# Patient Record
Sex: FEMALE | Race: WHITE | Employment: OTHER | URBAN - NONMETROPOLITAN AREA
[De-identification: names, ages, dates, MRNs, and addresses within clinical notes are randomized per-mention and may not be internally consistent; named-entity substitution may affect disease eponyms.]

---

## 2017-01-10 DIAGNOSIS — I10 ESSENTIAL HYPERTENSION: ICD-10-CM

## 2017-01-10 DIAGNOSIS — Z13.820 OSTEOPOROSIS SCREENING: ICD-10-CM

## 2017-01-10 DIAGNOSIS — E78.49 OTHER HYPERLIPIDEMIA: ICD-10-CM

## 2017-01-10 LAB
ALBUMIN SERPL-MCNC: 4.1 G/DL (ref 3.5–5.2)
ALP BLD-CCNC: 77 U/L (ref 35–104)
ALT SERPL-CCNC: 13 U/L (ref 5–33)
ANION GAP SERPL CALCULATED.3IONS-SCNC: 15 MMOL/L (ref 7–19)
AST SERPL-CCNC: 16 U/L (ref 5–32)
BILIRUB SERPL-MCNC: 0.3 MG/DL (ref 0.2–1.2)
BUN BLDV-MCNC: 11 MG/DL (ref 8–23)
CALCIUM SERPL-MCNC: 9.2 MG/DL (ref 8.8–10.2)
CHLORIDE BLD-SCNC: 93 MMOL/L (ref 98–111)
CHOLESTEROL, TOTAL: 204 MG/DL (ref 160–199)
CO2: 24 MMOL/L (ref 22–29)
CREAT SERPL-MCNC: 0.7 MG/DL (ref 0.5–0.9)
GFR NON-AFRICAN AMERICAN: >60
GLOBULIN: 3.3 G/DL
GLUCOSE BLD-MCNC: 100 MG/DL (ref 74–109)
HCT VFR BLD CALC: 40.9 % (ref 37–47)
HDLC SERPL-MCNC: 48 MG/DL (ref 65–121)
HEMOGLOBIN: 13.6 G/DL (ref 12–16)
LDL CHOLESTEROL CALCULATED: 130 MG/DL
MCH RBC QN AUTO: 28.3 PG (ref 27–31)
MCHC RBC AUTO-ENTMCNC: 33.3 G/DL (ref 33–37)
MCV RBC AUTO: 85 FL (ref 81–99)
PDW BLD-RTO: 13.6 % (ref 11.5–14.5)
PLATELET # BLD: 411 K/UL (ref 130–400)
PMV BLD AUTO: 9.8 FL (ref 7.4–10.4)
POTASSIUM SERPL-SCNC: 4.3 MMOL/L (ref 3.5–5)
RBC # BLD: 4.81 M/UL (ref 4.2–5.4)
SODIUM BLD-SCNC: 132 MMOL/L (ref 136–145)
TOTAL PROTEIN: 7.4 G/DL (ref 6.6–8.7)
TRIGL SERPL-MCNC: 129 MG/DL (ref 150–199)
WBC # BLD: 6.7 K/UL (ref 4.8–10.8)

## 2017-01-12 ENCOUNTER — TELEPHONE (OUTPATIENT)
Dept: PRIMARY CARE CLINIC | Age: 77
End: 2017-01-12

## 2017-02-14 RX ORDER — DIPHENOXYLATE HYDROCHLORIDE AND ATROPINE SULFATE 2.5; .025 MG/1; MG/1
1 TABLET ORAL 4 TIMES DAILY
Qty: 120 TABLET | Refills: 0 | Status: SHIPPED | OUTPATIENT
Start: 2017-02-14 | End: 2017-07-17 | Stop reason: SDUPTHER

## 2017-03-15 DIAGNOSIS — F32.A ANXIETY AND DEPRESSION: ICD-10-CM

## 2017-03-15 DIAGNOSIS — F41.9 ANXIETY AND DEPRESSION: ICD-10-CM

## 2017-03-15 RX ORDER — MONTELUKAST SODIUM 10 MG/1
10 TABLET ORAL NIGHTLY
Qty: 90 TABLET | Refills: 3 | Status: SHIPPED | OUTPATIENT
Start: 2017-03-15 | End: 2018-05-02 | Stop reason: SDUPTHER

## 2017-03-15 RX ORDER — OMEPRAZOLE 20 MG/1
20 CAPSULE, DELAYED RELEASE ORAL DAILY
Qty: 90 CAPSULE | Refills: 3 | Status: SHIPPED | OUTPATIENT
Start: 2017-03-15 | End: 2017-05-28 | Stop reason: SDUPTHER

## 2017-03-15 RX ORDER — TOLTERODINE 4 MG/1
CAPSULE, EXTENDED RELEASE ORAL
Qty: 90 CAPSULE | Refills: 3 | Status: SHIPPED | OUTPATIENT
Start: 2017-03-15 | End: 2017-11-22 | Stop reason: ALTCHOICE

## 2017-03-15 RX ORDER — POTASSIUM CHLORIDE 750 MG/1
10 TABLET, EXTENDED RELEASE ORAL DAILY
Qty: 90 TABLET | Refills: 3 | Status: SHIPPED | OUTPATIENT
Start: 2017-03-15 | End: 2017-10-13 | Stop reason: SDUPTHER

## 2017-03-15 RX ORDER — ESTRADIOL 1 MG/1
1 TABLET ORAL DAILY
Qty: 90 TABLET | Refills: 3 | Status: SHIPPED | OUTPATIENT
Start: 2017-03-15 | End: 2017-05-28 | Stop reason: SDUPTHER

## 2017-03-15 RX ORDER — VENLAFAXINE HYDROCHLORIDE 150 MG/1
150 CAPSULE, EXTENDED RELEASE ORAL DAILY
Qty: 90 CAPSULE | Refills: 3 | Status: SHIPPED | OUTPATIENT
Start: 2017-03-15 | End: 2018-05-02 | Stop reason: SDUPTHER

## 2017-03-29 ENCOUNTER — OFFICE VISIT (OUTPATIENT)
Dept: PRIMARY CARE CLINIC | Age: 77
End: 2017-03-29
Payer: MEDICARE

## 2017-03-29 VITALS
OXYGEN SATURATION: 98 % | BODY MASS INDEX: 29.41 KG/M2 | HEART RATE: 81 BPM | WEIGHT: 149.8 LBS | SYSTOLIC BLOOD PRESSURE: 130 MMHG | HEIGHT: 60 IN | RESPIRATION RATE: 19 BRPM | DIASTOLIC BLOOD PRESSURE: 71 MMHG | TEMPERATURE: 97.4 F

## 2017-03-29 DIAGNOSIS — M54.40 CHRONIC MIDLINE LOW BACK PAIN WITH SCIATICA, SCIATICA LATERALITY UNSPECIFIED: ICD-10-CM

## 2017-03-29 DIAGNOSIS — G89.29 CHRONIC MIDLINE LOW BACK PAIN WITH SCIATICA, SCIATICA LATERALITY UNSPECIFIED: ICD-10-CM

## 2017-03-29 DIAGNOSIS — M25.531 RIGHT WRIST PAIN: Primary | ICD-10-CM

## 2017-03-29 PROCEDURE — G8420 CALC BMI NORM PARAMETERS: HCPCS | Performed by: NURSE PRACTITIONER

## 2017-03-29 PROCEDURE — G8482 FLU IMMUNIZE ORDER/ADMIN: HCPCS | Performed by: NURSE PRACTITIONER

## 2017-03-29 PROCEDURE — 4040F PNEUMOC VAC/ADMIN/RCVD: CPT | Performed by: NURSE PRACTITIONER

## 2017-03-29 PROCEDURE — 3017F COLORECTAL CA SCREEN DOC REV: CPT | Performed by: NURSE PRACTITIONER

## 2017-03-29 PROCEDURE — 4004F PT TOBACCO SCREEN RCVD TLK: CPT | Performed by: NURSE PRACTITIONER

## 2017-03-29 PROCEDURE — G8427 DOCREV CUR MEDS BY ELIG CLIN: HCPCS | Performed by: NURSE PRACTITIONER

## 2017-03-29 PROCEDURE — G8400 PT W/DXA NO RESULTS DOC: HCPCS | Performed by: NURSE PRACTITIONER

## 2017-03-29 PROCEDURE — 1123F ACP DISCUSS/DSCN MKR DOCD: CPT | Performed by: NURSE PRACTITIONER

## 2017-03-29 PROCEDURE — 1090F PRES/ABSN URINE INCON ASSESS: CPT | Performed by: NURSE PRACTITIONER

## 2017-03-29 PROCEDURE — 99213 OFFICE O/P EST LOW 20 MIN: CPT | Performed by: NURSE PRACTITIONER

## 2017-03-29 ASSESSMENT — ENCOUNTER SYMPTOMS
RESPIRATORY NEGATIVE: 1
EYES NEGATIVE: 1
GASTROINTESTINAL NEGATIVE: 1
ALLERGIC/IMMUNOLOGIC NEGATIVE: 1
BACK PAIN: 1

## 2017-04-10 ENCOUNTER — TELEPHONE (OUTPATIENT)
Dept: PRIMARY CARE CLINIC | Age: 77
End: 2017-04-10

## 2017-04-10 RX ORDER — METHYLPREDNISOLONE 4 MG/1
TABLET ORAL
Qty: 1 KIT | Refills: 0 | Status: SHIPPED | OUTPATIENT
Start: 2017-04-10 | End: 2019-12-27 | Stop reason: SDUPTHER

## 2017-04-11 ENCOUNTER — TELEPHONE (OUTPATIENT)
Dept: PRIMARY CARE CLINIC | Age: 77
End: 2017-04-11

## 2017-04-11 RX ORDER — AZITHROMYCIN 250 MG/1
TABLET, FILM COATED ORAL
Qty: 1 PACKET | Refills: 0 | Status: SHIPPED | OUTPATIENT
Start: 2017-04-11 | End: 2017-04-21

## 2017-04-17 DIAGNOSIS — H10.9 CONJUNCTIVITIS OF BOTH EYES, UNSPECIFIED CONJUNCTIVITIS TYPE: Primary | ICD-10-CM

## 2017-04-17 RX ORDER — TOBRAMYCIN 3 MG/ML
1-2 SOLUTION/ DROPS OPHTHALMIC EVERY 4 HOURS
Qty: 5 ML | Refills: 0 | Status: SHIPPED | OUTPATIENT
Start: 2017-04-17 | End: 2017-04-27

## 2017-05-11 ENCOUNTER — TELEPHONE (OUTPATIENT)
Dept: PRIMARY CARE CLINIC | Age: 77
End: 2017-05-11

## 2017-05-11 RX ORDER — FLUCONAZOLE 150 MG/1
150 TABLET ORAL DAILY
Qty: 3 TABLET | Refills: 0 | Status: SHIPPED | OUTPATIENT
Start: 2017-05-11 | End: 2017-05-14

## 2017-07-13 ENCOUNTER — APPOINTMENT (OUTPATIENT)
Dept: GENERAL RADIOLOGY | Age: 77
End: 2017-07-13
Payer: MEDICARE

## 2017-07-13 ENCOUNTER — OFFICE VISIT (OUTPATIENT)
Dept: PRIMARY CARE CLINIC | Age: 77
End: 2017-07-13

## 2017-07-13 ENCOUNTER — HOSPITAL ENCOUNTER (EMERGENCY)
Age: 77
Discharge: HOME | End: 2017-07-13
Payer: MEDICARE

## 2017-07-13 VITALS
SYSTOLIC BLOOD PRESSURE: 155 MMHG | RESPIRATION RATE: 18 BRPM | HEIGHT: 61 IN | TEMPERATURE: 97.7 F | BODY MASS INDEX: 27.38 KG/M2 | OXYGEN SATURATION: 92 % | WEIGHT: 145 LBS | DIASTOLIC BLOOD PRESSURE: 75 MMHG | HEART RATE: 81 BPM

## 2017-07-13 DIAGNOSIS — J40 BRONCHITIS: ICD-10-CM

## 2017-07-13 DIAGNOSIS — R09.1 PLEURISY: Primary | ICD-10-CM

## 2017-07-13 DIAGNOSIS — R07.89 OTHER CHEST PAIN: Primary | ICD-10-CM

## 2017-07-13 LAB
ALBUMIN SERPL-MCNC: 4.2 G/DL (ref 3.5–5.2)
ALP BLD-CCNC: 80 U/L (ref 35–104)
ALT SERPL-CCNC: 18 U/L (ref 5–33)
ANION GAP SERPL CALCULATED.3IONS-SCNC: 14 MMOL/L (ref 7–19)
AST SERPL-CCNC: 18 U/L (ref 5–32)
BASOPHILS ABSOLUTE: 0 K/UL (ref 0–0.2)
BASOPHILS RELATIVE PERCENT: 0.2 % (ref 0–1)
BILIRUB SERPL-MCNC: 0.3 MG/DL (ref 0.2–1.2)
BUN BLDV-MCNC: 17 MG/DL (ref 8–23)
CALCIUM SERPL-MCNC: 9.3 MG/DL (ref 8.8–10.2)
CHLORIDE BLD-SCNC: 91 MMOL/L (ref 98–111)
CO2: 25 MMOL/L (ref 22–29)
CREAT SERPL-MCNC: 0.6 MG/DL (ref 0.5–0.9)
EOSINOPHILS ABSOLUTE: 0 K/UL (ref 0–0.6)
EOSINOPHILS RELATIVE PERCENT: 0.1 % (ref 0–5)
GFR NON-AFRICAN AMERICAN: >60
GLUCOSE BLD-MCNC: 109 MG/DL (ref 74–109)
HCT VFR BLD CALC: 39.1 % (ref 37–47)
HEMOGLOBIN: 13.1 G/DL (ref 12–16)
INR BLD: 0.97 (ref 0.88–1.18)
LYMPHOCYTES ABSOLUTE: 1.9 K/UL (ref 1.1–4.5)
LYMPHOCYTES RELATIVE PERCENT: 19.1 % (ref 20–40)
MCH RBC QN AUTO: 28.5 PG (ref 27–31)
MCHC RBC AUTO-ENTMCNC: 33.5 G/DL (ref 33–37)
MCV RBC AUTO: 85.2 FL (ref 81–99)
MONOCYTES ABSOLUTE: 1 K/UL (ref 0–0.9)
MONOCYTES RELATIVE PERCENT: 10 % (ref 0–10)
NEUTROPHILS ABSOLUTE: 7 K/UL (ref 1.5–7.5)
NEUTROPHILS RELATIVE PERCENT: 70.4 % (ref 50–65)
PDW BLD-RTO: 13.2 % (ref 11.5–14.5)
PERFORMED ON: NORMAL
PERFORMED ON: NORMAL
PLATELET # BLD: 351 K/UL (ref 130–400)
PMV BLD AUTO: 9.2 FL (ref 9.4–12.3)
POC TROPONIN I: 0 NG/ML (ref 0–0.08)
POC TROPONIN I: 0 NG/ML (ref 0–0.08)
POTASSIUM SERPL-SCNC: 3.4 MMOL/L (ref 3.5–5)
PRO-BNP: 189 PG/ML (ref 0–1800)
PROTHROMBIN TIME: 12.8 SEC (ref 12–14.6)
RBC # BLD: 4.59 M/UL (ref 4.2–5.4)
SODIUM BLD-SCNC: 130 MMOL/L (ref 136–145)
TOTAL PROTEIN: 7.8 G/DL (ref 6.6–8.7)
TROPONIN: <0.01 NG/ML (ref 0–0.03)
WBC # BLD: 10 K/UL (ref 4.8–10.8)

## 2017-07-13 PROCEDURE — 6370000000 HC RX 637 (ALT 250 FOR IP): Performed by: PHYSICIAN ASSISTANT

## 2017-07-13 PROCEDURE — 2580000003 HC RX 258: Performed by: PHYSICIAN ASSISTANT

## 2017-07-13 PROCEDURE — 93005 ELECTROCARDIOGRAM TRACING: CPT

## 2017-07-13 PROCEDURE — 94640 AIRWAY INHALATION TREATMENT: CPT

## 2017-07-13 PROCEDURE — 71020 XR CHEST STANDARD TWO VW: CPT

## 2017-07-13 PROCEDURE — 3017F COLORECTAL CA SCREEN DOC REV: CPT | Performed by: NURSE PRACTITIONER

## 2017-07-13 PROCEDURE — 36415 COLL VENOUS BLD VENIPUNCTURE: CPT

## 2017-07-13 PROCEDURE — 87040 BLOOD CULTURE FOR BACTERIA: CPT

## 2017-07-13 PROCEDURE — 99285 EMERGENCY DEPT VISIT HI MDM: CPT

## 2017-07-13 PROCEDURE — 6360000002 HC RX W HCPCS: Performed by: PHYSICIAN ASSISTANT

## 2017-07-13 PROCEDURE — 99999 PR OFFICE/OUTPT VISIT,PROCEDURE ONLY: CPT | Performed by: NURSE PRACTITIONER

## 2017-07-13 PROCEDURE — 99283 EMERGENCY DEPT VISIT LOW MDM: CPT | Performed by: PHYSICIAN ASSISTANT

## 2017-07-13 PROCEDURE — 83880 ASSAY OF NATRIURETIC PEPTIDE: CPT

## 2017-07-13 PROCEDURE — 85610 PROTHROMBIN TIME: CPT

## 2017-07-13 PROCEDURE — G8428 CUR MEDS NOT DOCUMENT: HCPCS | Performed by: NURSE PRACTITIONER

## 2017-07-13 PROCEDURE — G8419 CALC BMI OUT NRM PARAM NOF/U: HCPCS | Performed by: NURSE PRACTITIONER

## 2017-07-13 PROCEDURE — 4040F PNEUMOC VAC/ADMIN/RCVD: CPT | Performed by: NURSE PRACTITIONER

## 2017-07-13 PROCEDURE — 80053 COMPREHEN METABOLIC PANEL: CPT

## 2017-07-13 PROCEDURE — 85025 COMPLETE CBC W/AUTO DIFF WBC: CPT

## 2017-07-13 PROCEDURE — 84484 ASSAY OF TROPONIN QUANT: CPT

## 2017-07-13 RX ORDER — GUAIFENESIN 600 MG/1
600 TABLET, EXTENDED RELEASE ORAL 2 TIMES DAILY
Qty: 20 TABLET | Refills: 0 | Status: SHIPPED | OUTPATIENT
Start: 2017-07-13 | End: 2017-07-23

## 2017-07-13 RX ORDER — METHYLPREDNISOLONE 4 MG/1
TABLET ORAL
Qty: 1 KIT | Refills: 0 | Status: SHIPPED | OUTPATIENT
Start: 2017-07-13 | End: 2017-10-07 | Stop reason: ALTCHOICE

## 2017-07-13 RX ORDER — BENZONATATE 100 MG/1
100 CAPSULE ORAL 3 TIMES DAILY PRN
Qty: 15 CAPSULE | Refills: 0 | Status: SHIPPED | OUTPATIENT
Start: 2017-07-13 | End: 2017-07-20

## 2017-07-13 RX ORDER — NITROGLYCERIN 0.4 MG/1
0.4 TABLET SUBLINGUAL ONCE
Status: COMPLETED | OUTPATIENT
Start: 2017-07-13 | End: 2017-07-13

## 2017-07-13 RX ORDER — ALBUTEROL SULFATE 2.5 MG/3ML
2.5 SOLUTION RESPIRATORY (INHALATION) EVERY 4 HOURS PRN
Status: DISCONTINUED | OUTPATIENT
Start: 2017-07-13 | End: 2017-07-13 | Stop reason: HOSPADM

## 2017-07-13 RX ORDER — ASPIRIN 325 MG
325 TABLET ORAL ONCE
Status: COMPLETED | OUTPATIENT
Start: 2017-07-13 | End: 2017-07-13

## 2017-07-13 RX ORDER — 0.9 % SODIUM CHLORIDE 0.9 %
1000 INTRAVENOUS SOLUTION INTRAVENOUS ONCE
Status: COMPLETED | OUTPATIENT
Start: 2017-07-13 | End: 2017-07-13

## 2017-07-13 RX ADMIN — NITROGLYCERIN 0.4 MG: 0.4 TABLET SUBLINGUAL at 17:51

## 2017-07-13 RX ADMIN — ALBUTEROL SULFATE 2.5 MG: 2.5 SOLUTION RESPIRATORY (INHALATION) at 16:43

## 2017-07-13 RX ADMIN — SODIUM CHLORIDE 1000 ML: 9 INJECTION, SOLUTION INTRAVENOUS at 16:38

## 2017-07-13 RX ADMIN — ASPIRIN 325 MG ORAL TABLET 325 MG: 325 PILL ORAL at 17:51

## 2017-07-13 ASSESSMENT — PAIN SCALES - GENERAL: PAINLEVEL_OUTOF10: 3

## 2017-07-13 ASSESSMENT — ENCOUNTER SYMPTOMS
CONSTIPATION: 0
VOMITING: 0
ABDOMINAL DISTENTION: 0
APNEA: 0
SHORTNESS OF BREATH: 0
RHINORRHEA: 0
COUGH: 1
ABDOMINAL PAIN: 0
NAUSEA: 0
WHEEZING: 0
SINUS PRESSURE: 0
CHEST TIGHTNESS: 0
SORE THROAT: 0
DIARRHEA: 0
EYE PAIN: 0

## 2017-07-14 ENCOUNTER — TELEPHONE (OUTPATIENT)
Dept: PRIMARY CARE CLINIC | Age: 77
End: 2017-07-14

## 2017-07-17 LAB
EKG P AXIS: 63 DEGREES
EKG P AXIS: 66 DEGREES
EKG P-R INTERVAL: 172 MS
EKG P-R INTERVAL: 208 MS
EKG Q-T INTERVAL: 378 MS
EKG Q-T INTERVAL: 444 MS
EKG QRS DURATION: 76 MS
EKG QRS DURATION: 80 MS
EKG QTC CALCULATION (BAZETT): 442 MS
EKG QTC CALCULATION (BAZETT): 466 MS
EKG T AXIS: 58 DEGREES
EKG T AXIS: 63 DEGREES

## 2017-07-17 RX ORDER — DIPHENOXYLATE HYDROCHLORIDE AND ATROPINE SULFATE 2.5; .025 MG/1; MG/1
1 TABLET ORAL 4 TIMES DAILY
Qty: 120 TABLET | Refills: 0 | OUTPATIENT
Start: 2017-07-17 | End: 2017-12-22 | Stop reason: SDUPTHER

## 2017-07-18 LAB
BLOOD CULTURE, ROUTINE: NORMAL
CULTURE, BLOOD 2: NORMAL

## 2017-08-02 RX ORDER — LOSARTAN POTASSIUM AND HYDROCHLOROTHIAZIDE 25; 100 MG/1; MG/1
TABLET ORAL
Qty: 90 TABLET | Refills: 3 | Status: SHIPPED | OUTPATIENT
Start: 2017-08-02 | End: 2017-10-11

## 2017-08-02 RX ORDER — TEMAZEPAM 30 MG/1
30 CAPSULE ORAL NIGHTLY PRN
Qty: 30 CAPSULE | Refills: 2 | OUTPATIENT
Start: 2017-08-02 | End: 2017-10-31

## 2017-08-17 ENCOUNTER — OFFICE VISIT (OUTPATIENT)
Dept: PRIMARY CARE CLINIC | Age: 77
End: 2017-08-17
Payer: MEDICARE

## 2017-08-17 VITALS — OXYGEN SATURATION: 99 %

## 2017-08-17 DIAGNOSIS — T63.461A WASP STING, ACCIDENTAL OR UNINTENTIONAL, INITIAL ENCOUNTER: Primary | ICD-10-CM

## 2017-08-17 PROCEDURE — 4040F PNEUMOC VAC/ADMIN/RCVD: CPT | Performed by: NURSE PRACTITIONER

## 2017-08-17 PROCEDURE — G8400 PT W/DXA NO RESULTS DOC: HCPCS | Performed by: NURSE PRACTITIONER

## 2017-08-17 PROCEDURE — 3017F COLORECTAL CA SCREEN DOC REV: CPT | Performed by: NURSE PRACTITIONER

## 2017-08-17 PROCEDURE — 1090F PRES/ABSN URINE INCON ASSESS: CPT | Performed by: NURSE PRACTITIONER

## 2017-08-17 PROCEDURE — 4004F PT TOBACCO SCREEN RCVD TLK: CPT | Performed by: NURSE PRACTITIONER

## 2017-08-17 PROCEDURE — 1123F ACP DISCUSS/DSCN MKR DOCD: CPT | Performed by: NURSE PRACTITIONER

## 2017-08-17 PROCEDURE — G8419 CALC BMI OUT NRM PARAM NOF/U: HCPCS | Performed by: NURSE PRACTITIONER

## 2017-08-17 PROCEDURE — 96372 THER/PROPH/DIAG INJ SC/IM: CPT | Performed by: NURSE PRACTITIONER

## 2017-08-17 PROCEDURE — G8427 DOCREV CUR MEDS BY ELIG CLIN: HCPCS | Performed by: NURSE PRACTITIONER

## 2017-08-17 PROCEDURE — 99212 OFFICE O/P EST SF 10 MIN: CPT | Performed by: NURSE PRACTITIONER

## 2017-08-17 RX ORDER — TRIAMCINOLONE ACETONIDE 40 MG/ML
40 INJECTION, SUSPENSION INTRA-ARTICULAR; INTRAMUSCULAR ONCE
Status: COMPLETED | OUTPATIENT
Start: 2017-08-17 | End: 2017-08-17

## 2017-08-17 RX ORDER — TRIAMCINOLONE ACETONIDE 40 MG/ML
40 INJECTION, SUSPENSION INTRA-ARTICULAR; INTRAMUSCULAR ONCE
Status: DISCONTINUED | OUTPATIENT
Start: 2017-08-17 | End: 2018-09-20 | Stop reason: HOSPADM

## 2017-08-17 RX ADMIN — TRIAMCINOLONE ACETONIDE 40 MG: 40 INJECTION, SUSPENSION INTRA-ARTICULAR; INTRAMUSCULAR at 16:26

## 2017-08-17 ASSESSMENT — ENCOUNTER SYMPTOMS
APNEA: 0
WHEEZING: 0
CHEST TIGHTNESS: 0
STRIDOR: 0
CHOKING: 0
SHORTNESS OF BREATH: 0
COLOR CHANGE: 1
COUGH: 0

## 2017-10-07 ENCOUNTER — OFFICE VISIT (OUTPATIENT)
Dept: URGENT CARE | Age: 77
End: 2017-10-07
Payer: MEDICARE

## 2017-10-07 VITALS
HEIGHT: 61 IN | WEIGHT: 140 LBS | SYSTOLIC BLOOD PRESSURE: 149 MMHG | HEART RATE: 85 BPM | OXYGEN SATURATION: 98 % | TEMPERATURE: 98.2 F | DIASTOLIC BLOOD PRESSURE: 88 MMHG | BODY MASS INDEX: 26.43 KG/M2 | RESPIRATION RATE: 20 BRPM

## 2017-10-07 DIAGNOSIS — R33.9 URINARY RETENTION: ICD-10-CM

## 2017-10-07 DIAGNOSIS — F41.8 DEPRESSION WITH ANXIETY: ICD-10-CM

## 2017-10-07 DIAGNOSIS — I10 ESSENTIAL (PRIMARY) HYPERTENSION: Primary | ICD-10-CM

## 2017-10-07 LAB
APPEARANCE FLUID: ABNORMAL
BILIRUBIN, POC: ABNORMAL
BLOOD URINE, POC: ABNORMAL
CLARITY, POC: ABNORMAL
COLOR, POC: ABNORMAL
GLUCOSE URINE, POC: ABNORMAL
KETONES, POC: ABNORMAL
LEUKOCYTE EST, POC: ABNORMAL
NITRITE, POC: ABNORMAL
PH, POC: 7.5
PROTEIN, POC: ABNORMAL
SPECIFIC GRAVITY, POC: 1.01
UROBILINOGEN, POC: 0.2

## 2017-10-07 PROCEDURE — 99213 OFFICE O/P EST LOW 20 MIN: CPT | Performed by: FAMILY MEDICINE

## 2017-10-07 PROCEDURE — 4040F PNEUMOC VAC/ADMIN/RCVD: CPT | Performed by: FAMILY MEDICINE

## 2017-10-07 PROCEDURE — G8484 FLU IMMUNIZE NO ADMIN: HCPCS | Performed by: FAMILY MEDICINE

## 2017-10-07 PROCEDURE — G8427 DOCREV CUR MEDS BY ELIG CLIN: HCPCS | Performed by: FAMILY MEDICINE

## 2017-10-07 PROCEDURE — G8419 CALC BMI OUT NRM PARAM NOF/U: HCPCS | Performed by: FAMILY MEDICINE

## 2017-10-07 PROCEDURE — 3017F COLORECTAL CA SCREEN DOC REV: CPT | Performed by: FAMILY MEDICINE

## 2017-10-07 PROCEDURE — 1090F PRES/ABSN URINE INCON ASSESS: CPT | Performed by: FAMILY MEDICINE

## 2017-10-07 PROCEDURE — 4004F PT TOBACCO SCREEN RCVD TLK: CPT | Performed by: FAMILY MEDICINE

## 2017-10-07 PROCEDURE — G8400 PT W/DXA NO RESULTS DOC: HCPCS | Performed by: FAMILY MEDICINE

## 2017-10-07 PROCEDURE — 1123F ACP DISCUSS/DSCN MKR DOCD: CPT | Performed by: FAMILY MEDICINE

## 2017-10-07 RX ORDER — BUSPIRONE HYDROCHLORIDE 10 MG/1
TABLET ORAL
Qty: 30 TABLET | Refills: 0 | Status: SHIPPED | OUTPATIENT
Start: 2017-10-07 | End: 2017-12-22

## 2017-10-07 RX ORDER — BUSPIRONE HYDROCHLORIDE 10 MG/1
TABLET ORAL
Qty: 30 TABLET | Refills: 0 | Status: SHIPPED | OUTPATIENT
Start: 2017-10-07 | End: 2017-10-07 | Stop reason: SDUPTHER

## 2017-10-07 RX ORDER — CLONIDINE HYDROCHLORIDE 0.2 MG/1
0.2 TABLET ORAL 2 TIMES DAILY
COMMUNITY
Start: 2017-10-04 | End: 2017-10-09

## 2017-10-07 RX ORDER — AMLODIPINE BESYLATE 2.5 MG/1
2.5 TABLET ORAL DAILY
Qty: 30 TABLET | Refills: 0 | Status: SHIPPED | OUTPATIENT
Start: 2017-10-07 | End: 2017-10-11 | Stop reason: SDUPTHER

## 2017-10-07 RX ORDER — CHOLECALCIFEROL (VITAMIN D3) 125 MCG
5 CAPSULE ORAL NIGHTLY PRN
COMMUNITY

## 2017-10-07 RX ORDER — KETOCONAZOLE 20 MG/ML
SHAMPOO TOPICAL DAILY PRN
COMMUNITY
Start: 2017-09-18 | End: 2018-05-02

## 2017-10-07 RX ORDER — AMLODIPINE BESYLATE 2.5 MG/1
2.5 TABLET ORAL DAILY
Qty: 30 TABLET | Refills: 0 | Status: SHIPPED | OUTPATIENT
Start: 2017-10-07 | End: 2017-10-07 | Stop reason: SDUPTHER

## 2017-10-07 ASSESSMENT — ENCOUNTER SYMPTOMS
CONSTIPATION: 0
SHORTNESS OF BREATH: 0
ABDOMINAL PAIN: 0
RHINORRHEA: 0
WHEEZING: 0
BACK PAIN: 1
CHEST TIGHTNESS: 0
SINUS PAIN: 0
DIARRHEA: 0
ABDOMINAL DISTENTION: 0
COUGH: 1

## 2017-10-07 NOTE — PROGRESS NOTES
Smokeless tobacco: Never Used    Alcohol use Yes      Comment: Rare       Current Outpatient Prescriptions   Medication Sig Dispense Refill    cloNIDine (CATAPRES) 0.2 MG tablet Take 0.2 mg by mouth 2 times daily       ketoconazole (NIZORAL) 2 % shampoo Apply topically daily as needed       melatonin 5 MG TABS tablet Take 5 mg by mouth daily      busPIRone (BUSPAR) 10 MG tablet 1 tablet twice a day as needed for anxiety 30 tablet 0    amLODIPine (NORVASC) 2.5 MG tablet Take 1 tablet by mouth daily 30 tablet 0    losartan-hydrochlorothiazide (HYZAAR) 100-25 MG per tablet TAKE 1 TABLET BY MOUTH ONCE DAILY. 90 tablet 3    temazepam (RESTORIL) 30 MG capsule Take 1 capsule by mouth nightly as needed for Sleep 30 capsule 2    diphenoxylate-atropine (LOMOTIL) 2.5-0.025 MG per tablet Take 1 tablet by mouth 4 times daily 120 tablet 0    albuterol (PROVENTIL) (5 MG/ML) 0.5% nebulizer solution Take 0.5 mLs by nebulization every 6 hours as needed for Wheezing 30 each 0    omeprazole (PRILOSEC) 20 MG delayed release capsule TAKE 1 CAPSULE BY MOUTH DAILY 90 capsule 3    estradiol (ESTRACE) 1 MG tablet TAKE 1 TABLET BY MOUTH ONCE DAILY. 90 tablet 3    montelukast (SINGULAIR) 10 MG tablet Take 1 tablet by mouth nightly 90 tablet 3    potassium chloride (KLOR-CON M) 10 MEQ extended release tablet Take 1 tablet by mouth daily 90 tablet 3    tolterodine (DETROL LA) 4 MG extended release capsule TAKE 1 CAPSULE BY MOUTH DAILY 90 capsule 3    venlafaxine (EFFEXOR XR) 150 MG extended release capsule Take 1 capsule by mouth daily 90 capsule 3    gemfibrozil (LOPID) 600 MG tablet TAKE 1 TABLET BY MOUTH TWICE DAILY.  180 tablet 3     Current Facility-Administered Medications   Medication Dose Route Frequency Provider Last Rate Last Dose    triamcinolone acetonide (KENALOG-40) injection 40 mg  40 mg Intramuscular Once DIANA Pryor         Allergies   Allergen Reactions    Aleve [Naproxen Sodium] Other (See Comments)  Fexofenadine-Pseudoephed Er Other (See Comments)       Health Maintenance   Topic Date Due    DTaP/Tdap/Td vaccine (1 - Tdap) 03/10/1959    DEXA (modify frequency per FRAX score)  03/10/2007    Flu vaccine (1) 09/01/2017    Zostavax vaccine  12/29/2017 (Originally 3/10/2002)    Pneumococcal low/med risk (1 of 2 - PCV13) 03/29/2018 (Originally 3/10/2007)    Lipid screen  01/10/2022    Colon cancer screen colonoscopy  08/29/2023       Subjective:      Review of Systems   Constitutional: Positive for fatigue. Negative for chills and fever. HENT: Negative for congestion, drooling, rhinorrhea and sinus pain. Respiratory: Positive for cough. Negative for chest tightness, shortness of breath and wheezing. Cardiovascular: Negative for chest pain, palpitations and leg swelling. Gastrointestinal: Negative for abdominal distention, abdominal pain, constipation and diarrhea. Endocrine: Negative for polydipsia and polyuria. Genitourinary: Positive for decreased urine volume and difficulty urinating. Negative for dysuria, enuresis, flank pain, frequency, hematuria, pelvic pain and urgency. Musculoskeletal: Positive for arthralgias and back pain. Neurological: Negative for dizziness, light-headedness and headaches. Psychiatric/Behavioral: Positive for decreased concentration and sleep disturbance. Negative for agitation, behavioral problems, confusion and suicidal ideas. The patient is nervous/anxious. See HPI for visit specific review of symptoms. All others negative      Objective:   BP (!) 149/88 (Site: Right Arm)   Pulse 85   Temp 98.2 °F (36.8 °C) (Temporal)   Resp 20   Ht 5' 1\" (1.549 m)   Wt 140 lb (63.5 kg)   SpO2 98%   BMI 26.45 kg/m²   Physical Exam   Constitutional: She appears well-developed. She does not appear ill. Eyes: Pupils are equal, round, and reactive to light. Neck: Normal range of motion. Neck supple. Cardiovascular: Normal rate and regular rhythm.   Exam

## 2017-10-09 ENCOUNTER — OFFICE VISIT (OUTPATIENT)
Dept: PRIMARY CARE CLINIC | Age: 77
End: 2017-10-09
Payer: MEDICARE

## 2017-10-09 ENCOUNTER — HOSPITAL ENCOUNTER (OUTPATIENT)
Dept: CT IMAGING | Age: 77
Discharge: HOME | End: 2017-10-09
Payer: MEDICARE

## 2017-10-09 VITALS
BODY MASS INDEX: 27.15 KG/M2 | RESPIRATION RATE: 18 BRPM | WEIGHT: 143.8 LBS | TEMPERATURE: 97 F | DIASTOLIC BLOOD PRESSURE: 80 MMHG | HEART RATE: 89 BPM | HEIGHT: 61 IN | SYSTOLIC BLOOD PRESSURE: 139 MMHG | OXYGEN SATURATION: 98 %

## 2017-10-09 DIAGNOSIS — R07.9 CHEST PAIN, UNSPECIFIED TYPE: ICD-10-CM

## 2017-10-09 DIAGNOSIS — I10 ESSENTIAL HYPERTENSION: ICD-10-CM

## 2017-10-09 DIAGNOSIS — I63.9 CEREBROVASCULAR ACCIDENT (CVA), UNSPECIFIED MECHANISM (HCC): ICD-10-CM

## 2017-10-09 DIAGNOSIS — R41.0 DELIRIUM: Primary | ICD-10-CM

## 2017-10-09 LAB
ALBUMIN SERPL-MCNC: 4.4 G/DL (ref 3.5–5.2)
ALP BLD-CCNC: 100 U/L (ref 35–104)
ALT SERPL-CCNC: 19 U/L (ref 5–33)
ANION GAP SERPL CALCULATED.3IONS-SCNC: 15 MMOL/L (ref 7–19)
AST SERPL-CCNC: 20 U/L (ref 5–32)
BASOPHILS ABSOLUTE: 0 K/UL (ref 0–0.2)
BASOPHILS RELATIVE PERCENT: 0.3 % (ref 0–1)
BILIRUB SERPL-MCNC: 0.4 MG/DL (ref 0.2–1.2)
BUN BLDV-MCNC: 23 MG/DL (ref 8–23)
CALCIUM SERPL-MCNC: 9.6 MG/DL (ref 8.8–10.2)
CHLORIDE BLD-SCNC: 83 MMOL/L (ref 98–111)
CO2: 28 MMOL/L (ref 22–29)
CREAT SERPL-MCNC: 0.8 MG/DL (ref 0.5–0.9)
EOSINOPHILS ABSOLUTE: 0 K/UL (ref 0–0.6)
EOSINOPHILS RELATIVE PERCENT: 0.1 % (ref 0–5)
GFR NON-AFRICAN AMERICAN: 48
GFR NON-AFRICAN AMERICAN: >60
GLUCOSE BLD-MCNC: 122 MG/DL (ref 74–109)
HCT VFR BLD CALC: 44.8 % (ref 37–47)
HEMOGLOBIN: 15 G/DL (ref 12–16)
LYMPHOCYTES ABSOLUTE: 1.8 K/UL (ref 1.1–4.5)
LYMPHOCYTES RELATIVE PERCENT: 16.6 % (ref 20–40)
MAGNESIUM: 1.9 MG/DL (ref 1.6–2.4)
MCH RBC QN AUTO: 28.1 PG (ref 27–31)
MCHC RBC AUTO-ENTMCNC: 33.5 G/DL (ref 33–37)
MCV RBC AUTO: 83.9 FL (ref 81–99)
MONOCYTES ABSOLUTE: 1 K/UL (ref 0–0.9)
MONOCYTES RELATIVE PERCENT: 8.6 % (ref 0–10)
NEUTROPHILS ABSOLUTE: 8.1 K/UL (ref 1.5–7.5)
NEUTROPHILS RELATIVE PERCENT: 74 % (ref 50–65)
PDW BLD-RTO: 13.6 % (ref 11.5–14.5)
PERFORMED ON: ABNORMAL
PLATELET # BLD: 458 K/UL (ref 130–400)
PMV BLD AUTO: 8.8 FL (ref 9.4–12.3)
POC CREATININE: 1.1 MG/DL (ref 0.3–1.3)
POC SAMPLE TYPE: ABNORMAL
POTASSIUM SERPL-SCNC: 3.6 MMOL/L (ref 3.5–5)
RBC # BLD: 5.34 M/UL (ref 4.2–5.4)
SODIUM BLD-SCNC: 126 MMOL/L (ref 136–145)
TOTAL PROTEIN: 8 G/DL (ref 6.6–8.7)
TROPONIN: <0.01 NG/ML (ref 0–0.03)
WBC # BLD: 11 K/UL (ref 4.8–10.8)

## 2017-10-09 PROCEDURE — 1090F PRES/ABSN URINE INCON ASSESS: CPT | Performed by: FAMILY MEDICINE

## 2017-10-09 PROCEDURE — G8484 FLU IMMUNIZE NO ADMIN: HCPCS | Performed by: FAMILY MEDICINE

## 2017-10-09 PROCEDURE — 4040F PNEUMOC VAC/ADMIN/RCVD: CPT | Performed by: FAMILY MEDICINE

## 2017-10-09 PROCEDURE — 99215 OFFICE O/P EST HI 40 MIN: CPT | Performed by: FAMILY MEDICINE

## 2017-10-09 PROCEDURE — 6360000004 HC RX CONTRAST MEDICATION: Performed by: FAMILY MEDICINE

## 2017-10-09 PROCEDURE — 93000 ELECTROCARDIOGRAM COMPLETE: CPT | Performed by: FAMILY MEDICINE

## 2017-10-09 PROCEDURE — G8598 ASA/ANTIPLAT THER USED: HCPCS | Performed by: FAMILY MEDICINE

## 2017-10-09 PROCEDURE — 70470 CT HEAD/BRAIN W/O & W/DYE: CPT

## 2017-10-09 PROCEDURE — 1123F ACP DISCUSS/DSCN MKR DOCD: CPT | Performed by: FAMILY MEDICINE

## 2017-10-09 PROCEDURE — G8400 PT W/DXA NO RESULTS DOC: HCPCS | Performed by: FAMILY MEDICINE

## 2017-10-09 PROCEDURE — G8427 DOCREV CUR MEDS BY ELIG CLIN: HCPCS | Performed by: FAMILY MEDICINE

## 2017-10-09 PROCEDURE — 4004F PT TOBACCO SCREEN RCVD TLK: CPT | Performed by: FAMILY MEDICINE

## 2017-10-09 PROCEDURE — 82565 ASSAY OF CREATININE: CPT

## 2017-10-09 PROCEDURE — 3017F COLORECTAL CA SCREEN DOC REV: CPT | Performed by: FAMILY MEDICINE

## 2017-10-09 PROCEDURE — G8419 CALC BMI OUT NRM PARAM NOF/U: HCPCS | Performed by: FAMILY MEDICINE

## 2017-10-09 RX ORDER — ATORVASTATIN CALCIUM 20 MG/1
20 TABLET, FILM COATED ORAL DAILY
Qty: 30 TABLET | Refills: 1 | Status: SHIPPED | OUTPATIENT
Start: 2017-10-09 | End: 2017-11-10 | Stop reason: SDUPTHER

## 2017-10-09 RX ORDER — ONDANSETRON 4 MG/1
4 TABLET, ORALLY DISINTEGRATING ORAL EVERY 8 HOURS PRN
Qty: 20 TABLET | Refills: 0 | Status: SHIPPED | OUTPATIENT
Start: 2017-10-09 | End: 2017-12-22

## 2017-10-09 RX ORDER — ASPIRIN 81 MG/1
81 TABLET ORAL DAILY
Qty: 90 TABLET | Refills: 5 | Status: SHIPPED | OUTPATIENT
Start: 2017-10-09 | End: 2018-11-28 | Stop reason: SDUPTHER

## 2017-10-09 RX ADMIN — IOVERSOL 60 ML: 741 INJECTION INTRA-ARTERIAL; INTRAVENOUS at 11:43

## 2017-10-09 NOTE — PATIENT INSTRUCTIONS
Stop gemfibrizol. Start 1/2 tab of atorvastatin daily for 2 weeks, then increase to full tab daily. Start taking daily 81 mg aspirin daily after you find out about your head CT    Follow up in 2 weeks. or sooner if needed.

## 2017-10-10 ENCOUNTER — TELEPHONE (OUTPATIENT)
Dept: PRIMARY CARE CLINIC | Age: 77
End: 2017-10-10

## 2017-10-10 ENCOUNTER — HOSPITAL ENCOUNTER (OUTPATIENT)
Dept: MRI IMAGING | Age: 77
Discharge: HOME | End: 2017-10-10
Payer: MEDICARE

## 2017-10-10 DIAGNOSIS — E87.1 LOW SODIUM LEVELS: ICD-10-CM

## 2017-10-10 DIAGNOSIS — I63.9 CEREBROVASCULAR ACCIDENT (CVA), UNSPECIFIED MECHANISM (HCC): ICD-10-CM

## 2017-10-10 DIAGNOSIS — R93.0 ABNORMAL CT SCAN OF HEAD: Primary | ICD-10-CM

## 2017-10-10 DIAGNOSIS — R93.0 ABNORMAL CT SCAN OF HEAD: ICD-10-CM

## 2017-10-10 PROBLEM — I63.50 CEREBROVASCULAR ACCIDENT (CVA) DUE TO OCCLUSION OF CEREBRAL ARTERY (HCC): Chronic | Status: ACTIVE | Noted: 2017-10-10

## 2017-10-10 LAB
ALBUMIN SERPL-MCNC: 4.1 G/DL (ref 3.5–5.2)
ALP BLD-CCNC: 93 U/L (ref 35–104)
ALT SERPL-CCNC: 19 U/L (ref 5–33)
ANION GAP SERPL CALCULATED.3IONS-SCNC: 18 MMOL/L (ref 7–19)
AST SERPL-CCNC: 21 U/L (ref 5–32)
BILIRUB SERPL-MCNC: 0.3 MG/DL (ref 0.2–1.2)
BUN BLDV-MCNC: 32 MG/DL (ref 8–23)
CALCIUM SERPL-MCNC: 9.2 MG/DL (ref 8.8–10.2)
CHLORIDE BLD-SCNC: 85 MMOL/L (ref 98–111)
CO2: 24 MMOL/L (ref 22–29)
CREAT SERPL-MCNC: 1.3 MG/DL (ref 0.5–0.9)
GFR NON-AFRICAN AMERICAN: 40
GLUCOSE BLD-MCNC: 92 MG/DL (ref 74–109)
POTASSIUM SERPL-SCNC: 3.1 MMOL/L (ref 3.5–5)
SODIUM BLD-SCNC: 127 MMOL/L (ref 136–145)
TOTAL PROTEIN: 7.3 G/DL (ref 6.6–8.7)

## 2017-10-10 PROCEDURE — 70551 MRI BRAIN STEM W/O DYE: CPT

## 2017-10-10 NOTE — TELEPHONE ENCOUNTER
I had spoke to Dr. Mallory Clay on the CT order, he read the results and ordered a MRI so I gave results of the CT - evidence of stroke, we will do further testing at the is point and gave results of labs - sodium level keeps dropping if the patient is not feeling any better go to the ER she really needs to be admitted to fix the issue. However the patient did state she was feeling better last night so we will repeat the CMP which I have ordered I did let her know if she started to feel bad to go on to the ER so they can get the sodium level taken care of . ..  The patient will do the the MRI today at 3:15

## 2017-10-11 ENCOUNTER — TELEPHONE (OUTPATIENT)
Dept: PRIMARY CARE CLINIC | Age: 77
End: 2017-10-11

## 2017-10-11 DIAGNOSIS — I63.89 OTHER CEREBRAL INFARCTION (HCC): ICD-10-CM

## 2017-10-11 DIAGNOSIS — E87.6 HYPOKALEMIA: ICD-10-CM

## 2017-10-11 DIAGNOSIS — I63.522: ICD-10-CM

## 2017-10-11 DIAGNOSIS — E87.1 HYPONATREMIA: ICD-10-CM

## 2017-10-11 DIAGNOSIS — I63.9 CEREBROVASCULAR ACCIDENT (CVA), UNSPECIFIED MECHANISM (HCC): ICD-10-CM

## 2017-10-11 DIAGNOSIS — I63.50 CEREBROVASCULAR ACCIDENT (CVA) DUE TO OCCLUSION OF CEREBRAL ARTERY (HCC): Primary | Chronic | ICD-10-CM

## 2017-10-11 RX ORDER — AMLODIPINE BESYLATE 5 MG/1
5 TABLET ORAL DAILY
Qty: 30 TABLET | Refills: 0 | Status: SHIPPED | OUTPATIENT
Start: 2017-10-11 | End: 2017-11-08 | Stop reason: SDUPTHER

## 2017-10-11 RX ORDER — LOSARTAN POTASSIUM 100 MG/1
100 TABLET ORAL DAILY
Qty: 30 TABLET | Refills: 3 | Status: SHIPPED | OUTPATIENT
Start: 2017-10-11 | End: 2018-02-22 | Stop reason: SDUPTHER

## 2017-10-11 NOTE — TELEPHONE ENCOUNTER
This NCMA called and spoke to the patient giving her the MRI results. This NCMA explained that as soon as each test and referral is scheduled she will be contacted with appointment date and times.

## 2017-10-11 NOTE — TELEPHONE ENCOUNTER
----- Message from Aly Holden MD sent at 10/11/2017  7:09 AM CDT -----  MRI is consistent with having had a stroke. I will refer her to see a neurologist.  I'll place an order for this. I am also going to order a carotid ultrasound as well as an echocardiogram.  She needs to start the statin medication that was prescribed as well as aspirin daily. I have placed the orders for these, can we please make them. Also her sodium and potassium is low. I need to change her blood pressure med again unfortunately. Let's have her stop the hydrochlorothiazide/losartan and take losartan in place of this. Hydrochlorothiazide component of the medicine is lowering her sodium and her potassium potentially. Also she needs to take 5 mg of amlodipine daily. She has 2.5 mg tabs at home, she can take 2 of these tabs until she runs out and then a new prescription is been placed. If she can make these changes today that will be great so that way we can see what her blood pressure looks like tomorrow.

## 2017-10-13 ENCOUNTER — HOSPITAL ENCOUNTER (OUTPATIENT)
Dept: NON INVASIVE DIAGNOSTICS | Age: 77
Discharge: HOME | End: 2017-10-13
Payer: MEDICARE

## 2017-10-13 ENCOUNTER — OFFICE VISIT (OUTPATIENT)
Dept: PRIMARY CARE CLINIC | Age: 77
End: 2017-10-13
Payer: MEDICARE

## 2017-10-13 ENCOUNTER — TELEPHONE (OUTPATIENT)
Dept: PRIMARY CARE CLINIC | Age: 77
End: 2017-10-13

## 2017-10-13 VITALS
DIASTOLIC BLOOD PRESSURE: 70 MMHG | SYSTOLIC BLOOD PRESSURE: 126 MMHG | BODY MASS INDEX: 27.56 KG/M2 | WEIGHT: 146 LBS | OXYGEN SATURATION: 99 % | HEART RATE: 83 BPM | TEMPERATURE: 97.4 F | HEIGHT: 61 IN

## 2017-10-13 DIAGNOSIS — I63.81 BASAL GANGLIA INFARCTION (HCC): Primary | Chronic | ICD-10-CM

## 2017-10-13 DIAGNOSIS — R27.8 DECREASED COORDINATION: ICD-10-CM

## 2017-10-13 DIAGNOSIS — E87.1 HYPONATREMIA: ICD-10-CM

## 2017-10-13 DIAGNOSIS — I63.50 CEREBROVASCULAR ACCIDENT (CVA) DUE TO OCCLUSION OF CEREBRAL ARTERY (HCC): Chronic | ICD-10-CM

## 2017-10-13 DIAGNOSIS — I63.89 OTHER CEREBRAL INFARCTION (HCC): ICD-10-CM

## 2017-10-13 DIAGNOSIS — I10 BENIGN ESSENTIAL HTN: Chronic | ICD-10-CM

## 2017-10-13 LAB
ANION GAP SERPL CALCULATED.3IONS-SCNC: 16 MMOL/L (ref 7–19)
BUN BLDV-MCNC: 17 MG/DL (ref 8–23)
CALCIUM SERPL-MCNC: 8.6 MG/DL (ref 8.8–10.2)
CHLORIDE BLD-SCNC: 91 MMOL/L (ref 98–111)
CO2: 26 MMOL/L (ref 22–29)
CREAT SERPL-MCNC: 0.8 MG/DL (ref 0.5–0.9)
GFR NON-AFRICAN AMERICAN: >60
GLUCOSE BLD-MCNC: 107 MG/DL (ref 74–109)
LV EF: 63 %
LVEF MODALITY: NORMAL
POTASSIUM SERPL-SCNC: 2.8 MMOL/L (ref 3.5–5)
SODIUM BLD-SCNC: 133 MMOL/L (ref 136–145)

## 2017-10-13 PROCEDURE — 1123F ACP DISCUSS/DSCN MKR DOCD: CPT | Performed by: FAMILY MEDICINE

## 2017-10-13 PROCEDURE — 1090F PRES/ABSN URINE INCON ASSESS: CPT | Performed by: FAMILY MEDICINE

## 2017-10-13 PROCEDURE — 99214 OFFICE O/P EST MOD 30 MIN: CPT | Performed by: FAMILY MEDICINE

## 2017-10-13 PROCEDURE — 3017F COLORECTAL CA SCREEN DOC REV: CPT | Performed by: FAMILY MEDICINE

## 2017-10-13 PROCEDURE — 4040F PNEUMOC VAC/ADMIN/RCVD: CPT | Performed by: FAMILY MEDICINE

## 2017-10-13 PROCEDURE — G8419 CALC BMI OUT NRM PARAM NOF/U: HCPCS | Performed by: FAMILY MEDICINE

## 2017-10-13 PROCEDURE — G8400 PT W/DXA NO RESULTS DOC: HCPCS | Performed by: FAMILY MEDICINE

## 2017-10-13 PROCEDURE — G8598 ASA/ANTIPLAT THER USED: HCPCS | Performed by: FAMILY MEDICINE

## 2017-10-13 PROCEDURE — 93306 TTE W/DOPPLER COMPLETE: CPT

## 2017-10-13 PROCEDURE — 4004F PT TOBACCO SCREEN RCVD TLK: CPT | Performed by: FAMILY MEDICINE

## 2017-10-13 PROCEDURE — 93880 EXTRACRANIAL BILAT STUDY: CPT

## 2017-10-13 PROCEDURE — G8427 DOCREV CUR MEDS BY ELIG CLIN: HCPCS | Performed by: FAMILY MEDICINE

## 2017-10-13 PROCEDURE — G8484 FLU IMMUNIZE NO ADMIN: HCPCS | Performed by: FAMILY MEDICINE

## 2017-10-13 RX ORDER — POTASSIUM CHLORIDE 750 MG/1
20 TABLET, EXTENDED RELEASE ORAL DAILY
Qty: 90 TABLET | Refills: 3 | Status: SHIPPED | OUTPATIENT
Start: 2017-10-13 | End: 2018-05-02 | Stop reason: SDUPTHER

## 2017-10-13 ASSESSMENT — ENCOUNTER SYMPTOMS
EYE ITCHING: 0
COLOR CHANGE: 0
SORE THROAT: 0
SHORTNESS OF BREATH: 0
ABDOMINAL PAIN: 0
COUGH: 0
RHINORRHEA: 0
NAUSEA: 0

## 2017-10-13 NOTE — PATIENT INSTRUCTIONS
Continue amlodipine 5 mg at night. Continue losartan in the morning. Stay on aspirin and atorvastatin. Get your labs checked in [address].

## 2017-10-14 PROBLEM — I63.81 BASAL GANGLIA INFARCTION (HCC): Chronic | Status: ACTIVE | Noted: 2017-10-14

## 2017-10-14 PROBLEM — I63.50 CEREBROVASCULAR ACCIDENT (CVA) DUE TO OCCLUSION OF CEREBRAL ARTERY (HCC): Chronic | Status: RESOLVED | Noted: 2017-10-10 | Resolved: 2017-10-14

## 2017-10-14 PROBLEM — I10 BENIGN ESSENTIAL HTN: Chronic | Status: ACTIVE | Noted: 2017-10-14

## 2017-10-14 NOTE — PROGRESS NOTES
Neurology     Number of Visits Requested:   1     Orders Placed This Encounter   Medications    potassium chloride (KLOR-CON M) 10 MEQ extended release tablet     Sig: Take 2 tablets by mouth daily     Dispense:  90 tablet     Refill:  3     Medications Discontinued During This Encounter   Medication Reason    potassium chloride (KLOR-CON M) 10 MEQ extended release tablet Reorder     Patient Instructions   Continue amlodipine 5 mg at night. Continue losartan in the morning. Stay on aspirin and atorvastatin. Get your labs checked in [address]. Patient given educational handouts and has had all questions answered. Patient voices understanding and agrees to plans along with risks and benefits of plan. Patient is instructed to continue prior meds, diet, and exercise plans unless instructed otherwise. Patient agrees to follow up as instructed and sooner if needed. Patient agrees to go to ER if condition becomes emergent. Notes may be completed with dictation device and spelling errors may occur. Return in about 4 weeks (around 11/10/2017) for f/u CVA.

## 2017-10-17 DIAGNOSIS — E87.1 HYPONATREMIA: ICD-10-CM

## 2017-10-17 LAB
ANION GAP SERPL CALCULATED.3IONS-SCNC: 21 MMOL/L (ref 7–19)
BUN BLDV-MCNC: 12 MG/DL (ref 8–23)
CALCIUM SERPL-MCNC: 9.3 MG/DL (ref 8.8–10.2)
CHLORIDE BLD-SCNC: 94 MMOL/L (ref 98–111)
CO2: 23 MMOL/L (ref 22–29)
CREAT SERPL-MCNC: 0.7 MG/DL (ref 0.5–0.9)
GFR NON-AFRICAN AMERICAN: >60
GLUCOSE BLD-MCNC: 101 MG/DL (ref 74–109)
POTASSIUM SERPL-SCNC: 3.3 MMOL/L (ref 3.5–5)
SODIUM BLD-SCNC: 138 MMOL/L (ref 136–145)

## 2017-10-20 ENCOUNTER — TELEPHONE (OUTPATIENT)
Dept: PRIMARY CARE CLINIC | Age: 77
End: 2017-10-20

## 2017-10-30 ENCOUNTER — TELEPHONE (OUTPATIENT)
Dept: PRIMARY CARE CLINIC | Age: 77
End: 2017-10-30

## 2017-10-30 NOTE — TELEPHONE ENCOUNTER
----- Message from Darrin Campbell MD sent at 10/23/2017 10:07 AM CDT -----    Called pt    Overall rather unremarkable echocardiogram.  It shows age expected changes. Heart function overall is very good for age.

## 2017-11-08 RX ORDER — AMLODIPINE BESYLATE 5 MG/1
TABLET ORAL
Qty: 30 TABLET | Refills: 0 | Status: SHIPPED | OUTPATIENT
Start: 2017-11-08 | End: 2017-11-10 | Stop reason: SDUPTHER

## 2017-11-10 ENCOUNTER — OFFICE VISIT (OUTPATIENT)
Dept: PRIMARY CARE CLINIC | Age: 77
End: 2017-11-10
Payer: MEDICARE

## 2017-11-10 VITALS
BODY MASS INDEX: 27.75 KG/M2 | SYSTOLIC BLOOD PRESSURE: 130 MMHG | HEART RATE: 63 BPM | HEIGHT: 61 IN | TEMPERATURE: 98 F | RESPIRATION RATE: 20 BRPM | OXYGEN SATURATION: 93 % | WEIGHT: 147 LBS | DIASTOLIC BLOOD PRESSURE: 72 MMHG

## 2017-11-10 DIAGNOSIS — I63.81 BASAL GANGLIA INFARCTION (HCC): Primary | Chronic | ICD-10-CM

## 2017-11-10 DIAGNOSIS — I10 BENIGN ESSENTIAL HTN: Chronic | ICD-10-CM

## 2017-11-10 DIAGNOSIS — J43.9 MILD EMPHYSEMA (HCC): ICD-10-CM

## 2017-11-10 DIAGNOSIS — F17.200 TOBACCO USE DISORDER: ICD-10-CM

## 2017-11-10 PROCEDURE — G8427 DOCREV CUR MEDS BY ELIG CLIN: HCPCS | Performed by: FAMILY MEDICINE

## 2017-11-10 PROCEDURE — G8484 FLU IMMUNIZE NO ADMIN: HCPCS | Performed by: FAMILY MEDICINE

## 2017-11-10 PROCEDURE — 1123F ACP DISCUSS/DSCN MKR DOCD: CPT | Performed by: FAMILY MEDICINE

## 2017-11-10 PROCEDURE — G8400 PT W/DXA NO RESULTS DOC: HCPCS | Performed by: FAMILY MEDICINE

## 2017-11-10 PROCEDURE — G8598 ASA/ANTIPLAT THER USED: HCPCS | Performed by: FAMILY MEDICINE

## 2017-11-10 PROCEDURE — G8926 SPIRO NO PERF OR DOC: HCPCS | Performed by: FAMILY MEDICINE

## 2017-11-10 PROCEDURE — 3023F SPIROM DOC REV: CPT | Performed by: FAMILY MEDICINE

## 2017-11-10 PROCEDURE — 4040F PNEUMOC VAC/ADMIN/RCVD: CPT | Performed by: FAMILY MEDICINE

## 2017-11-10 PROCEDURE — 99406 BEHAV CHNG SMOKING 3-10 MIN: CPT | Performed by: FAMILY MEDICINE

## 2017-11-10 PROCEDURE — 99214 OFFICE O/P EST MOD 30 MIN: CPT | Performed by: FAMILY MEDICINE

## 2017-11-10 PROCEDURE — G8419 CALC BMI OUT NRM PARAM NOF/U: HCPCS | Performed by: FAMILY MEDICINE

## 2017-11-10 PROCEDURE — 3017F COLORECTAL CA SCREEN DOC REV: CPT | Performed by: FAMILY MEDICINE

## 2017-11-10 PROCEDURE — 4004F PT TOBACCO SCREEN RCVD TLK: CPT | Performed by: FAMILY MEDICINE

## 2017-11-10 PROCEDURE — 1090F PRES/ABSN URINE INCON ASSESS: CPT | Performed by: FAMILY MEDICINE

## 2017-11-10 RX ORDER — ALBUTEROL SULFATE 90 UG/1
2 AEROSOL, METERED RESPIRATORY (INHALATION) EVERY 6 HOURS PRN
Qty: 1 INHALER | Refills: 3 | Status: SHIPPED | OUTPATIENT
Start: 2017-11-10 | End: 2018-03-09 | Stop reason: SDUPTHER

## 2017-11-10 RX ORDER — ATORVASTATIN CALCIUM 40 MG/1
40 TABLET, FILM COATED ORAL DAILY
Qty: 90 TABLET | Refills: 1 | Status: SHIPPED | OUTPATIENT
Start: 2017-11-10 | End: 2018-05-02 | Stop reason: SDUPTHER

## 2017-11-10 RX ORDER — AMLODIPINE BESYLATE 5 MG/1
5 TABLET ORAL DAILY
Qty: 90 TABLET | Refills: 3 | Status: SHIPPED | OUTPATIENT
Start: 2017-11-10 | End: 2018-11-07 | Stop reason: SDUPTHER

## 2017-11-10 ASSESSMENT — ENCOUNTER SYMPTOMS
COUGH: 0
EYE ITCHING: 0
SHORTNESS OF BREATH: 1
COLOR CHANGE: 0
SORE THROAT: 0
RHINORRHEA: 0
NAUSEA: 0
ABDOMINAL PAIN: 0

## 2017-11-10 NOTE — PATIENT INSTRUCTIONS
Use albuterol inhaler as needed. Keep track of how often you are having to use it. Use sample inhaler daily. Double up on atorvastatin 20 mg until you run out and then pick 40 mg. Follow up in 6 weeks or sooner if needed.

## 2017-11-10 NOTE — PROGRESS NOTES
Wheezing 1 Inhaler 3    amLODIPine (NORVASC) 5 MG tablet Take 1 tablet by mouth daily 90 tablet 3    potassium chloride (KLOR-CON M) 10 MEQ extended release tablet Take 2 tablets by mouth daily 90 tablet 3    losartan (COZAAR) 100 MG tablet Take 1 tablet by mouth daily 30 tablet 3    aspirin EC 81 MG EC tablet Take 1 tablet by mouth daily 90 tablet 5    ondansetron (ZOFRAN ODT) 4 MG disintegrating tablet Take 1 tablet by mouth every 8 hours as needed for Nausea or Vomiting 20 tablet 0    ketoconazole (NIZORAL) 2 % shampoo Apply topically daily as needed       melatonin 5 MG TABS tablet Take 5 mg by mouth daily      busPIRone (BUSPAR) 10 MG tablet 1 tablet twice a day as needed for anxiety 30 tablet 0    diphenoxylate-atropine (LOMOTIL) 2.5-0.025 MG per tablet Take 1 tablet by mouth 4 times daily 120 tablet 0    albuterol (PROVENTIL) (5 MG/ML) 0.5% nebulizer solution Take 0.5 mLs by nebulization every 6 hours as needed for Wheezing 30 each 0    omeprazole (PRILOSEC) 20 MG delayed release capsule TAKE 1 CAPSULE BY MOUTH DAILY 90 capsule 3    estradiol (ESTRACE) 1 MG tablet TAKE 1 TABLET BY MOUTH ONCE DAILY.  90 tablet 3    montelukast (SINGULAIR) 10 MG tablet Take 1 tablet by mouth nightly 90 tablet 3    tolterodine (DETROL LA) 4 MG extended release capsule TAKE 1 CAPSULE BY MOUTH DAILY 90 capsule 3    venlafaxine (EFFEXOR XR) 150 MG extended release capsule Take 1 capsule by mouth daily 90 capsule 3     Current Facility-Administered Medications   Medication Dose Route Frequency Provider Last Rate Last Dose    triamcinolone acetonide (KENALOG-40) injection 40 mg  40 mg Intramuscular Once Renetta Fatima, APRJAYLEN           Allergies   Allergen Reactions    Aleve [Naproxen Sodium] Other (See Comments)    Fexofenadine-Pseudoephed Er Other (See Comments)       Past Surgical History:   Procedure Laterality Date    APPENDECTOMY      CARDIAC CATHETERIZATION  5/3/2013   MDL    EF 60%    HEMORRHOID SURGERY     

## 2017-11-22 ENCOUNTER — OFFICE VISIT (OUTPATIENT)
Dept: NEUROLOGY | Age: 77
End: 2017-11-22
Payer: MEDICARE

## 2017-11-22 ENCOUNTER — HOSPITAL ENCOUNTER (OUTPATIENT)
Dept: NON INVASIVE DIAGNOSTICS | Age: 77
Discharge: HOME | End: 2017-11-22
Payer: MEDICARE

## 2017-11-22 VITALS
SYSTOLIC BLOOD PRESSURE: 146 MMHG | BODY MASS INDEX: 28.7 KG/M2 | WEIGHT: 146.2 LBS | HEART RATE: 81 BPM | DIASTOLIC BLOOD PRESSURE: 76 MMHG | HEIGHT: 60 IN

## 2017-11-22 DIAGNOSIS — I10 ESSENTIAL HYPERTENSION: ICD-10-CM

## 2017-11-22 DIAGNOSIS — I49.8 OTHER SPECIFIED CARDIAC ARRHYTHMIAS (CODE): ICD-10-CM

## 2017-11-22 DIAGNOSIS — R25.1 TREMOR: ICD-10-CM

## 2017-11-22 DIAGNOSIS — I63.81 BASAL GANGLIA INFARCTION (HCC): Primary | ICD-10-CM

## 2017-11-22 DIAGNOSIS — R26.9 GAIT ABNORMALITY: ICD-10-CM

## 2017-11-22 DIAGNOSIS — I63.81 BASAL GANGLIA INFARCTION (HCC): ICD-10-CM

## 2017-11-22 PROCEDURE — G8427 DOCREV CUR MEDS BY ELIG CLIN: HCPCS | Performed by: PSYCHIATRY & NEUROLOGY

## 2017-11-22 PROCEDURE — 93229 REMOTE 30 DAY ECG TECH SUPP: CPT

## 2017-11-22 PROCEDURE — 4004F PT TOBACCO SCREEN RCVD TLK: CPT | Performed by: PSYCHIATRY & NEUROLOGY

## 2017-11-22 PROCEDURE — G8400 PT W/DXA NO RESULTS DOC: HCPCS | Performed by: PSYCHIATRY & NEUROLOGY

## 2017-11-22 PROCEDURE — G8419 CALC BMI OUT NRM PARAM NOF/U: HCPCS | Performed by: PSYCHIATRY & NEUROLOGY

## 2017-11-22 PROCEDURE — 1123F ACP DISCUSS/DSCN MKR DOCD: CPT | Performed by: PSYCHIATRY & NEUROLOGY

## 2017-11-22 PROCEDURE — 3017F COLORECTAL CA SCREEN DOC REV: CPT | Performed by: PSYCHIATRY & NEUROLOGY

## 2017-11-22 PROCEDURE — G8598 ASA/ANTIPLAT THER USED: HCPCS | Performed by: PSYCHIATRY & NEUROLOGY

## 2017-11-22 PROCEDURE — 4040F PNEUMOC VAC/ADMIN/RCVD: CPT | Performed by: PSYCHIATRY & NEUROLOGY

## 2017-11-22 PROCEDURE — G8484 FLU IMMUNIZE NO ADMIN: HCPCS | Performed by: PSYCHIATRY & NEUROLOGY

## 2017-11-22 PROCEDURE — 1090F PRES/ABSN URINE INCON ASSESS: CPT | Performed by: PSYCHIATRY & NEUROLOGY

## 2017-11-22 PROCEDURE — 99204 OFFICE O/P NEW MOD 45 MIN: CPT | Performed by: PSYCHIATRY & NEUROLOGY

## 2017-11-22 RX ORDER — CLONIDINE HYDROCHLORIDE 0.2 MG/1
1 TABLET ORAL NIGHTLY
COMMUNITY
Start: 2017-11-14 | End: 2017-12-22

## 2017-11-22 NOTE — PROGRESS NOTES
Review of Systems    Constitutional  No fever or chills. No diaphoresis or significant fatigue. HENT   No tinnitus or significant hearing loss. Eyes  no sudden vision change or eye pain  Respiratory  yes significant shortness of breath or cough  Cardiovascular  no chest pain No palpitations or significant leg swelling  Gastrointestinal  no abdominal swelling or pain. Genitourinary  No difficulty urinating, dysuria  Musculoskeletal  yes back pain or myalgia. Skin  no color change or rash  Neurologic  No seizures. yes lateralizing weakness. Hematologic  yes easy bruising or excessive bleeding. Psychiatric  no severe anxiety or nervousness. All other review of systems are negative.

## 2017-11-22 NOTE — PROGRESS NOTES
Chief Complaint   Patient presents with    New Patient     ref Dr Nadeen Brown, Basal Ganglia Infarction. Traci Martínez is a 78y.o. year old female who is seen for evaluation of Episode of confusion and stroke on MRI. The patient indicates that in October 2017 she was at work when she could not remember the name. Her coworker indicated that she may have had some speech issues. She denies any clear weakness or numbness. She indicates that she has had chronic tremors worse the right hand and there is a family history of similar tremors. She denies diplopia, dysarthria or dysphagia. She denies any previous history of stroke. She does have some chronic imbalance.     Active Ambulatory Problems     Diagnosis Date Noted    Stress incontinence, female 05/09/2012    Nevus 05/09/2012    Hyponatremia 10/11/2017    Basal ganglia infarction ([address] 10/14/2017    Essential hypertension 10/14/2017    Tremor 11/22/2017    Gait abnormality 11/22/2017     Resolved Ambulatory Problems     Diagnosis Date Noted    Cerebrovascular accident (CVA) due to occlusion of cerebral artery ([address] 10/10/2017     Past Medical History:   Diagnosis Date    Acid reflux     Allergic rhinitis     Hyperlipidemia     Hypertension     Lung cancer ([address]     Osteoarthritis     Urinary incontinence        Past Surgical History:   Procedure Laterality Date    APPENDECTOMY      CARDIAC CATHETERIZATION  5/3/2013   MDL    EF 60%    HEMORRHOID SURGERY      HYSTERECTOMY      HYSTERECTOMY, TOTAL ABDOMINAL      LUNG CANCER SURGERY      TONSILLECTOMY         Family History   Problem Relation Age of Onset    High Blood Pressure Mother     High Blood Pressure Father     Diabetes Sister     High Blood Pressure Brother        Allergies   Allergen Reactions    Aleve [Naproxen Sodium] Other (See Comments)    Fexofenadine-Pseudoephed Er Other (See Comments)       Social History     Social History    Marital status:      Spouse name: N/A    tablet by mouth daily 90 tablet 5    ondansetron (ZOFRAN ODT) 4 MG disintegrating tablet Take 1 tablet by mouth every 8 hours as needed for Nausea or Vomiting 20 tablet 0    ketoconazole (NIZORAL) 2 % shampoo Apply topically daily as needed       melatonin 5 MG TABS tablet Take 5 mg by mouth daily      busPIRone (BUSPAR) 10 MG tablet 1 tablet twice a day as needed for anxiety 30 tablet 0    diphenoxylate-atropine (LOMOTIL) 2.5-0.025 MG per tablet Take 1 tablet by mouth 4 times daily (Patient taking differently: Take 1 tablet by mouth 4 times daily as needed  .) 120 tablet 0    albuterol (PROVENTIL) (5 MG/ML) 0.5% nebulizer solution Take 0.5 mLs by nebulization every 6 hours as needed for Wheezing 30 each 0    omeprazole (PRILOSEC) 20 MG delayed release capsule TAKE 1 CAPSULE BY MOUTH DAILY 90 capsule 3    estradiol (ESTRACE) 1 MG tablet TAKE 1 TABLET BY MOUTH ONCE DAILY. 90 tablet 3    montelukast (SINGULAIR) 10 MG tablet Take 1 tablet by mouth nightly 90 tablet 3    venlafaxine (EFFEXOR XR) 150 MG extended release capsule Take 1 capsule by mouth daily 90 capsule 3     Current Facility-Administered Medications   Medication Dose Route Frequency Provider Last Rate Last Dose    triamcinolone acetonide (KENALOG-40) injection 40 mg  40 mg Intramuscular Once DIANA Pryor           BP (!) 146/76   Pulse 81   Ht 5' (1.524 m)   Wt 146 lb 3.2 oz (66.3 kg)   BMI 28.55 kg/m²     Constitutional  well developed, well nourished.     Eyes  conjunctiva normal.  Pupils react to light  Ear, nose, throat -hearing intact to finger rub No scars, masses, or lesions over external nose or ears, no atrophy of tongue  Neck-symmetric, no masses noted, no jugular vein distension  Respiration- chest wall appears symmetric, good expansion,   normal effort without use of accessory muscles  Musculoskeletal  no significant wasting of muscles noted, no bony deformities  Extremities-no clubbing, cyanosis or edema  Skin  warm, dry, and intact. No rash, erythema, or pallor. Psychiatric  mood, affect, and behavior appear normal.      Neurological exam  Awake, alert, fluent oriented x 3 appropriate affect  Attention and concentration appear appropriate  Recent and remote memory appears unremarkable  Speech normal without dysarthria  No clear issues with language of fund of knowledge    Cranial Nerve Exam   CN II- Visual fields grossly unremarkable  CN III, IV,VI-EOMI, No nystagmus, conjugate eye movements, no ptosis  CN V-sensation intact to LT over face  CN VII-no facial assymetry  CN VIII-Hearing intact to finger rub  CN IX and X- Palate elevates in midline  CN XI-good shoulder shrug  CN XII-Tongue midline with no fasciculations or fibrillations    Motor Exam  V/V throughout upper and lower extremities bilaterally, no cogwheeling, normal tone    Sensory Exam  Sensation intact to light touch and temperature upper and lower extremities bilaterally    Reflexes   2+ biceps bilaterally  2+ brachioradialis  2+patella  2+ ankle jerks  No clonus ankles  No Johnson's sign bilateral hands    Tremors- postural tremor in the hands    Gait  Slightly slowed    Coordination  Finger to nose-unremarkable    No results found for: [id number]  Lab Results   Component Value Date    WBC 11.0 (H) 10/09/2017    HGB 15.0 10/09/2017    HCT 44.8 10/09/2017    MCV 83.9 10/09/2017     (H) 10/09/2017     Lab Results   Component Value Date     10/17/2017    K 3.3 (L) 10/17/2017    CL 94 (L) 10/17/2017    CO2 23 10/17/2017    BUN 12 10/17/2017    CREATININE 0.7 10/17/2017    GLUCOSE 101 10/17/2017    CALCIUM 9.3 10/17/2017    PROT 7.3 10/10/2017    LABALBU 4.1 10/10/2017    BILITOT 0.3 10/10/2017    ALKPHOS 93 10/10/2017    AST 21 10/10/2017    ALT 19 10/10/2017    LABGLOM >60 10/17/2017    GLOB 3.3 01/10/2017           Assessment    ICD-10-CM ICD-9-CM    1. Basal ganglia infarction (HCC) I63.9 434.91 Cardiac event monitor   2.  Tremor R25.1 781.0 Cardiac event monitor   3. Gait abnormality R26.9 781. 2 Cardiac event monitor   4. Essential hypertension I10 401.9 Cardiac event monitor   5. Other specified cardiac arrhythmias (CODE)  I49.8 427.89 Cardiac event monitor       Her neurological examination today was significant for some postural tremor in the hands consistent with an essential tremor. Based upon her history and examination it is unclear what the etiology of this episode of confusion was. Certainly a TIA is possible. Her MRI of the brain and evidence of an old right basal ganglia infarct with encephalomalacia. She had no evidence of acute ischemic change on MRI. Her carotid ultrasound was unremarkable. Her 2-D echo was unremarkable except for some mild left atrial enlargement Which can be seen in atrial fibrillation. At this time she will be scheduled for an extended cardiac Holter monitor x 14 days for completeness. She is on aspirin. We talked about blood pressure and lipids under targeted goals. The signs and symptoms of stroke were discussed with her. The patient indicated understanding of the management plan. She is to follow-up with me on a when necessary basis and call with any further problems. Plan    Orders Placed This Encounter   Procedures    Cardiac event monitor       No orders of the defined types were placed in this encounter. Return if symptoms worsen or fail to improve.

## 2017-12-08 ENCOUNTER — TELEPHONE (OUTPATIENT)
Dept: NEUROLOGY | Age: 77
End: 2017-12-08

## 2017-12-22 ENCOUNTER — OFFICE VISIT (OUTPATIENT)
Dept: PRIMARY CARE CLINIC | Age: 77
End: 2017-12-22
Payer: MEDICARE

## 2017-12-22 VITALS
OXYGEN SATURATION: 97 % | SYSTOLIC BLOOD PRESSURE: 130 MMHG | WEIGHT: 144.4 LBS | HEIGHT: 61 IN | HEART RATE: 93 BPM | DIASTOLIC BLOOD PRESSURE: 78 MMHG | BODY MASS INDEX: 27.26 KG/M2

## 2017-12-22 DIAGNOSIS — G45.8 OTHER SPECIFIED TRANSIENT CEREBRAL ISCHEMIAS: Primary | ICD-10-CM

## 2017-12-22 DIAGNOSIS — J43.8 OTHER EMPHYSEMA (HCC): ICD-10-CM

## 2017-12-22 DIAGNOSIS — R25.1 TREMOR: ICD-10-CM

## 2017-12-22 PROBLEM — G45.9 TIA (TRANSIENT ISCHEMIC ATTACK): Status: ACTIVE | Noted: 2017-12-22

## 2017-12-22 PROCEDURE — G8400 PT W/DXA NO RESULTS DOC: HCPCS | Performed by: FAMILY MEDICINE

## 2017-12-22 PROCEDURE — 1090F PRES/ABSN URINE INCON ASSESS: CPT | Performed by: FAMILY MEDICINE

## 2017-12-22 PROCEDURE — G8484 FLU IMMUNIZE NO ADMIN: HCPCS | Performed by: FAMILY MEDICINE

## 2017-12-22 PROCEDURE — G8419 CALC BMI OUT NRM PARAM NOF/U: HCPCS | Performed by: FAMILY MEDICINE

## 2017-12-22 PROCEDURE — 3017F COLORECTAL CA SCREEN DOC REV: CPT | Performed by: FAMILY MEDICINE

## 2017-12-22 PROCEDURE — G8926 SPIRO NO PERF OR DOC: HCPCS | Performed by: FAMILY MEDICINE

## 2017-12-22 PROCEDURE — G8427 DOCREV CUR MEDS BY ELIG CLIN: HCPCS | Performed by: FAMILY MEDICINE

## 2017-12-22 PROCEDURE — 99214 OFFICE O/P EST MOD 30 MIN: CPT | Performed by: FAMILY MEDICINE

## 2017-12-22 PROCEDURE — 4040F PNEUMOC VAC/ADMIN/RCVD: CPT | Performed by: FAMILY MEDICINE

## 2017-12-22 PROCEDURE — 3023F SPIROM DOC REV: CPT | Performed by: FAMILY MEDICINE

## 2017-12-22 PROCEDURE — G8598 ASA/ANTIPLAT THER USED: HCPCS | Performed by: FAMILY MEDICINE

## 2017-12-22 PROCEDURE — 4004F PT TOBACCO SCREEN RCVD TLK: CPT | Performed by: FAMILY MEDICINE

## 2017-12-22 PROCEDURE — 1123F ACP DISCUSS/DSCN MKR DOCD: CPT | Performed by: FAMILY MEDICINE

## 2017-12-22 RX ORDER — PROPRANOLOL HYDROCHLORIDE 40 MG/1
40 TABLET ORAL 2 TIMES DAILY
Qty: 60 TABLET | Refills: 2 | Status: SHIPPED | OUTPATIENT
Start: 2017-12-22 | End: 2018-02-02 | Stop reason: SDUPTHER

## 2017-12-22 RX ORDER — DIPHENOXYLATE HYDROCHLORIDE AND ATROPINE SULFATE 2.5; .025 MG/1; MG/1
1 TABLET ORAL 4 TIMES DAILY
Qty: 120 TABLET | Refills: 0 | Status: SHIPPED | OUTPATIENT
Start: 2017-12-22 | End: 2018-05-02 | Stop reason: SDUPTHER

## 2017-12-22 ASSESSMENT — ENCOUNTER SYMPTOMS
WHEEZING: 0
SHORTNESS OF BREATH: 0
NAUSEA: 0
DIARRHEA: 0
ABDOMINAL PAIN: 0
CHEST TIGHTNESS: 0
COUGH: 0
CONSTIPATION: 0
VOMITING: 0

## 2017-12-22 ASSESSMENT — PATIENT HEALTH QUESTIONNAIRE - PHQ9
SUM OF ALL RESPONSES TO PHQ9 QUESTIONS 1 & 2: 0
SUM OF ALL RESPONSES TO PHQ QUESTIONS 1-9: 0
2. FEELING DOWN, DEPRESSED OR HOPELESS: 0
1. LITTLE INTEREST OR PLEASURE IN DOING THINGS: 0

## 2017-12-22 NOTE — PROGRESS NOTES
Edgar Fox is a 78 y.o. female who presents today for   Chief Complaint   Patient presents with    Follow-up     pt states new meds have been helping. HPI  Patient is here for Follow-up for COPD. She notes that the Spiriva inhaler has been helping. Her co-pay would be $90 per month and she is debating on being able pain this. She does have Medicare with Medicare supplement currently. Complaining of tremor. Has dropped her for a couple times. She recently saw Dr. Colette Galarza and he believes that she had a prior major stroke. However TIA cannot be ruled out. No change in PMH, family, social, or surgical history unless mentioned above. Review of Systems   Constitutional: Negative for chills and fever. Respiratory: Negative for cough, chest tightness, shortness of breath and wheezing. Cardiovascular: Negative for chest pain, palpitations and leg swelling. Gastrointestinal: Negative for abdominal pain, constipation, diarrhea, nausea and vomiting. Genitourinary: Negative for difficulty urinating, dysuria and frequency. Neurological: Positive for tremors (worsening tremor in right hand, worsens whenever trying to do something such as eating). Past Medical History:   Diagnosis Date    Acid reflux     Allergic rhinitis     Hyperlipidemia     Hypertension     [facility]     Osteoarthritis     Other emphysema ([facility] 12/22/2017    Urinary incontinence     stress incontinence       Current Outpatient Prescriptions   Medication Sig Dispense Refill    diphenoxylate-atropine (LOMOTIL) 2.5-0.025 MG per tablet Take 1 tablet by mouth 4 times daily . 120 tablet 0    propranolol (INDERAL) 40 MG tablet Take 1 tablet by mouth 2 times daily Take 1 pill twice a day for 5 days, then start 1 pill three times a day thereafter.  60 tablet 2    ipratropium (ATROVENT HFA) 17 MCG/ACT inhaler Inhale 2 puffs into the lungs every 6 hours 1 Inhaler 3    atorvastatin (LIPITOR) 40 MG tablet Take 1 tablet Discontinued During This Encounter   Medication Reason    busPIRone (BUSPAR) 10 MG tablet Patient Choice    cloNIDine (CATAPRES) 0.2 MG tablet     ondansetron (ZOFRAN ODT) 4 MG disintegrating tablet     diphenoxylate-atropine (LOMOTIL) 2.5-0.025 MG per tablet Reorder     Patient Instructions   Start propranolol twice per day for tremor. Stay on current medications. Ask pharmacist about price of spiriva vs atrovent. Whichever is cheaper start daily. Please arrive 15 minutes early to next follow up appointment in 6 weeks or schedule an appointment sooner if needed. Patient given educational handouts and has had all questions answered. Patient voices understanding and agrees to plans along with risks and benefits of plan. Patient is instructed to continue prior meds, diet, and exercise plans unless instructed otherwise. Patient agrees to follow up as instructed and sooner if needed. Patient agrees to go to ER if condition becomes emergent. Notes may be completed with dictation device and spelling errors may occur. Return in about 6 weeks (around 2/2/2018) for f/u inhaler, propranolol for tremor.

## 2017-12-22 NOTE — PATIENT INSTRUCTIONS
Start propranolol twice per day for tremor. Stay on current medications. Ask pharmacist about price of spiriva vs atrovent. Whichever is cheaper start daily. Please arrive 15 minutes early to next follow up appointment in 6 weeks or schedule an appointment sooner if needed.

## 2018-01-29 ENCOUNTER — OFFICE VISIT (OUTPATIENT)
Dept: GASTROENTEROLOGY | Facility: CLINIC | Age: 78
End: 2018-01-29

## 2018-01-29 VITALS
BODY MASS INDEX: 27.94 KG/M2 | DIASTOLIC BLOOD PRESSURE: 64 MMHG | HEART RATE: 79 BPM | OXYGEN SATURATION: 98 % | WEIGHT: 148 LBS | HEIGHT: 61 IN | SYSTOLIC BLOOD PRESSURE: 128 MMHG | TEMPERATURE: 97.7 F

## 2018-01-29 DIAGNOSIS — R19.7 DIARRHEA, UNSPECIFIED TYPE: ICD-10-CM

## 2018-01-29 DIAGNOSIS — K63.5 POLYP OF COLON, UNSPECIFIED PART OF COLON, UNSPECIFIED TYPE: ICD-10-CM

## 2018-01-29 DIAGNOSIS — K92.1 BLOOD IN STOOL: Primary | ICD-10-CM

## 2018-01-29 PROCEDURE — 99214 OFFICE O/P EST MOD 30 MIN: CPT | Performed by: NURSE PRACTITIONER

## 2018-01-29 RX ORDER — SODIUM, POTASSIUM,MAG SULFATES 17.5-3.13G
2 SOLUTION, RECONSTITUTED, ORAL ORAL ONCE
Qty: 2 BOTTLE | Refills: 0 | Status: SHIPPED | OUTPATIENT
Start: 2018-01-29 | End: 2018-01-29

## 2018-01-29 RX ORDER — ATORVASTATIN CALCIUM 40 MG/1
TABLET, FILM COATED ORAL
COMMUNITY
Start: 2017-11-24

## 2018-01-29 RX ORDER — ASPIRIN 81 MG/1
81 TABLET ORAL
COMMUNITY
Start: 2017-10-09

## 2018-01-29 RX ORDER — KETOCONAZOLE 20 MG/ML
SHAMPOO TOPICAL
COMMUNITY
Start: 2018-01-17

## 2018-01-29 RX ORDER — ALBUTEROL SULFATE 90 UG/1
AEROSOL, METERED RESPIRATORY (INHALATION)
COMMUNITY
Start: 2017-11-10

## 2018-01-29 RX ORDER — TIOTROPIUM BROMIDE 18 UG/1
CAPSULE ORAL; RESPIRATORY (INHALATION)
Status: ON HOLD | COMMUNITY
Start: 2017-12-22 | End: 2018-06-04

## 2018-01-29 RX ORDER — DIPHENOXYLATE HYDROCHLORIDE AND ATROPINE SULFATE 2.5; .025 MG/1; MG/1
TABLET ORAL
COMMUNITY
Start: 2017-12-22

## 2018-01-29 RX ORDER — AMLODIPINE BESYLATE 5 MG/1
TABLET ORAL
COMMUNITY
Start: 2017-12-11

## 2018-01-29 RX ORDER — POTASSIUM CHLORIDE 750 MG/1
TABLET, EXTENDED RELEASE ORAL
COMMUNITY
Start: 2017-12-11

## 2018-01-29 RX ORDER — VENLAFAXINE HYDROCHLORIDE 150 MG/1
CAPSULE, EXTENDED RELEASE ORAL
COMMUNITY
Start: 2018-01-16

## 2018-01-29 RX ORDER — ESTRADIOL 1 MG/1
TABLET ORAL
COMMUNITY
Start: 2017-05-28

## 2018-01-29 RX ORDER — MONTELUKAST SODIUM 10 MG/1
TABLET ORAL
COMMUNITY
Start: 2017-12-11

## 2018-01-29 RX ORDER — OMEPRAZOLE 20 MG/1
CAPSULE, DELAYED RELEASE ORAL
COMMUNITY
Start: 2017-12-11

## 2018-01-29 RX ORDER — LOSARTAN POTASSIUM 100 MG/1
TABLET ORAL
COMMUNITY
Start: 2018-01-16

## 2018-01-29 NOTE — PROGRESS NOTES
Chief Complaint:   Chief Complaint   Patient presents with   • Black or Bloody Stool     Patient is here today with blood in her stool and bowel changes.         Patient ID: Nidia Dempsey is a 77 y.o. female     History of Present Illness: This is a very pleasant 77-year-old female who comes today with complaints of findings of bright red blood and clots in her stool for approximately 3 days.  The patient states that this occurred approximately 2 weeks ago.  She states that she really had no other symptom other than some diarrhea with this.  She states that she has had a long history of IBS and diarrhea and she normally takes Lomotil which works well.  She states that after the 3 days this stopped however she became concerned and felt that she needed further evaluation.  . She states she is lactose intolerence.  No family history of colon cancer or polyps. The patients last colonoscopy 8/29/13 with polyps.     She denies any nausea, vomiting, epigastric pain, dysphagia, pyrosis or hematemesis.  She denies any fever or chills.  She denies any black tarry stools.  She denies any unintentional weight loss or loss of appetite.      Past Medical History:   Diagnosis Date   • Colon polyp    • Depression    • Diverticulitis    • GERD (gastroesophageal reflux disease)    • HTN (hypertension)    • Lung cancer        Past Surgical History:   Procedure Laterality Date   • COLONOSCOPY  08/29/2013   • COLONOSCOPY  02/23/2009   • HEMORRHOIDECTOMY     • HYSTERECTOMY     • LOBECTOMY FOR SEIZURE FOCUS     • TONSILLECTOMY     • UPPER GASTROINTESTINAL ENDOSCOPY  08/29/2013         Current Outpatient Prescriptions:   •  amLODIPine (NORVASC) 5 MG tablet, , Disp: , Rfl:   •  aspirin 81 MG EC tablet, Take 81 mg by mouth., Disp: , Rfl:   •  atorvastatin (LIPITOR) 40 MG tablet, , Disp: , Rfl:   •  diphenoxylate-atropine (LOMOTIL) 2.5-0.025 MG per tablet, , Disp: , Rfl:   •  estradiol (ESTRACE) 1 MG tablet, TAKE 1 TABLET BY MOUTH ONCE DAILY.,  "Disp: , Rfl:   •  ketoconazole (NIZORAL) 2 % shampoo, , Disp: , Rfl:   •  losartan (COZAAR) 100 MG tablet, , Disp: , Rfl:   •  montelukast (SINGULAIR) 10 MG tablet, , Disp: , Rfl:   •  omeprazole (priLOSEC) 20 MG capsule, , Disp: , Rfl:   •  potassium chloride (K-DUR,KLOR-CON) 10 MEQ CR tablet, , Disp: , Rfl:   •  SPIRIVA HANDIHALER 18 MCG per inhalation capsule, , Disp: , Rfl:   •  venlafaxine XR (EFFEXOR-XR) 150 MG 24 hr capsule, , Disp: , Rfl:   •  VENTOLIN  (90 Base) MCG/ACT inhaler, , Disp: , Rfl:   •  sodium-potassium-magnesium sulfates (SUPREP BOWEL PREP KIT) 17.5-3.13-1.6 GM/180ML solution oral solution, Take 2 bottles by mouth 1 (One) Time for 1 dose. Split dose prep as directed by office instructions provided.  2 bottles = one kit., Disp: 2 bottle, Rfl: 0    Allergies   Allergen Reactions   • Aleve [Naproxen] Hives   • Lactose Intolerance (Gi) GI Intolerance       Social History     Social History   • Marital status: Unknown     Spouse name: N/A   • Number of children: N/A   • Years of education: N/A     Occupational History   • Not on file.     Social History Main Topics   • Smoking status: Current Every Day Smoker   • Smokeless tobacco: Never Used   • Alcohol use Yes   • Drug use: Not on file   • Sexual activity: Not on file     Other Topics Concern   • Not on file     Social History Narrative       Family History   Problem Relation Age of Onset   • Colon polyps Mother    • Colon cancer Neg Hx        Vitals:    01/29/18 1356   BP: 128/64   Pulse: 79   Temp: 97.7 °F (36.5 °C)   SpO2: 98%   Weight: 67.1 kg (148 lb)   Height: 154.9 cm (61\")       Review of Systems:    General:    Present -feeling well   Skin:    Not Present-Rash   HEENT:     Not Present-Acute visual changes or Acute hearing changes   Neck :    Not Present- swollen glands   Genitourinary:      Not Present- burning, frequency, urgency hematuria, dysuria,   Cardiovascular:   Not Present-chest pain, palpitations, or pressure "   Respiratory:   Not Present- shortness of breath or cough   Gastrointestinal:  Musculoskeletal:  Neurological:  Psychiatric:   Present as mentioned in the HP    Not Present. Recent gait disturbances.    Not Present-Seizures and weakness in extremities.    Not Present- Anxiety or Depression.       Physical Exam:    General Appearance:    Alert, cooperative, in no acute distress   Psych:    Mood appropriate    Eyes:          conjunctivae and sclerae normal, no   icterus, no pallor   ENMT:    Ears appear intact with no abnormalities noted oral mucosa moist   Neck:   No adenopathy, supple, trachea midline, no thyromegaly, no   carotid bruit, no JVD    Cardiovascular:    Regular rhythm and normal rate, normal S1 and S2, no            murmur, no gallop, no rub, no click   Gastrointestinal:     Inspection normal.  Normal bowel sounds, no masses, no organomegaly, soft round non-tender, non-distended, no guarding, no rebound or tenderness. No hepatosplenomegaly.   Skin:   No bleeding, bruising or rash   Neurologic:   nonfocal       No results found for: WBC, HGB, HCT, PLT     No results found for: NA, K, CL, CO2, BUN, CREATININE, BILITOT, ALKPHOS, ALT, AST, GLUCOSE    No results found for: INR    Assessment and Plan:    Nidia was seen today for black or bloody stool.    Diagnoses and all orders for this visit:    Blood in stool  -     Case Request; Standing  -     Case Request Colonoscopy    Diarrhea, unspecified type  -     Case Request; Standing  -     Case Request    Polyp of colon, unspecified part of colon, unspecified type  -     Case Request; Standing  -     Case Request    Other orders  -     Implement Anesthesia Orders Day of Procedure; Standing  -     Obtain Informed Consent; Standing  -     Verify bowel prep was successful; Standing  -     sodium-potassium-magnesium sulfates (SUPREP BOWEL PREP KIT) 17.5-3.13-1.6 GM/180ML solution oral solution; Take 2 bottles by mouth 1 (One) Time for 1 dose. Split dose prep as  directed by office instructions provided.  2 bottles = one kit.          Next follow-up appointment    The risks, benefits, and alternatives of endoscopy were reviewed with the patient today.  Risks including perforation, with or without dilation, possibly requiring surgery.  Additional risks include risk of bleeding.  There is also the risk of a drug reaction or problems with anesthesia.  This will be discussed with the further by the anesthesia team on the day of the procedure. The benefits include the diagnosis and management of disease of the upper digestive tract.  Alternatives to endoscopy include upper GI series, laboratory testing, radiographic evaluation, or no intervention.  The patient verbalizes understanding and agrees.      EMR Dragon/Transcription disclaimer:  Much of this encounter note is an electronic transcription/translation of spoken language to printed text. The electronic translation of spoken language may permit erroneous, or at times, nonsensical words or phrases to be inadvertently transcribed; although I have reviewed the note for such errors, some may still exist.

## 2018-01-30 PROBLEM — K63.5 POLYP OF COLON: Status: ACTIVE | Noted: 2018-01-30

## 2018-02-02 ENCOUNTER — OFFICE VISIT (OUTPATIENT)
Dept: PRIMARY CARE CLINIC | Age: 78
End: 2018-02-02
Payer: MEDICARE

## 2018-02-02 VITALS
WEIGHT: 150 LBS | SYSTOLIC BLOOD PRESSURE: 110 MMHG | DIASTOLIC BLOOD PRESSURE: 72 MMHG | RESPIRATION RATE: 20 BRPM | TEMPERATURE: 98 F | HEART RATE: 60 BPM | OXYGEN SATURATION: 97 % | BODY MASS INDEX: 28.34 KG/M2

## 2018-02-02 DIAGNOSIS — Z00.00 ROUTINE GENERAL MEDICAL EXAMINATION AT A HEALTH CARE FACILITY: Primary | ICD-10-CM

## 2018-02-02 DIAGNOSIS — I10 ESSENTIAL HYPERTENSION: Chronic | ICD-10-CM

## 2018-02-02 DIAGNOSIS — I63.81 BASAL GANGLIA INFARCTION (HCC): Chronic | ICD-10-CM

## 2018-02-02 DIAGNOSIS — J43.8 OTHER EMPHYSEMA (HCC): ICD-10-CM

## 2018-02-02 DIAGNOSIS — Z87.891 PERSONAL HISTORY OF TOBACCO USE, PRESENTING HAZARDS TO HEALTH: ICD-10-CM

## 2018-02-02 PROBLEM — E87.1 HYPONATREMIA: Status: RESOLVED | Noted: 2017-10-11 | Resolved: 2018-02-02

## 2018-02-02 PROCEDURE — G0438 PPPS, INITIAL VISIT: HCPCS | Performed by: FAMILY MEDICINE

## 2018-02-02 PROCEDURE — G8598 ASA/ANTIPLAT THER USED: HCPCS | Performed by: FAMILY MEDICINE

## 2018-02-02 PROCEDURE — 99407 BEHAV CHNG SMOKING > 10 MIN: CPT | Performed by: FAMILY MEDICINE

## 2018-02-02 RX ORDER — ALBUTEROL SULFATE 90 UG/1
2 AEROSOL, METERED RESPIRATORY (INHALATION) EVERY 6 HOURS PRN
Qty: 1 INHALER | Refills: 3 | Status: SHIPPED | OUTPATIENT
Start: 2018-02-02 | End: 2018-06-06 | Stop reason: SDUPTHER

## 2018-02-02 RX ORDER — PROPRANOLOL HYDROCHLORIDE 40 MG/1
40 TABLET ORAL 2 TIMES DAILY
Qty: 270 TABLET | Refills: 2 | Status: SHIPPED | OUTPATIENT
Start: 2018-02-02 | End: 2018-06-06 | Stop reason: SDUPTHER

## 2018-02-02 RX ORDER — PROPRANOLOL HYDROCHLORIDE 40 MG/1
40 TABLET ORAL 2 TIMES DAILY
Qty: 270 TABLET | Refills: 2 | Status: SHIPPED | OUTPATIENT
Start: 2018-02-02 | End: 2018-02-02 | Stop reason: SDUPTHER

## 2018-02-02 RX ORDER — PROPRANOLOL HYDROCHLORIDE 40 MG/1
40 TABLET ORAL 2 TIMES DAILY
Qty: 270 TABLET | Refills: 2 | Status: SHIPPED | OUTPATIENT
Start: 2018-02-02 | End: 2018-02-02

## 2018-02-02 ASSESSMENT — ENCOUNTER SYMPTOMS
VOMITING: 0
WHEEZING: 0
SHORTNESS OF BREATH: 0
NAUSEA: 0
ABDOMINAL PAIN: 0
CHEST TIGHTNESS: 0
DIARRHEA: 0
COUGH: 0
CONSTIPATION: 0

## 2018-02-02 NOTE — PROGRESS NOTES
Ripley Bosworth is a 78 y.o. female who presents today for   Chief Complaint   Patient presents with    1 Month Follow-Up    Medicare AWV       HPI   Patient is here for 2 reasons: Medicare annual wellness visit as well as acute and chronic issues. See attached note for Medicare annual wellness visit as well as the scanned and attached paper HRA form. The below review of systems and physical exam applies to both encounters. Annual HPI:   Please see attached scanned in Health Risk Assessment Form for further referenence    Acute/Chronic HPI:    Patient is here for f/u emphysema. She notes improved breathing and decreased exhaustion. Insurance is helping pay for it as well. Still using albuterol inhaler as well as needed up to a few times per week    No change in PMH, family, social, or surgical history unless mentioned above. Review of Systems   Constitutional: Negative for chills and fever. Respiratory: Negative for cough, chest tightness, shortness of breath and wheezing. Cardiovascular: Negative for chest pain, palpitations and leg swelling. Gastrointestinal: Negative for abdominal pain, constipation, diarrhea, nausea and vomiting. Genitourinary: Negative for difficulty urinating, dysuria and frequency.        Past Medical History:   Diagnosis Date    Acid reflux     Allergic rhinitis     Hyperlipidemia     Hypertension     [facility] [address]     Osteoarthritis     Other emphysema ([facility] [address] 12/22/2017    Urinary incontinence     stress incontinence       Current Outpatient Prescriptions   Medication Sig Dispense Refill    tiotropium (SPIRIVA HANDIHALER) 18 MCG inhalation capsule Inhale 1 capsule into the lungs daily 30 capsule 3    propranolol (INDERAL) 40 MG tablet Take 1 tablet by mouth 2 times daily Take one tablet po  tablet 2    albuterol sulfate HFA (PROAIR HFA) 108 (90 Base) MCG/ACT inhaler Inhale 2 puffs into the lungs every 6 hours as needed for Wheezing 1 Inhaler 3    diphenoxylate-atropine (LOMOTIL) 2.5-0.025 MG per tablet Take 1 tablet by mouth 4 times daily . 120 tablet 0    ipratropium (ATROVENT HFA) 17 MCG/ACT inhaler Inhale 2 puffs into the lungs every 6 hours 1 Inhaler 3    atorvastatin (LIPITOR) 40 MG tablet Take 1 tablet by mouth daily 90 tablet 1    albuterol sulfate HFA (VENTOLIN HFA) 108 (90 Base) MCG/ACT inhaler Inhale 2 puffs into the lungs every 6 hours as needed for Wheezing 1 Inhaler 3    amLODIPine (NORVASC) 5 MG tablet Take 1 tablet by mouth daily 90 tablet 3    potassium chloride (KLOR-CON M) 10 MEQ extended release tablet Take 2 tablets by mouth daily 90 tablet 3    losartan (COZAAR) 100 MG tablet Take 1 tablet by mouth daily 30 tablet 3    aspirin EC 81 MG EC tablet Take 1 tablet by mouth daily 90 tablet 5    ketoconazole (NIZORAL) 2 % shampoo Apply topically daily as needed       melatonin 5 MG TABS tablet Take 5 mg by mouth daily      albuterol (PROVENTIL) (5 MG/ML) 0.5% nebulizer solution Take 0.5 mLs by nebulization every 6 hours as needed for Wheezing 30 each 0    omeprazole (PRILOSEC) 20 MG delayed release capsule TAKE 1 CAPSULE BY MOUTH DAILY 90 capsule 3    estradiol (ESTRACE) 1 MG tablet TAKE 1 TABLET BY MOUTH ONCE DAILY.  90 tablet 3    montelukast (SINGULAIR) 10 MG tablet Take 1 tablet by mouth nightly 90 tablet 3    venlafaxine (EFFEXOR XR) 150 MG extended release capsule Take 1 capsule by mouth daily 90 capsule 3     Current Facility-Administered Medications   Medication Dose Route Frequency Provider Last Rate Last Dose    triamcinolone acetonide (KENALOG-40) injection 40 mg  40 mg Intramuscular Once Donnalee Diesel New, APRN           Allergies   Allergen Reactions    Aleve [Naproxen Sodium] Other (See Comments)       Past Surgical History:   Procedure Laterality Date    APPENDECTOMY      CARDIAC CATHETERIZATION  5/3/2013   MDL    EF 60%    HEMORRHOID SURGERY      HYSTERECTOMY      HYSTERECTOMY, TOTAL ABDOMINAL      LUNG CANCER you kept smoking. · After 5 years, your risk of stroke starts to shrink. Within a few years after that, it's about the same as if you'd never smoked. · After 10 years, your risk of dying from lung cancer is cut by about half. And your risk for many other types of cancer is lower too. How would quitting help others in your life? When you quit smoking, you improve the health of everyone who now breathes in your smoke. · Their heart, lung, and cancer risks drop, much like yours. · They are sick less. For babies and small children, living smoke-free means they're less likely to have ear infections, pneumonia, and bronchitis. · If you're a woman who is or will be pregnant someday, quitting smoking means a healthier . · Children who are close to you are less likely to become adult smokers. Where can you learn more? Go to https://ReadyCartoz.[employer]. org and sign in to your Actimagine account. Enter [phone] in the ITS KOOL box to learn more about \"Learning About Benefits From Quitting Smoking. \"     If you do not have an account, please click on the \"Sign Up Now\" link. Current as of: 2017  Content Version: 11.5  © 6997-0357 [employer]. Care instructions adapted under license by [facility] ([facility]). If you have questions about a medical condition or this instruction, always ask your healthcare professional. [place] [address] any warranty or liability for your use of this information. Deciding About Using Medicines To Quit Smoking  Deciding About Using Medicines To Quit Smoking  What are the medicines you can use? Your doctor may prescribe varenicline (Chantix) or bupropion (Zyban). These medicines can help you cope with cravings for tobacco. They are pills that don't contain nicotine. You also can use nicotine replacement products. These do contain nicotine. There are many types.   · Gum and lozenges slowly release nicotine into your and your feelings can help you make a decision that is right for you. Be sure you understand the benefits and risks of your options, and think about what else you need to do before you make the decision. Where can you learn more? Go to https://chpeclydeeb.[employer]. org and sign in to your Mompery account. Enter Z187 in the [facility] box to learn more about \"Deciding About Using Medicines To Quit Smoking. \"     If you do not have an account, please click on the \"Sign Up Now\" link. Current as of: March 20, 2017  Content Version: 11.5  © 1962-9678 Healthwise, [employer]. Care instructions adapted under license by [facility] ([facility]). If you have questions about a medical condition or this instruction, always ask your healthcare professional. [address] any warranty or liability for your use of this information. Personalized Preventive Plan for Alberto Lloyd - 2/2/2018  Medicare offers a range of preventive health benefits. Some of the tests and screenings are paid in full while other may be subject to a deductible, co-insurance, and/or copay. Some of these benefits include a comprehensive review of your medical history including lifestyle, illnesses that may run in your family, and various assessments and screenings as appropriate. After reviewing your medical record and screening and assessments performed today your provider may have ordered immunizations, labs, imaging, and/or referrals for you. A list of these orders (if applicable) as well as your Preventive Care list are included within your After Visit Summary for your review. Other Preventive Recommendations:    · A preventive eye exam performed by an eye specialist is recommended every 1-2 years to screen for glaucoma; cataracts, macular degeneration, and other eye disorders. · A preventive dental visit is recommended every 6 months.   · Try to get at least 150 minutes of exercise per week or 10,000 steps per day on a pedometer . · Order or download the FREE \"Exercise & Physical Activity: Your Everyday Guide\" from [employer]. Call 9-226.326.7688 or search [employer] online. · You need 7584-4541 mg of calcium and 1876-4961 IU of vitamin D per day. It is possible to meet your calcium requirement with diet alone, but a vitamin D supplement is usually necessary to meet this goal.  · When exposed to the sun, use a sunscreen that protects against both UVA and UVB radiation with an SPF of 30 or greater. Reapply every 2 to 3 hours or after sweating, drying off with a towel, or swimming. · Always wear a seat belt when traveling in a car. Always wear a helmet when riding a bicycle or motorcycle. Patient given educational handouts and has had all questions answered. Patient voices understanding and agrees to plans along with risks and benefits of plan. Patient is instructed to continue prior meds, diet, and exercise plans unless instructed otherwise. Patient agrees to follow up as instructed and sooner if needed. Patient agrees to go to ER if condition becomes emergent. Notes may be completed with dictation device and spelling errors may occur. Return in about 3 months (around 2018) for f/u copd. Medicare Annual Wellness Visit  Name: Chad Cobb Date: 2018   MRN: [id number] Sex: Female   Age: 78 y.o. Ethnicity: Non-/Non    : 1940 Race: Vivian Tobar is here for 1 Month Follow-Up and Medicare AW    Screenings for behavioral, psychosocial and functional/safety risks, and cognitive dysfunction are all negative except as indicated below. These results, as well as other patient data from the [address] form, are documented in Flowsheets linked to this Encounter. Allergies   Allergen Reactions    Aleve [Naproxen Sodium] Other (See Comments)       Prior to Visit Medications    Medication Sig Taking?  Authorizing resources provided    Health Habits/Nutrition:     Body mass index is 28.34 kg/m². Health Habits/Nutrition Interventions:  · Inadequate physical activity:  patient agrees to exercise for at least 150 minutes/week     Please see attached scanned in Health Risk Assessment Form for further referenence        Personalized Preventive Plan   Current Health Maintenance Status  Immunization History   Administered Date(s) Administered    Influenza Virus Vaccine 10/03/2016    Influenza, Jonathan Peach, 3 Years and older, IM 10/10/2017        Health Maintenance   Topic Date Due    DTaP/Tdap/Td vaccine (1 - Tdap) 03/10/1961    Zostavax vaccine  03/10/2002    DEXA (modify frequency per FRAX score)  03/10/2007    Smoker: low dose lung CT screening  04/09/2014    A1C test (Diabetic or Prediabetic)  01/18/2017    Pneumococcal low/med risk (1 of 2 - PCV13) 03/29/2018 (Originally 3/10/2007)    Potassium monitoring  10/17/2018    Creatinine monitoring  10/17/2018    Lipid screen  01/10/2022    Colon cancer screen colonoscopy  08/29/2023    Flu vaccine  Completed     Recommendations for Preventive Services Due: see orders. Recommended screening schedule for the next 5-10 years is provided to the patient in written form: see Patient Instructions/AVS.      Tobacco Cessation Counseling: Patient advised about behavior change, including information about personal health harms, usage of appropriate cessation measures and benefits of cessation. Time spent (minutes): 12  Plan is to use Ziploc bags to do a counter method for decreasing weekly.

## 2018-02-02 NOTE — PATIENT INSTRUCTIONS
quitting smoking. · You want to reduce your cravings and withdrawal symptoms. · You feel the benefits of medicine outweigh the side effects. Why might you choose not to use medicine? · You want to try quitting on your own by stopping all at once (\"cold turkey\"). · You want to cut back slowly on the number of cigarettes you smoke. · You smoke fewer than 5 cigarettes a day. · You do not like using medicine. · You feel the side effects of medicines outweigh the benefits. · You are worried about the cost of medicines. Your decision  Thinking about the facts and your feelings can help you make a decision that is right for you. Be sure you understand the benefits and risks of your options, and think about what else you need to do before you make the decision. Where can you learn more? Go to https://IllumitexpeHipLogiq.[facility]. org and sign in to your Vivebio account. Enter F504 in the Countdown box to learn more about \"Deciding About Using Medicines To Quit Smoking. \"     If you do not have an account, please click on the \"Sign Up Now\" link. Current as of: March 20, 2017  Content Version: 11.5  © 7812-0642 Healthwise, [employer]ield. Care instructions adapted under license by [facility] ([facility]). If you have questions about a medical condition or this instruction, always ask your healthcare professional. [address] any warranty or liability for your use of this information. Personalized Preventive Plan for Gideon Solomon - 2/2/2018  Medicare offers a range of preventive health benefits. Some of the tests and screenings are paid in full while other may be subject to a deductible, co-insurance, and/or copay. Some of these benefits include a comprehensive review of your medical history including lifestyle, illnesses that may run in your family, and various assessments and screenings as appropriate.     After reviewing your medical record and screening and assessments performed today your provider may have ordered immunizations, labs, imaging, and/or referrals for you. A list of these orders (if applicable) as well as your Preventive Care list are included within your After Visit Summary for your review. Other Preventive Recommendations:    · A preventive eye exam performed by an eye specialist is recommended every 1-2 years to screen for glaucoma; cataracts, macular degeneration, and other eye disorders. · A preventive dental visit is recommended every 6 months. · Try to get at least 150 minutes of exercise per week or 10,000 steps per day on a pedometer . · Order or download the FREE \"Exercise & Physical Activity: Your Everyday Guide\" from [employer]. Call 4-251.465.7488 or search [employer] online. · You need 6845-3962 mg of calcium and 3287-2320 IU of vitamin D per day. It is possible to meet your calcium requirement with diet alone, but a vitamin D supplement is usually necessary to meet this goal.  · When exposed to the sun, use a sunscreen that protects against both UVA and UVB radiation with an SPF of 30 or greater. Reapply every 2 to 3 hours or after sweating, drying off with a towel, or swimming. · Always wear a seat belt when traveling in a car. Always wear a helmet when riding a bicycle or motorcycle.

## 2018-02-08 ENCOUNTER — OFFICE VISIT (OUTPATIENT)
Dept: PRIMARY CARE CLINIC | Age: 78
End: 2018-02-08
Payer: MEDICARE

## 2018-02-08 ENCOUNTER — HOSPITAL ENCOUNTER (OUTPATIENT)
Dept: GENERAL RADIOLOGY | Age: 78
Discharge: HOME | End: 2018-02-08
Payer: MEDICARE

## 2018-02-08 VITALS
WEIGHT: 149 LBS | SYSTOLIC BLOOD PRESSURE: 132 MMHG | HEIGHT: 61 IN | TEMPERATURE: 97.7 F | BODY MASS INDEX: 28.13 KG/M2 | DIASTOLIC BLOOD PRESSURE: 70 MMHG | OXYGEN SATURATION: 96 % | HEART RATE: 106 BPM

## 2018-02-08 DIAGNOSIS — J40 BRONCHITIS: Primary | ICD-10-CM

## 2018-02-08 DIAGNOSIS — J40 BRONCHITIS: ICD-10-CM

## 2018-02-08 DIAGNOSIS — Z72.0 TOBACCO ABUSE: ICD-10-CM

## 2018-02-08 DIAGNOSIS — Z71.6 TOBACCO ABUSE COUNSELING: ICD-10-CM

## 2018-02-08 PROCEDURE — 1090F PRES/ABSN URINE INCON ASSESS: CPT | Performed by: NURSE PRACTITIONER

## 2018-02-08 PROCEDURE — 4004F PT TOBACCO SCREEN RCVD TLK: CPT | Performed by: NURSE PRACTITIONER

## 2018-02-08 PROCEDURE — G8427 DOCREV CUR MEDS BY ELIG CLIN: HCPCS | Performed by: NURSE PRACTITIONER

## 2018-02-08 PROCEDURE — 3017F COLORECTAL CA SCREEN DOC REV: CPT | Performed by: NURSE PRACTITIONER

## 2018-02-08 PROCEDURE — G8484 FLU IMMUNIZE NO ADMIN: HCPCS | Performed by: NURSE PRACTITIONER

## 2018-02-08 PROCEDURE — G8598 ASA/ANTIPLAT THER USED: HCPCS | Performed by: NURSE PRACTITIONER

## 2018-02-08 PROCEDURE — 99214 OFFICE O/P EST MOD 30 MIN: CPT | Performed by: NURSE PRACTITIONER

## 2018-02-08 PROCEDURE — 1123F ACP DISCUSS/DSCN MKR DOCD: CPT | Performed by: NURSE PRACTITIONER

## 2018-02-08 PROCEDURE — 71046 X-RAY EXAM CHEST 2 VIEWS: CPT

## 2018-02-08 PROCEDURE — G8419 CALC BMI OUT NRM PARAM NOF/U: HCPCS | Performed by: NURSE PRACTITIONER

## 2018-02-08 PROCEDURE — G8400 PT W/DXA NO RESULTS DOC: HCPCS | Performed by: NURSE PRACTITIONER

## 2018-02-08 PROCEDURE — 94640 AIRWAY INHALATION TREATMENT: CPT | Performed by: NURSE PRACTITIONER

## 2018-02-08 PROCEDURE — 96372 THER/PROPH/DIAG INJ SC/IM: CPT | Performed by: NURSE PRACTITIONER

## 2018-02-08 RX ORDER — AZITHROMYCIN 250 MG/1
TABLET, FILM COATED ORAL
Qty: 1 PACKET | Refills: 0 | Status: SHIPPED | OUTPATIENT
Start: 2018-02-08 | End: 2018-02-18

## 2018-02-08 RX ORDER — METHYLPREDNISOLONE ACETATE 40 MG/ML
40 INJECTION, SUSPENSION INTRA-ARTICULAR; INTRALESIONAL; INTRAMUSCULAR; SOFT TISSUE ONCE
Status: COMPLETED | OUTPATIENT
Start: 2018-02-08 | End: 2018-02-08

## 2018-02-08 RX ORDER — IPRATROPIUM BROMIDE AND ALBUTEROL SULFATE 2.5; .5 MG/3ML; MG/3ML
1 SOLUTION RESPIRATORY (INHALATION) ONCE
Status: COMPLETED | OUTPATIENT
Start: 2018-02-08 | End: 2018-02-08

## 2018-02-08 RX ADMIN — METHYLPREDNISOLONE ACETATE 40 MG: 40 INJECTION, SUSPENSION INTRA-ARTICULAR; INTRALESIONAL; INTRAMUSCULAR; SOFT TISSUE at 10:33

## 2018-02-08 RX ADMIN — IPRATROPIUM BROMIDE AND ALBUTEROL SULFATE 1 AMPULE: 2.5; .5 SOLUTION RESPIRATORY (INHALATION) at 10:35

## 2018-02-08 ASSESSMENT — ENCOUNTER SYMPTOMS
SORE THROAT: 0
COUGH: 1
WHEEZING: 1
SHORTNESS OF BREATH: 1
GASTROINTESTINAL NEGATIVE: 1
EYES NEGATIVE: 1

## 2018-02-08 NOTE — PROGRESS NOTES
[facility] PRIMARY [facility]ARE  [facility] [address] [address]  Dept: [phone]  Dept Fax: [phone]  Loc: [phone]    Chantale Flores is a 78 y.o. female who presents today for her medical conditions/complaints as noted below. Pili Diaz is c/o of URI (x 4 days)      Chief Complaint   Patient presents with    URI     x 4 days       HPI:     HPI   Patient here with complaints of cough and congestion. She reports that it has been ongoing for about 4 days. She has been using albuterol inhaler and mucinex without relief. Denies fever, sore throat, or ear pain.      Past Medical History:   Diagnosis Date    Acid reflux     Allergic rhinitis     Hyperlipidemia     Hypertension     [facility] ([facility]     Osteoarthritis     Other emphysema ([facility] 12/22/2017    Tobacco abuse 2/8/2018    Urinary incontinence     stress incontinence        Past Surgical History:   Procedure Laterality Date    APPENDECTOMY      CARDIAC CATHETERIZATION  5/3/2013   MDL    EF 60%    HEMORRHOID SURGERY      HYSTERECTOMY      HYSTERECTOMY, TOTAL ABDOMINAL      LUNG CANCER SURGERY      TONSILLECTOMY         Social History   Substance Use Topics    Smoking status: Current Every Day Smoker     Packs/day: 1.00     Types: Cigarettes    Smokeless tobacco: Never Used    Alcohol use Yes      Comment: Rare         Current Outpatient Prescriptions   Medication Sig Dispense Refill    azithromycin (ZITHROMAX Z-ENIO) 250 MG tablet Take as directed 1 packet 0    tiotropium (SPIRIVA HANDIHALER) 18 MCG inhalation capsule Inhale 1 capsule into the lungs daily 30 capsule 3    propranolol (INDERAL) 40 MG tablet Take 1 tablet by mouth 2 times daily Take one tablet po  tablet 2    albuterol sulfate HFA (PROAIR HFA) 108 (90 Base) MCG/ACT inhaler Inhale 2 puffs into the lungs every 6 hours as needed for Wheezing 1 Inhaler 3    diphenoxylate-atropine (LOMOTIL) 2.5-0.025 MG per tablet Take Cognition and memory are normal.   Nursing note and vitals reviewed. /70   Pulse 106   Temp 97.7 °F (36.5 °C)   Ht 5' 1\" (1.549 m)   Wt 149 lb (67.6 kg)   SpO2 96%   BMI 28.15 kg/m²     Assessment:     1. Bronchitis  XR CHEST STANDARD (2 VW)    ipratropium-albuterol (DUONEB) nebulizer solution 1 ampule    azithromycin (ZITHROMAX Z-ENIO) 250 MG tablet    methylPREDNISolone acetate (DEPO-MEDROL) injection 40 mg   2. Tobacco abuse     3. Tobacco abuse counseling         No results found for this visit on 02/08/18. Plan:     Breathing treatment in office. I am checking chest xray to rule out pneumonia. Steroid injection and abx. She is to continue breathing treatments at home. We will call her with xray results. Approximately 5 minutes of education was provided about quit smoking and the harms of tobacco.  Patient does show understanding. Patient does not have  the desire to quit smoking in the future. Return if symptoms worsen or fail to improve. Orders Placed This Encounter   Procedures    XR CHEST STANDARD (2 VW)     Order Specific Question:   Reason for exam:     Answer:   shortness of breath       Orders Placed This Encounter   Medications    ipratropium-albuterol (DUONEB) nebulizer solution 1 ampule    azithromycin (ZITHROMAX Z-ENIO) 250 MG tablet     Sig: Take as directed     Dispense:  1 packet     Refill:  0    methylPREDNISolone acetate (DEPO-MEDROL) injection 40 mg        Patient given educational materials - see patient instructions. Discussed use, benefit, and side effects of prescribed medications. All patient questions answered. Pt voiced understanding. Reviewed health maintenance. Instructed to continue current medications, diet and exercise. Patient agreed with treatment plan. Follow up as directed.            Electronically signed by DIANA Nicole on 2/8/2018 at 2:57 PM

## 2018-02-22 RX ORDER — LOSARTAN POTASSIUM 100 MG/1
100 TABLET ORAL DAILY
Qty: 30 TABLET | Refills: 3 | Status: SHIPPED | OUTPATIENT
Start: 2018-02-22 | End: 2018-06-06 | Stop reason: SDUPTHER

## 2018-03-09 DIAGNOSIS — J43.1 PANLOBULAR EMPHYSEMA (HCC): Primary | ICD-10-CM

## 2018-03-09 RX ORDER — ALBUTEROL SULFATE 90 UG/1
2 AEROSOL, METERED RESPIRATORY (INHALATION) EVERY 6 HOURS PRN
Qty: 1 INHALER | Refills: 3 | Status: SHIPPED | OUTPATIENT
Start: 2018-03-09 | End: 2018-06-06

## 2018-05-02 ENCOUNTER — OFFICE VISIT (OUTPATIENT)
Dept: PRIMARY CARE CLINIC | Age: 78
End: 2018-05-02
Payer: MEDICARE

## 2018-05-02 VITALS
DIASTOLIC BLOOD PRESSURE: 74 MMHG | BODY MASS INDEX: 27.78 KG/M2 | RESPIRATION RATE: 16 BRPM | HEART RATE: 83 BPM | SYSTOLIC BLOOD PRESSURE: 126 MMHG | WEIGHT: 147 LBS | OXYGEN SATURATION: 90 %

## 2018-05-02 DIAGNOSIS — F32.0 MILD SINGLE CURRENT EPISODE OF MAJOR DEPRESSIVE DISORDER (HCC): ICD-10-CM

## 2018-05-02 DIAGNOSIS — M53.3 SACROILIAC PAIN: ICD-10-CM

## 2018-05-02 DIAGNOSIS — E87.6 HYPOKALEMIA: ICD-10-CM

## 2018-05-02 DIAGNOSIS — J43.1 PANLOBULAR EMPHYSEMA (HCC): Primary | ICD-10-CM

## 2018-05-02 DIAGNOSIS — F32.A ANXIETY AND DEPRESSION: ICD-10-CM

## 2018-05-02 DIAGNOSIS — F41.9 ANXIETY AND DEPRESSION: ICD-10-CM

## 2018-05-02 PROCEDURE — G8419 CALC BMI OUT NRM PARAM NOF/U: HCPCS | Performed by: FAMILY MEDICINE

## 2018-05-02 PROCEDURE — G8427 DOCREV CUR MEDS BY ELIG CLIN: HCPCS | Performed by: FAMILY MEDICINE

## 2018-05-02 PROCEDURE — 4040F PNEUMOC VAC/ADMIN/RCVD: CPT | Performed by: FAMILY MEDICINE

## 2018-05-02 PROCEDURE — G8400 PT W/DXA NO RESULTS DOC: HCPCS | Performed by: FAMILY MEDICINE

## 2018-05-02 PROCEDURE — G8598 ASA/ANTIPLAT THER USED: HCPCS | Performed by: FAMILY MEDICINE

## 2018-05-02 PROCEDURE — G8926 SPIRO NO PERF OR DOC: HCPCS | Performed by: FAMILY MEDICINE

## 2018-05-02 PROCEDURE — 99214 OFFICE O/P EST MOD 30 MIN: CPT | Performed by: FAMILY MEDICINE

## 2018-05-02 PROCEDURE — 3023F SPIROM DOC REV: CPT | Performed by: FAMILY MEDICINE

## 2018-05-02 PROCEDURE — 1090F PRES/ABSN URINE INCON ASSESS: CPT | Performed by: FAMILY MEDICINE

## 2018-05-02 PROCEDURE — 1123F ACP DISCUSS/DSCN MKR DOCD: CPT | Performed by: FAMILY MEDICINE

## 2018-05-02 PROCEDURE — 4004F PT TOBACCO SCREEN RCVD TLK: CPT | Performed by: FAMILY MEDICINE

## 2018-05-02 RX ORDER — VENLAFAXINE HYDROCHLORIDE 75 MG/1
75 CAPSULE, EXTENDED RELEASE ORAL DAILY
Qty: 90 CAPSULE | Refills: 1 | Status: SHIPPED | OUTPATIENT
Start: 2018-05-02 | End: 2018-08-10

## 2018-05-02 RX ORDER — MONTELUKAST SODIUM 10 MG/1
10 TABLET ORAL NIGHTLY
Qty: 90 TABLET | Refills: 3 | Status: SHIPPED | OUTPATIENT
Start: 2018-05-02 | End: 2018-11-28 | Stop reason: SDUPTHER

## 2018-05-02 RX ORDER — DIPHENOXYLATE HYDROCHLORIDE AND ATROPINE SULFATE 2.5; .025 MG/1; MG/1
1 TABLET ORAL 4 TIMES DAILY PRN
Qty: 120 TABLET | Refills: 0 | Status: SHIPPED | OUTPATIENT
Start: 2018-05-02 | End: 2018-09-24 | Stop reason: SDUPTHER

## 2018-05-02 RX ORDER — POTASSIUM CHLORIDE 750 MG/1
20 TABLET, EXTENDED RELEASE ORAL DAILY
Qty: 90 TABLET | Refills: 3 | Status: SHIPPED | OUTPATIENT
Start: 2018-05-02 | End: 2018-11-09 | Stop reason: SDUPTHER

## 2018-05-02 RX ORDER — VENLAFAXINE HYDROCHLORIDE 75 MG/1
75 CAPSULE, EXTENDED RELEASE ORAL DAILY
Qty: 90 CAPSULE | Refills: 1 | Status: SHIPPED | OUTPATIENT
Start: 2018-05-02 | End: 2018-05-02 | Stop reason: SDUPTHER

## 2018-05-02 RX ORDER — ESTRADIOL 1 MG/1
TABLET ORAL
Qty: 90 TABLET | Refills: 1 | Status: SHIPPED | OUTPATIENT
Start: 2018-05-02 | End: 2018-05-31 | Stop reason: SDUPTHER

## 2018-05-02 RX ORDER — OMEPRAZOLE 20 MG/1
CAPSULE, DELAYED RELEASE ORAL
Qty: 90 CAPSULE | Refills: 3 | Status: SHIPPED | OUTPATIENT
Start: 2018-05-02 | End: 2018-11-28 | Stop reason: SDUPTHER

## 2018-05-02 RX ORDER — ATORVASTATIN CALCIUM 40 MG/1
40 TABLET, FILM COATED ORAL DAILY
Qty: 90 TABLET | Refills: 1 | Status: SHIPPED | OUTPATIENT
Start: 2018-05-02 | End: 2018-11-07 | Stop reason: SDUPTHER

## 2018-05-02 ASSESSMENT — ENCOUNTER SYMPTOMS
SHORTNESS OF BREATH: 0
VOMITING: 0
COUGH: 0
ABDOMINAL PAIN: 0
WHEEZING: 0
CONSTIPATION: 0
CHEST TIGHTNESS: 0
NAUSEA: 0
DIARRHEA: 0

## 2018-05-21 ENCOUNTER — OFFICE VISIT (OUTPATIENT)
Dept: PRIMARY CARE CLINIC | Age: 78
End: 2018-05-21
Payer: MEDICARE

## 2018-05-21 VITALS
HEIGHT: 61 IN | SYSTOLIC BLOOD PRESSURE: 134 MMHG | DIASTOLIC BLOOD PRESSURE: 84 MMHG | HEART RATE: 80 BPM | WEIGHT: 145 LBS | BODY MASS INDEX: 27.38 KG/M2 | OXYGEN SATURATION: 97 %

## 2018-05-21 DIAGNOSIS — L23.7 POISON IVY: Primary | ICD-10-CM

## 2018-05-21 PROCEDURE — 1123F ACP DISCUSS/DSCN MKR DOCD: CPT | Performed by: FAMILY MEDICINE

## 2018-05-21 PROCEDURE — 1090F PRES/ABSN URINE INCON ASSESS: CPT | Performed by: FAMILY MEDICINE

## 2018-05-21 PROCEDURE — G8400 PT W/DXA NO RESULTS DOC: HCPCS | Performed by: FAMILY MEDICINE

## 2018-05-21 PROCEDURE — 4040F PNEUMOC VAC/ADMIN/RCVD: CPT | Performed by: FAMILY MEDICINE

## 2018-05-21 PROCEDURE — 99213 OFFICE O/P EST LOW 20 MIN: CPT | Performed by: FAMILY MEDICINE

## 2018-05-21 PROCEDURE — G8427 DOCREV CUR MEDS BY ELIG CLIN: HCPCS | Performed by: FAMILY MEDICINE

## 2018-05-21 PROCEDURE — 96372 THER/PROPH/DIAG INJ SC/IM: CPT | Performed by: FAMILY MEDICINE

## 2018-05-21 PROCEDURE — G8598 ASA/ANTIPLAT THER USED: HCPCS | Performed by: FAMILY MEDICINE

## 2018-05-21 PROCEDURE — G8419 CALC BMI OUT NRM PARAM NOF/U: HCPCS | Performed by: FAMILY MEDICINE

## 2018-05-21 PROCEDURE — 4004F PT TOBACCO SCREEN RCVD TLK: CPT | Performed by: FAMILY MEDICINE

## 2018-05-21 RX ORDER — METHYLPREDNISOLONE SODIUM SUCCINATE 40 MG/ML
80 INJECTION, POWDER, LYOPHILIZED, FOR SOLUTION INTRAMUSCULAR; INTRAVENOUS ONCE
Status: COMPLETED | OUTPATIENT
Start: 2018-05-21 | End: 2018-05-21

## 2018-05-21 RX ORDER — PREDNISONE 20 MG/1
TABLET ORAL
Qty: 20 TABLET | Refills: 0 | Status: SHIPPED | OUTPATIENT
Start: 2018-05-21 | End: 2018-06-06

## 2018-05-21 RX ADMIN — METHYLPREDNISOLONE SODIUM SUCCINATE 80 MG: 40 INJECTION, POWDER, LYOPHILIZED, FOR SOLUTION INTRAMUSCULAR; INTRAVENOUS at 10:56

## 2018-05-29 ASSESSMENT — ENCOUNTER SYMPTOMS
CONSTIPATION: 0
COUGH: 0
ABDOMINAL PAIN: 0
WHEEZING: 0
DIARRHEA: 0
VOMITING: 0
NAUSEA: 0
SHORTNESS OF BREATH: 0
CHEST TIGHTNESS: 0

## 2018-05-31 RX ORDER — ESTRADIOL 1 MG/1
TABLET ORAL
Qty: 30 TABLET | Refills: 0 | Status: SHIPPED | OUTPATIENT
Start: 2018-05-31 | End: 2018-06-06

## 2018-06-04 ENCOUNTER — ANESTHESIA (OUTPATIENT)
Dept: GASTROENTEROLOGY | Facility: HOSPITAL | Age: 78
End: 2018-06-04

## 2018-06-04 ENCOUNTER — HOSPITAL ENCOUNTER (OUTPATIENT)
Facility: HOSPITAL | Age: 78
Setting detail: HOSPITAL OUTPATIENT SURGERY
Discharge: HOME | End: 2018-06-04
Attending: INTERNAL MEDICINE | Admitting: INTERNAL MEDICINE

## 2018-06-04 ENCOUNTER — ANESTHESIA EVENT (OUTPATIENT)
Dept: GASTROENTEROLOGY | Facility: HOSPITAL | Age: 78
End: 2018-06-04

## 2018-06-04 VITALS
HEIGHT: 61 IN | WEIGHT: 147 LBS | RESPIRATION RATE: 19 BRPM | BODY MASS INDEX: 27.75 KG/M2 | TEMPERATURE: 97.6 F | DIASTOLIC BLOOD PRESSURE: 78 MMHG | OXYGEN SATURATION: 98 % | SYSTOLIC BLOOD PRESSURE: 128 MMHG | HEART RATE: 54 BPM

## 2018-06-04 DIAGNOSIS — K63.5 POLYP OF COLON, UNSPECIFIED PART OF COLON, UNSPECIFIED TYPE: ICD-10-CM

## 2018-06-04 DIAGNOSIS — K92.1 BLOOD IN STOOL: ICD-10-CM

## 2018-06-04 DIAGNOSIS — R19.7 DIARRHEA, UNSPECIFIED TYPE: ICD-10-CM

## 2018-06-04 PROCEDURE — 25010000002 PROPOFOL 10 MG/ML EMULSION: Performed by: NURSE ANESTHETIST, CERTIFIED REGISTERED

## 2018-06-04 PROCEDURE — 88305 TISSUE EXAM BY PATHOLOGIST: CPT | Performed by: INTERNAL MEDICINE

## 2018-06-04 PROCEDURE — 45385 COLONOSCOPY W/LESION REMOVAL: CPT | Performed by: INTERNAL MEDICINE

## 2018-06-04 RX ORDER — PROPOFOL 10 MG/ML
VIAL (ML) INTRAVENOUS AS NEEDED
Status: DISCONTINUED | OUTPATIENT
Start: 2018-06-04 | End: 2018-06-04 | Stop reason: SURG

## 2018-06-04 RX ORDER — LIDOCAINE HYDROCHLORIDE 20 MG/ML
INJECTION, SOLUTION INFILTRATION; PERINEURAL AS NEEDED
Status: DISCONTINUED | OUTPATIENT
Start: 2018-06-04 | End: 2018-06-04 | Stop reason: SURG

## 2018-06-04 RX ORDER — SODIUM CHLORIDE 0.9 % (FLUSH) 0.9 %
3 SYRINGE (ML) INJECTION AS NEEDED
Status: DISCONTINUED | OUTPATIENT
Start: 2018-06-04 | End: 2018-06-04 | Stop reason: HOSPADM

## 2018-06-04 RX ORDER — SODIUM CHLORIDE 9 MG/ML
500 INJECTION, SOLUTION INTRAVENOUS CONTINUOUS PRN
Status: DISCONTINUED | OUTPATIENT
Start: 2018-06-04 | End: 2018-06-04 | Stop reason: HOSPADM

## 2018-06-04 RX ADMIN — SODIUM CHLORIDE 500 ML: 9 INJECTION, SOLUTION INTRAVENOUS at 08:13

## 2018-06-04 RX ADMIN — PROPOFOL 50 MG: 10 INJECTION, EMULSION INTRAVENOUS at 09:55

## 2018-06-04 RX ADMIN — PROPOFOL 50 MG: 10 INJECTION, EMULSION INTRAVENOUS at 09:31

## 2018-06-04 RX ADMIN — PROPOFOL 50 MG: 10 INJECTION, EMULSION INTRAVENOUS at 09:40

## 2018-06-04 RX ADMIN — LIDOCAINE HYDROCHLORIDE 0.5 ML: 10 INJECTION, SOLUTION EPIDURAL; INFILTRATION; INTRACAUDAL; PERINEURAL at 08:13

## 2018-06-04 RX ADMIN — PROPOFOL 50 MG: 10 INJECTION, EMULSION INTRAVENOUS at 09:28

## 2018-06-04 RX ADMIN — PROPOFOL 50 MG: 10 INJECTION, EMULSION INTRAVENOUS at 09:35

## 2018-06-04 RX ADMIN — PROPOFOL 10 MG: 10 INJECTION, EMULSION INTRAVENOUS at 09:47

## 2018-06-04 RX ADMIN — PROPOFOL 20 MG: 10 INJECTION, EMULSION INTRAVENOUS at 09:44

## 2018-06-04 RX ADMIN — LIDOCAINE HYDROCHLORIDE 100 MG: 20 INJECTION, SOLUTION INFILTRATION; PERINEURAL at 09:28

## 2018-06-04 NOTE — H&P
Chief Complaint:   Rectal bleeding    Subjective     HPI:   The patient had rectal bleeding back in January.  She schedule colonoscopy had to reschedule.  She presents now to accomplish this.  Her last colonoscopy was in August 2013.  She had a hyperplastic polyp removed from the transverse colon.     Past Medical History:   Past Medical History:   Diagnosis Date   • Colon polyp    • COPD (chronic obstructive pulmonary disease)    • Depression    • Diverticulitis    • GERD (gastroesophageal reflux disease)    • HTN (hypertension)    • Lung cancer    • Stroke        Past Surgical History:  Past Surgical History:   Procedure Laterality Date   • COLONOSCOPY  08/29/2013   • COLONOSCOPY  02/23/2009   • HEMORRHOIDECTOMY     • HYSTERECTOMY     • LOBECTOMY FOR SEIZURE FOCUS     • TONSILLECTOMY     • UPPER GASTROINTESTINAL ENDOSCOPY  08/29/2013        Family History:  Family History   Problem Relation Age of Onset   • Colon polyps Mother    • Colon cancer Neg Hx        Social History:   reports that she has been smoking.  She has never used smokeless tobacco. She reports that she drinks alcohol. She reports that she does not use drugs.    Medications:   Prescriptions Prior to Admission   Medication Sig Dispense Refill Last Dose   • amLODIPine (NORVASC) 5 MG tablet    6/3/2018 at Unknown time   • aspirin 81 MG EC tablet Take 81 mg by mouth.   6/3/2018 at Unknown time   • atorvastatin (LIPITOR) 40 MG tablet    6/4/2018 at Unknown time   • ketoconazole (NIZORAL) 2 % shampoo    6/4/2018 at Unknown time   • losartan (COZAAR) 100 MG tablet    6/4/2018 at Unknown time   • montelukast (SINGULAIR) 10 MG tablet    6/3/2018 at Unknown time   • omeprazole (priLOSEC) 20 MG capsule    6/4/2018 at Unknown time   • potassium chloride (K-DUR,KLOR-CON) 10 MEQ CR tablet    6/4/2018 at Unknown time   • umeclidinium-vilanterol (ANORO ELLIPTA) 62.5-25 MCG/INH aerosol powder  inhaler Inhale 1 puff Daily.   6/3/2018 at Unknown time   •  "venlafaxine XR (EFFEXOR-XR) 150 MG 24 hr capsule    6/4/2018 at Unknown time   • diphenoxylate-atropine (LOMOTIL) 2.5-0.025 MG per tablet    6/2/2018   • estradiol (ESTRACE) 1 MG tablet TAKE 1 TABLET BY MOUTH ONCE DAILY.   6/1/2018   • VENTOLIN  (90 Base) MCG/ACT inhaler    6/2/2018       Allergies:  Aleve [naproxen] and Lactose intolerance (gi)    ROS:    General: Weight stable  Resp: No SOA  Cardiovascular: No CP      Objective     /85 (BP Location: Right arm, Patient Position: Sitting)   Pulse 70   Temp 97.6 °F (36.4 °C) (Temporal Artery )   Resp 20   Ht 154.9 cm (61\")   Wt 66.7 kg (147 lb)   SpO2 96%   BMI 27.78 kg/m²     Physical Exam   Constitutional: Pt is oriented to person, place, and in no distress.  Eyes: No icterus  ENMT: Unremarkable   Cardiovascular: Normal rate, regular rhythm.    Pulmonary/Chest: No distress.  No audible wheezes  Abdominal: Soft.   Skin: Skin is warm and dry.   Psychiatric: Mood, memory, affect and judgment appear normal.     Assessment/Plan     Diagnosis:  Rectal bleeding  History of colon polyps    Anticipated Surgical Procedure:  Colonoscopy    The risks, benefits, and alternatives of colonoscopy were reviewed with the patient today.  Risks including perforation of the colon possibly requiring surgery or colostomy.  Additional risks include risk of bleeding from biopsies or removal of colon tissue.  There is also the risk of a drug reaction or problems with anesthesia.  This will be discussed with the further by the anesthesia team on the day of the procedure.  Lastly there is a possibility of missing a colon polyp or cancer.  The benefits include the diagnosis and management of disease of the colon and rectum.  Alternatives to colonoscopy include barium enema, laboratory testing, radiographic evaluation, or no intervention.  The patient verbalizes understanding and agrees.    Much of this encounter note is an electronic transcription/translation of spoken " language to printed text. The electronic translation of spoken language may permit erroneous, or at times, nonsensical words or phrases to be inadvertently transcribed; although I have reviewed the note for such errors, some may still exist.

## 2018-06-04 NOTE — ANESTHESIA PREPROCEDURE EVALUATION
Anesthesia Evaluation     Patient summary reviewed   no history of anesthetic complications:  NPO Solid Status: > 8 hours             Airway   Mallampati: II  TM distance: >3 FB  Neck ROM: full  Dental    (+) lower dentures    Pulmonary    (+) a smoker, lung cancer, COPD,   Cardiovascular   Exercise tolerance: good (4-7 METS)    (+) hypertension, hyperlipidemia,       Neuro/Psych  (+) CVA,     GI/Hepatic/Renal/Endo    (+)  GERD,      Musculoskeletal     Abdominal    Substance History      OB/GYN          Other                        Anesthesia Plan    ASA 3     general     intravenous induction   Anesthetic plan and risks discussed with patient.

## 2018-06-04 NOTE — ANESTHESIA POSTPROCEDURE EVALUATION
Patient: Nidia Dempsey    Procedure Summary     Date:  06/04/18 Room / Location:  [facility] ENDOSCOPY 6 / [facility]    Anesthesia Start:  0925 Anesthesia Stop:  0958    Procedure:  COLONOSCOPY WITH ANESTHESIA (N/A ) Diagnosis:       Blood in stool      Diarrhea, unspecified type      Polyp of colon, unspecified part of colon, unspecified type      (Blood in stool [K92.1])      (Diarrhea, unspecified type [R19.7])      (Polyp of colon, unspecified part of colon, unspecified type [K63.5])    Surgeon:  Padmini Anders MD Provider:  CHARLIE Driver CRNA    Anesthesia Type:  general ASA Status:  3          Anesthesia Type: general  Last vitals  BP   128/78 (06/04/18 1020)   Temp   97.6 °F (36.4 °C) (06/04/18 0752)   Pulse   54 (06/04/18 1020)   Resp   19 (06/04/18 1020)     SpO2   98 % (06/04/18 1017)     Post Anesthesia Care and Evaluation    Patient location during evaluation: PHASE II  Patient participation: complete - patient participated  Level of consciousness: awake and alert  Pain management: satisfactory to patient  Airway patency: patent  Anesthetic complications: No anesthetic complications    Cardiovascular status: acceptable  Respiratory status: acceptable  Hydration status: acceptable

## 2018-06-05 ENCOUNTER — TELEPHONE (OUTPATIENT)
Dept: GASTROENTEROLOGY | Facility: CLINIC | Age: 78
End: 2018-06-05

## 2018-06-05 LAB
CYTO UR: NORMAL
LAB AP CASE REPORT: NORMAL
LAB AP CLINICAL INFORMATION: NORMAL
Lab: NORMAL
PATH REPORT.FINAL DX SPEC: NORMAL
PATH REPORT.GROSS SPEC: NORMAL

## 2018-06-05 NOTE — TELEPHONE ENCOUNTER
----- Message from Padmini Anders MD sent at 6/5/2018  4:41 PM CDT -----  I am writing to let you know that the polyps removed from the colon did not have any cancer. They no longer pose a threat to you since there were completely removed.  However, in the future, it is possible that additional polyps might grow.  For this reason, we will repeat colonoscopy in 5 years as planned.    If you have any questions, please call our office.    Sincerely,        Padmini Anders MD

## 2018-06-06 ENCOUNTER — OFFICE VISIT (OUTPATIENT)
Dept: PRIMARY CARE CLINIC | Age: 78
End: 2018-06-06
Payer: MEDICARE

## 2018-06-06 VITALS
BODY MASS INDEX: 27.94 KG/M2 | DIASTOLIC BLOOD PRESSURE: 82 MMHG | SYSTOLIC BLOOD PRESSURE: 110 MMHG | HEIGHT: 61 IN | HEART RATE: 63 BPM | WEIGHT: 148 LBS | OXYGEN SATURATION: 96 %

## 2018-06-06 DIAGNOSIS — M53.3 SACROILIAC JOINT DYSFUNCTION OF BOTH SIDES: Primary | ICD-10-CM

## 2018-06-06 DIAGNOSIS — R35.0 URINARY FREQUENCY: ICD-10-CM

## 2018-06-06 DIAGNOSIS — J44.9 STAGE 3 SEVERE COPD BY GOLD CLASSIFICATION (HCC): ICD-10-CM

## 2018-06-06 PROBLEM — J43.1 PANLOBULAR EMPHYSEMA (HCC): Status: RESOLVED | Noted: 2018-03-09 | Resolved: 2018-06-06

## 2018-06-06 PROCEDURE — 1123F ACP DISCUSS/DSCN MKR DOCD: CPT | Performed by: FAMILY MEDICINE

## 2018-06-06 PROCEDURE — G8926 SPIRO NO PERF OR DOC: HCPCS | Performed by: FAMILY MEDICINE

## 2018-06-06 PROCEDURE — 4004F PT TOBACCO SCREEN RCVD TLK: CPT | Performed by: FAMILY MEDICINE

## 2018-06-06 PROCEDURE — 4040F PNEUMOC VAC/ADMIN/RCVD: CPT | Performed by: FAMILY MEDICINE

## 2018-06-06 PROCEDURE — 99214 OFFICE O/P EST MOD 30 MIN: CPT | Performed by: FAMILY MEDICINE

## 2018-06-06 PROCEDURE — 3023F SPIROM DOC REV: CPT | Performed by: FAMILY MEDICINE

## 2018-06-06 PROCEDURE — G8598 ASA/ANTIPLAT THER USED: HCPCS | Performed by: FAMILY MEDICINE

## 2018-06-06 PROCEDURE — 20610 DRAIN/INJ JOINT/BURSA W/O US: CPT | Performed by: FAMILY MEDICINE

## 2018-06-06 PROCEDURE — 1090F PRES/ABSN URINE INCON ASSESS: CPT | Performed by: FAMILY MEDICINE

## 2018-06-06 PROCEDURE — G8400 PT W/DXA NO RESULTS DOC: HCPCS | Performed by: FAMILY MEDICINE

## 2018-06-06 PROCEDURE — G8419 CALC BMI OUT NRM PARAM NOF/U: HCPCS | Performed by: FAMILY MEDICINE

## 2018-06-06 PROCEDURE — G8427 DOCREV CUR MEDS BY ELIG CLIN: HCPCS | Performed by: FAMILY MEDICINE

## 2018-06-06 PROCEDURE — 94060 EVALUATION OF WHEEZING: CPT | Performed by: FAMILY MEDICINE

## 2018-06-06 RX ORDER — PREDNISONE 20 MG/1
40 TABLET ORAL DAILY
Qty: 10 TABLET | Refills: 0 | Status: SHIPPED | OUTPATIENT
Start: 2018-06-06 | End: 2018-06-11

## 2018-06-06 RX ORDER — TRIAMCINOLONE ACETONIDE 40 MG/ML
40 INJECTION, SUSPENSION INTRA-ARTICULAR; INTRAMUSCULAR ONCE
Status: COMPLETED | OUTPATIENT
Start: 2018-06-06 | End: 2018-06-06

## 2018-06-06 RX ORDER — IPRATROPIUM BROMIDE AND ALBUTEROL SULFATE 2.5; .5 MG/3ML; MG/3ML
1 SOLUTION RESPIRATORY (INHALATION) EVERY 4 HOURS
Qty: 360 ML | Refills: 0 | Status: SHIPPED | OUTPATIENT
Start: 2018-06-06 | End: 2018-10-15 | Stop reason: SDUPTHER

## 2018-06-06 RX ORDER — ALBUTEROL SULFATE 90 UG/1
2 AEROSOL, METERED RESPIRATORY (INHALATION) EVERY 6 HOURS PRN
Qty: 1 INHALER | Refills: 3 | Status: SHIPPED | OUTPATIENT
Start: 2018-06-06

## 2018-06-06 RX ORDER — AZITHROMYCIN 250 MG/1
250 TABLET, FILM COATED ORAL DAILY
Qty: 6 TABLET | Refills: 0 | Status: SHIPPED | OUTPATIENT
Start: 2018-06-06 | End: 2018-06-11

## 2018-06-06 RX ORDER — LOSARTAN POTASSIUM 100 MG/1
100 TABLET ORAL DAILY
Qty: 30 TABLET | Refills: 3 | Status: SHIPPED | OUTPATIENT
Start: 2018-06-06 | End: 2018-11-28 | Stop reason: SDUPTHER

## 2018-06-06 RX ORDER — PROPRANOLOL HYDROCHLORIDE 40 MG/1
40 TABLET ORAL 2 TIMES DAILY
Qty: 270 TABLET | Refills: 2 | Status: SHIPPED | OUTPATIENT
Start: 2018-06-06 | End: 2018-11-28 | Stop reason: SDUPTHER

## 2018-06-06 RX ADMIN — TRIAMCINOLONE ACETONIDE 40 MG: 40 INJECTION, SUSPENSION INTRA-ARTICULAR; INTRAMUSCULAR at 13:55

## 2018-06-06 ASSESSMENT — ENCOUNTER SYMPTOMS
SHORTNESS OF BREATH: 1
NAUSEA: 0
DIARRHEA: 0
COUGH: 1
CONSTIPATION: 0
WHEEZING: 0
VOMITING: 0
ABDOMINAL PAIN: 0
CHEST TIGHTNESS: 0
BACK PAIN: 1

## 2018-06-21 ENCOUNTER — CARE COORDINATION (OUTPATIENT)
Dept: CARE COORDINATION | Age: 78
End: 2018-06-21

## 2018-08-01 RX ORDER — UMECLIDINIUM BROMIDE AND VILANTEROL TRIFENATATE 62.5; 25 UG/1; UG/1
POWDER RESPIRATORY (INHALATION)
Qty: 30 EACH | Refills: 5 | Status: SHIPPED | OUTPATIENT
Start: 2018-08-01 | End: 2018-11-14 | Stop reason: SDUPTHER

## 2018-08-10 ENCOUNTER — OFFICE VISIT (OUTPATIENT)
Dept: PRIMARY CARE CLINIC | Age: 78
End: 2018-08-10
Payer: MEDICARE

## 2018-08-10 VITALS
OXYGEN SATURATION: 97 % | DIASTOLIC BLOOD PRESSURE: 82 MMHG | BODY MASS INDEX: 28.13 KG/M2 | TEMPERATURE: 98 F | HEIGHT: 61 IN | WEIGHT: 149 LBS | SYSTOLIC BLOOD PRESSURE: 124 MMHG | HEART RATE: 60 BPM

## 2018-08-10 DIAGNOSIS — Z72.0 TOBACCO ABUSE: ICD-10-CM

## 2018-08-10 DIAGNOSIS — Z71.6 TOBACCO ABUSE COUNSELING: ICD-10-CM

## 2018-08-10 DIAGNOSIS — M25.552 PAIN OF BOTH HIP JOINTS: ICD-10-CM

## 2018-08-10 DIAGNOSIS — F41.9 ANXIETY AND DEPRESSION: ICD-10-CM

## 2018-08-10 DIAGNOSIS — J44.9 STAGE 3 SEVERE COPD BY GOLD CLASSIFICATION (HCC): Primary | Chronic | ICD-10-CM

## 2018-08-10 DIAGNOSIS — F32.A ANXIETY AND DEPRESSION: ICD-10-CM

## 2018-08-10 DIAGNOSIS — M25.551 PAIN OF BOTH HIP JOINTS: ICD-10-CM

## 2018-08-10 DIAGNOSIS — M19.90 ARTHRITIS: ICD-10-CM

## 2018-08-10 PROCEDURE — 99214 OFFICE O/P EST MOD 30 MIN: CPT | Performed by: NURSE PRACTITIONER

## 2018-08-10 PROCEDURE — G8400 PT W/DXA NO RESULTS DOC: HCPCS | Performed by: NURSE PRACTITIONER

## 2018-08-10 PROCEDURE — 1101F PT FALLS ASSESS-DOCD LE1/YR: CPT | Performed by: NURSE PRACTITIONER

## 2018-08-10 PROCEDURE — G8419 CALC BMI OUT NRM PARAM NOF/U: HCPCS | Performed by: NURSE PRACTITIONER

## 2018-08-10 PROCEDURE — 1123F ACP DISCUSS/DSCN MKR DOCD: CPT | Performed by: NURSE PRACTITIONER

## 2018-08-10 PROCEDURE — 3023F SPIROM DOC REV: CPT | Performed by: NURSE PRACTITIONER

## 2018-08-10 PROCEDURE — G8427 DOCREV CUR MEDS BY ELIG CLIN: HCPCS | Performed by: NURSE PRACTITIONER

## 2018-08-10 PROCEDURE — 1090F PRES/ABSN URINE INCON ASSESS: CPT | Performed by: NURSE PRACTITIONER

## 2018-08-10 PROCEDURE — 99406 BEHAV CHNG SMOKING 3-10 MIN: CPT | Performed by: NURSE PRACTITIONER

## 2018-08-10 PROCEDURE — G8926 SPIRO NO PERF OR DOC: HCPCS | Performed by: NURSE PRACTITIONER

## 2018-08-10 PROCEDURE — G8598 ASA/ANTIPLAT THER USED: HCPCS | Performed by: NURSE PRACTITIONER

## 2018-08-10 PROCEDURE — 4004F PT TOBACCO SCREEN RCVD TLK: CPT | Performed by: NURSE PRACTITIONER

## 2018-08-10 PROCEDURE — 4040F PNEUMOC VAC/ADMIN/RCVD: CPT | Performed by: NURSE PRACTITIONER

## 2018-08-10 RX ORDER — VENLAFAXINE HYDROCHLORIDE 150 MG/1
150 CAPSULE, EXTENDED RELEASE ORAL DAILY
Qty: 90 CAPSULE | Refills: 1 | Status: SHIPPED | OUTPATIENT
Start: 2018-08-10 | End: 2018-11-28 | Stop reason: SDUPTHER

## 2018-08-10 RX ORDER — MELOXICAM 7.5 MG/1
7.5 TABLET ORAL DAILY
Qty: 30 TABLET | Refills: 3 | Status: SHIPPED | OUTPATIENT
Start: 2018-08-10 | End: 2018-11-28 | Stop reason: SDUPTHER

## 2018-08-10 ASSESSMENT — ENCOUNTER SYMPTOMS
GASTROINTESTINAL NEGATIVE: 1
EYES NEGATIVE: 1
SHORTNESS OF BREATH: 1

## 2018-09-04 ENCOUNTER — OFFICE VISIT (OUTPATIENT)
Dept: PRIMARY CARE CLINIC | Age: 78
End: 2018-09-04
Payer: MEDICARE

## 2018-09-04 ENCOUNTER — HOSPITAL ENCOUNTER (OUTPATIENT)
Dept: VASCULAR LAB | Age: 78
Discharge: HOME | End: 2018-09-04
Payer: MEDICARE

## 2018-09-04 VITALS
HEIGHT: 61 IN | WEIGHT: 194.4 LBS | TEMPERATURE: 98 F | OXYGEN SATURATION: 98 % | DIASTOLIC BLOOD PRESSURE: 78 MMHG | HEART RATE: 78 BPM | BODY MASS INDEX: 36.7 KG/M2 | SYSTOLIC BLOOD PRESSURE: 124 MMHG

## 2018-09-04 DIAGNOSIS — M79.661 RIGHT CALF PAIN: ICD-10-CM

## 2018-09-04 DIAGNOSIS — M79.661 RIGHT CALF PAIN: Primary | ICD-10-CM

## 2018-09-04 PROCEDURE — 1123F ACP DISCUSS/DSCN MKR DOCD: CPT | Performed by: NURSE PRACTITIONER

## 2018-09-04 PROCEDURE — 99214 OFFICE O/P EST MOD 30 MIN: CPT | Performed by: NURSE PRACTITIONER

## 2018-09-04 PROCEDURE — 1090F PRES/ABSN URINE INCON ASSESS: CPT | Performed by: NURSE PRACTITIONER

## 2018-09-04 PROCEDURE — G8598 ASA/ANTIPLAT THER USED: HCPCS | Performed by: NURSE PRACTITIONER

## 2018-09-04 PROCEDURE — G8400 PT W/DXA NO RESULTS DOC: HCPCS | Performed by: NURSE PRACTITIONER

## 2018-09-04 PROCEDURE — 4040F PNEUMOC VAC/ADMIN/RCVD: CPT | Performed by: NURSE PRACTITIONER

## 2018-09-04 PROCEDURE — G8417 CALC BMI ABV UP PARAM F/U: HCPCS | Performed by: NURSE PRACTITIONER

## 2018-09-04 PROCEDURE — 93971 EXTREMITY STUDY: CPT

## 2018-09-04 PROCEDURE — 4004F PT TOBACCO SCREEN RCVD TLK: CPT | Performed by: NURSE PRACTITIONER

## 2018-09-04 PROCEDURE — G8427 DOCREV CUR MEDS BY ELIG CLIN: HCPCS | Performed by: NURSE PRACTITIONER

## 2018-09-04 PROCEDURE — 1101F PT FALLS ASSESS-DOCD LE1/YR: CPT | Performed by: NURSE PRACTITIONER

## 2018-09-04 ASSESSMENT — ENCOUNTER SYMPTOMS
EYES NEGATIVE: 1
RESPIRATORY NEGATIVE: 1
GASTROINTESTINAL NEGATIVE: 1

## 2018-09-04 NOTE — PROGRESS NOTES
[facility]  [address]  [address] [facility] [address]  Dept: [phone]  Dept Fax: [phone]  Loc: [phone]    Niharika Welch is a 70 y.o. female who presents today for her medical conditions/complaints as noted below. Niharika Welch is c/o of Leg Pain (right calf pain, x1 month+)      Chief Complaint   Patient presents with    Leg Pain     right calf pain, x1 month+       HPI:     HPI   Patient here for follow up on hip pain. Patient states that she is now having pain in her calf. She states the she is unable to stand or walk too far without having the calf pain. She states that her hips have not improved and overall discomfort either. Patient does not have a history of DVTs and she denies any recent travel or sitting in a seated position for a significant amount of time. Patient does state that the pain is localized to one area of the right calf.     Past Medical History:   Diagnosis Date    Acid reflux     Allergic rhinitis     Hyperlipidemia     Hypertension     Lung cancer ([address]     Mild single current episode of major depressive disorder ([address] 5/2/2018    Osteoarthritis     Other emphysema ([address] 12/22/2017    Stage 3 severe COPD by GOLD classification ([facility] 6/6/2018    FEV1 42%    Tobacco abuse 2/8/2018    Urinary incontinence     stress incontinence        Past Surgical History:   Procedure Laterality Date    APPENDECTOMY      CARDIAC CATHETERIZATION  5/3/2013   MDL    EF 60%    HEMORRHOID SURGERY      HYSTERECTOMY      HYSTERECTOMY, TOTAL ABDOMINAL      LUNG CANCER SURGERY      TONSILLECTOMY         Social History   Substance Use Topics    Smoking status: Current Every Day Smoker     Packs/day: 0.50     Years: 30.00     Types: Cigarettes     Start date: 6/6/1970    Smokeless tobacco: Never Used    Alcohol use Yes      Comment: Rare         Current Outpatient Prescriptions   Medication Sig Dispense Refill    venlafaxine

## 2018-09-05 ENCOUNTER — TELEPHONE (OUTPATIENT)
Dept: PRIMARY CARE CLINIC | Age: 78
End: 2018-09-05

## 2018-09-18 ENCOUNTER — HOSPITAL ENCOUNTER (INPATIENT)
Age: 78
LOS: 2 days | Discharge: HOME | DRG: 190 | End: 2018-09-20
Attending: EMERGENCY MEDICINE | Admitting: FAMILY MEDICINE
Payer: MEDICARE

## 2018-09-18 ENCOUNTER — APPOINTMENT (OUTPATIENT)
Dept: GENERAL RADIOLOGY | Age: 78
DRG: 190 | End: 2018-09-18
Payer: MEDICARE

## 2018-09-18 DIAGNOSIS — J44.1 COPD EXACERBATION (HCC): Primary | ICD-10-CM

## 2018-09-18 PROBLEM — R09.02 HYPOXIA: Status: ACTIVE | Noted: 2018-09-18

## 2018-09-18 PROBLEM — Z72.0 TOBACCO ABUSE: Status: ACTIVE | Noted: 2018-09-18

## 2018-09-18 LAB
ALBUMIN SERPL-MCNC: 4 G/DL (ref 3.5–5.2)
ALP BLD-CCNC: 93 U/L (ref 35–104)
ALT SERPL-CCNC: 15 U/L (ref 5–33)
ANION GAP SERPL CALCULATED.3IONS-SCNC: 18 MMOL/L (ref 7–19)
APTT: 28.8 SEC (ref 26–36.2)
AST SERPL-CCNC: 18 U/L (ref 5–32)
BACTERIA: ABNORMAL /HPF
BASE EXCESS ARTERIAL: -2 MMOL/L (ref -2–2)
BASE EXCESS ARTERIAL: -8.7 MMOL/L (ref -2–2)
BASOPHILS ABSOLUTE: 0 K/UL (ref 0–0.2)
BASOPHILS RELATIVE PERCENT: 0.2 % (ref 0–1)
BILIRUB SERPL-MCNC: 0.3 MG/DL (ref 0.2–1.2)
BILIRUBIN URINE: NEGATIVE
BLOOD, URINE: ABNORMAL
BUN BLDV-MCNC: 24 MG/DL (ref 8–23)
CALCIUM SERPL-MCNC: 9.3 MG/DL (ref 8.8–10.2)
CARBOXYHEMOGLOBIN ARTERIAL: 3.3 % (ref 0–5)
CARBOXYHEMOGLOBIN ARTERIAL: 4.9 % (ref 0–5)
CHLORIDE BLD-SCNC: 96 MMOL/L (ref 98–111)
CLARITY: CLEAR
CO2: 19 MMOL/L (ref 22–29)
COLOR: YELLOW
CREAT SERPL-MCNC: 1.1 MG/DL (ref 0.5–0.9)
EKG P AXIS: 71 DEGREES
EKG P-R INTERVAL: 204 MS
EKG Q-T INTERVAL: 410 MS
EKG QRS DURATION: 74 MS
EKG QTC CALCULATION (BAZETT): 443 MS
EKG T AXIS: 75 DEGREES
EOSINOPHILS ABSOLUTE: 0.1 K/UL (ref 0–0.6)
EOSINOPHILS RELATIVE PERCENT: 0.5 % (ref 0–5)
EPITHELIAL CELLS, UA: 2 /HPF (ref 0–5)
GFR NON-AFRICAN AMERICAN: 48
GLUCOSE BLD-MCNC: 230 MG/DL (ref 74–109)
GLUCOSE URINE: NEGATIVE MG/DL
HCO3 ARTERIAL: 18.8 MMOL/L (ref 22–26)
HCO3 ARTERIAL: 23.1 MMOL/L (ref 22–26)
HCT VFR BLD CALC: 41.5 % (ref 37–47)
HEMOGLOBIN, ART, EXTENDED: 11.5 G/DL (ref 12–16)
HEMOGLOBIN, ART, EXTENDED: 11.9 G/DL (ref 12–16)
HEMOGLOBIN: 12 G/DL (ref 12–16)
HYALINE CASTS: 0 /HPF (ref 0–8)
HYPOCHROMIA: ABNORMAL
INR BLD: 1.03 (ref 0.88–1.18)
KETONES, URINE: ABNORMAL MG/DL
LEUKOCYTE ESTERASE, URINE: ABNORMAL
LYMPHOCYTES ABSOLUTE: 3.3 K/UL (ref 1.1–4.5)
LYMPHOCYTES RELATIVE PERCENT: 30.6 % (ref 20–40)
MACROCYTES: ABNORMAL
MAGNESIUM: 1.9 MG/DL (ref 1.6–2.4)
MCH RBC QN AUTO: 27.7 PG (ref 27–31)
MCHC RBC AUTO-ENTMCNC: 28.9 G/DL (ref 33–37)
MCV RBC AUTO: 95.8 FL (ref 81–99)
METHEMOGLOBIN ARTERIAL: 0.5 %
METHEMOGLOBIN ARTERIAL: 0.7 %
MONOCYTES ABSOLUTE: 1 K/UL (ref 0–0.9)
MONOCYTES RELATIVE PERCENT: 9.4 % (ref 0–10)
NEUTROPHILS ABSOLUTE: 6.4 K/UL (ref 1.5–7.5)
NEUTROPHILS RELATIVE PERCENT: 58.7 % (ref 50–65)
NITRITE, URINE: NEGATIVE
O2 CONTENT ARTERIAL: 15.4 ML/DL
O2 CONTENT ARTERIAL: 16.1 ML/DL
O2 SAT, ARTERIAL: 93.1 %
O2 SAT, ARTERIAL: 94.1 %
O2 THERAPY: ABNORMAL
O2 THERAPY: ABNORMAL
OVALOCYTES: ABNORMAL
PCO2 ARTERIAL: 40 MMHG (ref 35–45)
PCO2 ARTERIAL: 46 MMHG (ref 35–45)
PDW BLD-RTO: 13.5 % (ref 11.5–14.5)
PH ARTERIAL: 7.22 (ref 7.35–7.45)
PH ARTERIAL: 7.37 (ref 7.35–7.45)
PH UA: 6.5
PLATELET # BLD: 306 K/UL (ref 130–400)
PLATELET SLIDE REVIEW: ADEQUATE
PMV BLD AUTO: 10 FL (ref 9.4–12.3)
PO2 ARTERIAL: 214 MMHG (ref 80–100)
PO2 ARTERIAL: 99 MMHG (ref 80–100)
POLYCHROMASIA: ABNORMAL
POTASSIUM SERPL-SCNC: 4.4 MMOL/L (ref 3.5–5)
POTASSIUM, WHOLE BLOOD: 3.9
POTASSIUM, WHOLE BLOOD: 4.3
PRO-BNP: 990 PG/ML (ref 0–1800)
PROTEIN UA: NEGATIVE MG/DL
PROTHROMBIN TIME: 13.4 SEC (ref 12–14.6)
RBC # BLD: 4.33 M/UL (ref 4.2–5.4)
RBC UA: 1 /HPF (ref 0–4)
SODIUM BLD-SCNC: 133 MMOL/L (ref 136–145)
SPECIFIC GRAVITY UA: 1.01
TOTAL PROTEIN: 7.2 G/DL (ref 6.6–8.7)
TROPONIN: 0.13 NG/ML (ref 0–0.03)
UROBILINOGEN, URINE: 0.2 E.U./DL
WBC # BLD: 10.9 K/UL (ref 4.8–10.8)
WBC UA: 7 /HPF (ref 0–5)

## 2018-09-18 PROCEDURE — 84484 ASSAY OF TROPONIN QUANT: CPT

## 2018-09-18 PROCEDURE — 71045 X-RAY EXAM CHEST 1 VIEW: CPT

## 2018-09-18 PROCEDURE — 6370000000 HC RX 637 (ALT 250 FOR IP): Performed by: HOSPITALIST

## 2018-09-18 PROCEDURE — 2500000003 HC RX 250 WO HCPCS: Performed by: HOSPITALIST

## 2018-09-18 PROCEDURE — 96374 THER/PROPH/DIAG INJ IV PUSH: CPT

## 2018-09-18 PROCEDURE — 81001 URINALYSIS AUTO W/SCOPE: CPT

## 2018-09-18 PROCEDURE — 99285 EMERGENCY DEPT VISIT HI MDM: CPT

## 2018-09-18 PROCEDURE — 85025 COMPLETE CBC W/AUTO DIFF WBC: CPT

## 2018-09-18 PROCEDURE — 99285 EMERGENCY DEPT VISIT HI MDM: CPT | Performed by: EMERGENCY MEDICINE

## 2018-09-18 PROCEDURE — 93005 ELECTROCARDIOGRAM TRACING: CPT

## 2018-09-18 PROCEDURE — 82803 BLOOD GASES ANY COMBINATION: CPT

## 2018-09-18 PROCEDURE — 6360000002 HC RX W HCPCS: Performed by: HOSPITALIST

## 2018-09-18 PROCEDURE — 83735 ASSAY OF MAGNESIUM: CPT

## 2018-09-18 PROCEDURE — 2580000003 HC RX 258: Performed by: HOSPITALIST

## 2018-09-18 PROCEDURE — 94640 AIRWAY INHALATION TREATMENT: CPT

## 2018-09-18 PROCEDURE — 85610 PROTHROMBIN TIME: CPT

## 2018-09-18 PROCEDURE — 85730 THROMBOPLASTIN TIME PARTIAL: CPT

## 2018-09-18 PROCEDURE — 2700000000 HC OXYGEN THERAPY PER DAY

## 2018-09-18 PROCEDURE — 94664 DEMO&/EVAL PT USE INHALER: CPT

## 2018-09-18 PROCEDURE — 99999 PR OFFICE/OUTPT VISIT,PROCEDURE ONLY: CPT | Performed by: INTERNAL MEDICINE

## 2018-09-18 PROCEDURE — 83880 ASSAY OF NATRIURETIC PEPTIDE: CPT

## 2018-09-18 PROCEDURE — 36415 COLL VENOUS BLD VENIPUNCTURE: CPT

## 2018-09-18 PROCEDURE — 84132 ASSAY OF SERUM POTASSIUM: CPT

## 2018-09-18 PROCEDURE — 80053 COMPREHEN METABOLIC PANEL: CPT

## 2018-09-18 PROCEDURE — APPSS60 APP SPLIT SHARED TIME 46-60 MINUTES: Performed by: PHYSICIAN ASSISTANT

## 2018-09-18 PROCEDURE — 36600 WITHDRAWAL OF ARTERIAL BLOOD: CPT

## 2018-09-18 PROCEDURE — 6360000002 HC RX W HCPCS: Performed by: EMERGENCY MEDICINE

## 2018-09-18 PROCEDURE — 1210000000 HC MED SURG R&B

## 2018-09-18 RX ORDER — SODIUM BICARBONATE 650 MG/1
650 TABLET ORAL 4 TIMES DAILY
Status: DISCONTINUED | OUTPATIENT
Start: 2018-09-18 | End: 2018-09-20 | Stop reason: HOSPADM

## 2018-09-18 RX ORDER — ASPIRIN 81 MG/1
81 TABLET ORAL DAILY
Status: DISCONTINUED | OUTPATIENT
Start: 2018-09-18 | End: 2018-09-20 | Stop reason: HOSPADM

## 2018-09-18 RX ORDER — ATORVASTATIN CALCIUM 40 MG/1
40 TABLET, FILM COATED ORAL DAILY
Status: DISCONTINUED | OUTPATIENT
Start: 2018-09-18 | End: 2018-09-20 | Stop reason: HOSPADM

## 2018-09-18 RX ORDER — IPRATROPIUM BROMIDE AND ALBUTEROL SULFATE 2.5; .5 MG/3ML; MG/3ML
1 SOLUTION RESPIRATORY (INHALATION)
Status: DISCONTINUED | OUTPATIENT
Start: 2018-09-18 | End: 2018-09-20 | Stop reason: HOSPADM

## 2018-09-18 RX ORDER — GUAIFENESIN 600 MG/1
600 TABLET, EXTENDED RELEASE ORAL 2 TIMES DAILY
Status: DISCONTINUED | OUTPATIENT
Start: 2018-09-18 | End: 2018-09-18

## 2018-09-18 RX ORDER — PROPRANOLOL HYDROCHLORIDE 40 MG/1
40 TABLET ORAL 2 TIMES DAILY
Status: DISCONTINUED | OUTPATIENT
Start: 2018-09-18 | End: 2018-09-20 | Stop reason: HOSPADM

## 2018-09-18 RX ORDER — POTASSIUM CHLORIDE 20 MEQ/1
20 TABLET, EXTENDED RELEASE ORAL DAILY
Status: DISCONTINUED | OUTPATIENT
Start: 2018-09-18 | End: 2018-09-20 | Stop reason: HOSPADM

## 2018-09-18 RX ORDER — METHYLPREDNISOLONE SODIUM SUCCINATE 125 MG/2ML
125 INJECTION, POWDER, LYOPHILIZED, FOR SOLUTION INTRAMUSCULAR; INTRAVENOUS ONCE
Status: COMPLETED | OUTPATIENT
Start: 2018-09-18 | End: 2018-09-18

## 2018-09-18 RX ORDER — METHYLPREDNISOLONE SODIUM SUCCINATE 40 MG/ML
40 INJECTION, POWDER, LYOPHILIZED, FOR SOLUTION INTRAMUSCULAR; INTRAVENOUS EVERY 8 HOURS
Status: DISCONTINUED | OUTPATIENT
Start: 2018-09-19 | End: 2018-09-18

## 2018-09-18 RX ORDER — SODIUM CHLORIDE 9 MG/ML
INJECTION, SOLUTION INTRAVENOUS CONTINUOUS
Status: DISCONTINUED | OUTPATIENT
Start: 2018-09-18 | End: 2018-09-20 | Stop reason: HOSPADM

## 2018-09-18 RX ORDER — MONTELUKAST SODIUM 10 MG/1
10 TABLET ORAL NIGHTLY
Status: DISCONTINUED | OUTPATIENT
Start: 2018-09-18 | End: 2018-09-20 | Stop reason: HOSPADM

## 2018-09-18 RX ORDER — PANTOPRAZOLE SODIUM 40 MG/1
40 TABLET, DELAYED RELEASE ORAL
Status: DISCONTINUED | OUTPATIENT
Start: 2018-09-18 | End: 2018-09-20 | Stop reason: HOSPADM

## 2018-09-18 RX ORDER — GUAIFENESIN 600 MG/1
1200 TABLET, EXTENDED RELEASE ORAL 2 TIMES DAILY
Status: DISCONTINUED | OUTPATIENT
Start: 2018-09-18 | End: 2018-09-20 | Stop reason: HOSPADM

## 2018-09-18 RX ORDER — VENLAFAXINE HYDROCHLORIDE 75 MG/1
75 CAPSULE, EXTENDED RELEASE ORAL DAILY
Status: DISCONTINUED | OUTPATIENT
Start: 2018-09-18 | End: 2018-09-18

## 2018-09-18 RX ORDER — VENLAFAXINE HYDROCHLORIDE 75 MG/1
150 CAPSULE, EXTENDED RELEASE ORAL DAILY
Status: DISCONTINUED | OUTPATIENT
Start: 2018-09-18 | End: 2018-09-18

## 2018-09-18 RX ORDER — METHYLPREDNISOLONE SODIUM SUCCINATE 40 MG/ML
40 INJECTION, POWDER, LYOPHILIZED, FOR SOLUTION INTRAMUSCULAR; INTRAVENOUS EVERY 6 HOURS
Status: DISCONTINUED | OUTPATIENT
Start: 2018-09-18 | End: 2018-09-18

## 2018-09-18 RX ORDER — ACETYLCYSTEINE 200 MG/ML
600 SOLUTION ORAL; RESPIRATORY (INHALATION) 2 TIMES DAILY
Status: DISCONTINUED | OUTPATIENT
Start: 2018-09-18 | End: 2018-09-20 | Stop reason: HOSPADM

## 2018-09-18 RX ORDER — VENLAFAXINE HYDROCHLORIDE 75 MG/1
150 CAPSULE, EXTENDED RELEASE ORAL DAILY
Status: DISCONTINUED | OUTPATIENT
Start: 2018-09-19 | End: 2018-09-20 | Stop reason: HOSPADM

## 2018-09-18 RX ORDER — LOSARTAN POTASSIUM 100 MG/1
100 TABLET ORAL DAILY
Status: DISCONTINUED | OUTPATIENT
Start: 2018-09-18 | End: 2018-09-18

## 2018-09-18 RX ORDER — METHYLPREDNISOLONE SODIUM SUCCINATE 40 MG/ML
40 INJECTION, POWDER, LYOPHILIZED, FOR SOLUTION INTRAMUSCULAR; INTRAVENOUS EVERY 6 HOURS
Status: DISCONTINUED | OUTPATIENT
Start: 2018-09-18 | End: 2018-09-20 | Stop reason: HOSPADM

## 2018-09-18 RX ORDER — AMLODIPINE BESYLATE 5 MG/1
5 TABLET ORAL DAILY
Status: DISCONTINUED | OUTPATIENT
Start: 2018-09-18 | End: 2018-09-18

## 2018-09-18 RX ORDER — OMEPRAZOLE 10 MG/1
10 CAPSULE, DELAYED RELEASE ORAL EVERY MORNING
Status: DISCONTINUED | OUTPATIENT
Start: 2018-09-18 | End: 2018-09-18

## 2018-09-18 RX ADMIN — ATORVASTATIN CALCIUM 40 MG: 40 TABLET, FILM COATED ORAL at 11:05

## 2018-09-18 RX ADMIN — ALBUTEROL SULFATE 15 MG/HR: 2.5 SOLUTION RESPIRATORY (INHALATION) at 03:25

## 2018-09-18 RX ADMIN — DOXYCYCLINE 100 MG: 100 INJECTION, POWDER, LYOPHILIZED, FOR SOLUTION INTRAVENOUS at 11:56

## 2018-09-18 RX ADMIN — METHYLPREDNISOLONE SODIUM SUCCINATE 40 MG: 40 INJECTION, POWDER, FOR SOLUTION INTRAMUSCULAR; INTRAVENOUS at 17:42

## 2018-09-18 RX ADMIN — METHYLPREDNISOLONE SODIUM SUCCINATE 40 MG: 40 INJECTION, POWDER, FOR SOLUTION INTRAMUSCULAR; INTRAVENOUS at 11:04

## 2018-09-18 RX ADMIN — MONTELUKAST SODIUM 10 MG: 10 TABLET, COATED ORAL at 20:55

## 2018-09-18 RX ADMIN — SODIUM BICARBONATE 650 MG: 650 TABLET ORAL at 13:37

## 2018-09-18 RX ADMIN — METHYLPREDNISOLONE SODIUM SUCCINATE 40 MG: 40 INJECTION, POWDER, FOR SOLUTION INTRAMUSCULAR; INTRAVENOUS at 20:55

## 2018-09-18 RX ADMIN — PROPRANOLOL HYDROCHLORIDE 40 MG: 40 TABLET ORAL at 11:04

## 2018-09-18 RX ADMIN — IPRATROPIUM BROMIDE 0.5 MG: 0.5 SOLUTION RESPIRATORY (INHALATION) at 03:25

## 2018-09-18 RX ADMIN — ACETYLCYSTEINE 600 MG: 200 SOLUTION ORAL; RESPIRATORY (INHALATION) at 18:59

## 2018-09-18 RX ADMIN — ASPIRIN 81 MG: 81 TABLET ORAL at 11:04

## 2018-09-18 RX ADMIN — SODIUM CHLORIDE: 9 INJECTION, SOLUTION INTRAVENOUS at 20:55

## 2018-09-18 RX ADMIN — SODIUM BICARBONATE 650 MG: 650 TABLET ORAL at 17:42

## 2018-09-18 RX ADMIN — IPRATROPIUM BROMIDE AND ALBUTEROL SULFATE 1 AMPULE: .5; 3 SOLUTION RESPIRATORY (INHALATION) at 10:37

## 2018-09-18 RX ADMIN — PANTOPRAZOLE SODIUM 40 MG: 40 TABLET, DELAYED RELEASE ORAL at 11:05

## 2018-09-18 RX ADMIN — SODIUM BICARBONATE 650 MG: 650 TABLET ORAL at 20:55

## 2018-09-18 RX ADMIN — VENLAFAXINE HYDROCHLORIDE 75 MG: 75 CAPSULE, EXTENDED RELEASE ORAL at 11:04

## 2018-09-18 RX ADMIN — METHYLPREDNISOLONE SODIUM SUCCINATE 125 MG: 125 INJECTION, POWDER, FOR SOLUTION INTRAMUSCULAR; INTRAVENOUS at 03:26

## 2018-09-18 RX ADMIN — GUAIFENESIN 1200 MG: 600 TABLET, EXTENDED RELEASE ORAL at 20:55

## 2018-09-18 RX ADMIN — PROPRANOLOL HYDROCHLORIDE 40 MG: 40 TABLET ORAL at 20:55

## 2018-09-18 RX ADMIN — POTASSIUM CHLORIDE 20 MEQ: 20 TABLET, EXTENDED RELEASE ORAL at 11:04

## 2018-09-18 RX ADMIN — DOXYCYCLINE 100 MG: 100 INJECTION, POWDER, LYOPHILIZED, FOR SOLUTION INTRAVENOUS at 23:06

## 2018-09-18 RX ADMIN — IPRATROPIUM BROMIDE AND ALBUTEROL SULFATE 1 AMPULE: .5; 3 SOLUTION RESPIRATORY (INHALATION) at 18:58

## 2018-09-18 RX ADMIN — IPRATROPIUM BROMIDE AND ALBUTEROL SULFATE 1 AMPULE: .5; 3 SOLUTION RESPIRATORY (INHALATION) at 15:10

## 2018-09-18 NOTE — ED PROVIDER NOTES
Previous Medications    ALBUTEROL SULFATE HFA (PROAIR HFA) 108 (90 BASE) MCG/ACT INHALER    Inhale 2 puffs into the lungs every 6 hours as needed for Wheezing    AMLODIPINE (NORVASC) 5 MG TABLET    Take 1 tablet by mouth daily    ANORO ELLIPTA 62.5-25 MCG/INH AEPB INHALER    INHALE 1 PUFF INTO LUNGS DAILY.     ASPIRIN EC 81 MG EC TABLET    Take 1 tablet by mouth daily    ATORVASTATIN (LIPITOR) 40 MG TABLET    Take 1 tablet by mouth daily    IPRATROPIUM-ALBUTEROL (DUONEB) 0.5-2.5 (3) MG/3ML SOLN NEBULIZER SOLUTION    Inhale 3 mLs into the lungs every 4 hours    LOSARTAN (COZAAR) 100 MG TABLET    Take 1 tablet by mouth daily    MELATONIN 5 MG TABS TABLET    Take 5 mg by mouth daily    MELOXICAM (MOBIC) 7.5 MG TABLET    Take 1 tablet by mouth daily    MONTELUKAST (SINGULAIR) 10 MG TABLET    Take 1 tablet by mouth nightly    OMEPRAZOLE (PRILOSEC) 20 MG DELAYED RELEASE CAPSULE    TAKE 1 CAPSULE BY MOUTH DAILY    POTASSIUM CHLORIDE (KLOR-CON M) 10 MEQ EXTENDED RELEASE TABLET    Take 2 tablets by mouth daily    PROPRANOLOL (INDERAL) 40 MG TABLET    Take 1 tablet by mouth 2 times daily Take one tablet po TID    VENLAFAXINE (EFFEXOR XR) 150 MG EXTENDED RELEASE CAPSULE    Take 1 capsule by mouth daily       ALLERGIES     Naproxen and Lactose intolerance (gi)    FAMILY HISTORY       Family History   Problem Relation Age of Onset    High Blood Pressure Mother     High Blood Pressure Father     Diabetes Sister     High Blood Pressure Brother           SOCIAL HISTORY       Social History     Social History    Marital status:      Spouse name: N/A    Number of children: N/A    Years of education: N/A     Social History Main Topics    Smoking status: Current Every Day Smoker     Packs/day: 0.50     Years: 30.00     Types: Cigarettes     Start date: 6/6/1970    Smokeless tobacco: Never Used    Alcohol use Yes      Comment: Rare     Drug use: No    Sexual activity: Yes     Partners: Male     Other Topics Concern mis-transcribed.)    Pietro Ramsey MD (electronically signed)  Attending Emergency Physician          Cathleen Forte MD  09/18/18 4012

## 2018-09-18 NOTE — H&P
lung changes. The above finding are recorded on a digital voice clip in PACS. Signed by Dr Yue Bucio on 9/18/2018 6:56 AM       CBC:  Recent Labs      09/18/18 0325   WBC  10.9*   HGB  12.0   HCT  41.5   PLT  306     BMP:  Recent Labs      09/18/18   0321  09/18/18   0325  09/18/18   0722   NA   --   133*   --    K  3.9  4.4  4.3   CL   --   96*   --    CO2   --   19*   --    BUN   --   24*   --    CREATININE   --   1.1*   --    CALCIUM   --   9.3   --      Recent Labs      09/18/18 0325   AST  18   ALT  15   BILITOT  0.3   ALKPHOS  93     Coag Panel:   Recent Labs      09/18/18 0325   INR  1.03   PROTIME  13.4   APTT  28.8     Cardiac Enzymes: No results for input(s): CKTOTAL, TROPONINI in the last 72 hours. ABGs:  Lab Results   Component Value Date    PHART 7.370 09/18/2018    PO2ART 99.0 09/18/2018    IFN3ARI 40.0 09/18/2018     Urinalysis:  Lab Results   Component Value Date    NITRU Negative 09/18/2018    WBCUA 7 09/18/2018    BACTERIA 1+ 09/18/2018    RBCUA 1 09/18/2018    BLOODU TRACE 09/18/2018    SPECGRAV 1.009 09/18/2018    GLUCOSEU Negative 09/18/2018         Active Hospital Problems    Diagnosis Date Noted    COPD exacerbation ([facility] [J44.1] 09/18/2018    Tobacco abuse [Z72.0] 09/18/2018    Hypoxia [R09.02] 09/18/2018    Stage 3 severe COPD by GOLD classification ([facility] [J44.9] 06/06/2018    Mild single current episode of major depressive disorder ([facility] [F32.0] 05/02/2018       Assessment and Plan:    1. COPD Exacerbation - Admit to inpatient, pulse dose steroids, nebulizer treatments, supplemental oxygen prn, mucinex, antibiotics, obtain sputum culture. 2. Hypoxia - nebulizer treatments, supplemental oxygen. 3. Tobacco Abuse - Stop Smoking. 4. Depression - Resume home Effexor. 5. FULL Code. 6. DVT Prophylaxis - Lovenox. Further recommendations per Dr. Zehra Tate.     Dylon Le PA-C  9/18/2018       Attestation Statement     I have independently seen and examined this

## 2018-09-18 NOTE — ED NOTES
Assisted PT to bathroom on RA, PT became SOB and O2sat dropped to 85%. PT began wheezing after getting back in bed. MD Eduin Johnson at bedside assessing PT. PT placed on 3L NC.      Surendra Lennon RN  09/18/18 0073

## 2018-09-19 LAB
ANION GAP SERPL CALCULATED.3IONS-SCNC: 10 MMOL/L (ref 7–19)
BUN BLDV-MCNC: 24 MG/DL (ref 8–23)
CALCIUM SERPL-MCNC: 9.6 MG/DL (ref 8.8–10.2)
CHLORIDE BLD-SCNC: 102 MMOL/L (ref 98–111)
CO2: 25 MMOL/L (ref 22–29)
CREAT SERPL-MCNC: 0.7 MG/DL (ref 0.5–0.9)
GFR NON-AFRICAN AMERICAN: >60
GLUCOSE BLD-MCNC: 163 MG/DL (ref 74–109)
POTASSIUM SERPL-SCNC: 4.1 MMOL/L (ref 3.5–5)
SODIUM BLD-SCNC: 137 MMOL/L (ref 136–145)
TROPONIN: 0.06 NG/ML (ref 0–0.03)
TROPONIN: 0.1 NG/ML (ref 0–0.03)
TROPONIN: 0.13 NG/ML (ref 0–0.03)
TROPONIN: 0.17 NG/ML (ref 0–0.03)

## 2018-09-19 PROCEDURE — 6370000000 HC RX 637 (ALT 250 FOR IP): Performed by: HOSPITALIST

## 2018-09-19 PROCEDURE — 2500000003 HC RX 250 WO HCPCS: Performed by: HOSPITALIST

## 2018-09-19 PROCEDURE — 84484 ASSAY OF TROPONIN QUANT: CPT

## 2018-09-19 PROCEDURE — 6370000000 HC RX 637 (ALT 250 FOR IP): Performed by: PHYSICIAN ASSISTANT

## 2018-09-19 PROCEDURE — 6360000002 HC RX W HCPCS: Performed by: HOSPITALIST

## 2018-09-19 PROCEDURE — 94761 N-INVAS EAR/PLS OXIMETRY MLT: CPT

## 2018-09-19 PROCEDURE — 99232 SBSQ HOSP IP/OBS MODERATE 35: CPT | Performed by: FAMILY MEDICINE

## 2018-09-19 PROCEDURE — 36415 COLL VENOUS BLD VENIPUNCTURE: CPT

## 2018-09-19 PROCEDURE — 2700000000 HC OXYGEN THERAPY PER DAY

## 2018-09-19 PROCEDURE — 94640 AIRWAY INHALATION TREATMENT: CPT

## 2018-09-19 PROCEDURE — 2580000003 HC RX 258: Performed by: HOSPITALIST

## 2018-09-19 PROCEDURE — 1210000000 HC MED SURG R&B

## 2018-09-19 PROCEDURE — 80048 BASIC METABOLIC PNL TOTAL CA: CPT

## 2018-09-19 PROCEDURE — 93005 ELECTROCARDIOGRAM TRACING: CPT

## 2018-09-19 RX ADMIN — PANTOPRAZOLE SODIUM 40 MG: 40 TABLET, DELAYED RELEASE ORAL at 06:09

## 2018-09-19 RX ADMIN — ATORVASTATIN CALCIUM 40 MG: 40 TABLET, FILM COATED ORAL at 08:36

## 2018-09-19 RX ADMIN — GUAIFENESIN 1200 MG: 600 TABLET, EXTENDED RELEASE ORAL at 08:36

## 2018-09-19 RX ADMIN — ACETYLCYSTEINE 800 MG: 200 SOLUTION ORAL; RESPIRATORY (INHALATION) at 07:17

## 2018-09-19 RX ADMIN — SODIUM BICARBONATE 650 MG: 650 TABLET ORAL at 08:36

## 2018-09-19 RX ADMIN — ASPIRIN 81 MG: 81 TABLET ORAL at 08:36

## 2018-09-19 RX ADMIN — METHYLPREDNISOLONE SODIUM SUCCINATE 40 MG: 40 INJECTION, POWDER, FOR SOLUTION INTRAMUSCULAR; INTRAVENOUS at 01:30

## 2018-09-19 RX ADMIN — DOXYCYCLINE 100 MG: 100 INJECTION, POWDER, LYOPHILIZED, FOR SOLUTION INTRAVENOUS at 23:29

## 2018-09-19 RX ADMIN — ACETYLCYSTEINE 600 MG: 200 SOLUTION ORAL; RESPIRATORY (INHALATION) at 20:01

## 2018-09-19 RX ADMIN — GUAIFENESIN 1200 MG: 600 TABLET, EXTENDED RELEASE ORAL at 19:51

## 2018-09-19 RX ADMIN — METHYLPREDNISOLONE SODIUM SUCCINATE 40 MG: 40 INJECTION, POWDER, FOR SOLUTION INTRAMUSCULAR; INTRAVENOUS at 13:33

## 2018-09-19 RX ADMIN — IPRATROPIUM BROMIDE AND ALBUTEROL SULFATE 1 AMPULE: .5; 3 SOLUTION RESPIRATORY (INHALATION) at 07:17

## 2018-09-19 RX ADMIN — IPRATROPIUM BROMIDE AND ALBUTEROL SULFATE 1 AMPULE: .5; 3 SOLUTION RESPIRATORY (INHALATION) at 15:14

## 2018-09-19 RX ADMIN — IPRATROPIUM BROMIDE AND ALBUTEROL SULFATE 1 AMPULE: .5; 3 SOLUTION RESPIRATORY (INHALATION) at 20:01

## 2018-09-19 RX ADMIN — IPRATROPIUM BROMIDE AND ALBUTEROL SULFATE 1 AMPULE: .5; 3 SOLUTION RESPIRATORY (INHALATION) at 10:57

## 2018-09-19 RX ADMIN — SODIUM BICARBONATE 650 MG: 650 TABLET ORAL at 19:52

## 2018-09-19 RX ADMIN — PROPRANOLOL HYDROCHLORIDE 40 MG: 40 TABLET ORAL at 19:52

## 2018-09-19 RX ADMIN — DOXYCYCLINE 100 MG: 100 INJECTION, POWDER, LYOPHILIZED, FOR SOLUTION INTRAVENOUS at 11:31

## 2018-09-19 RX ADMIN — METHYLPREDNISOLONE SODIUM SUCCINATE 40 MG: 40 INJECTION, POWDER, FOR SOLUTION INTRAMUSCULAR; INTRAVENOUS at 19:51

## 2018-09-19 RX ADMIN — METHYLPREDNISOLONE SODIUM SUCCINATE 40 MG: 40 INJECTION, POWDER, FOR SOLUTION INTRAMUSCULAR; INTRAVENOUS at 08:36

## 2018-09-19 RX ADMIN — SODIUM CHLORIDE: 9 INJECTION, SOLUTION INTRAVENOUS at 19:51

## 2018-09-19 RX ADMIN — POTASSIUM CHLORIDE 20 MEQ: 20 TABLET, EXTENDED RELEASE ORAL at 08:36

## 2018-09-19 RX ADMIN — SODIUM BICARBONATE 650 MG: 650 TABLET ORAL at 13:33

## 2018-09-19 RX ADMIN — VENLAFAXINE HYDROCHLORIDE 150 MG: 75 CAPSULE, EXTENDED RELEASE ORAL at 08:36

## 2018-09-19 RX ADMIN — PROPRANOLOL HYDROCHLORIDE 40 MG: 40 TABLET ORAL at 08:36

## 2018-09-19 RX ADMIN — MONTELUKAST SODIUM 10 MG: 10 TABLET, COATED ORAL at 19:52

## 2018-09-19 RX ADMIN — SODIUM CHLORIDE: 9 INJECTION, SOLUTION INTRAVENOUS at 08:35

## 2018-09-19 NOTE — PROGRESS NOTES
Patient visited by Spiritual Care volunteer COTY Reyna. [address] Contact completed. Prayer/emotional support.

## 2018-09-19 NOTE — PROGRESS NOTES
11.5*   E0FLDQYY  93.1  94.1   CARBOXHGBART  4.9  3.3   02THERAPY  Unknown  Unknown     HgBA1c: Invalid input(s): HGBA1C  FLP:  Lab Results   Component Value Date    TRIG 129 01/10/2017    HDL 48 01/10/2017    LDLCALC 130 01/10/2017    LDLDIRECT 158 01/18/2016     TSH:  No results found for: TSH  Troponin T:   Recent Labs      09/19/18   0129  09/19/18   0607  09/19/18   1238   TROPONINI  0.17*  0.13*  0.10*     INR:   Recent Labs      09/18/18   0325   INR  1.03       Objective:   Vitals: BP (!) 156/75   Pulse 83   Temp 97.8 °F (36.6 °C) (Temporal)   Resp 20   SpO2 99%   24HR INTAKE/OUTPUT:    Intake/Output Summary (Last 24 hours) at 09/19/18 1603  Last data filed at 09/19/18 1316   Gross per 24 hour   Intake             2485 ml   Output                0 ml   Net             2485 ml     General appearance: alert and cooperative with exam  HEENT: atraumatic, eyes with clear conjunctiva and normal lids, pupils and irises normal, external ears and nose are normal, lips normal.  Neck without masses, lympadenopathy, bruit, thyroid normal  Lungs: no increased work of breathing, diminished breath sounds bilaterally and wheezes bilaterally  Heart: regular rate and rhythm, S1, S2 normal, no murmur, click, rub or gallop  Abdomen: soft, non-tender; bowel sounds normal; no masses,  no organomegaly  Extremities: extremities normal, atraumatic, no cyanosis or edema  Neurologic: No focal neurologic deficits, normal sensation, alert and oriented, affect and mood appropriate. Skin: no rashes, nodules. Assessment and Plan: Active Problems:    Mild single current episode of major depressive disorder (HCC)    Stage 3 severe COPD by GOLD classification (HCC)    COPD exacerbation (HCC)    Tobacco abuse    Hypoxia    Troponin I above reference range  Resolved Problems:    * No resolved hospital problems. *    Recheck troponin, discontinue telemetry if not increased.  Continue current, discharge home tomorrow if clinical improvement continues. Advance Directive: No Order    DVT prophylaxis: SCDs    Discharge planning:  To home tomorrow      Ruma Sevilla DO  [facility]

## 2018-09-20 VITALS
DIASTOLIC BLOOD PRESSURE: 63 MMHG | OXYGEN SATURATION: 98 % | RESPIRATION RATE: 20 BRPM | SYSTOLIC BLOOD PRESSURE: 117 MMHG | TEMPERATURE: 98.6 F | HEART RATE: 63 BPM

## 2018-09-20 PROBLEM — R09.02 HYPOXIA: Status: RESOLVED | Noted: 2018-09-18 | Resolved: 2018-09-20

## 2018-09-20 LAB
ANION GAP SERPL CALCULATED.3IONS-SCNC: 17 MMOL/L (ref 7–19)
BUN BLDV-MCNC: 25 MG/DL (ref 8–23)
CALCIUM SERPL-MCNC: 9.7 MG/DL (ref 8.8–10.2)
CHLORIDE BLD-SCNC: 98 MMOL/L (ref 98–111)
CO2: 23 MMOL/L (ref 22–29)
CREAT SERPL-MCNC: 0.7 MG/DL (ref 0.5–0.9)
EKG P AXIS: 66 DEGREES
EKG P-R INTERVAL: 202 MS
EKG Q-T INTERVAL: 484 MS
EKG QRS DURATION: 76 MS
EKG QTC CALCULATION (BAZETT): 477 MS
EKG T AXIS: 38 DEGREES
GFR NON-AFRICAN AMERICAN: >60
GLUCOSE BLD-MCNC: 138 MG/DL (ref 74–109)
POTASSIUM SERPL-SCNC: 4 MMOL/L (ref 3.5–5)
SODIUM BLD-SCNC: 138 MMOL/L (ref 136–145)

## 2018-09-20 PROCEDURE — 99222 1ST HOSP IP/OBS MODERATE 55: CPT | Performed by: HOSPITALIST

## 2018-09-20 PROCEDURE — 6370000000 HC RX 637 (ALT 250 FOR IP): Performed by: HOSPITALIST

## 2018-09-20 PROCEDURE — 6360000002 HC RX W HCPCS: Performed by: HOSPITALIST

## 2018-09-20 PROCEDURE — 2580000003 HC RX 258: Performed by: HOSPITALIST

## 2018-09-20 PROCEDURE — 99238 HOSP IP/OBS DSCHRG MGMT 30/<: CPT | Performed by: FAMILY MEDICINE

## 2018-09-20 PROCEDURE — G8987 SELF CARE CURRENT STATUS: HCPCS

## 2018-09-20 PROCEDURE — 94640 AIRWAY INHALATION TREATMENT: CPT

## 2018-09-20 PROCEDURE — G8989 SELF CARE D/C STATUS: HCPCS

## 2018-09-20 PROCEDURE — 80048 BASIC METABOLIC PNL TOTAL CA: CPT

## 2018-09-20 PROCEDURE — G8988 SELF CARE GOAL STATUS: HCPCS

## 2018-09-20 PROCEDURE — 6370000000 HC RX 637 (ALT 250 FOR IP): Performed by: PHYSICIAN ASSISTANT

## 2018-09-20 PROCEDURE — 2500000003 HC RX 250 WO HCPCS: Performed by: HOSPITALIST

## 2018-09-20 PROCEDURE — 36415 COLL VENOUS BLD VENIPUNCTURE: CPT

## 2018-09-20 RX ORDER — DOXYCYCLINE HYCLATE 100 MG
100 TABLET ORAL 2 TIMES DAILY
Qty: 9 TABLET | Refills: 0 | Status: SHIPPED | OUTPATIENT
Start: 2018-09-20 | End: 2018-09-25

## 2018-09-20 RX ORDER — GUAIFENESIN 600 MG/1
1200 TABLET, EXTENDED RELEASE ORAL 2 TIMES DAILY
Qty: 60 TABLET | Refills: 0 | Status: SHIPPED | OUTPATIENT
Start: 2018-09-20 | End: 2018-11-14 | Stop reason: SDUPTHER

## 2018-09-20 RX ORDER — PREDNISONE 20 MG/1
TABLET ORAL
Qty: 7 TABLET | Refills: 0 | Status: SHIPPED | OUTPATIENT
Start: 2018-09-20 | End: 2018-09-26 | Stop reason: ALTCHOICE

## 2018-09-20 RX ADMIN — VENLAFAXINE HYDROCHLORIDE 150 MG: 75 CAPSULE, EXTENDED RELEASE ORAL at 08:34

## 2018-09-20 RX ADMIN — SODIUM BICARBONATE 650 MG: 650 TABLET ORAL at 08:34

## 2018-09-20 RX ADMIN — METHYLPREDNISOLONE SODIUM SUCCINATE 40 MG: 40 INJECTION, POWDER, FOR SOLUTION INTRAMUSCULAR; INTRAVENOUS at 01:54

## 2018-09-20 RX ADMIN — SODIUM BICARBONATE 650 MG: 650 TABLET ORAL at 11:34

## 2018-09-20 RX ADMIN — IPRATROPIUM BROMIDE AND ALBUTEROL SULFATE 1 AMPULE: .5; 3 SOLUTION RESPIRATORY (INHALATION) at 10:15

## 2018-09-20 RX ADMIN — METHYLPREDNISOLONE SODIUM SUCCINATE 40 MG: 40 INJECTION, POWDER, FOR SOLUTION INTRAMUSCULAR; INTRAVENOUS at 08:35

## 2018-09-20 RX ADMIN — ATORVASTATIN CALCIUM 40 MG: 40 TABLET, FILM COATED ORAL at 08:34

## 2018-09-20 RX ADMIN — IPRATROPIUM BROMIDE AND ALBUTEROL SULFATE 1 AMPULE: .5; 3 SOLUTION RESPIRATORY (INHALATION) at 10:10

## 2018-09-20 RX ADMIN — DOXYCYCLINE 100 MG: 100 INJECTION, POWDER, LYOPHILIZED, FOR SOLUTION INTRAVENOUS at 11:35

## 2018-09-20 RX ADMIN — ACETYLCYSTEINE 800 MG: 200 SOLUTION ORAL; RESPIRATORY (INHALATION) at 06:13

## 2018-09-20 RX ADMIN — IPRATROPIUM BROMIDE AND ALBUTEROL SULFATE 1 AMPULE: .5; 3 SOLUTION RESPIRATORY (INHALATION) at 06:13

## 2018-09-20 RX ADMIN — SODIUM CHLORIDE: 9 INJECTION, SOLUTION INTRAVENOUS at 05:31

## 2018-09-20 RX ADMIN — ASPIRIN 81 MG: 81 TABLET ORAL at 08:34

## 2018-09-20 RX ADMIN — PANTOPRAZOLE SODIUM 40 MG: 40 TABLET, DELAYED RELEASE ORAL at 05:31

## 2018-09-20 RX ADMIN — PROPRANOLOL HYDROCHLORIDE 40 MG: 40 TABLET ORAL at 08:34

## 2018-09-20 RX ADMIN — GUAIFENESIN 1200 MG: 600 TABLET, EXTENDED RELEASE ORAL at 08:35

## 2018-09-20 RX ADMIN — POTASSIUM CHLORIDE 20 MEQ: 20 TABLET, EXTENDED RELEASE ORAL at 08:34

## 2018-09-20 NOTE — DISCHARGE SUMMARY
Sharyn Gallagher  :  1940  MRN:  [id number]    Admit date:  2018  Discharge date:  2018    Admitting Physician:  Jyoti Ron DO    Advance Directive: No Order    Consults: none    Primary Care Physician:  Iesha Vasquez MD    Discharge Diagnoses:  Principal Problem:    COPD exacerbation ([address]  Active Problems:    Mild single current episode of major depressive disorder ([address]    Stage 3 severe COPD by GOLD classification ([address]    Tobacco abuse  Resolved Problems:    Hypoxia    Troponin I above reference range    Acute respiratory failure with hypoxia and hypercapnia ([address]      Significant Diagnostic Studies:   Xr Chest Portable    Result Date: 2018  Examination. XR CHEST PORTABLE History: Shortness of breath. Frontal portable semiupright view of the chest is compared with the previous study dated 2018. There are coarse interstitial changes in the lungs bilaterally representing scarring. There is no pleural effusion, pulmonary congestion or pneumothorax. Heart size is not evaluated due to the portable projection. Postsurgical changes of the right chest with resection of the right fifth rib posteriorly is noted. No active cardiopulmonary disease. Chronic inflammatory lung changes. The above finding are recorded on a digital voice clip in PACS. Signed by Dr Krystyna Enamorado on 2018 6:56 AM      Pertinent Labs:   CBC:   No results for input(s): WBC, HGB, PLT in the last 72 hours. BMP:    No results for input(s): NA, K, CL, CO2, BUN, CREATININE, GLUCOSE in the last 72 hours. INR:   No results for input(s): INR in the last 72 hours. Lipids: No results for input(s): CHOL, HDL in the last 72 hours. Invalid input(s): LDLCALCU  ABGs:  No results for input(s): PHART, NWA6TUA, PO2ART, ONL2UQF, BEART, HGBAE, H7BODPKZ, CARBOXHGBART, 02THERAPY in the last 72 hours. HgBA1c:  No components found for: HEMOGLOBIN A1C    Procedures: None performed.     Hospital Course:   : Presents to ED with worsening

## 2018-09-20 NOTE — PROGRESS NOTES
():  At least 1 percent but less than 20 percent impaired, limited or restricted  OutComes Score                                           AM-PAC Score             Goals          Therapy Time   Individual Concurrent Group Co-treatment   Time In           Time Out           Minutes                   Ellen Licea OT Electronically signed by Ellen Licea OT on 9/20/2018 at 2:05 PM

## 2018-09-24 ENCOUNTER — TELEPHONE (OUTPATIENT)
Dept: INTERNAL MEDICINE | Age: 78
End: 2018-09-24

## 2018-09-24 DIAGNOSIS — R19.7 DIARRHEA, UNSPECIFIED TYPE: Primary | ICD-10-CM

## 2018-09-24 RX ORDER — DIPHENOXYLATE HYDROCHLORIDE AND ATROPINE SULFATE 2.5; .025 MG/1; MG/1
1 TABLET ORAL 4 TIMES DAILY PRN
Qty: 120 TABLET | Refills: 0 | OUTPATIENT
Start: 2018-09-24 | End: 2018-12-18 | Stop reason: SDUPTHER

## 2018-09-24 NOTE — TELEPHONE ENCOUNTER
[facility] Transitions Initial Follow Up Call    Outreach made within 2 business days of discharge: Yes    Patient: Romana Koch Patient : 1940   MRN: [id number]  Reason for Admission: Admitted 18 for COPD   Discharge Date: 18       Spoke with: patient     Discharge department/facility: [address] [place] [address]     TCM Interactive Patient Contact:  Was patient able to fill all prescriptions: Yes  Was patient instructed to bring all medications to the follow-up visit: Yes  Is patient taking all medications as directed in the discharge summary? Yes  Does patient understand their discharge instructions: Yes  Does patient have questions or concerns that need addressed prior to 7-14 day follow up office visit: no    I spoke with Mrs. Anderson. She states she is home and doing ok. She states she feels like she had a panic attack and that's what may have led her to the ER. She states they taught her how to breath in the hospital. She was able to  all of her new medications from the hospital and is taking them as directed. I moved her follow up appt up to . Pt was advised to bring all medications to this appointment.      Scheduled appointment with PCP within 7-14 days    Follow Up  Future Appointments  Date Time Provider Jaylon Joseph   10/1/2018 10:00 AM Rhonda Maldonado MD [address]

## 2018-09-26 ENCOUNTER — OFFICE VISIT (OUTPATIENT)
Dept: PRIMARY CARE CLINIC | Age: 78
End: 2018-09-26
Payer: MEDICARE

## 2018-09-26 VITALS
SYSTOLIC BLOOD PRESSURE: 124 MMHG | HEART RATE: 83 BPM | BODY MASS INDEX: 28.32 KG/M2 | DIASTOLIC BLOOD PRESSURE: 80 MMHG | WEIGHT: 150 LBS | OXYGEN SATURATION: 98 % | TEMPERATURE: 97 F | HEIGHT: 61 IN

## 2018-09-26 DIAGNOSIS — J44.1 COPD EXACERBATION (HCC): Primary | ICD-10-CM

## 2018-09-26 DIAGNOSIS — J44.9 STAGE 3 SEVERE COPD BY GOLD CLASSIFICATION (HCC): Chronic | ICD-10-CM

## 2018-09-26 DIAGNOSIS — M53.3 SACROILIAC DYSFUNCTION: ICD-10-CM

## 2018-09-26 DIAGNOSIS — Z78.0 POST-MENOPAUSAL: ICD-10-CM

## 2018-09-26 PROCEDURE — 1111F DSCHRG MED/CURRENT MED MERGE: CPT | Performed by: FAMILY MEDICINE

## 2018-09-26 PROCEDURE — 99496 TRANSJ CARE MGMT HIGH F2F 7D: CPT | Performed by: FAMILY MEDICINE

## 2018-09-26 NOTE — PROGRESS NOTES
nightly 90 tablet 3    omeprazole (PRILOSEC) 20 MG delayed release capsule TAKE 1 CAPSULE BY MOUTH DAILY 90 capsule 3    potassium chloride (KLOR-CON M) 10 MEQ extended release tablet Take 2 tablets by mouth daily 90 tablet 3    amLODIPine (NORVASC) 5 MG tablet Take 1 tablet by mouth daily 90 tablet 3    aspirin EC 81 MG EC tablet Take 1 tablet by mouth daily 90 tablet 5    melatonin 5 MG TABS tablet Take 5 mg by mouth daily       No current facility-administered medications for this visit. Allergies   Allergen Reactions    Naproxen Hives    Lactose Intolerance (Gi) Nausea And Vomiting       Past Surgical History:   Procedure Laterality Date    APPENDECTOMY      CARDIAC CATHETERIZATION  5/3/2013   MDL    EF 60%    HEMORRHOID SURGERY      HYSTERECTOMY      HYSTERECTOMY, TOTAL ABDOMINAL      LUNG CANCER SURGERY      TONSILLECTOMY         Social History   Substance Use Topics    Smoking status: Current Every Day Smoker     Packs/day: 0.50     Years: 30.00     Types: Cigarettes     Start date: 6/6/1970    Smokeless tobacco: Never Used    Alcohol use No       Family History   Problem Relation Age of Onset    High Blood Pressure Mother     High Blood Pressure Father     Diabetes Sister     High Blood Pressure Brother        /80 (Site: Left Upper Arm, Position: Sitting)   Pulse 83   Temp 97 °F (36.1 °C)   Ht 5' 1\" (1.549 m)   Wt 150 lb (68 kg)   SpO2 98%   BMI 28.34 kg/m²     Physical Exam   Constitutional: She is oriented to person, place, and time. She appears well-developed and well-nourished. Non-toxic appearance. No distress. HENT:   Head: Normocephalic and atraumatic. Not macrocephalic and not microcephalic. Right Ear: External ear normal. No drainage. No decreased hearing is noted. Left Ear: External ear normal. No drainage. No decreased hearing is noted. Nose: Nose normal. No rhinorrhea or nasal deformity.    Mouth/Throat: Uvula is midline, oropharynx is clear and moist and mucous membranes are normal. Mucous membranes are not pale and not dry. Eyes: Pupils are equal, round, and reactive to light. Conjunctivae, EOM and lids are normal. Right eye exhibits no discharge. Left eye exhibits no discharge. No scleral icterus. Neck: Normal range of motion and phonation normal. Neck supple. No tracheal deviation present. No thyromegaly present. Cardiovascular: Normal rate, regular rhythm, S1 normal, S2 normal and normal heart sounds. No extrasystoles are present. No murmur heard. Pulmonary/Chest: Effort normal. No respiratory distress. She has decreased breath sounds. She has no wheezes. She has no rhonchi. She has no rales. Abdominal: Soft. Bowel sounds are normal. She exhibits no distension. There is no tenderness. There is no guarding. Musculoskeletal: She exhibits no edema. Cervical back: Normal. She exhibits no tenderness and no bony tenderness. Thoracic back: Normal. She exhibits no tenderness and no bony tenderness. Lumbar back: She exhibits decreased range of motion (pain w/ torsion at waist and pelvic rocking) and tenderness (pain at L SI joint). She exhibits no bony tenderness. Right lower leg: She exhibits no swelling and no edema. Left lower leg: She exhibits no swelling and no edema. Lymphadenopathy:        Head (right side): No submental, no submandibular and no tonsillar adenopathy present. Head (left side): No submental, no submandibular and no tonsillar adenopathy present. She has no cervical adenopathy. Right: No supraclavicular adenopathy present. Left: No supraclavicular adenopathy present. Neurological: She is alert and oriented to person, place, and time. She has normal reflexes. She displays no tremor. She displays no seizure activity. Gait normal.   Skin: Skin is dry. Bruising noted. No rash noted. She is not diaphoretic. No cyanosis or erythema (on head, neck, face, extremities).  No pallor. Nails show no clubbing. Psychiatric: She has a normal mood and affect. Her speech is normal and behavior is normal. Judgment and thought content normal. Cognition and memory are normal.   Nursing note and vitals reviewed. Assessment:  No diagnosis found. Plan:   COPD is improving and she is back at baseline. She is very high risk for readmission though as she does have severe COPD. Reeducation on COPD action plan took place today. I will proceed with doing an SI joint injection for her as well to increase her mobility. She is to continue following up with orthopedic [facility] for her recent neck injury. It is currently stable at this time. SI JOINT INJECTION  Patient was given verbal informed consent and agreed verbally and with signed consent to the risks and benefits of procedure. Risks discussed included bleeding, infection, damage to structures, and potentially other yet unlikely unforeseen consequences of those risks. An impression was made with a pen for injection site left sacroilac joint. The area was cleaned w/ betadine and alcohol swab. It was then sprayed w/ ethyl chloride and injected w/ a 25 gauge 1.5\" needle into the the tissue between the sacrum and ilium  It was aspirated and no bloody return into syringe. The medicine was administered w/o issue. 8.0 cc of 1% lidocaine w/o epinephrine and 1.0 cc of 40 mg/mL kenalog was administered. A bandage was applied directly thereafter. All bleeding stopped. EBL - 0 cc. Pt was instructed on basic cleansing and rest of affected area. Pt noted some relief prior to leaving the office. No orders of the defined types were placed in this encounter. No orders of the defined types were placed in this encounter. Medications Discontinued During This Encounter   Medication Reason    predniSONE (DELTASONE) 20 MG tablet Therapy completed     There are no Patient Instructions on file for this visit.    Patient given tablet  Take 1 tablet by mouth daily             atorvastatin (LIPITOR) 40 MG tablet  Take 1 tablet by mouth daily             diphenoxylate-atropine (LOMOTIL) 2.5-0.025 MG per tablet  Take 1 tablet by mouth 4 times daily as needed for Diarrhea for up to 30 days. Elva Luis guaiFENesin (MUCINEX) 600 MG extended release tablet  Take 2 tablets by mouth 2 times daily             ipratropium-albuterol (DUONEB) 0.5-2.5 (3) MG/3ML SOLN nebulizer solution  Inhale 3 mLs into the lungs every 4 hours             losartan (COZAAR) 100 MG tablet  Take 1 tablet by mouth daily             melatonin 5 MG TABS tablet  Take 5 mg by mouth daily             meloxicam (MOBIC) 7.5 MG tablet  Take 1 tablet by mouth daily             montelukast (SINGULAIR) 10 MG tablet  Take 1 tablet by mouth nightly             omeprazole (PRILOSEC) 20 MG delayed release capsule  TAKE 1 CAPSULE BY MOUTH DAILY             potassium chloride (KLOR-CON M) 10 MEQ extended release tablet  Take 2 tablets by mouth daily             propranolol (INDERAL) 40 MG tablet  Take 1 tablet by mouth 2 times daily Take one tablet po TID             venlafaxine (EFFEXOR XR) 150 MG extended release capsule  Take 1 capsule by mouth daily                   Medications marked \"taking\" at this time  Outpatient Prescriptions Marked as Taking for the 9/26/18 encounter (Office Visit) with Leidy Dahl MD   Medication Sig Dispense Refill    diphenoxylate-atropine (LOMOTIL) 2.5-0.025 MG per tablet Take 1 tablet by mouth 4 times daily as needed for Diarrhea for up to 30 days. . 120 tablet 0    guaiFENesin (MUCINEX) 600 MG extended release tablet Take 2 tablets by mouth 2 times daily 60 tablet 0    venlafaxine (EFFEXOR XR) 150 MG extended release capsule Take 1 capsule by mouth daily 90 capsule 1    meloxicam (MOBIC) 7.5 MG tablet Take 1 tablet by mouth daily 30 tablet 3    ANORO ELLIPTA 62.5-25 MCG/INH AEPB inhaler INHALE 1 PUFF INTO LUNGS DAILY.  30 each 5    losartan (COZAAR) 100 MG tablet Take 1 tablet by mouth daily 30 tablet 3    propranolol (INDERAL) 40 MG tablet Take 1 tablet by mouth 2 times daily Take one tablet po TID (Patient taking differently: Take 40 mg by mouth 3 times daily Take one tablet po TID) 270 tablet 2    albuterol sulfate HFA (PROAIR HFA) 108 (90 Base) MCG/ACT inhaler Inhale 2 puffs into the lungs every 6 hours as needed for Wheezing 1 Inhaler 3    ipratropium-albuterol (DUONEB) 0.5-2.5 (3) MG/3ML SOLN nebulizer solution Inhale 3 mLs into the lungs every 4 hours 360 mL 0    atorvastatin (LIPITOR) 40 MG tablet Take 1 tablet by mouth daily 90 tablet 1    montelukast (SINGULAIR) 10 MG tablet Take 1 tablet by mouth nightly 90 tablet 3    omeprazole (PRILOSEC) 20 MG delayed release capsule TAKE 1 CAPSULE BY MOUTH DAILY 90 capsule 3    potassium chloride (KLOR-CON M) 10 MEQ extended release tablet Take 2 tablets by mouth daily 90 tablet 3    amLODIPine (NORVASC) 5 MG tablet Take 1 tablet by mouth daily 90 tablet 3    aspirin EC 81 MG EC tablet Take 1 tablet by mouth daily 90 tablet 5    melatonin 5 MG TABS tablet Take 5 mg by mouth daily          Medications patient taking as of now reconciled against medications ordered at time of hospital discharge: Yes    Chief Complaint   Patient presents with    Follow-Up from Hospital     Follow up from hospital for COPD, still not feeling well       History of Present illness - Follow up of Hospital diagnosis(es): COPD exacerbation    Inpatient course: Discharge summary reviewed- see chart. Interval history/Current status: Stable, better        Vitals:    09/26/18 1510   BP: 124/80   Site: Left Upper Arm   Position: Sitting   Pulse: 83   Temp: 97 °F (36.1 °C)   SpO2: 98%   Weight: 150 lb (68 kg)   Height: 5' 1\" (1.549 m)     Body mass index is 28.34 kg/m².    Wt Readings from Last 3 Encounters:   09/26/18 150 lb (68 kg)   09/04/18 194 lb 6.4 oz (88.2 kg)   08/10/18 149 lb (67.6 kg)     BP Readings from Last 3 Encounters:   09/26/18 124/80   09/20/18 117/63   09/04/18 124/78        Physical Exam:          Medical Decision Making: high complexity

## 2018-09-28 ASSESSMENT — ENCOUNTER SYMPTOMS
EYE ITCHING: 0
VOMITING: 0
ABDOMINAL PAIN: 0
COLOR CHANGE: 0
DIARRHEA: 0
CHEST TIGHTNESS: 0
BACK PAIN: 1
WHEEZING: 0
COUGH: 1
SORE THROAT: 0
NAUSEA: 0
CONSTIPATION: 0
RHINORRHEA: 0
SHORTNESS OF BREATH: 1

## 2018-10-04 PROBLEM — J96.02 ACUTE RESPIRATORY FAILURE WITH HYPOXIA AND HYPERCAPNIA (HCC): Status: RESOLVED | Noted: 2018-10-04 | Resolved: 2018-10-04

## 2018-10-04 PROBLEM — J96.01 ACUTE RESPIRATORY FAILURE WITH HYPOXIA AND HYPERCAPNIA (HCC): Status: RESOLVED | Noted: 2018-10-04 | Resolved: 2018-10-04

## 2018-10-04 PROBLEM — J96.01 ACUTE RESPIRATORY FAILURE WITH HYPOXIA AND HYPERCAPNIA (HCC): Status: ACTIVE | Noted: 2018-10-04

## 2018-10-04 PROBLEM — J96.02 ACUTE RESPIRATORY FAILURE WITH HYPOXIA AND HYPERCAPNIA (HCC): Status: ACTIVE | Noted: 2018-10-04

## 2018-10-15 ENCOUNTER — CARE COORDINATION (OUTPATIENT)
Dept: CARE COORDINATION | Age: 78
End: 2018-10-15

## 2018-10-15 RX ORDER — IPRATROPIUM BROMIDE AND ALBUTEROL SULFATE 2.5; .5 MG/3ML; MG/3ML
1 SOLUTION RESPIRATORY (INHALATION) EVERY 4 HOURS
Qty: 360 ML | Refills: 0 | Status: SHIPPED | OUTPATIENT
Start: 2018-10-15 | End: 2018-11-14 | Stop reason: SDUPTHER

## 2018-11-10 RX ORDER — AMLODIPINE BESYLATE 5 MG/1
TABLET ORAL
Qty: 90 TABLET | Refills: 3 | Status: SHIPPED | OUTPATIENT
Start: 2018-11-10 | End: 2018-11-28 | Stop reason: SDUPTHER

## 2018-11-10 RX ORDER — POTASSIUM CHLORIDE 750 MG/1
TABLET, EXTENDED RELEASE ORAL
Qty: 60 TABLET | Refills: 3 | Status: SHIPPED | OUTPATIENT
Start: 2018-11-10 | End: 2018-11-28 | Stop reason: SDUPTHER

## 2018-11-14 ENCOUNTER — OFFICE VISIT (OUTPATIENT)
Dept: PRIMARY CARE CLINIC | Age: 78
End: 2018-11-14

## 2018-11-14 VITALS
SYSTOLIC BLOOD PRESSURE: 132 MMHG | RESPIRATION RATE: 16 BRPM | HEART RATE: 76 BPM | DIASTOLIC BLOOD PRESSURE: 84 MMHG | WEIGHT: 141 LBS | BODY MASS INDEX: 26.62 KG/M2 | OXYGEN SATURATION: 97 % | HEIGHT: 61 IN

## 2018-11-14 DIAGNOSIS — R19.7 DIARRHEA, UNSPECIFIED TYPE: ICD-10-CM

## 2018-11-14 DIAGNOSIS — M25.552 PAIN OF BOTH HIP JOINTS: ICD-10-CM

## 2018-11-14 DIAGNOSIS — Z00.00 ROUTINE GENERAL MEDICAL EXAMINATION AT A HEALTH CARE FACILITY: Primary | ICD-10-CM

## 2018-11-14 DIAGNOSIS — M19.90 ARTHRITIS: ICD-10-CM

## 2018-11-14 DIAGNOSIS — M25.551 PAIN OF BOTH HIP JOINTS: ICD-10-CM

## 2018-11-14 DIAGNOSIS — F32.A ANXIETY AND DEPRESSION: ICD-10-CM

## 2018-11-14 DIAGNOSIS — F41.9 ANXIETY AND DEPRESSION: ICD-10-CM

## 2018-11-14 PROCEDURE — G8598 ASA/ANTIPLAT THER USED: HCPCS | Performed by: FAMILY MEDICINE

## 2018-11-14 PROCEDURE — G8482 FLU IMMUNIZE ORDER/ADMIN: HCPCS | Performed by: FAMILY MEDICINE

## 2018-11-14 PROCEDURE — 4040F PNEUMOC VAC/ADMIN/RCVD: CPT | Performed by: FAMILY MEDICINE

## 2018-11-14 PROCEDURE — G0439 PPPS, SUBSEQ VISIT: HCPCS | Performed by: FAMILY MEDICINE

## 2018-11-14 RX ORDER — DIPHENOXYLATE HYDROCHLORIDE AND ATROPINE SULFATE 2.5; .025 MG/1; MG/1
1 TABLET ORAL 4 TIMES DAILY PRN
Qty: 120 TABLET | Refills: 0 | Status: CANCELLED | OUTPATIENT
Start: 2018-11-14 | End: 2018-12-14

## 2018-11-14 ASSESSMENT — LIFESTYLE VARIABLES
HOW OFTEN DURING THE LAST YEAR HAVE YOU FAILED TO DO WHAT WAS NORMALLY EXPECTED FROM YOU BECAUSE OF DRINKING: 0
HOW OFTEN DURING THE LAST YEAR HAVE YOU HAD A FEELING OF GUILT OR REMORSE AFTER DRINKING: 0
HOW OFTEN DO YOU HAVE SIX OR MORE DRINKS ON ONE OCCASION: 0
HAS A RELATIVE, FRIEND, DOCTOR, OR ANOTHER HEALTH PROFESSIONAL EXPRESSED CONCERN ABOUT YOUR DRINKING OR SUGGESTED YOU CUT DOWN: 0
HOW OFTEN DO YOU HAVE A DRINK CONTAINING ALCOHOL: 1
HOW OFTEN DURING THE LAST YEAR HAVE YOU BEEN UNABLE TO REMEMBER WHAT HAPPENED THE NIGHT BEFORE BECAUSE YOU HAD BEEN DRINKING: 0
AUDIT-C TOTAL SCORE: 1
HOW MANY STANDARD DRINKS CONTAINING ALCOHOL DO YOU HAVE ON A TYPICAL DAY: 0
HOW OFTEN DURING THE LAST YEAR HAVE YOU NEEDED AN ALCOHOLIC DRINK FIRST THING IN THE MORNING TO GET YOURSELF GOING AFTER A NIGHT OF HEAVY DRINKING: 0
HOW OFTEN DURING THE LAST YEAR HAVE YOU FOUND THAT YOU WERE NOT ABLE TO STOP DRINKING ONCE YOU HAD STARTED: 0
AUDIT TOTAL SCORE: 1
HAVE YOU OR SOMEONE ELSE BEEN INJURED AS A RESULT OF YOUR DRINKING: 0

## 2018-11-14 ASSESSMENT — PATIENT HEALTH QUESTIONNAIRE - PHQ9
SUM OF ALL RESPONSES TO PHQ QUESTIONS 1-9: 0
SUM OF ALL RESPONSES TO PHQ QUESTIONS 1-9: 0

## 2018-11-14 ASSESSMENT — ANXIETY QUESTIONNAIRES: GAD7 TOTAL SCORE: 0

## 2018-11-14 NOTE — PATIENT INSTRUCTIONS
Learning About Benefits From Quitting Smoking  How does quitting smoking make you healthier? If you're thinking about quitting smoking, you may have a few reasons to be smoke-free. Your health may be one of them. · When you quit smoking, you lower your risks for cancer, lung disease, heart attack, stroke, blood vessel disease, and blindness from macular degeneration. · When you're smoke-free, you get sick less often, and you heal faster. You are less likely to get colds, flu, bronchitis, and pneumonia. · As a nonsmoker, you may find that your mood is better and you are less stressed. When and how will you feel healthier? Quitting has real health benefits that start from day 1 of being smoke-free. And the longer you stay smoke-free, the healthier you get and the better you feel. The first hours  · After just 20 minutes, your blood pressure and heart rate go down. That means there's less stress on your heart and blood vessels. · Within 12 hours, the level of carbon monoxide in your blood drops back to normal. That makes room for more oxygen. With more oxygen in your body, you may notice that you have more energy than when you smoked. After 2 weeks  · Your lungs start to work better. · Your risk of heart attack starts to drop. After 1 month  · When your lungs are clear, you cough less and breathe deeper, so it's easier to be active. · Your sense of taste and smell return. That means you can enjoy food more than you have since you started smoking. Over the years  · After 1 year, your risk of heart disease is half what it would be if you kept smoking. · After 5 years, your risk of stroke starts to shrink. Within a few years after that, it's about the same as if you'd never smoked. · After 10 years, your risk of dying from lung cancer is cut by about half. And your risk for many other types of cancer is lower too. How would quitting help others in your life?   When you quit smoking, you improve the Does your furniture layout leave plenty of space to maneuver between and around chairs, tables, beds, and sofas? Are hallways, stairs and passages between rooms well lit? Can you reach a lamp without getting out of bed? Are floor surfaces well maintained? Shag rugs, high-pile carpeting, tile floors, and polished wood floors can be particularly slippery. Stairs should always have handrails and be carpeted or fitted with a non-skid tread. Is your telephone easily reachable. Is the cord safely tucked away? Room by Room   According to the Association of Aging, bathrooms and willis are the two most potentially hazardous rooms in your home. In the Kitchen    Be sure your stove is in proper working order and always make sure burners and the oven are off before you go out or go to sleep. Keep pots on the back burners, turn handles away from the front of the stove, and keep stove clean and free of grease build-up. Kitchen ventilation systems and range exhausts should be working properly. Keep flammable objects such as towels and pot holders away from the cooking area except when in use. Make sure kitchen curtains are tied back. Move cords and appliances away from the sink and hot surfaces. If extension cords are needed, install wiring guides so they do not hang over the sink, range, or working areas. Look for coffee pots, kettles and toaster ovens with automatic shut-offs. Keep a mop handy in the kitchen so you can wipe up spills instantly. You should also have a small fire extinguisher. Arrange your kitchen with frequently used items on lower shelves to avoid the need to stand on a stepstool to reach them. Make sure countertops are well-lit to avoid injuries while cutting and preparing food. In the Bathroom    Use a non-slip mat or decals in the tub and shower, since wet, soapy tile or porcelain surfaces are extremely slippery.     Make sure bathroom rugs are non-skid or tape them all. If you do use one, be sure to keep them away from flammable materials. In case of fire, make sure you have a pre-established emergency exit plan. Have a professional check your fireplace and other fuel-burning appliances yearly. Helping Hands   Baby boomers entering the aparicio years will continue to see the development of new products to help older adults live safely and independently in spite of age-related changes. Making Life More Livable  , by Jannette Copeland, lists over 1,000 products for \"living well in the mature years,\" such as bathing and mobility aids, household security devices, ergonomically designed knives and peelers, and faucet valves and knobs for temperature control. Medical supply stores and organizations are good sources of information about products that improve your quality of life and insure your safety.      Last Reviewed: November 2009 Vickey Hickey MD   Updated: 3/7/2011     ·

## 2018-11-14 NOTE — PROGRESS NOTES
made and/or referrals ordered.     Positive Risk Factor Screenings with Interventions:     Substance Abuse:  Social History     Tobacco History     Smoking Status  Current Every Day Smoker Smoking Start Date  6/6/1970 Smoking Frequency  0.5 packs/day for 30 years (15 pk yrs) Smoking Tobacco Type  Cigarettes    Smokeless Tobacco Use  Never Used          Alcohol History     Alcohol Use Status  No          Drug Use     Drug Use Status  No          Sexual Activity     Sexually Active  Yes Partners  Male               Audit Questionnaire: Screen for Alcohol Misuse  How often do you have a drink containing alcohol?: Monthly or less  How many standard drinks containing alcohol do you have on a typical day when drinking?: One or two  How often do you have six or more drinks on one occasion?: Never  Audit-C Score: 1  During the past year, how often have you found that you were not able to stop drinking once you had started?: Never  During the past year, how often have you failed to do what was normally expected of you because of drinking?: Never  During the past year, how often have you needed a drink in the morning to get yourself going after a heavy drinking session?: Never  During the past year, how often have you had a feeling of guilt or remorse after drinking?: Never  During the past year, have you been unable to remember what happened the night before because you had been drinking?: Never  Have you or someone else been injured as a result of your drinking?: No  Has a relative or friend, doctor or health worker been concerned about your drinking or suggested you cut down?: No  Total Score: 1  Substance Abuse Interventions:  · Tobacco abuse:  tobacco cessation tips and resources provided she is trying to quit    General Health:  General  In general, how would you say your health is?: Good  In the past 7 days, have you experienced any of the following?: (!) None of These, Stress, Loneliness (is dealing with stress of her son getting a divorce,  passed away in feb)  Do you get the social and emotional support that you need?: Yes  Do you have a Living Will?: Yes  General Health Risk Interventions:  · Stress: patient declines any further evaluation/treatment for this issue    Hearing/Vision:  Hearing/Vision  Do you or your family notice any trouble with your hearing?: No  Do you have difficulty driving, watching TV, or doing any of your daily activities because of your eyesight?: No  Have you had an eye exam within the past year?: (!) No (she is going to schedule)  Hearing/Vision Interventions:  · Vision concerns:  patient encouraged to make appointment with his/her eye specialist    Personalized Preventive Plan   Current Health Maintenance Status  Immunization History   Administered Date(s) Administered    Influenza Virus Vaccine 10/03/2016    Influenza, Manzanares Simpler, 3 Years and older, IM (Fluzone 3 yrs and older or Afluria 5 yrs and older) 10/10/2017        Health Maintenance   Topic Date Due    DTaP/Tdap/Td vaccine (1 - Tdap) 03/10/1961    Shingles Vaccine (1 of 2 - 2 Dose Series) 03/10/1992    Pneumococcal low/med risk (1 of 2 - PCV13) 03/10/2007    Flu vaccine (1) 09/01/2018    Potassium monitoring  09/20/2019    Creatinine monitoring  09/20/2019    Colon cancer screen colonoscopy  06/04/2023    DEXA (modify frequency per FRAX score)  Completed     Recommendations for Preventive Services Due: see orders and patient instructions/AVS.  . Recommended screening schedule for the next 5-10 years is provided to the patient in written form: see Patient Instructions/AVS.    She has recently lost her  in [de-identified], then she fell in sept and fractured her back, now her son is getting a divorce and has moved in with her. We have sent in all of her medications to her mail order pharmacy today. I will be checking with her pharmacy she states she has had both pneumonia vaccines.     Azam Luu LPN on 36/28/6865, performed the documented evaluation under the direct supervision of the attending physician. I, Dr. Abrahan Asher M.D. on 11/14/18 , was involved with direct supervision of the care provided by the aforementioned nurse. Further orders, if necessary will be directed to the nursing staff for any acute and/or chronic treatment changes needed as well as any necessary follow-ups needing to be scheduled based on the above findings.

## 2018-11-27 ENCOUNTER — TELEPHONE (OUTPATIENT)
Dept: PRIMARY CARE CLINIC | Age: 78
End: 2018-11-27

## 2018-11-27 DIAGNOSIS — F41.9 ANXIETY AND DEPRESSION: ICD-10-CM

## 2018-11-27 DIAGNOSIS — M25.551 PAIN OF BOTH HIP JOINTS: ICD-10-CM

## 2018-11-27 DIAGNOSIS — M25.552 PAIN OF BOTH HIP JOINTS: ICD-10-CM

## 2018-11-27 DIAGNOSIS — M19.90 ARTHRITIS: ICD-10-CM

## 2018-11-27 DIAGNOSIS — F32.A ANXIETY AND DEPRESSION: ICD-10-CM

## 2018-11-28 RX ORDER — AMLODIPINE BESYLATE 5 MG/1
TABLET ORAL
Qty: 90 TABLET | Refills: 3 | Status: SHIPPED | OUTPATIENT
Start: 2018-11-28 | End: 2019-04-05

## 2018-11-28 RX ORDER — MELOXICAM 7.5 MG/1
7.5 TABLET ORAL DAILY
Qty: 90 TABLET | Refills: 3 | Status: SHIPPED | OUTPATIENT
Start: 2018-11-28 | End: 2019-04-05 | Stop reason: SDUPTHER

## 2018-11-28 RX ORDER — POTASSIUM CHLORIDE 750 MG/1
TABLET, EXTENDED RELEASE ORAL
Qty: 180 TABLET | Refills: 3 | Status: SHIPPED | OUTPATIENT
Start: 2018-11-28 | End: 2018-12-18

## 2018-11-28 RX ORDER — LOSARTAN POTASSIUM 100 MG/1
100 TABLET ORAL DAILY
Qty: 90 TABLET | Refills: 3 | Status: SHIPPED | OUTPATIENT
Start: 2018-11-28 | End: 2019-01-30 | Stop reason: SDUPTHER

## 2018-11-28 RX ORDER — MONTELUKAST SODIUM 10 MG/1
10 TABLET ORAL NIGHTLY
Qty: 90 TABLET | Refills: 3 | Status: SHIPPED | OUTPATIENT
Start: 2018-11-28 | End: 2019-07-03

## 2018-11-28 RX ORDER — VENLAFAXINE HYDROCHLORIDE 150 MG/1
150 CAPSULE, EXTENDED RELEASE ORAL DAILY
Qty: 90 CAPSULE | Refills: 3 | Status: SHIPPED | OUTPATIENT
Start: 2018-11-28 | End: 2019-07-03 | Stop reason: SDUPTHER

## 2018-11-28 RX ORDER — OMEPRAZOLE 20 MG/1
CAPSULE, DELAYED RELEASE ORAL
Qty: 90 CAPSULE | Refills: 3 | Status: SHIPPED | OUTPATIENT
Start: 2018-11-28 | End: 2019-07-03 | Stop reason: SDUPTHER

## 2018-11-28 RX ORDER — ATORVASTATIN CALCIUM 40 MG/1
TABLET, FILM COATED ORAL
Qty: 90 TABLET | Refills: 3 | Status: SHIPPED | OUTPATIENT
Start: 2018-11-28 | End: 2018-12-18

## 2018-11-28 RX ORDER — OMEPRAZOLE 20 MG/1
CAPSULE, DELAYED RELEASE ORAL
Qty: 90 CAPSULE | Refills: 3 | Status: SHIPPED | OUTPATIENT
Start: 2018-11-28 | End: 2018-12-18

## 2018-11-28 RX ORDER — IPRATROPIUM BROMIDE AND ALBUTEROL SULFATE 2.5; .5 MG/3ML; MG/3ML
1 SOLUTION RESPIRATORY (INHALATION) EVERY 4 HOURS
Qty: 360 ML | Refills: 0 | Status: SHIPPED | OUTPATIENT
Start: 2018-11-28 | End: 2019-09-17

## 2018-11-28 RX ORDER — AMLODIPINE BESYLATE 5 MG/1
TABLET ORAL
Qty: 90 TABLET | Refills: 3 | Status: SHIPPED | OUTPATIENT
Start: 2018-11-28 | End: 2019-01-03 | Stop reason: SDUPTHER

## 2018-11-28 RX ORDER — LOSARTAN POTASSIUM 100 MG/1
100 TABLET ORAL DAILY
Qty: 90 TABLET | Refills: 3 | Status: SHIPPED | OUTPATIENT
Start: 2018-11-28 | End: 2018-12-18

## 2018-11-28 RX ORDER — MELOXICAM 7.5 MG/1
7.5 TABLET ORAL DAILY
Qty: 90 TABLET | Refills: 3 | Status: SHIPPED | OUTPATIENT
Start: 2018-11-28 | End: 2018-12-18

## 2018-11-28 RX ORDER — ATORVASTATIN CALCIUM 40 MG/1
TABLET, FILM COATED ORAL
Qty: 30 TABLET | Refills: 0 | Status: SHIPPED | OUTPATIENT
Start: 2018-11-28 | End: 2018-11-28 | Stop reason: SDUPTHER

## 2018-11-28 RX ORDER — VENLAFAXINE HYDROCHLORIDE 150 MG/1
150 CAPSULE, EXTENDED RELEASE ORAL DAILY
Qty: 90 CAPSULE | Refills: 3 | Status: SHIPPED | OUTPATIENT
Start: 2018-11-28 | End: 2018-12-18

## 2018-11-28 RX ORDER — PROPRANOLOL HYDROCHLORIDE 40 MG/1
40 TABLET ORAL 3 TIMES DAILY
Qty: 270 TABLET | Refills: 3 | Status: SHIPPED | OUTPATIENT
Start: 2018-11-28 | End: 2018-12-18

## 2018-11-28 RX ORDER — ASPIRIN 81 MG/1
81 TABLET ORAL DAILY
Qty: 90 TABLET | Refills: 3 | Status: SHIPPED | OUTPATIENT
Start: 2018-11-28 | End: 2019-07-03 | Stop reason: SDUPTHER

## 2018-11-28 RX ORDER — ATORVASTATIN CALCIUM 40 MG/1
TABLET, FILM COATED ORAL
Qty: 90 TABLET | Refills: 3 | Status: SHIPPED | OUTPATIENT
Start: 2018-11-28 | End: 2019-01-30 | Stop reason: SDUPTHER

## 2018-11-28 RX ORDER — MONTELUKAST SODIUM 10 MG/1
10 TABLET ORAL NIGHTLY
Qty: 90 TABLET | Refills: 3 | Status: SHIPPED | OUTPATIENT
Start: 2018-11-28 | End: 2018-12-18

## 2018-11-28 RX ORDER — POTASSIUM CHLORIDE 750 MG/1
TABLET, EXTENDED RELEASE ORAL
Qty: 180 TABLET | Refills: 3 | Status: SHIPPED | OUTPATIENT
Start: 2018-11-28 | End: 2019-07-03 | Stop reason: SDUPTHER

## 2018-11-28 RX ORDER — LOSARTAN POTASSIUM 100 MG/1
100 TABLET ORAL DAILY
Qty: 30 TABLET | Refills: 0 | Status: SHIPPED | OUTPATIENT
Start: 2018-11-28 | End: 2018-11-28 | Stop reason: SDUPTHER

## 2018-11-28 RX ORDER — PROPRANOLOL HYDROCHLORIDE 40 MG/1
40 TABLET ORAL 3 TIMES DAILY
Qty: 270 TABLET | Refills: 3 | Status: SHIPPED | OUTPATIENT
Start: 2018-11-28 | End: 2019-07-03

## 2018-11-28 RX ORDER — GUAIFENESIN 600 MG/1
1200 TABLET, EXTENDED RELEASE ORAL 2 TIMES DAILY
Qty: 180 TABLET | Refills: 3 | Status: SHIPPED | OUTPATIENT
Start: 2018-11-28 | End: 2018-12-18

## 2018-12-18 ENCOUNTER — OFFICE VISIT (OUTPATIENT)
Dept: PRIMARY CARE CLINIC | Age: 78
End: 2018-12-18
Payer: MEDICARE

## 2018-12-18 VITALS
SYSTOLIC BLOOD PRESSURE: 110 MMHG | TEMPERATURE: 97.8 F | DIASTOLIC BLOOD PRESSURE: 72 MMHG | HEART RATE: 78 BPM | WEIGHT: 145 LBS | BODY MASS INDEX: 27.38 KG/M2 | OXYGEN SATURATION: 96 % | RESPIRATION RATE: 22 BRPM | HEIGHT: 61 IN

## 2018-12-18 DIAGNOSIS — R19.7 DIARRHEA, UNSPECIFIED TYPE: ICD-10-CM

## 2018-12-18 DIAGNOSIS — M46.1 INFLAMMATION OF LEFT SACROILIAC JOINT (HCC): ICD-10-CM

## 2018-12-18 DIAGNOSIS — Z91.81 AT HIGH RISK FOR FALLS: ICD-10-CM

## 2018-12-18 DIAGNOSIS — J44.9 STAGE 3 SEVERE COPD BY GOLD CLASSIFICATION (HCC): Primary | Chronic | ICD-10-CM

## 2018-12-18 DIAGNOSIS — J01.00 ACUTE NON-RECURRENT MAXILLARY SINUSITIS: ICD-10-CM

## 2018-12-18 PROCEDURE — 1090F PRES/ABSN URINE INCON ASSESS: CPT | Performed by: FAMILY MEDICINE

## 2018-12-18 PROCEDURE — G8400 PT W/DXA NO RESULTS DOC: HCPCS | Performed by: FAMILY MEDICINE

## 2018-12-18 PROCEDURE — 4004F PT TOBACCO SCREEN RCVD TLK: CPT | Performed by: FAMILY MEDICINE

## 2018-12-18 PROCEDURE — 1101F PT FALLS ASSESS-DOCD LE1/YR: CPT | Performed by: FAMILY MEDICINE

## 2018-12-18 PROCEDURE — G8926 SPIRO NO PERF OR DOC: HCPCS | Performed by: FAMILY MEDICINE

## 2018-12-18 PROCEDURE — 20610 DRAIN/INJ JOINT/BURSA W/O US: CPT | Performed by: FAMILY MEDICINE

## 2018-12-18 PROCEDURE — G8417 CALC BMI ABV UP PARAM F/U: HCPCS | Performed by: FAMILY MEDICINE

## 2018-12-18 PROCEDURE — 4040F PNEUMOC VAC/ADMIN/RCVD: CPT | Performed by: FAMILY MEDICINE

## 2018-12-18 PROCEDURE — G8427 DOCREV CUR MEDS BY ELIG CLIN: HCPCS | Performed by: FAMILY MEDICINE

## 2018-12-18 PROCEDURE — G8482 FLU IMMUNIZE ORDER/ADMIN: HCPCS | Performed by: FAMILY MEDICINE

## 2018-12-18 PROCEDURE — 99214 OFFICE O/P EST MOD 30 MIN: CPT | Performed by: FAMILY MEDICINE

## 2018-12-18 PROCEDURE — 3023F SPIROM DOC REV: CPT | Performed by: FAMILY MEDICINE

## 2018-12-18 PROCEDURE — 1123F ACP DISCUSS/DSCN MKR DOCD: CPT | Performed by: FAMILY MEDICINE

## 2018-12-18 PROCEDURE — G8598 ASA/ANTIPLAT THER USED: HCPCS | Performed by: FAMILY MEDICINE

## 2018-12-18 RX ORDER — DIPHENOXYLATE HYDROCHLORIDE AND ATROPINE SULFATE 2.5; .025 MG/1; MG/1
1 TABLET ORAL 4 TIMES DAILY PRN
Qty: 120 TABLET | Refills: 0 | Status: SHIPPED | OUTPATIENT
Start: 2018-12-18 | End: 2019-01-17

## 2018-12-18 RX ORDER — AMOXICILLIN AND CLAVULANATE POTASSIUM 875; 125 MG/1; MG/1
1 TABLET, FILM COATED ORAL 2 TIMES DAILY
Qty: 20 TABLET | Refills: 0 | Status: SHIPPED | OUTPATIENT
Start: 2018-12-18 | End: 2018-12-28

## 2018-12-18 ASSESSMENT — ENCOUNTER SYMPTOMS
SINUS PRESSURE: 1
SHORTNESS OF BREATH: 0
FACIAL SWELLING: 1
ABDOMINAL PAIN: 0
RHINORRHEA: 1
WHEEZING: 0
NAUSEA: 0
CHEST TIGHTNESS: 0
SORE THROAT: 1
DIARRHEA: 0
COUGH: 0
CONSTIPATION: 0
VOMITING: 0

## 2018-12-18 NOTE — PROGRESS NOTES
Jannette Swain is a 70 y.o. female who presents today for   Chief Complaint   Patient presents with    3 Month Follow-Up    COPD       HPI  Patient is here for Concern of COPD. Patient is having issues with her back. COPD is been doing well. She notes that the inhalers keep her breathing better. Her back pain has improved with prior injections of the left SI area. She notes that otherwise she has been doing quite well. No change in PMH, family, social, or surgical history unless mentioned above. Review of Systems   Constitutional: Positive for fatigue. Negative for chills and fever. HENT: Positive for congestion, facial swelling (L maxillary > 2 weeks), rhinorrhea, sinus pressure and sore throat. Respiratory: Negative for cough, chest tightness, shortness of breath and wheezing. Cardiovascular: Negative for chest pain, palpitations and leg swelling. Gastrointestinal: Negative for abdominal pain, constipation, diarrhea, nausea and vomiting. Genitourinary: Negative for difficulty urinating, dysuria and frequency. Past Medical History:   Diagnosis Date    Acid reflux     Allergic rhinitis     Hyperlipidemia     Hypertension     Lung [facility] ([facility]     Mild single current episode of major depressive disorder ([facility] 5/2/2018    Osteoarthritis     Other emphysema ([facility] 12/22/2017    Stage 3 severe COPD by GOLD classification ([facility] 6/6/2018    FEV1 42%    Tobacco abuse 2/8/2018    Urinary incontinence     stress incontinence       Current Outpatient Prescriptions   Medication Sig Dispense Refill    diphenoxylate-atropine (LOMOTIL) 2.5-0.025 MG per tablet Take 1 tablet by mouth 4 times daily as needed for Diarrhea for up to 30 days. . 120 tablet 0    amoxicillin-clavulanate (AUGMENTIN) 875-125 MG per tablet Take 1 tablet by mouth 2 times daily for 10 days 20 tablet 0    venlafaxine (EFFEXOR XR) 150 MG extended release capsule Take 1 capsule by mouth daily 90 capsule 3    propranolol does not exhibit a depressed mood. She expresses no homicidal and no suicidal ideation. Nursing note and vitals reviewed. Assessment:    ICD-10-CM    1. Stage 3 severe COPD by GOLD classification ([facility] J44.9    2. Diarrhea, unspecified type R19.7 diphenoxylate-atropine (LOMOTIL) 2.5-0.025 MG per tablet   3. Inflammation of left sacroiliac joint (HCC) M46.1    4. Acute non-recurrent maxillary sinusitis J01.00    5. At high risk for falls Z91.81        Plan:   COPD is currently stable but she does have acute sinusitis. I will treat for this. I will also go ahead and do an injection of the left SI joint today. This is for uncontrolled sacroiliac dysfunction    SI JOINT INJECTION  Patient was given verbal informed consent and agreed verbally and with signed consent to the risks and benefits of procedure. Risks discussed included bleeding, infection, damage to structures, and potentially other yet unlikely unforeseen consequences of those risks. An impression was made with a pen for injection site left sacroilac joint. The area was cleaned w/ betadine and alcohol swab. It was then sprayed w/ ethyl chloride and injected w/ a 25 gauge 1.5\" needle into the the tissue between the sacrum and ilium  It was aspirated and no bloody return into syringe. The medicine was administered w/o issue. 8.0 cc of 1% lidocaine w/o epinephrine and 1.0 cc of 40 mg/mL kenalog was administered. A bandage was applied directly thereafter. All bleeding stopped. EBL - 0 cc. Pt was instructed on basic cleansing and rest of affected area. Pt noted some relief prior to leaving the office. No orders of the defined types were placed in this encounter. Orders Placed This Encounter   Medications    diphenoxylate-atropine (LOMOTIL) 2.5-0.025 MG per tablet     Sig: Take 1 tablet by mouth 4 times daily as needed for Diarrhea for up to 30 days. .     Dispense:  120 tablet     Refill:  0    amoxicillin-clavulanate (AUGMENTIN) 875-125 MG per tablet     Sig: Take 1 tablet by mouth 2 times daily for 10 days     Dispense:  20 tablet     Refill:  0     Medications Discontinued During This Encounter   Medication Reason    atorvastatin (LIPITOR) 40 MG tablet     guaiFENesin (MUCINEX) 600 MG extended release tablet     losartan (COZAAR) 100 MG tablet     meloxicam (MOBIC) 7.5 MG tablet     montelukast (SINGULAIR) 10 MG tablet     omeprazole (PRILOSEC) 20 MG delayed release capsule     potassium chloride (KLOR-CON M) 10 MEQ extended release tablet     propranolol (INDERAL) 40 MG tablet     venlafaxine (EFFEXOR XR) 150 MG extended release capsule     diphenoxylate-atropine (LOMOTIL) 2.5-0.025 MG per tablet REORDER     Patient Instructions   Patient Education   Start Augmentin twice a day for 10 days. Vertigo: Exercises  Your Care Instructions  Here are some examples of typical rehabilitation exercises for your condition. Start each exercise slowly. Ease off the exercise if you start to have pain. Your doctor or physical therapist will tell you when you can start these exercises and which ones will work best for you. How to do the exercises  Exercise 1    1. Stand with a chair in front of you and a wall behind you. If you begin to fall, you may use them for support. 2. Stand with your feet together and your arms at your sides. 3. Move your head up and down 10 times. Exercise 2    1. Move your head side to side 10 times. Exercise 3    1. Move your head diagonally up and down 10 times. Exercise 4    1. Move your head diagonally up and down 10 times on the other side. Follow-up care is a key part of your treatment and safety. Be sure to make and go to all appointments, and call your doctor if you are having problems. It's also a good idea to know your test results and keep a list of the medicines you take. Where can you learn more? Go to [email or web address] and sign in to your [employer] account.

## 2019-01-03 ENCOUNTER — OFFICE VISIT (OUTPATIENT)
Dept: PRIMARY CARE CLINIC | Age: 79
End: 2019-01-03
Payer: MEDICARE

## 2019-01-03 VITALS
TEMPERATURE: 97.6 F | DIASTOLIC BLOOD PRESSURE: 70 MMHG | SYSTOLIC BLOOD PRESSURE: 112 MMHG | HEIGHT: 60 IN | WEIGHT: 137 LBS | BODY MASS INDEX: 26.9 KG/M2 | OXYGEN SATURATION: 94 % | HEART RATE: 86 BPM

## 2019-01-03 DIAGNOSIS — J01.00 SUBACUTE MAXILLARY SINUSITIS: Primary | ICD-10-CM

## 2019-01-03 DIAGNOSIS — J40 SINOBRONCHITIS: ICD-10-CM

## 2019-01-03 DIAGNOSIS — J32.9 SINOBRONCHITIS: ICD-10-CM

## 2019-01-03 PROCEDURE — 96372 THER/PROPH/DIAG INJ SC/IM: CPT | Performed by: NURSE PRACTITIONER

## 2019-01-03 PROCEDURE — 1123F ACP DISCUSS/DSCN MKR DOCD: CPT | Performed by: NURSE PRACTITIONER

## 2019-01-03 PROCEDURE — G8417 CALC BMI ABV UP PARAM F/U: HCPCS | Performed by: NURSE PRACTITIONER

## 2019-01-03 PROCEDURE — 4004F PT TOBACCO SCREEN RCVD TLK: CPT | Performed by: NURSE PRACTITIONER

## 2019-01-03 PROCEDURE — 4040F PNEUMOC VAC/ADMIN/RCVD: CPT | Performed by: NURSE PRACTITIONER

## 2019-01-03 PROCEDURE — 1090F PRES/ABSN URINE INCON ASSESS: CPT | Performed by: NURSE PRACTITIONER

## 2019-01-03 PROCEDURE — 1101F PT FALLS ASSESS-DOCD LE1/YR: CPT | Performed by: NURSE PRACTITIONER

## 2019-01-03 PROCEDURE — 99212 OFFICE O/P EST SF 10 MIN: CPT | Performed by: NURSE PRACTITIONER

## 2019-01-03 PROCEDURE — G8427 DOCREV CUR MEDS BY ELIG CLIN: HCPCS | Performed by: NURSE PRACTITIONER

## 2019-01-03 PROCEDURE — G8400 PT W/DXA NO RESULTS DOC: HCPCS | Performed by: NURSE PRACTITIONER

## 2019-01-03 PROCEDURE — G8598 ASA/ANTIPLAT THER USED: HCPCS | Performed by: NURSE PRACTITIONER

## 2019-01-03 PROCEDURE — G8482 FLU IMMUNIZE ORDER/ADMIN: HCPCS | Performed by: NURSE PRACTITIONER

## 2019-01-03 RX ORDER — METHYLPREDNISOLONE ACETATE 40 MG/ML
40 INJECTION, SUSPENSION INTRA-ARTICULAR; INTRALESIONAL; INTRAMUSCULAR; SOFT TISSUE ONCE
Status: COMPLETED | OUTPATIENT
Start: 2019-01-03 | End: 2019-01-03

## 2019-01-03 RX ORDER — TESTOSTERONE CYPIONATE 200 MG/ML
4 INJECTION INTRAMUSCULAR ONCE
Status: COMPLETED | OUTPATIENT
Start: 2019-01-03 | End: 2019-01-03

## 2019-01-03 RX ORDER — FLUTICASONE PROPIONATE 50 MCG
2 SPRAY, SUSPENSION (ML) NASAL DAILY
Qty: 1 BOTTLE | Refills: 3 | Status: SHIPPED | OUTPATIENT
Start: 2019-01-03 | End: 2019-09-04 | Stop reason: SDUPTHER

## 2019-01-03 RX ORDER — DOXYCYCLINE HYCLATE 100 MG
100 TABLET ORAL 2 TIMES DAILY
Qty: 20 TABLET | Refills: 0 | Status: SHIPPED | OUTPATIENT
Start: 2019-01-03 | End: 2019-01-13

## 2019-01-03 RX ADMIN — TESTOSTERONE CYPIONATE 4 MG: 200 INJECTION INTRAMUSCULAR at 14:55

## 2019-01-03 RX ADMIN — METHYLPREDNISOLONE ACETATE 40 MG: 40 INJECTION, SUSPENSION INTRA-ARTICULAR; INTRALESIONAL; INTRAMUSCULAR; SOFT TISSUE at 14:56

## 2019-01-05 ASSESSMENT — ENCOUNTER SYMPTOMS
SHORTNESS OF BREATH: 0
RHINORRHEA: 1
WHEEZING: 1
SORE THROAT: 1
APNEA: 0

## 2019-01-14 ENCOUNTER — OFFICE VISIT (OUTPATIENT)
Dept: PRIMARY CARE CLINIC | Age: 79
End: 2019-01-14
Payer: MEDICARE

## 2019-01-14 VITALS
SYSTOLIC BLOOD PRESSURE: 118 MMHG | HEART RATE: 68 BPM | HEIGHT: 60 IN | BODY MASS INDEX: 26.7 KG/M2 | TEMPERATURE: 97.3 F | WEIGHT: 136 LBS | DIASTOLIC BLOOD PRESSURE: 68 MMHG

## 2019-01-14 DIAGNOSIS — N39.41 URGENCY INCONTINENCE: Primary | ICD-10-CM

## 2019-01-14 PROCEDURE — G8427 DOCREV CUR MEDS BY ELIG CLIN: HCPCS | Performed by: NURSE PRACTITIONER

## 2019-01-14 PROCEDURE — 99211 OFF/OP EST MAY X REQ PHY/QHP: CPT | Performed by: NURSE PRACTITIONER

## 2019-01-14 PROCEDURE — G8417 CALC BMI ABV UP PARAM F/U: HCPCS | Performed by: NURSE PRACTITIONER

## 2019-01-30 RX ORDER — MONTELUKAST SODIUM 10 MG/1
TABLET ORAL
Qty: 90 TABLET | Refills: 0 | Status: SHIPPED | OUTPATIENT
Start: 2019-01-30 | End: 2019-07-03

## 2019-01-30 RX ORDER — ATORVASTATIN CALCIUM 40 MG/1
TABLET, FILM COATED ORAL
Qty: 90 TABLET | Refills: 0 | Status: SHIPPED | OUTPATIENT
Start: 2019-01-30 | End: 2019-07-03 | Stop reason: SDUPTHER

## 2019-01-30 RX ORDER — LOSARTAN POTASSIUM 100 MG/1
TABLET ORAL
Qty: 90 TABLET | Refills: 0 | Status: SHIPPED | OUTPATIENT
Start: 2019-01-30 | End: 2019-07-03 | Stop reason: SDUPTHER

## 2019-03-19 ENCOUNTER — TELEPHONE (OUTPATIENT)
Dept: PRIMARY CARE CLINIC | Age: 79
End: 2019-03-19

## 2019-03-19 RX ORDER — AMOXICILLIN AND CLAVULANATE POTASSIUM 875; 125 MG/1; MG/1
1 TABLET, FILM COATED ORAL 2 TIMES DAILY
Qty: 20 TABLET | Refills: 0 | Status: SHIPPED | OUTPATIENT
Start: 2019-03-19 | End: 2019-03-29

## 2019-04-05 ENCOUNTER — OFFICE VISIT (OUTPATIENT)
Dept: PRIMARY CARE CLINIC | Age: 79
End: 2019-04-05
Payer: MEDICARE

## 2019-04-05 VITALS
OXYGEN SATURATION: 98 % | SYSTOLIC BLOOD PRESSURE: 112 MMHG | DIASTOLIC BLOOD PRESSURE: 70 MMHG | BODY MASS INDEX: 26.39 KG/M2 | RESPIRATION RATE: 16 BRPM | TEMPERATURE: 97.6 F | HEIGHT: 61 IN | WEIGHT: 139.8 LBS | HEART RATE: 73 BPM

## 2019-04-05 DIAGNOSIS — M25.552 PAIN OF BOTH HIP JOINTS: ICD-10-CM

## 2019-04-05 DIAGNOSIS — J44.9 STAGE 3 SEVERE COPD BY GOLD CLASSIFICATION (HCC): ICD-10-CM

## 2019-04-05 DIAGNOSIS — M25.551 PAIN OF BOTH HIP JOINTS: ICD-10-CM

## 2019-04-05 DIAGNOSIS — M46.1 INFLAMMATION OF LEFT SACROILIAC JOINT (HCC): Primary | ICD-10-CM

## 2019-04-05 DIAGNOSIS — M19.90 ARTHRITIS: ICD-10-CM

## 2019-04-05 DIAGNOSIS — F32.0 MILD SINGLE CURRENT EPISODE OF MAJOR DEPRESSIVE DISORDER (HCC): ICD-10-CM

## 2019-04-05 DIAGNOSIS — J30.89 NON-SEASONAL ALLERGIC RHINITIS, UNSPECIFIED TRIGGER: ICD-10-CM

## 2019-04-05 PROCEDURE — 1123F ACP DISCUSS/DSCN MKR DOCD: CPT | Performed by: FAMILY MEDICINE

## 2019-04-05 PROCEDURE — 20610 DRAIN/INJ JOINT/BURSA W/O US: CPT | Performed by: FAMILY MEDICINE

## 2019-04-05 PROCEDURE — G8427 DOCREV CUR MEDS BY ELIG CLIN: HCPCS | Performed by: FAMILY MEDICINE

## 2019-04-05 PROCEDURE — 1090F PRES/ABSN URINE INCON ASSESS: CPT | Performed by: FAMILY MEDICINE

## 2019-04-05 PROCEDURE — G8417 CALC BMI ABV UP PARAM F/U: HCPCS | Performed by: FAMILY MEDICINE

## 2019-04-05 PROCEDURE — G8598 ASA/ANTIPLAT THER USED: HCPCS | Performed by: FAMILY MEDICINE

## 2019-04-05 PROCEDURE — 3023F SPIROM DOC REV: CPT | Performed by: FAMILY MEDICINE

## 2019-04-05 PROCEDURE — G8926 SPIRO NO PERF OR DOC: HCPCS | Performed by: FAMILY MEDICINE

## 2019-04-05 PROCEDURE — 4004F PT TOBACCO SCREEN RCVD TLK: CPT | Performed by: FAMILY MEDICINE

## 2019-04-05 PROCEDURE — 99214 OFFICE O/P EST MOD 30 MIN: CPT | Performed by: FAMILY MEDICINE

## 2019-04-05 PROCEDURE — G8400 PT W/DXA NO RESULTS DOC: HCPCS | Performed by: FAMILY MEDICINE

## 2019-04-05 PROCEDURE — 4040F PNEUMOC VAC/ADMIN/RCVD: CPT | Performed by: FAMILY MEDICINE

## 2019-04-05 RX ORDER — CETIRIZINE HYDROCHLORIDE 10 MG/1
10 TABLET ORAL DAILY
Qty: 90 TABLET | Refills: 3 | Status: SHIPPED | OUTPATIENT
Start: 2019-04-05 | End: 2019-07-03 | Stop reason: SDUPTHER

## 2019-04-05 RX ORDER — TRIAMCINOLONE ACETONIDE 40 MG/ML
40 INJECTION, SUSPENSION INTRA-ARTICULAR; INTRAMUSCULAR ONCE
Status: COMPLETED | OUTPATIENT
Start: 2019-04-05 | End: 2019-04-05

## 2019-04-05 RX ORDER — TRAZODONE HYDROCHLORIDE 50 MG/1
TABLET ORAL
Qty: 60 TABLET | Refills: 1 | Status: SHIPPED | OUTPATIENT
Start: 2019-04-05 | End: 2019-07-03

## 2019-04-05 RX ORDER — MELOXICAM 15 MG/1
15 TABLET ORAL DAILY
Qty: 90 TABLET | Refills: 1 | Status: SHIPPED | OUTPATIENT
Start: 2019-04-05 | End: 2019-07-03

## 2019-04-05 RX ORDER — PREDNISONE 20 MG/1
40 TABLET ORAL DAILY
Qty: 10 TABLET | Refills: 0 | Status: SHIPPED | OUTPATIENT
Start: 2019-04-05 | End: 2019-12-27 | Stop reason: SDUPTHER

## 2019-04-05 RX ORDER — AZITHROMYCIN 250 MG/1
250 TABLET, FILM COATED ORAL DAILY
Qty: 6 TABLET | Refills: 0 | Status: SHIPPED | OUTPATIENT
Start: 2019-04-05 | End: 2019-04-10

## 2019-04-05 RX ADMIN — TRIAMCINOLONE ACETONIDE 40 MG: 40 INJECTION, SUSPENSION INTRA-ARTICULAR; INTRAMUSCULAR at 11:03

## 2019-04-05 NOTE — PATIENT INSTRUCTIONS
Use tylenol 1000 mg three times a day. Use this with 15 mg mobic daily, don't take ibuprofen with this. Take trazodone 50 mg tablet 1-2 hrs before bedtime. If after 3 nights it is not helping, you may increase to 2 tabs at night 1-2 hrs before bedtime. If you feel too sedated the next morning, you may cut the tab in half and just take 25 mg.         Stop norvasc (amlodipine)

## 2019-04-17 ASSESSMENT — ENCOUNTER SYMPTOMS
SHORTNESS OF BREATH: 1
BACK PAIN: 1
ABDOMINAL PAIN: 0
COUGH: 1
WHEEZING: 0
VOMITING: 0
NAUSEA: 0
CHEST TIGHTNESS: 0
DIARRHEA: 0
CONSTIPATION: 0

## 2019-04-17 NOTE — PROGRESS NOTES
Jose Armando Peck is a 70 y.o. female who presents today for   Chief Complaint   Patient presents with    Injections    Discuss Medications     Mobic questions    COPD     3 month FU       HPI  Patient is here for follow-up for COPD and history of depression. States that breathing has been much better since starting appropriate inhalers last year. She also has a history of depression but notes that has been rather stable. She does note that she is having more difficulty sleeping with time though. She has difficulty staying asleep. She denies waking up with panic attacks. Patient also having generalized arthralgias. She continues to have severe left lower back pain and we have been doing injections that have helped this previously. She notes it is becoming uncontrolled and exacerbated (would like to possibly have a change in regimen for her oral treatment and do injections today. She denies any ill effect from prior injections. She is taking ibuprofen over-the-counter. No change in PMH, family, social, or surgical history unless mentioned above. Review of Systems   Constitutional: Negative for chills and fever. Respiratory: Positive for cough and shortness of breath. Negative for chest tightness and wheezing. Cardiovascular: Negative for chest pain, palpitations and leg swelling. Gastrointestinal: Negative for abdominal pain, constipation, diarrhea, nausea and vomiting. Genitourinary: Negative for difficulty urinating, dysuria and frequency. Musculoskeletal: Positive for back pain. Negative for gait problem. Allergic/Immunologic: Positive for environmental allergies. Neurological: Negative for weakness and numbness.           Past Medical History:   Diagnosis Date    Acid reflux     Allergic rhinitis     Hyperlipidemia     Hypertension     Lung [facility] ([facility]     Mild single current episode of major depressive disorder ([facility] 5/2/2018    Osteoarthritis     Other emphysema ([facility]

## 2019-04-25 ENCOUNTER — TELEPHONE (OUTPATIENT)
Dept: PRIMARY CARE CLINIC | Age: 79
End: 2019-04-25

## 2019-04-30 NOTE — TELEPHONE ENCOUNTER
I spoke with patient and she does not want to change medication at this time. She states that the Mobic does work it just wears off earlier then she would like.

## 2019-05-13 ENCOUNTER — OFFICE VISIT (OUTPATIENT)
Dept: PRIMARY CARE CLINIC | Age: 79
End: 2019-05-13
Payer: MEDICARE

## 2019-05-13 VITALS
WEIGHT: 141 LBS | TEMPERATURE: 97.9 F | HEART RATE: 66 BPM | DIASTOLIC BLOOD PRESSURE: 68 MMHG | BODY MASS INDEX: 26.62 KG/M2 | OXYGEN SATURATION: 96 % | HEIGHT: 61 IN | SYSTOLIC BLOOD PRESSURE: 112 MMHG

## 2019-05-13 DIAGNOSIS — L25.5 CONTACT DERMATITIS DUE TO PLANT: Primary | ICD-10-CM
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
## 2019-05-13 PROCEDURE — 4040F PNEUMOC VAC/ADMIN/RCVD: CPT | Performed by: NURSE PRACTITIONER

## 2019-05-13 PROCEDURE — G8427 DOCREV CUR MEDS BY ELIG CLIN: HCPCS | Performed by: NURSE PRACTITIONER

## 2019-05-13 PROCEDURE — 1090F PRES/ABSN URINE INCON ASSESS: CPT | Performed by: NURSE PRACTITIONER

## 2019-05-13 PROCEDURE — 1123F ACP DISCUSS/DSCN MKR DOCD: CPT | Performed by: NURSE PRACTITIONER

## 2019-05-13 PROCEDURE — G8400 PT W/DXA NO RESULTS DOC: HCPCS | Performed by: NURSE PRACTITIONER

## 2019-05-13 PROCEDURE — 4004F PT TOBACCO SCREEN RCVD TLK: CPT | Performed by: NURSE PRACTITIONER

## 2019-05-13 PROCEDURE — 99213 OFFICE O/P EST LOW 20 MIN: CPT | Performed by: NURSE PRACTITIONER

## 2019-05-13 PROCEDURE — G8417 CALC BMI ABV UP PARAM F/U: HCPCS | Performed by: NURSE PRACTITIONER

## 2019-05-13 PROCEDURE — G8598 ASA/ANTIPLAT THER USED: HCPCS | Performed by: NURSE PRACTITIONER

## 2019-05-13 RX ORDER — TRIAMCINOLONE ACETONIDE 40 MG/ML
40 INJECTION, SUSPENSION INTRA-ARTICULAR; INTRAMUSCULAR ONCE
Status: COMPLETED | OUTPATIENT
Start: 2019-05-13 | End: 2019-05-13

## 2019-05-13 RX ORDER — TRIAMCINOLONE ACETONIDE 0.25 MG/G
CREAM TOPICAL
Qty: 1 TUBE | Refills: 1 | Status: SHIPPED | OUTPATIENT
Start: 2019-05-13 | End: 2019-10-07 | Stop reason: ALTCHOICE

## 2019-05-13 RX ADMIN — TRIAMCINOLONE ACETONIDE 40 MG: 40 INJECTION, SUSPENSION INTRA-ARTICULAR; INTRAMUSCULAR at 17:29

## 2019-05-13 NOTE — PROGRESS NOTES
Diabetes Sister     High Blood Pressure Brother      Social History     Socioeconomic History    Marital status:      Spouse name: None    Number of children: None    Years of education: None    Highest education level: None   Occupational History    None   Social Needs    Financial resource strain: None    Food insecurity:     Worry: None     Inability: None    Transportation needs:     Medical: None     Non-medical: None   Tobacco Use    Smoking status: Current Every Day Smoker     Packs/day: 0.50     Years: 30.00     Pack years: 15.00     Types: Cigarettes     Start date: 6/6/1970    Smokeless tobacco: Never Used   Substance and Sexual Activity    Alcohol use: No    Drug use: No    Sexual activity: Yes     Partners: Male   Lifestyle    Physical activity:     Days per week: None     Minutes per session: None    Stress: None   Relationships    Social connections:     Talks on phone: None     Gets together: None     Attends [employer] service: None     Active member of club or organization: None     Attends meetings of clubs or organizations: None     Relationship status: None    Intimate partner violence:     Fear of current or ex partner: None     Emotionally abused: None     Physically abused: None     Forced sexual activity: None   Other Topics Concern    None   Social History Narrative    None     Current Outpatient Medications   Medication Sig Dispense Refill    triamcinolone (KENALOG) 0.025 % cream Apply topically 2 times daily. 1 Tube 1    cetirizine (ZYRTEC) 10 MG tablet Take 1 tablet by mouth daily 90 tablet 3    meloxicam (MOBIC) 15 MG tablet Take 1 tablet by mouth daily 90 tablet 1    traZODone (DESYREL) 50 MG tablet Take 1-2 tabs at night as directed 60 tablet 1    losartan (COZAAR) 100 MG tablet TAKE 1 TABLET BY MOUTH ONCE DAILY. 90 tablet 0    atorvastatin (LIPITOR) 40 MG tablet TAKE 1 TABLET BY MOUTH ONCE DAILY.  90 tablet 0    montelukast (SINGULAIR) 10 MG tablet TAKE 1 TABLET BY MOUTH AT BEDTIME 90 tablet 0    fluticasone (FLONASE) 50 MCG/ACT nasal spray 2 sprays by Nasal route daily 1 Bottle 3    venlafaxine (EFFEXOR XR) 150 MG extended release capsule Take 1 capsule by mouth daily 90 capsule 3    propranolol (INDERAL) 40 MG tablet Take 1 tablet by mouth 3 times daily Take one tablet po  tablet 3    potassium chloride (KLOR-CON M) 10 MEQ extended release tablet TAKE 2 TABLETS BY MOUTH EVERY  tablet 3    omeprazole (PRILOSEC) 20 MG delayed release capsule TAKE 1 CAPSULE BY MOUTH DAILY 90 capsule 3    montelukast (SINGULAIR) 10 MG tablet Take 1 tablet by mouth nightly 90 tablet 3    umeclidinium-vilanterol (ANORO ELLIPTA) 62.5-25 MCG/INH AEPB inhaler INHALE 1 PUFF INTO LUNGS DAILY. 3 each 3    ipratropium-albuterol (DUONEB) 0.5-2.5 (3) MG/3ML SOLN nebulizer solution Inhale 3 mLs into the lungs every 4 hours 360 mL 0    aspirin EC 81 MG EC tablet Take 1 tablet by mouth daily 90 tablet 3    albuterol sulfate HFA (PROAIR HFA) 108 (90 Base) MCG/ACT inhaler Inhale 2 puffs into the lungs every 6 hours as needed for Wheezing 1 Inhaler 3    melatonin 5 MG TABS tablet Take 5 mg by mouth daily       No current facility-administered medications for this visit. Current Outpatient Medications on File Prior to Visit   Medication Sig Dispense Refill    cetirizine (ZYRTEC) 10 MG tablet Take 1 tablet by mouth daily 90 tablet 3    meloxicam (MOBIC) 15 MG tablet Take 1 tablet by mouth daily 90 tablet 1    traZODone (DESYREL) 50 MG tablet Take 1-2 tabs at night as directed 60 tablet 1    losartan (COZAAR) 100 MG tablet TAKE 1 TABLET BY MOUTH ONCE DAILY. 90 tablet 0    atorvastatin (LIPITOR) 40 MG tablet TAKE 1 TABLET BY MOUTH ONCE DAILY.  90 tablet 0    montelukast (SINGULAIR) 10 MG tablet TAKE 1 TABLET BY MOUTH AT BEDTIME 90 tablet 0    fluticasone (FLONASE) 50 MCG/ACT nasal spray 2 sprays by Nasal route daily 1 Bottle 3    venlafaxine (EFFEXOR XR) 150 MG extended release capsule Take 1 capsule by mouth daily 90 capsule 3    propranolol (INDERAL) 40 MG tablet Take 1 tablet by mouth 3 times daily Take one tablet po  tablet 3    potassium chloride (KLOR-CON M) 10 MEQ extended release tablet TAKE 2 TABLETS BY MOUTH EVERY  tablet 3    omeprazole (PRILOSEC) 20 MG delayed release capsule TAKE 1 CAPSULE BY MOUTH DAILY 90 capsule 3    montelukast (SINGULAIR) 10 MG tablet Take 1 tablet by mouth nightly 90 tablet 3    umeclidinium-vilanterol (ANORO ELLIPTA) 62.5-25 MCG/INH AEPB inhaler INHALE 1 PUFF INTO LUNGS DAILY. 3 each 3    ipratropium-albuterol (DUONEB) 0.5-2.5 (3) MG/3ML SOLN nebulizer solution Inhale 3 mLs into the lungs every 4 hours 360 mL 0    aspirin EC 81 MG EC tablet Take 1 tablet by mouth daily 90 tablet 3    albuterol sulfate HFA (PROAIR HFA) 108 (90 Base) MCG/ACT inhaler Inhale 2 puffs into the lungs every 6 hours as needed for Wheezing 1 Inhaler 3    melatonin 5 MG TABS tablet Take 5 mg by mouth daily       No current facility-administered medications on file prior to visit. Allergies   Allergen Reactions    Doxycycline      Patient states it caused chills and hot flashes severe.  Naproxen Hives    Lactose Intolerance (Gi) Nausea And Vomiting       Review of Systems  Pertinent items are noted in HPI. Objective:     Physical Exam   Constitutional: She is oriented to person, place, and time. She appears well-developed and well-nourished. HENT:   Head: Normocephalic and atraumatic. Cardiovascular: Normal rate, regular rhythm and intact distal pulses. Exam reveals no gallop and no friction rub. No murmur heard. Pulmonary/Chest: Effort normal and breath sounds normal. No respiratory distress. She has no wheezes. She exhibits no tenderness. Neurological: She is alert and oriented to person, place, and time. Skin: Skin is warm and dry. Rash noted. There is erythema. 1. Redness 2. Itching 3. Swelling 4.  Blisters on lower back and arms. Patient has several areas of small round and ischemic areas consistent with some type of insect bite., These are primarily on her legs bilaterally. Nursing note and vitals reviewed. Assessment:      Diagnosis Orders   1. Contact dermatitis due to plant  triamcinolone acetonide (KENALOG-40) injection 40 mg    triamcinolone (KENALOG) 0.025 % cream   2. Insect bite, initial encounter  triamcinolone acetonide (KENALOG-40) injection 40 mg    triamcinolone (KENALOG) 0.025 % cream       Plan:      Medications: continue present medication. Treatment: calamine lotion, may also use antibiotic ointment on in sex bites. Torres Docker quality lotion at least twice a day.

## 2019-07-03 ENCOUNTER — OFFICE VISIT (OUTPATIENT)
Dept: PRIMARY CARE CLINIC | Age: 79
End: 2019-07-03
Payer: MEDICARE

## 2019-07-03 VITALS
HEIGHT: 61 IN | TEMPERATURE: 97.4 F | OXYGEN SATURATION: 94 % | HEART RATE: 65 BPM | RESPIRATION RATE: 14 BRPM | SYSTOLIC BLOOD PRESSURE: 122 MMHG | BODY MASS INDEX: 27.08 KG/M2 | DIASTOLIC BLOOD PRESSURE: 78 MMHG | WEIGHT: 143.4 LBS

## 2019-07-03 DIAGNOSIS — F32.A ANXIETY AND DEPRESSION: ICD-10-CM

## 2019-07-03 DIAGNOSIS — J44.9 STAGE 3 SEVERE COPD BY GOLD CLASSIFICATION (HCC): Primary | Chronic | ICD-10-CM

## 2019-07-03 DIAGNOSIS — M46.1 INFLAMMATION OF RIGHT SACROILIAC JOINT (HCC): ICD-10-CM

## 2019-07-03 DIAGNOSIS — R94.6 ABNORMAL RESULTS OF THYROID FUNCTION STUDIES: ICD-10-CM

## 2019-07-03 DIAGNOSIS — F32.0 MILD SINGLE CURRENT EPISODE OF MAJOR DEPRESSIVE DISORDER (HCC): ICD-10-CM

## 2019-07-03 DIAGNOSIS — I63.81 BASAL GANGLIA INFARCTION (HCC): Chronic | ICD-10-CM

## 2019-07-03 DIAGNOSIS — F17.210 CIGARETTE SMOKER: ICD-10-CM

## 2019-07-03 DIAGNOSIS — M46.1 INFLAMMATION OF LEFT SACROILIAC JOINT (HCC): ICD-10-CM

## 2019-07-03 DIAGNOSIS — M25.559 ARTHRALGIA OF HIP, UNSPECIFIED LATERALITY: ICD-10-CM

## 2019-07-03 DIAGNOSIS — F41.9 ANXIETY AND DEPRESSION: ICD-10-CM

## 2019-07-03 DIAGNOSIS — K58.0 IRRITABLE BOWEL SYNDROME WITH DIARRHEA: ICD-10-CM

## 2019-07-03 PROBLEM — J44.1 COPD EXACERBATION (HCC): Status: RESOLVED | Noted: 2018-09-18 | Resolved: 2019-07-03

## 2019-07-03 PROCEDURE — 1090F PRES/ABSN URINE INCON ASSESS: CPT | Performed by: FAMILY MEDICINE

## 2019-07-03 PROCEDURE — G8926 SPIRO NO PERF OR DOC: HCPCS | Performed by: FAMILY MEDICINE

## 2019-07-03 PROCEDURE — 4040F PNEUMOC VAC/ADMIN/RCVD: CPT | Performed by: FAMILY MEDICINE

## 2019-07-03 PROCEDURE — 3023F SPIROM DOC REV: CPT | Performed by: FAMILY MEDICINE

## 2019-07-03 PROCEDURE — 4004F PT TOBACCO SCREEN RCVD TLK: CPT | Performed by: FAMILY MEDICINE

## 2019-07-03 PROCEDURE — 1123F ACP DISCUSS/DSCN MKR DOCD: CPT | Performed by: FAMILY MEDICINE

## 2019-07-03 PROCEDURE — G8598 ASA/ANTIPLAT THER USED: HCPCS | Performed by: FAMILY MEDICINE

## 2019-07-03 PROCEDURE — G8400 PT W/DXA NO RESULTS DOC: HCPCS | Performed by: FAMILY MEDICINE

## 2019-07-03 PROCEDURE — G8427 DOCREV CUR MEDS BY ELIG CLIN: HCPCS | Performed by: FAMILY MEDICINE

## 2019-07-03 PROCEDURE — 20610 DRAIN/INJ JOINT/BURSA W/O US: CPT | Performed by: FAMILY MEDICINE

## 2019-07-03 PROCEDURE — 99406 BEHAV CHNG SMOKING 3-10 MIN: CPT | Performed by: FAMILY MEDICINE

## 2019-07-03 PROCEDURE — G8417 CALC BMI ABV UP PARAM F/U: HCPCS | Performed by: FAMILY MEDICINE

## 2019-07-03 PROCEDURE — 99214 OFFICE O/P EST MOD 30 MIN: CPT | Performed by: FAMILY MEDICINE

## 2019-07-03 RX ORDER — CELECOXIB 200 MG/1
200 CAPSULE ORAL 2 TIMES DAILY
Qty: 60 CAPSULE | Refills: 0 | Status: SHIPPED | OUTPATIENT
Start: 2019-07-03 | End: 2019-08-15

## 2019-07-03 RX ORDER — POTASSIUM CHLORIDE 750 MG/1
TABLET, EXTENDED RELEASE ORAL
Qty: 180 TABLET | Refills: 3 | Status: SHIPPED | OUTPATIENT
Start: 2019-07-03 | End: 2020-01-01 | Stop reason: SDUPTHER

## 2019-07-03 RX ORDER — TRIAMCINOLONE ACETONIDE 40 MG/ML
40 INJECTION, SUSPENSION INTRA-ARTICULAR; INTRAMUSCULAR ONCE
Status: COMPLETED | OUTPATIENT
Start: 2019-07-03 | End: 2019-07-03

## 2019-07-03 RX ORDER — CETIRIZINE HYDROCHLORIDE 10 MG/1
10 TABLET ORAL DAILY
Qty: 90 TABLET | Refills: 3 | Status: SHIPPED | OUTPATIENT
Start: 2019-07-03 | End: 2020-01-01 | Stop reason: SDUPTHER

## 2019-07-03 RX ORDER — METOPROLOL SUCCINATE 50 MG/1
50 TABLET, EXTENDED RELEASE ORAL DAILY
Qty: 90 TABLET | Refills: 1 | Status: SHIPPED | OUTPATIENT
Start: 2019-07-03 | End: 2019-08-15

## 2019-07-03 RX ORDER — VENLAFAXINE HYDROCHLORIDE 150 MG/1
150 CAPSULE, EXTENDED RELEASE ORAL DAILY
Qty: 90 CAPSULE | Refills: 3 | Status: SHIPPED | OUTPATIENT
Start: 2019-07-03 | End: 2020-01-01 | Stop reason: ALTCHOICE

## 2019-07-03 RX ORDER — DIPHENOXYLATE HYDROCHLORIDE AND ATROPINE SULFATE 2.5; .025 MG/1; MG/1
1 TABLET ORAL 4 TIMES DAILY PRN
Qty: 120 TABLET | Refills: 1 | Status: SHIPPED | OUTPATIENT
Start: 2019-07-03 | End: 2019-08-15 | Stop reason: SDUPTHER

## 2019-07-03 RX ORDER — ASPIRIN 81 MG/1
81 TABLET ORAL DAILY
Qty: 90 TABLET | Refills: 3 | Status: SHIPPED | OUTPATIENT
Start: 2019-07-03 | End: 2020-01-01 | Stop reason: SDUPTHER

## 2019-07-03 RX ORDER — OMEPRAZOLE 20 MG/1
CAPSULE, DELAYED RELEASE ORAL
Qty: 90 CAPSULE | Refills: 3 | Status: SHIPPED | OUTPATIENT
Start: 2019-07-03 | End: 2020-01-01 | Stop reason: SDUPTHER

## 2019-07-03 RX ORDER — ARIPIPRAZOLE 5 MG/1
5 TABLET ORAL DAILY
Qty: 30 TABLET | Refills: 0 | Status: SHIPPED | OUTPATIENT
Start: 2019-07-03 | End: 2019-08-15

## 2019-07-03 RX ORDER — MONTELUKAST SODIUM 10 MG/1
10 TABLET ORAL NIGHTLY
Qty: 90 TABLET | Refills: 3 | Status: SHIPPED | OUTPATIENT
Start: 2019-07-03 | End: 2020-01-01 | Stop reason: SDUPTHER

## 2019-07-03 RX ORDER — LOSARTAN POTASSIUM 100 MG/1
100 TABLET ORAL DAILY
Qty: 90 TABLET | Refills: 3 | Status: SHIPPED | OUTPATIENT
Start: 2019-07-03 | End: 2020-01-01 | Stop reason: SDUPTHER

## 2019-07-03 RX ORDER — CELECOXIB 200 MG/1
200 CAPSULE ORAL 2 TIMES DAILY
Qty: 60 CAPSULE | Refills: 0 | Status: SHIPPED | OUTPATIENT
Start: 2019-07-03 | End: 2019-07-03 | Stop reason: SDUPTHER

## 2019-07-03 RX ORDER — ATORVASTATIN CALCIUM 40 MG/1
40 TABLET, FILM COATED ORAL DAILY
Qty: 90 TABLET | Refills: 3 | Status: SHIPPED | OUTPATIENT
Start: 2019-07-03 | End: 2020-01-01 | Stop reason: SDUPTHER

## 2019-07-03 RX ADMIN — TRIAMCINOLONE ACETONIDE 40 MG: 40 INJECTION, SUSPENSION INTRA-ARTICULAR; INTRAMUSCULAR at 15:50

## 2019-07-03 RX ADMIN — TRIAMCINOLONE ACETONIDE 40 MG: 40 INJECTION, SUSPENSION INTRA-ARTICULAR; INTRAMUSCULAR at 15:52

## 2019-07-03 NOTE — PROGRESS NOTES
Juanis Nava is a 70 y.o. female who presents today for   Chief Complaint   Patient presents with    Injections       HPI  Patient is here for Follow-up for chronic disease including history of lung disease as well as cerebrovascular disease. Denies any neurological changes. States breathing is about the same overall but she does note that she becomes more severely short of breath with activity and is wondering about possibly oxygen usage. Patient is noting some increased anxiety and depression. Her son was with her as of now. She also has history of some abnormalities when in regards to her thyroid testing previously. Patient typically is due for left SI joint injection but is also noting pain on the other side. The ejection used to help for about 3 months but now is about 1 month. Ongoing or bowel irritability. She continues use tobacco.  She states that she does get pleasure from it still. She no she needs to quit. No change in PMH, family, social, or surgical history unless mentioned above. Review of Systems   Constitutional: Positive for fatigue. Negative for chills and fever. Respiratory: Positive for cough and shortness of breath. Negative for chest tightness and wheezing. Cardiovascular: Negative for chest pain, palpitations and leg swelling. Gastrointestinal: Negative for abdominal pain, constipation, diarrhea, nausea and vomiting. Genitourinary: Negative for difficulty urinating, dysuria and frequency. Musculoskeletal: Positive for arthralgias, back pain and gait problem. Psychiatric/Behavioral: Positive for dysphoric mood. Negative for agitation, self-injury, sleep disturbance and suicidal ideas. The patient is nervous/anxious.            Past Medical History:   Diagnosis Date    Acid reflux     Allergic rhinitis     Hyperlipidemia     Hypertension     Irritable bowel syndrome with diarrhea 7/5/2019    Lung cancer (HCC)     Mild single current episode of major depressive Education        Eustachian Tube Problems: Care Instructions  Your Care Instructions    The eustachian (say \"you-STAY-shee-un\") tubes run between the inside of the ears and the throat. They keep air pressure stable in the ears. If your eustachian tubes become blocked, the air pressure in your ears changes. The fluids from a cold can clog eustachian tubes, causing pain in the ears. A quick change in air pressure can cause eustachian tubes to close up. This might happen when an airplane changes altitude or when a  goes up or down underwater. Eustachian tube problems often clear up on their own or after antibiotic treatment. If your tubes continue to be blocked, you may need surgery. Follow-up care is a key part of your treatment and safety. Be sure to make and go to all appointments, and call your doctor if you are having problems. It's also a good idea to know your test results and keep a list of the medicines you take. How can you care for yourself at home? · To ease ear pain, apply a warm washcloth or a heating pad set on low. There may be some drainage from the ear when the heat melts earwax. Put a cloth between the heat source and your skin. Do not use a heating pad with children. · If your doctor prescribed antibiotics, take them as directed. Do not stop taking them just because you feel better. You need to take the full course of antibiotics. · Your doctor may recommend over-the-counter medicine. Be safe with medicines. Oral or nasal decongestants may relieve ear pain. Avoid decongestants that are combined with antihistamines, which tend to cause more blockage. But if allergies seem to be the problem, your doctor may recommend a combination. Be careful with cough and cold medicines. Don't give them to children younger than 6, because they don't work for children that age and can even be harmful. For children 6 and older, always follow all the instructions carefully.  Make sure you know how much

## 2019-07-05 DIAGNOSIS — R94.6 ABNORMAL RESULTS OF THYROID FUNCTION STUDIES: ICD-10-CM

## 2019-07-05 DIAGNOSIS — J44.9 STAGE 3 SEVERE COPD BY GOLD CLASSIFICATION (HCC): Chronic | ICD-10-CM

## 2019-07-05 DIAGNOSIS — I63.81 BASAL GANGLIA INFARCTION (HCC): Chronic | ICD-10-CM

## 2019-07-05 PROBLEM — K58.0 IRRITABLE BOWEL SYNDROME WITH DIARRHEA: Status: ACTIVE | Noted: 2019-07-05

## 2019-07-05 PROBLEM — M46.1 INFLAMMATION OF RIGHT SACROILIAC JOINT (HCC): Status: ACTIVE | Noted: 2019-07-05

## 2019-07-05 PROBLEM — F17.210 CIGARETTE SMOKER: Status: ACTIVE | Noted: 2019-07-05

## 2019-07-05 PROBLEM — Z72.0 TOBACCO ABUSE: Status: RESOLVED | Noted: 2018-09-18 | Resolved: 2019-07-05

## 2019-07-05 LAB
ALBUMIN SERPL-MCNC: 4.2 G/DL (ref 3.5–5.2)
ALP BLD-CCNC: 83 U/L (ref 35–104)
ALT SERPL-CCNC: 14 U/L (ref 5–33)
ANION GAP SERPL CALCULATED.3IONS-SCNC: 13 MMOL/L (ref 7–19)
AST SERPL-CCNC: 17 U/L (ref 5–32)
BACTERIA: NORMAL /HPF
BILIRUB SERPL-MCNC: <0.2 MG/DL (ref 0.2–1.2)
BILIRUBIN URINE: NEGATIVE
BLOOD, URINE: ABNORMAL
BUN BLDV-MCNC: 27 MG/DL (ref 8–23)
CALCIUM SERPL-MCNC: 9.3 MG/DL (ref 8.8–10.2)
CHLORIDE BLD-SCNC: 102 MMOL/L (ref 98–111)
CHOLESTEROL, TOTAL: 183 MG/DL (ref 160–199)
CLARITY: CLEAR
CO2: 24 MMOL/L (ref 22–29)
COLOR: YELLOW
CREAT SERPL-MCNC: 0.9 MG/DL (ref 0.5–0.9)
EPITHELIAL CELLS, UA: 2 /HPF (ref 0–5)
GFR NON-AFRICAN AMERICAN: >60
GLUCOSE BLD-MCNC: 85 MG/DL (ref 74–109)
GLUCOSE URINE: NEGATIVE MG/DL
HCT VFR BLD CALC: 40.4 % (ref 37–47)
HDLC SERPL-MCNC: 61 MG/DL (ref 65–121)
HEMOGLOBIN: 12.5 G/DL (ref 12–16)
HYALINE CASTS: 0 /HPF (ref 0–8)
KETONES, URINE: NEGATIVE MG/DL
LDL CHOLESTEROL CALCULATED: 107 MG/DL
LEUKOCYTE ESTERASE, URINE: NEGATIVE
MCH RBC QN AUTO: 28.2 PG (ref 27–31)
MCHC RBC AUTO-ENTMCNC: 30.9 G/DL (ref 33–37)
MCV RBC AUTO: 91 FL (ref 81–99)
NITRITE, URINE: NEGATIVE
PDW BLD-RTO: 14.3 % (ref 11.5–14.5)
PH UA: 6.5 (ref 5–8)
PLATELET # BLD: 315 K/UL (ref 130–400)
PMV BLD AUTO: 10.7 FL (ref 9.4–12.3)
POTASSIUM SERPL-SCNC: 4.4 MMOL/L (ref 3.5–5)
PROTEIN UA: ABNORMAL MG/DL
RBC # BLD: 4.44 M/UL (ref 4.2–5.4)
RBC UA: 3 /HPF (ref 0–4)
SODIUM BLD-SCNC: 139 MMOL/L (ref 136–145)
SPECIFIC GRAVITY UA: 1.01 (ref 1–1.03)
TOTAL PROTEIN: 7.3 G/DL (ref 6.6–8.7)
TRIGL SERPL-MCNC: 77 MG/DL (ref 0–149)
UROBILINOGEN, URINE: 0.2 E.U./DL
WBC # BLD: 13.2 K/UL (ref 4.8–10.8)
WBC UA: 1 /HPF (ref 0–5)

## 2019-07-05 ASSESSMENT — ENCOUNTER SYMPTOMS
BACK PAIN: 1
NAUSEA: 0
CONSTIPATION: 0
VOMITING: 0
COUGH: 1
WHEEZING: 0
CHEST TIGHTNESS: 0
SHORTNESS OF BREATH: 1
ABDOMINAL PAIN: 0
DIARRHEA: 0

## 2019-07-06 LAB — TSH, 3RD GENERATION: 3.62 MU/L (ref 0.3–4)

## 2019-07-08 ENCOUNTER — TELEPHONE (OUTPATIENT)
Dept: PRIMARY CARE CLINIC | Age: 79
End: 2019-07-08

## 2019-07-08 NOTE — TELEPHONE ENCOUNTER
----- Message from Lizabeth Cobb MD sent at 7/5/2019  2:01 PM CDT -----  Please send letter to pt to let them know everything looked fine and no changes to be made at this time.

## 2019-08-03 ENCOUNTER — APPOINTMENT (OUTPATIENT)
Dept: GENERAL RADIOLOGY | Age: 79
DRG: 189 | End: 2019-08-03
Payer: MEDICARE

## 2019-08-03 ENCOUNTER — HOSPITAL ENCOUNTER (INPATIENT)
Age: 79
LOS: 4 days | Discharge: HOME HEALTH | DRG: 189 | End: 2019-08-07
Attending: EMERGENCY MEDICINE | Admitting: INTERNAL MEDICINE
Payer: MEDICARE

## 2019-08-03 DIAGNOSIS — J96.01 ACUTE RESPIRATORY FAILURE WITH HYPOXIA AND HYPERCAPNIA (HCC): Primary | ICD-10-CM

## 2019-08-03 DIAGNOSIS — J96.02 ACUTE RESPIRATORY FAILURE WITH HYPOXIA AND HYPERCAPNIA (HCC): Primary | ICD-10-CM

## 2019-08-03 DIAGNOSIS — J44.1 COPD WITH ACUTE EXACERBATION (HCC): ICD-10-CM

## 2019-08-03 DIAGNOSIS — R77.8 ELEVATED TROPONIN: ICD-10-CM

## 2019-08-03 PROBLEM — E87.20 LACTIC ACIDOSIS: Status: ACTIVE | Noted: 2019-08-03

## 2019-08-03 PROBLEM — R07.89 CHEST DISCOMFORT: Status: ACTIVE | Noted: 2019-08-03

## 2019-08-03 PROBLEM — R07.9 CHEST PAIN: Status: ACTIVE | Noted: 2019-08-03

## 2019-08-03 PROBLEM — D72.829 LEUKOCYTOSIS: Status: ACTIVE | Noted: 2019-08-03

## 2019-08-03 PROBLEM — J96.00 ACUTE RESPIRATORY FAILURE (HCC): Status: ACTIVE | Noted: 2019-08-03

## 2019-08-03 LAB
ALBUMIN SERPL-MCNC: 3.8 G/DL (ref 3.5–5.2)
ALP BLD-CCNC: 109 U/L (ref 35–104)
ALT SERPL-CCNC: 21 U/L (ref 5–33)
ANION GAP SERPL CALCULATED.3IONS-SCNC: 15 MMOL/L (ref 7–19)
AST SERPL-CCNC: 31 U/L (ref 5–32)
BASE EXCESS ARTERIAL: -8.3 MMOL/L (ref -2–2)
BASOPHILS ABSOLUTE: 0 K/UL (ref 0–0.2)
BASOPHILS RELATIVE PERCENT: 0.1 % (ref 0–1)
BILIRUB SERPL-MCNC: <0.2 MG/DL (ref 0.2–1.2)
BILIRUBIN URINE: NEGATIVE
BLOOD, URINE: ABNORMAL
BUN BLDV-MCNC: 24 MG/DL (ref 8–23)
CALCIUM SERPL-MCNC: 8.6 MG/DL (ref 8.8–10.2)
CARBOXYHEMOGLOBIN ARTERIAL: 4.9 % (ref 0–5)
CHLORIDE BLD-SCNC: 98 MMOL/L (ref 98–111)
CLARITY: CLEAR
CO2: 20 MMOL/L (ref 22–29)
COLOR: YELLOW
CREAT SERPL-MCNC: 1.2 MG/DL (ref 0.5–0.9)
EOSINOPHILS ABSOLUTE: 0.1 K/UL (ref 0–0.6)
EOSINOPHILS RELATIVE PERCENT: 0.4 % (ref 0–5)
GFR NON-AFRICAN AMERICAN: 44
GLUCOSE BLD-MCNC: 112 MG/DL (ref 70–99)
GLUCOSE BLD-MCNC: 114 MG/DL (ref 70–99)
GLUCOSE BLD-MCNC: 148 MG/DL (ref 70–99)
GLUCOSE BLD-MCNC: 155 MG/DL (ref 70–99)
GLUCOSE BLD-MCNC: 315 MG/DL (ref 74–109)
GLUCOSE URINE: NEGATIVE MG/DL
HBA1C MFR BLD: 6.2 % (ref 4–6)
HCO3 ARTERIAL: 19.6 MMOL/L (ref 22–26)
HCT VFR BLD CALC: 40.5 % (ref 37–47)
HEMOGLOBIN, ART, EXTENDED: 12.8 G/DL (ref 12–16)
HEMOGLOBIN: 12.4 G/DL (ref 12–16)
INR BLD: 1.11 (ref 0.88–1.18)
KETONES, URINE: NEGATIVE MG/DL
LACTIC ACID: 1.4 MMOL/L (ref 0.5–1.9)
LACTIC ACID: 1.7 MMOL/L (ref 0.5–1.9)
LACTIC ACID: 2.5 MMOL/L (ref 0.5–1.9)
LACTIC ACID: 3.5 MMOL/L (ref 0.5–1.9)
LEUKOCYTE ESTERASE, URINE: NEGATIVE
LV EF: 58 %
LVEF MODALITY: NORMAL
LYMPHOCYTES ABSOLUTE: 2.8 K/UL (ref 1.1–4.5)
LYMPHOCYTES RELATIVE PERCENT: 20.1 % (ref 20–40)
MAGNESIUM: 2 MG/DL (ref 1.6–2.4)
MCH RBC QN AUTO: 27.7 PG (ref 27–31)
MCHC RBC AUTO-ENTMCNC: 30.6 G/DL (ref 33–37)
MCV RBC AUTO: 90.6 FL (ref 81–99)
METHEMOGLOBIN ARTERIAL: 0.4 %
MONOCYTES ABSOLUTE: 0.9 K/UL (ref 0–0.9)
MONOCYTES RELATIVE PERCENT: 6.5 % (ref 0–10)
NEUTROPHILS ABSOLUTE: 9.9 K/UL (ref 1.5–7.5)
NEUTROPHILS RELATIVE PERCENT: 72.2 % (ref 50–65)
NITRITE, URINE: NEGATIVE
O2 CONTENT ARTERIAL: 17.1 ML/DL
O2 SAT, ARTERIAL: 93.6 %
O2 THERAPY: ABNORMAL
PCO2 ARTERIAL: 49 MMHG (ref 35–45)
PDW BLD-RTO: 14.1 % (ref 11.5–14.5)
PERFORMED ON: ABNORMAL
PH ARTERIAL: 7.21 (ref 7.35–7.45)
PH UA: 7 (ref 5–8)
PLATELET # BLD: 411 K/UL (ref 130–400)
PMV BLD AUTO: 9.5 FL (ref 9.4–12.3)
PO2 ARTERIAL: 130 MMHG (ref 80–100)
POTASSIUM SERPL-SCNC: 4.5 MMOL/L (ref 3.5–5)
POTASSIUM, WHOLE BLOOD: 3.8
PRO-BNP: 3242 PG/ML (ref 0–1800)
PROTEIN UA: ABNORMAL MG/DL
PROTHROMBIN TIME: 13.7 SEC (ref 12–14.6)
RBC # BLD: 4.47 M/UL (ref 4.2–5.4)
REASON FOR REJECTION: NORMAL
REJECTED TEST: NORMAL
SODIUM BLD-SCNC: 133 MMOL/L (ref 136–145)
SPECIFIC GRAVITY UA: 1.01 (ref 1–1.03)
TOTAL PROTEIN: 7.3 G/DL (ref 6.6–8.7)
TROPONIN: 0.23 NG/ML (ref 0–0.03)
TROPONIN: 0.24 NG/ML (ref 0–0.03)
TROPONIN: 0.26 NG/ML (ref 0–0.03)
TROPONIN: 0.26 NG/ML (ref 0–0.03)
URINE REFLEX TO CULTURE: ABNORMAL
UROBILINOGEN, URINE: 0.2 E.U./DL
WBC # BLD: 13.7 K/UL (ref 4.8–10.8)

## 2019-08-03 PROCEDURE — 6370000000 HC RX 637 (ALT 250 FOR IP): Performed by: HOSPITALIST

## 2019-08-03 PROCEDURE — 36415 COLL VENOUS BLD VENIPUNCTURE: CPT

## 2019-08-03 PROCEDURE — 84484 ASSAY OF TROPONIN QUANT: CPT

## 2019-08-03 PROCEDURE — 93005 ELECTROCARDIOGRAM TRACING: CPT

## 2019-08-03 PROCEDURE — 99291 CRITICAL CARE FIRST HOUR: CPT

## 2019-08-03 PROCEDURE — 99223 1ST HOSP IP/OBS HIGH 75: CPT | Performed by: INTERNAL MEDICINE

## 2019-08-03 PROCEDURE — 2140000000 HC CCU INTERMEDIATE R&B

## 2019-08-03 PROCEDURE — 85025 COMPLETE CBC W/AUTO DIFF WBC: CPT

## 2019-08-03 PROCEDURE — 87040 BLOOD CULTURE FOR BACTERIA: CPT

## 2019-08-03 PROCEDURE — 85610 PROTHROMBIN TIME: CPT

## 2019-08-03 PROCEDURE — 83735 ASSAY OF MAGNESIUM: CPT

## 2019-08-03 PROCEDURE — 83036 HEMOGLOBIN GLYCOSYLATED A1C: CPT

## 2019-08-03 PROCEDURE — 94640 AIRWAY INHALATION TREATMENT: CPT

## 2019-08-03 PROCEDURE — 99291 CRITICAL CARE FIRST HOUR: CPT | Performed by: EMERGENCY MEDICINE

## 2019-08-03 PROCEDURE — 6360000002 HC RX W HCPCS: Performed by: EMERGENCY MEDICINE

## 2019-08-03 PROCEDURE — 84132 ASSAY OF SERUM POTASSIUM: CPT

## 2019-08-03 PROCEDURE — 96375 TX/PRO/DX INJ NEW DRUG ADDON: CPT

## 2019-08-03 PROCEDURE — 36600 WITHDRAWAL OF ARTERIAL BLOOD: CPT

## 2019-08-03 PROCEDURE — 2580000003 HC RX 258: Performed by: INTERNAL MEDICINE

## 2019-08-03 PROCEDURE — 80053 COMPREHEN METABOLIC PANEL: CPT

## 2019-08-03 PROCEDURE — 71045 X-RAY EXAM CHEST 1 VIEW: CPT

## 2019-08-03 PROCEDURE — 82948 REAGENT STRIP/BLOOD GLUCOSE: CPT

## 2019-08-03 PROCEDURE — 6370000000 HC RX 637 (ALT 250 FOR IP): Performed by: INTERNAL MEDICINE

## 2019-08-03 PROCEDURE — 94660 CPAP INITIATION&MGMT: CPT

## 2019-08-03 PROCEDURE — 82803 BLOOD GASES ANY COMBINATION: CPT

## 2019-08-03 PROCEDURE — 83880 ASSAY OF NATRIURETIC PEPTIDE: CPT

## 2019-08-03 PROCEDURE — 81003 URINALYSIS AUTO W/O SCOPE: CPT

## 2019-08-03 PROCEDURE — 96365 THER/PROPH/DIAG IV INF INIT: CPT

## 2019-08-03 PROCEDURE — 2580000003 HC RX 258: Performed by: EMERGENCY MEDICINE

## 2019-08-03 PROCEDURE — 93306 TTE W/DOPPLER COMPLETE: CPT

## 2019-08-03 PROCEDURE — 6370000000 HC RX 637 (ALT 250 FOR IP): Performed by: EMERGENCY MEDICINE

## 2019-08-03 PROCEDURE — 83605 ASSAY OF LACTIC ACID: CPT

## 2019-08-03 PROCEDURE — 2700000000 HC OXYGEN THERAPY PER DAY

## 2019-08-03 PROCEDURE — 2580000003 HC RX 258: Performed by: HOSPITALIST

## 2019-08-03 PROCEDURE — 6360000002 HC RX W HCPCS: Performed by: HOSPITALIST

## 2019-08-03 RX ORDER — ALBUTEROL SULFATE 90 UG/1
2 AEROSOL, METERED RESPIRATORY (INHALATION) EVERY 6 HOURS PRN
Status: DISCONTINUED | OUTPATIENT
Start: 2019-08-03 | End: 2019-08-07 | Stop reason: HOSPADM

## 2019-08-03 RX ORDER — CHOLECALCIFEROL (VITAMIN D3) 125 MCG
5 CAPSULE ORAL NIGHTLY PRN
Status: DISCONTINUED | OUTPATIENT
Start: 2019-08-03 | End: 2019-08-03 | Stop reason: SDUPTHER

## 2019-08-03 RX ORDER — DIPHENOXYLATE HYDROCHLORIDE AND ATROPINE SULFATE 2.5; .025 MG/1; MG/1
1 TABLET ORAL 4 TIMES DAILY PRN
COMMUNITY
End: 2019-08-15

## 2019-08-03 RX ORDER — SODIUM CHLORIDE 0.9 % (FLUSH) 0.9 %
10 SYRINGE (ML) INJECTION PRN
Status: DISCONTINUED | OUTPATIENT
Start: 2019-08-03 | End: 2019-08-07 | Stop reason: HOSPADM

## 2019-08-03 RX ORDER — ACETAMINOPHEN 325 MG/1
650 TABLET ORAL EVERY 6 HOURS PRN
Status: DISCONTINUED | OUTPATIENT
Start: 2019-08-03 | End: 2019-08-07 | Stop reason: HOSPADM

## 2019-08-03 RX ORDER — IPRATROPIUM BROMIDE AND ALBUTEROL SULFATE 2.5; .5 MG/3ML; MG/3ML
1 SOLUTION RESPIRATORY (INHALATION) ONCE
Status: COMPLETED | OUTPATIENT
Start: 2019-08-03 | End: 2019-08-03

## 2019-08-03 RX ORDER — CETIRIZINE HYDROCHLORIDE 5 MG/1
5 TABLET ORAL DAILY
Status: DISCONTINUED | OUTPATIENT
Start: 2019-08-03 | End: 2019-08-04

## 2019-08-03 RX ORDER — SODIUM CHLORIDE 9 MG/ML
INJECTION, SOLUTION INTRAVENOUS CONTINUOUS
Status: DISCONTINUED | OUTPATIENT
Start: 2019-08-03 | End: 2019-08-04

## 2019-08-03 RX ORDER — DEXTROSE MONOHYDRATE 50 MG/ML
100 INJECTION, SOLUTION INTRAVENOUS PRN
Status: DISCONTINUED | OUTPATIENT
Start: 2019-08-03 | End: 2019-08-07 | Stop reason: HOSPADM

## 2019-08-03 RX ORDER — IPRATROPIUM BROMIDE AND ALBUTEROL SULFATE 2.5; .5 MG/3ML; MG/3ML
1 SOLUTION RESPIRATORY (INHALATION) 4 TIMES DAILY
Status: DISCONTINUED | OUTPATIENT
Start: 2019-08-03 | End: 2019-08-07 | Stop reason: HOSPADM

## 2019-08-03 RX ORDER — FLUTICASONE PROPIONATE 50 MCG
2 SPRAY, SUSPENSION (ML) NASAL DAILY
Status: DISCONTINUED | OUTPATIENT
Start: 2019-08-03 | End: 2019-08-07 | Stop reason: HOSPADM

## 2019-08-03 RX ORDER — ASPIRIN 81 MG/1
81 TABLET ORAL DAILY
Status: DISCONTINUED | OUTPATIENT
Start: 2019-08-03 | End: 2019-08-07 | Stop reason: HOSPADM

## 2019-08-03 RX ORDER — MONTELUKAST SODIUM 10 MG/1
10 TABLET ORAL NIGHTLY
Status: DISCONTINUED | OUTPATIENT
Start: 2019-08-03 | End: 2019-08-07 | Stop reason: HOSPADM

## 2019-08-03 RX ORDER — METHYLPREDNISOLONE SODIUM SUCCINATE 40 MG/ML
40 INJECTION, POWDER, LYOPHILIZED, FOR SOLUTION INTRAMUSCULAR; INTRAVENOUS EVERY 8 HOURS
Status: DISCONTINUED | OUTPATIENT
Start: 2019-08-03 | End: 2019-08-04

## 2019-08-03 RX ORDER — SODIUM CHLORIDE, SODIUM LACTATE, POTASSIUM CHLORIDE, CALCIUM CHLORIDE 600; 310; 30; 20 MG/100ML; MG/100ML; MG/100ML; MG/100ML
1000 INJECTION, SOLUTION INTRAVENOUS ONCE
Status: COMPLETED | OUTPATIENT
Start: 2019-08-03 | End: 2019-08-03

## 2019-08-03 RX ORDER — VENLAFAXINE HYDROCHLORIDE 75 MG/1
150 CAPSULE, EXTENDED RELEASE ORAL DAILY
Status: DISCONTINUED | OUTPATIENT
Start: 2019-08-03 | End: 2019-08-07 | Stop reason: HOSPADM

## 2019-08-03 RX ORDER — ASPIRIN 81 MG/1
162 TABLET, CHEWABLE ORAL ONCE
Status: COMPLETED | OUTPATIENT
Start: 2019-08-03 | End: 2019-08-03

## 2019-08-03 RX ORDER — METOPROLOL SUCCINATE 50 MG/1
50 TABLET, EXTENDED RELEASE ORAL DAILY
Status: DISCONTINUED | OUTPATIENT
Start: 2019-08-03 | End: 2019-08-07 | Stop reason: HOSPADM

## 2019-08-03 RX ORDER — DEXTROSE MONOHYDRATE 25 G/50ML
12.5 INJECTION, SOLUTION INTRAVENOUS PRN
Status: DISCONTINUED | OUTPATIENT
Start: 2019-08-03 | End: 2019-08-07 | Stop reason: HOSPADM

## 2019-08-03 RX ORDER — ATORVASTATIN CALCIUM 40 MG/1
40 TABLET, FILM COATED ORAL DAILY
Status: DISCONTINUED | OUTPATIENT
Start: 2019-08-03 | End: 2019-08-07 | Stop reason: HOSPADM

## 2019-08-03 RX ORDER — IPRATROPIUM BROMIDE AND ALBUTEROL SULFATE 2.5; .5 MG/3ML; MG/3ML
1 SOLUTION RESPIRATORY (INHALATION) EVERY 4 HOURS
Status: DISCONTINUED | OUTPATIENT
Start: 2019-08-03 | End: 2019-08-03

## 2019-08-03 RX ORDER — ARIPIPRAZOLE 5 MG/1
5 TABLET ORAL DAILY
Status: DISCONTINUED | OUTPATIENT
Start: 2019-08-03 | End: 2019-08-04

## 2019-08-03 RX ORDER — NICOTINE POLACRILEX 4 MG
15 LOZENGE BUCCAL PRN
Status: DISCONTINUED | OUTPATIENT
Start: 2019-08-03 | End: 2019-08-07 | Stop reason: HOSPADM

## 2019-08-03 RX ORDER — PANTOPRAZOLE SODIUM 40 MG/1
40 TABLET, DELAYED RELEASE ORAL
Status: DISCONTINUED | OUTPATIENT
Start: 2019-08-03 | End: 2019-08-07 | Stop reason: HOSPADM

## 2019-08-03 RX ORDER — SODIUM CHLORIDE 0.9 % (FLUSH) 0.9 %
10 SYRINGE (ML) INJECTION EVERY 12 HOURS SCHEDULED
Status: DISCONTINUED | OUTPATIENT
Start: 2019-08-03 | End: 2019-08-07 | Stop reason: HOSPADM

## 2019-08-03 RX ORDER — LOSARTAN POTASSIUM 100 MG/1
100 TABLET ORAL DAILY
Status: DISCONTINUED | OUTPATIENT
Start: 2019-08-03 | End: 2019-08-07 | Stop reason: HOSPADM

## 2019-08-03 RX ORDER — MAGNESIUM SULFATE 1 G/100ML
1 INJECTION INTRAVENOUS ONCE
Status: COMPLETED | OUTPATIENT
Start: 2019-08-03 | End: 2019-08-03

## 2019-08-03 RX ADMIN — VENLAFAXINE HYDROCHLORIDE 150 MG: 75 CAPSULE, EXTENDED RELEASE ORAL at 13:21

## 2019-08-03 RX ADMIN — ATORVASTATIN CALCIUM 40 MG: 40 TABLET, FILM COATED ORAL at 21:07

## 2019-08-03 RX ADMIN — Medication 500 MG: at 02:16

## 2019-08-03 RX ADMIN — IPRATROPIUM BROMIDE AND ALBUTEROL SULFATE 1 AMPULE: .5; 3 SOLUTION RESPIRATORY (INHALATION) at 01:49

## 2019-08-03 RX ADMIN — SODIUM CHLORIDE: 9 INJECTION, SOLUTION INTRAVENOUS at 06:25

## 2019-08-03 RX ADMIN — CEFTRIAXONE 1 G: 1 INJECTION, POWDER, FOR SOLUTION INTRAMUSCULAR; INTRAVENOUS at 02:16

## 2019-08-03 RX ADMIN — ENOXAPARIN SODIUM 30 MG: 30 INJECTION SUBCUTANEOUS at 10:28

## 2019-08-03 RX ADMIN — IPRATROPIUM BROMIDE AND ALBUTEROL SULFATE 1 AMPULE: .5; 3 SOLUTION RESPIRATORY (INHALATION) at 15:02

## 2019-08-03 RX ADMIN — Medication 10 ML: at 21:10

## 2019-08-03 RX ADMIN — Medication 10 ML: at 22:53

## 2019-08-03 RX ADMIN — ASPIRIN 81 MG 162 MG: 81 TABLET ORAL at 03:59

## 2019-08-03 RX ADMIN — SODIUM CHLORIDE, POTASSIUM CHLORIDE, SODIUM LACTATE AND CALCIUM CHLORIDE 1000 ML: 600; 310; 30; 20 INJECTION, SOLUTION INTRAVENOUS at 02:16

## 2019-08-03 RX ADMIN — IPRATROPIUM BROMIDE AND ALBUTEROL SULFATE 1 AMPULE: .5; 3 SOLUTION RESPIRATORY (INHALATION) at 10:31

## 2019-08-03 RX ADMIN — MONTELUKAST SODIUM 10 MG: 10 TABLET, FILM COATED ORAL at 21:08

## 2019-08-03 RX ADMIN — Medication 10 ML: at 06:25

## 2019-08-03 RX ADMIN — IPRATROPIUM BROMIDE AND ALBUTEROL SULFATE 1 AMPULE: .5; 3 SOLUTION RESPIRATORY (INHALATION) at 18:30

## 2019-08-03 RX ADMIN — ASPIRIN 81 MG: 81 TABLET ORAL at 21:08

## 2019-08-03 RX ADMIN — METHYLPREDNISOLONE SODIUM SUCCINATE 40 MG: 40 INJECTION, POWDER, FOR SOLUTION INTRAMUSCULAR; INTRAVENOUS at 21:10

## 2019-08-03 RX ADMIN — ARIPIPRAZOLE 5 MG: 5 TABLET ORAL at 13:21

## 2019-08-03 RX ADMIN — IPRATROPIUM BROMIDE AND ALBUTEROL SULFATE 3 ML: .5; 3 SOLUTION RESPIRATORY (INHALATION) at 06:58

## 2019-08-03 RX ADMIN — ACETAMINOPHEN 650 MG: 325 TABLET ORAL at 16:13

## 2019-08-03 RX ADMIN — AZITHROMYCIN DIHYDRATE 500 MG: 500 INJECTION, POWDER, LYOPHILIZED, FOR SOLUTION INTRAVENOUS at 22:53

## 2019-08-03 RX ADMIN — LOSARTAN POTASSIUM 100 MG: 100 TABLET ORAL at 21:09

## 2019-08-03 RX ADMIN — Medication 1 G: at 22:54

## 2019-08-03 RX ADMIN — FLUTICASONE PROPIONATE 2 SPRAY: 50 SPRAY, METERED NASAL at 10:31

## 2019-08-03 RX ADMIN — METOPROLOL SUCCINATE 50 MG: 50 TABLET, EXTENDED RELEASE ORAL at 10:29

## 2019-08-03 RX ADMIN — INSULIN LISPRO 1 UNITS: 100 INJECTION, SOLUTION INTRAVENOUS; SUBCUTANEOUS at 13:22

## 2019-08-03 RX ADMIN — PANTOPRAZOLE SODIUM 40 MG: 40 TABLET, DELAYED RELEASE ORAL at 06:25

## 2019-08-03 RX ADMIN — CETIRIZINE HYDROCHLORIDE 5 MG: 5 TABLET ORAL at 13:21

## 2019-08-03 RX ADMIN — Medication 5 MG: at 21:08

## 2019-08-03 RX ADMIN — MAGNESIUM SULFATE HEPTAHYDRATE 1 G: 1 INJECTION, SOLUTION INTRAVENOUS at 01:53

## 2019-08-03 ASSESSMENT — ENCOUNTER SYMPTOMS
SHORTNESS OF BREATH: 1
ABDOMINAL PAIN: 0

## 2019-08-03 ASSESSMENT — PAIN SCALES - GENERAL
PAINLEVEL_OUTOF10: 0
PAINLEVEL_OUTOF10: 5
PAINLEVEL_OUTOF10: 0
PAINLEVEL_OUTOF10: 0

## 2019-08-03 ASSESSMENT — PAIN DESCRIPTION - LOCATION: LOCATION: CHEST

## 2019-08-03 NOTE — H&P
tablet by mouth daily 7/3/19  Yes Bonnie Rahman MD   atorvastatin (LIPITOR) 40 MG tablet Take 1 tablet by mouth daily 7/3/19  Yes Bonnie Rahman MD   celecoxib (CELEBREX) 200 MG capsule Take 1 capsule by mouth 2 times daily 7/3/19  Yes Bonnie Rahman MD   losartan (COZAAR) 100 MG tablet Take 1 tablet by mouth daily 7/3/19  Yes Bonnie Rahman MD   montelukast (SINGULAIR) 10 MG tablet Take 1 tablet by mouth nightly 7/3/19  Yes Bonnie Rahman MD   omeprazole (PRILOSEC) 20 MG delayed release capsule TAKE 1 CAPSULE BY MOUTH DAILY 7/3/19  Yes Bonnie Rahman MD   potassium chloride (KLOR-CON M) 10 MEQ extended release tablet TAKE 2 TABLETS BY MOUTH EVERY DAY 7/3/19  Yes Bonnie Rahman MD   umeclidinium-vilanterol (ANORO ELLIPTA) 62.5-25 MCG/INH AEPB inhaler INHALE 1 PUFF INTO LUNGS DAILY. 7/3/19  Yes Bonnie Rahman MD   venlafaxine (EFFEXOR XR) 150 MG extended release capsule Take 1 capsule by mouth daily 7/3/19  Yes Bonnie Rahman MD   aspirin EC 81 MG EC tablet Take 1 tablet by mouth daily 7/3/19  Yes Bonnie Rahman MD   metoprolol succinate (TOPROL XL) 50 MG extended release tablet Take 1 tablet by mouth daily 7/3/19  Yes Bonnie Rahman MD   fluticasone [facility]) 50 MCG/ACT nasal spray 2 sprays by Nasal route daily 1/3/19  Yes DIANA Pryor   ipratropium-albuterol (DUONEB) 0.5-2.5 (3) MG/3ML SOLN nebulizer solution Inhale 3 mLs into the lungs every 4 hours 11/28/18  Yes Bonnie Rahman MD   albuterol sulfate HFA (PROAIR HFA) 108 (90 Base) MCG/ACT inhaler Inhale 2 puffs into the lungs every 6 hours as needed for Wheezing 6/6/18  Yes Bonnie Rahman MD   triamcinolone (KENALOG) 0.025 % cream Apply topically 2 times daily. 5/13/19   Wily R New, APRN   melatonin 5 MG TABS tablet Take 5 mg by mouth daily    Historical Provider, MD       Allergies:    Doxycycline; Naproxen; and Lactose intolerance (gi)    Social History:    Tobacco:   reports that she has been smoking cigarettes.  She started smoking about 51 years

## 2019-08-03 NOTE — ED PROVIDER NOTES
Hypertension     Irritable bowel syndrome with diarrhea 7/5/2019    Lung cancer ([email or web address]     Mild single current episode of major depressive disorder ([email or web address] 5/2/2018    Osteoarthritis     Other emphysema ([email or web address] 12/22/2017    Stage 3 severe COPD by GOLD classification ([email or web address] 6/6/2018    FEV1 42%    Tobacco abuse 2/8/2018    Urinary incontinence     stress incontinence         SURGICAL HISTORY       Past Surgical History:   Procedure Laterality Date    APPENDECTOMY      CARDIAC CATHETERIZATION  5/3/2013   MDL    EF 60%    HEMORRHOID SURGERY      HYSTERECTOMY      HYSTERECTOMY, TOTAL ABDOMINAL      LUNG CANCER SURGERY      TONSILLECTOMY           CURRENT MEDICATIONS       Previous Medications    ALBUTEROL SULFATE HFA (PROAIR HFA) 108 (90 BASE) MCG/ACT INHALER    Inhale 2 puffs into the lungs every 6 hours as needed for Wheezing    ARIPIPRAZOLE (ABILIFY) 5 MG TABLET    Take 1 tablet by mouth daily    ASPIRIN EC 81 MG EC TABLET    Take 1 tablet by mouth daily    ATORVASTATIN (LIPITOR) 40 MG TABLET    Take 1 tablet by mouth daily    CELECOXIB (CELEBREX) 200 MG CAPSULE    Take 1 capsule by mouth 2 times daily    CETIRIZINE (ZYRTEC) 10 MG TABLET    Take 1 tablet by mouth daily    FLUTICASONE (FLONASE) 50 MCG/ACT NASAL SPRAY    2 sprays by Nasal route daily    IPRATROPIUM-ALBUTEROL (DUONEB) 0.5-2.5 (3) MG/3ML SOLN NEBULIZER SOLUTION    Inhale 3 mLs into the lungs every 4 hours    LOSARTAN (COZAAR) 100 MG TABLET    Take 1 tablet by mouth daily    MELATONIN 5 MG TABS TABLET    Take 5 mg by mouth daily    METOPROLOL SUCCINATE (TOPROL XL) 50 MG EXTENDED RELEASE TABLET    Take 1 tablet by mouth daily    MONTELUKAST (SINGULAIR) 10 MG TABLET    Take 1 tablet by mouth nightly    OMEPRAZOLE (PRILOSEC) 20 MG DELAYED RELEASE CAPSULE    TAKE 1 CAPSULE BY MOUTH DAILY    POTASSIUM CHLORIDE (KLOR-CON M) 10 MEQ EXTENDED RELEASE TABLET    TAKE 2 TABLETS BY MOUTH EVERY DAY    TRIAMCINOLONE (KENALOG) 0.025 % CREAM    Apply topically 2 times daily. UMECLIDINIUM-VILANTEROL (ANORO ELLIPTA) 62.5-25 MCG/INH AEPB INHALER    INHALE 1 PUFF INTO LUNGS DAILY.     VENLAFAXINE (EFFEXOR XR) 150 MG EXTENDED RELEASE CAPSULE    Take 1 capsule by mouth daily       ALLERGIES     Doxycycline; Naproxen; and Lactose intolerance (gi)    FAMILY HISTORY       Family History   Problem Relation Age of Onset    High Blood Pressure Mother     High Blood Pressure Father     Diabetes Sister     High Blood Pressure Brother           SOCIAL HISTORY       Social History     Socioeconomic History    Marital status:      Spouse name: None    Number of children: None    Years of education: None    Highest education level: None   Occupational History    None   Social Needs    Financial resource strain: None    Food insecurity:     Worry: None     Inability: None    Transportation needs:     Medical: None     Non-medical: None   Tobacco Use    Smoking status: Current Every Day Smoker     Packs/day: 0.50     Years: 30.00     Pack years: 15.00     Types: Cigarettes     Start date: 6/6/1970    Smokeless tobacco: Never Used   Substance and Sexual Activity    Alcohol use: No    Drug use: No    Sexual activity: Yes     Partners: Male   Lifestyle    Physical activity:     Days per week: None     Minutes per session: None    Stress: None   Relationships    Social connections:     Talks on phone: None     Gets together: None     Attends Church service: None     Active member of club or organization: None     Attends meetings of clubs or organizations: None     Relationship status: None    Intimate partner violence:     Fear of current or ex partner: None     Emotionally abused: None     Physically abused: None     Forced sexual activity: None   Other Topics Concern    None   Social History Narrative    None       SCREENINGS             PHYSICAL EXAM    (up to 7 for level 4, 8 or more for level 5)     ED Triage Vitals [08/03/19 0133]   BP Temp Temp Source

## 2019-08-03 NOTE — ED NOTES
Bed: 02-A  Expected date:   Expected time:   Means of arrival:   Comments:  EMS: resp failure.       Edi Sierra RN  08/03/19 8764

## 2019-08-03 NOTE — ED NOTES
Patient placed in a gown  Patient placed on cardiac monitor, continuous pulse oximeter, and NIBP monitor. Monitor alarms on.          Edel Mitchell RN  08/03/19 3794

## 2019-08-04 ENCOUNTER — APPOINTMENT (OUTPATIENT)
Dept: GENERAL RADIOLOGY | Age: 79
DRG: 189 | End: 2019-08-04
Payer: MEDICARE

## 2019-08-04 LAB
ANION GAP SERPL CALCULATED.3IONS-SCNC: 16 MMOL/L (ref 7–19)
BASE EXCESS ARTERIAL: -10.7 MMOL/L (ref -2–2)
BASE EXCESS ARTERIAL: 0.7 MMOL/L (ref -2–2)
BASOPHILS ABSOLUTE: 0 K/UL (ref 0–0.2)
BASOPHILS RELATIVE PERCENT: 0.1 % (ref 0–1)
BUN BLDV-MCNC: 29 MG/DL (ref 8–23)
CALCIUM SERPL-MCNC: 9.1 MG/DL (ref 8.8–10.2)
CARBOXYHEMOGLOBIN ARTERIAL: 1.3 % (ref 0–5)
CARBOXYHEMOGLOBIN ARTERIAL: 1.9 % (ref 0–5)
CHLORIDE BLD-SCNC: 99 MMOL/L (ref 98–111)
CO2: 22 MMOL/L (ref 22–29)
CREAT SERPL-MCNC: 1.1 MG/DL (ref 0.5–0.9)
EOSINOPHILS ABSOLUTE: 0 K/UL (ref 0–0.6)
EOSINOPHILS RELATIVE PERCENT: 0 % (ref 0–5)
GFR NON-AFRICAN AMERICAN: 48
GLUCOSE BLD-MCNC: 113 MG/DL (ref 74–109)
GLUCOSE BLD-MCNC: 116 MG/DL (ref 70–99)
GLUCOSE BLD-MCNC: 146 MG/DL (ref 70–99)
GLUCOSE BLD-MCNC: 196 MG/DL (ref 70–99)
HCO3 ARTERIAL: 18.9 MMOL/L (ref 22–26)
HCO3 ARTERIAL: 25.4 MMOL/L (ref 22–26)
HCT VFR BLD CALC: 35.4 % (ref 37–47)
HEMOGLOBIN, ART, EXTENDED: 11.6 G/DL (ref 12–16)
HEMOGLOBIN, ART, EXTENDED: 11.8 G/DL (ref 12–16)
HEMOGLOBIN: 10.6 G/DL (ref 12–16)
LACTIC ACID: 3.7 MMOL/L (ref 0.5–1.9)
LYMPHOCYTES ABSOLUTE: 1.1 K/UL (ref 1.1–4.5)
LYMPHOCYTES RELATIVE PERCENT: 8.2 % (ref 20–40)
MCH RBC QN AUTO: 27.4 PG (ref 27–31)
MCHC RBC AUTO-ENTMCNC: 29.9 G/DL (ref 33–37)
MCV RBC AUTO: 91.5 FL (ref 81–99)
METHEMOGLOBIN ARTERIAL: 0.5 %
METHEMOGLOBIN ARTERIAL: 1 %
MONOCYTES ABSOLUTE: 0.6 K/UL (ref 0–0.9)
MONOCYTES RELATIVE PERCENT: 4.6 % (ref 0–10)
NEUTROPHILS ABSOLUTE: 11.9 K/UL (ref 1.5–7.5)
NEUTROPHILS RELATIVE PERCENT: 86.5 % (ref 50–65)
O2 CONTENT ARTERIAL: 15.2 ML/DL
O2 CONTENT ARTERIAL: 15.8 ML/DL
O2 SAT, ARTERIAL: 91.3 %
O2 SAT, ARTERIAL: 96.2 %
O2 THERAPY: ABNORMAL
O2 THERAPY: ABNORMAL
PCO2 ARTERIAL: 40 MMHG (ref 35–45)
PCO2 ARTERIAL: 58 MMHG (ref 35–45)
PDW BLD-RTO: 13.9 % (ref 11.5–14.5)
PERFORMED ON: ABNORMAL
PH ARTERIAL: 7.12 (ref 7.35–7.45)
PH ARTERIAL: 7.41 (ref 7.35–7.45)
PLATELET # BLD: 366 K/UL (ref 130–400)
PMV BLD AUTO: 9.8 FL (ref 9.4–12.3)
PO2 ARTERIAL: 107 MMHG (ref 80–100)
PO2 ARTERIAL: 74 MMHG (ref 80–100)
POTASSIUM REFLEX MAGNESIUM: 4.9 MMOL/L (ref 3.5–5)
POTASSIUM, WHOLE BLOOD: 3.6
POTASSIUM, WHOLE BLOOD: 4.7
PRO-BNP: 3669 PG/ML (ref 0–1800)
RBC # BLD: 3.87 M/UL (ref 4.2–5.4)
SODIUM BLD-SCNC: 137 MMOL/L (ref 136–145)
WBC # BLD: 13.8 K/UL (ref 4.8–10.8)

## 2019-08-04 PROCEDURE — 2100000000 HC CCU R&B

## 2019-08-04 PROCEDURE — 94640 AIRWAY INHALATION TREATMENT: CPT

## 2019-08-04 PROCEDURE — 6360000002 HC RX W HCPCS: Performed by: HOSPITALIST

## 2019-08-04 PROCEDURE — 2580000003 HC RX 258: Performed by: HOSPITALIST

## 2019-08-04 PROCEDURE — 36600 WITHDRAWAL OF ARTERIAL BLOOD: CPT

## 2019-08-04 PROCEDURE — 2580000003 HC RX 258: Performed by: INTERNAL MEDICINE

## 2019-08-04 PROCEDURE — 2700000000 HC OXYGEN THERAPY PER DAY

## 2019-08-04 PROCEDURE — 6360000002 HC RX W HCPCS: Performed by: INTERNAL MEDICINE

## 2019-08-04 PROCEDURE — 85025 COMPLETE CBC W/AUTO DIFF WBC: CPT

## 2019-08-04 PROCEDURE — 99232 SBSQ HOSP IP/OBS MODERATE 35: CPT | Performed by: INTERNAL MEDICINE

## 2019-08-04 PROCEDURE — 84132 ASSAY OF SERUM POTASSIUM: CPT

## 2019-08-04 PROCEDURE — 94660 CPAP INITIATION&MGMT: CPT

## 2019-08-04 PROCEDURE — 99233 SBSQ HOSP IP/OBS HIGH 50: CPT | Performed by: INTERNAL MEDICINE

## 2019-08-04 PROCEDURE — 6370000000 HC RX 637 (ALT 250 FOR IP): Performed by: HOSPITALIST

## 2019-08-04 PROCEDURE — 82948 REAGENT STRIP/BLOOD GLUCOSE: CPT

## 2019-08-04 PROCEDURE — 6370000000 HC RX 637 (ALT 250 FOR IP): Performed by: INTERNAL MEDICINE

## 2019-08-04 PROCEDURE — 83605 ASSAY OF LACTIC ACID: CPT

## 2019-08-04 PROCEDURE — 80048 BASIC METABOLIC PNL TOTAL CA: CPT

## 2019-08-04 PROCEDURE — 36415 COLL VENOUS BLD VENIPUNCTURE: CPT

## 2019-08-04 PROCEDURE — 82803 BLOOD GASES ANY COMBINATION: CPT

## 2019-08-04 PROCEDURE — 71045 X-RAY EXAM CHEST 1 VIEW: CPT

## 2019-08-04 PROCEDURE — 83880 ASSAY OF NATRIURETIC PEPTIDE: CPT

## 2019-08-04 RX ORDER — FUROSEMIDE 10 MG/ML
40 INJECTION INTRAMUSCULAR; INTRAVENOUS ONCE
Status: COMPLETED | OUTPATIENT
Start: 2019-08-04 | End: 2019-08-04

## 2019-08-04 RX ORDER — METHYLPREDNISOLONE SODIUM SUCCINATE 40 MG/ML
40 INJECTION, POWDER, LYOPHILIZED, FOR SOLUTION INTRAMUSCULAR; INTRAVENOUS EVERY 6 HOURS
Status: DISCONTINUED | OUTPATIENT
Start: 2019-08-04 | End: 2019-08-06

## 2019-08-04 RX ADMIN — FLUTICASONE PROPIONATE 2 SPRAY: 50 SPRAY, METERED NASAL at 12:44

## 2019-08-04 RX ADMIN — METHYLPREDNISOLONE SODIUM SUCCINATE 40 MG: 40 INJECTION, POWDER, FOR SOLUTION INTRAMUSCULAR; INTRAVENOUS at 09:52

## 2019-08-04 RX ADMIN — VENLAFAXINE HYDROCHLORIDE 150 MG: 75 CAPSULE, EXTENDED RELEASE ORAL at 09:50

## 2019-08-04 RX ADMIN — MONTELUKAST SODIUM 10 MG: 10 TABLET, FILM COATED ORAL at 20:19

## 2019-08-04 RX ADMIN — ACETAMINOPHEN 650 MG: 325 TABLET ORAL at 23:27

## 2019-08-04 RX ADMIN — Medication 10 ML: at 12:38

## 2019-08-04 RX ADMIN — IPRATROPIUM BROMIDE AND ALBUTEROL SULFATE 1 AMPULE: .5; 3 SOLUTION RESPIRATORY (INHALATION) at 10:32

## 2019-08-04 RX ADMIN — ATORVASTATIN CALCIUM 40 MG: 40 TABLET, FILM COATED ORAL at 20:19

## 2019-08-04 RX ADMIN — Medication 10 ML: at 04:31

## 2019-08-04 RX ADMIN — SODIUM CHLORIDE: 9 INJECTION, SOLUTION INTRAVENOUS at 02:06

## 2019-08-04 RX ADMIN — LOSARTAN POTASSIUM 100 MG: 100 TABLET ORAL at 20:19

## 2019-08-04 RX ADMIN — METOPROLOL SUCCINATE 50 MG: 50 TABLET, EXTENDED RELEASE ORAL at 09:51

## 2019-08-04 RX ADMIN — METHYLPREDNISOLONE SODIUM SUCCINATE 40 MG: 40 INJECTION, POWDER, FOR SOLUTION INTRAMUSCULAR; INTRAVENOUS at 22:18

## 2019-08-04 RX ADMIN — METHYLPREDNISOLONE SODIUM SUCCINATE 40 MG: 40 INJECTION, POWDER, FOR SOLUTION INTRAMUSCULAR; INTRAVENOUS at 04:31

## 2019-08-04 RX ADMIN — IPRATROPIUM BROMIDE AND ALBUTEROL SULFATE 1 AMPULE: .5; 3 SOLUTION RESPIRATORY (INHALATION) at 06:45

## 2019-08-04 RX ADMIN — IPRATROPIUM BROMIDE AND ALBUTEROL SULFATE 1 AMPULE: .5; 3 SOLUTION RESPIRATORY (INHALATION) at 18:33

## 2019-08-04 RX ADMIN — Medication 1 G: at 23:27

## 2019-08-04 RX ADMIN — METHYLPREDNISOLONE SODIUM SUCCINATE 40 MG: 40 INJECTION, POWDER, FOR SOLUTION INTRAMUSCULAR; INTRAVENOUS at 17:29

## 2019-08-04 RX ADMIN — PANTOPRAZOLE SODIUM 40 MG: 40 TABLET, DELAYED RELEASE ORAL at 09:51

## 2019-08-04 RX ADMIN — FUROSEMIDE 40 MG: 10 INJECTION, SOLUTION INTRAMUSCULAR; INTRAVENOUS at 07:25

## 2019-08-04 RX ADMIN — ALBUTEROL SULFATE 2 PUFF: 90 AEROSOL, METERED RESPIRATORY (INHALATION) at 02:11

## 2019-08-04 RX ADMIN — AZITHROMYCIN DIHYDRATE 500 MG: 500 INJECTION, POWDER, LYOPHILIZED, FOR SOLUTION INTRAVENOUS at 22:18

## 2019-08-04 RX ADMIN — ASPIRIN 81 MG: 81 TABLET ORAL at 20:19

## 2019-08-04 RX ADMIN — ENOXAPARIN SODIUM 40 MG: 40 INJECTION SUBCUTANEOUS at 09:52

## 2019-08-04 RX ADMIN — Medication 10 ML: at 20:19

## 2019-08-04 RX ADMIN — IPRATROPIUM BROMIDE AND ALBUTEROL SULFATE 1 AMPULE: .5; 3 SOLUTION RESPIRATORY (INHALATION) at 14:42

## 2019-08-04 ASSESSMENT — PAIN SCALES - GENERAL
PAINLEVEL_OUTOF10: 0
PAINLEVEL_OUTOF10: 5
PAINLEVEL_OUTOF10: 0

## 2019-08-04 NOTE — PROGRESS NOTES
normal, conjunctiva and lids are normal, ears and nose appear normal  NECK - no thyromegaly, no JVD, trachea is in the midline  CARDIOVASCULAR - PMI is in the left mid line clavicular position, Normal S1 and S2 with a grade 1/6 systolic murmur. No S3 or S4    PULMONARY - No respiratory distress. scattered wheezes and rales. Breath sounds in both  lung fields are Decreased  ABDOMEN  - soft, non tender, no rebound, no hepatomegaly or splenomegaly  MUSCULOSKELETAL  - Prone/Supine, digitals and nails are without clubbing or cyanosis  EXTREMITIES - trace edema  NEUROLOGIC - cranial nerves, II-XII, are normal  SKIN - turgor is normal, no rash  PSYCHIATRIC - normal mood and affect, alert and orientated x 3, judgement and insight appear appropriate      ASSESSMENT:    ALL THE CARDIOLOGY PROBLEMS ARE LISTED ABOVE; HOWEVER, THE FOLLOWING SPECIFIC CARDIAC PROBLEMS / CONDITIONS WERE ADDRESSED AND TREATED DURING THE OFFICE VISIT TODAY:                                                                                            MEDICAL DECISION MAKING                   Cardiac Specific Problem / Diagnosis   Discussion and Data Reviewed Diagnostic Procedures Ordered Management Options Selected                 1. Presenting problem / symptom     Dyspnea and chest discomfort  are improving    Perhaps representing myocardial ischemia    Now on bipap; lexiscan postponed Yes: Enrique Pacheco / cardiolyte, 08/04/19   Continue current medications:      Yes:                  2. Elevated troponin Initial presentation during this evaluation    Review and summation of old records:     4/9/2013  DSE negative for myocardial ischemia  5/3/2013  Cath  Mild CAD, normal LVFX  10/23/2017 echo  Normal LVFX  11/22/ - 11/29/2017  florencio CABALLERO          lexiscan / cardiolyte, 08/04/19   Continue current medications:     Yes:                  3.  Systemic arterial hypertension Initial presentation during this evaluation The blood pressure for the lastr 36 hours has been:  Systolic (78VJZ), HTN:961 , Min:107 , PATRICIA:483    Diastolic (59YGP), NZH:11, Min:54, Max:96       No Continue current medications:        yes         CONSIDERATIONS, THOUGHTS, AND PLANS:    1. Continue present medications except for changes as noted above  2. Continue to monitor rhythm  3. Further orders per clinical course. 4. lexiscan postponed due to increased respiratory effort, patient informed           Discussed with patient and nursing.     Electronically signed by Tiburcio Tong MD on 8/4/19        [facility] [place]

## 2019-08-04 NOTE — PROGRESS NOTES
mg  40 mg Intravenous Q6H Federico Lai MD        ARIPiprazole (ABILIFY) tablet 5 mg  5 mg Oral Daily Beth Puentes MD   5 mg at 08/03/19 1321    aspirin EC tablet 81 mg  81 mg Oral Daily Beth Puentes MD   81 mg at 08/03/19 2108    atorvastatin (LIPITOR) tablet 40 mg  40 mg Oral Daily Beth Puentes MD   40 mg at 08/03/19 2107    fluticasone (FLONASE) 50 MCG/ACT nasal spray 2 spray  2 spray Nasal Daily Beth Puentes MD   2 spray at 08/03/19 1031    losartan (COZAAR) tablet 100 mg  100 mg Oral Daily Beth Puentes MD   100 mg at 08/03/19 2109    metoprolol succinate (TOPROL XL) extended release tablet 50 mg  50 mg Oral Daily Beth Puentes MD   50 mg at 08/03/19 1029    montelukast (SINGULAIR) tablet 10 mg  10 mg Oral Nightly Beth Puentes MD   10 mg at 08/03/19 2108    pantoprazole (PROTONIX) tablet 40 mg  40 mg Oral QAM AC Beth Puentes MD   40 mg at 08/03/19 0625    venlafaxine (EFFEXOR XR) extended release capsule 150 mg  150 mg Oral Daily Beth Puentes MD   150 mg at 08/03/19 1321    sodium chloride flush 0.9 % injection 10 mL  10 mL Intravenous 2 times per day Beth Puentes MD   10 mL at 08/03/19 2110    sodium chloride flush 0.9 % injection 10 mL  10 mL Intravenous PRN Beth Puentes MD   10 mL at 08/04/19 0431    magnesium hydroxide (MILK OF MAGNESIA) 400 MG/5ML suspension 30 mL  30 mL Oral Daily PRN Beth Puentes MD        acetaminophen (TYLENOL) tablet 650 mg  650 mg Oral Q6H PRN Beth Puentes MD   650 mg at 08/03/19 1613    albuterol sulfate  (90 Base) MCG/ACT inhaler 2 puff  2 puff Inhalation Q6H PRN Em Townsend MD   2 puff at 08/04/19 0211    cetirizine (ZYRTEC) tablet 5 mg  5 mg Oral Daily Em Townsend MD   5 mg at 08/03/19 1321    ipratropium-albuterol (DUONEB) nebulizer solution 1 ampule  1 ampule Inhalation 4x daily Em Townsend MD   1 ampule at 08/04/19 0645    enoxaparin

## 2019-08-05 ENCOUNTER — APPOINTMENT (OUTPATIENT)
Dept: NUCLEAR MEDICINE | Age: 79
DRG: 189 | End: 2019-08-05
Payer: MEDICARE

## 2019-08-05 LAB
ALBUMIN SERPL-MCNC: 3.3 G/DL (ref 3.5–5.2)
ALP BLD-CCNC: 77 U/L (ref 35–104)
ALT SERPL-CCNC: 26 U/L (ref 5–33)
ANION GAP SERPL CALCULATED.3IONS-SCNC: 15 MMOL/L (ref 7–19)
AST SERPL-CCNC: 24 U/L (ref 5–32)
BASOPHILS ABSOLUTE: 0 K/UL (ref 0–0.2)
BASOPHILS RELATIVE PERCENT: 0.2 % (ref 0–1)
BILIRUB SERPL-MCNC: 0.4 MG/DL (ref 0.2–1.2)
BUN BLDV-MCNC: 31 MG/DL (ref 8–23)
CALCIUM SERPL-MCNC: 9 MG/DL (ref 8.8–10.2)
CHLORIDE BLD-SCNC: 96 MMOL/L (ref 98–111)
CO2: 24 MMOL/L (ref 22–29)
CREAT SERPL-MCNC: 0.8 MG/DL (ref 0.5–0.9)
EKG P AXIS: 65 DEGREES
EKG P AXIS: 66 DEGREES
EKG P-R INTERVAL: 172 MS
EKG P-R INTERVAL: 196 MS
EKG Q-T INTERVAL: 344 MS
EKG Q-T INTERVAL: 420 MS
EKG QRS DURATION: 68 MS
EKG QRS DURATION: 74 MS
EKG QTC CALCULATION (BAZETT): 424 MS
EKG QTC CALCULATION (BAZETT): 460 MS
EKG T AXIS: 60 DEGREES
EKG T AXIS: 72 DEGREES
EOSINOPHILS ABSOLUTE: 0 K/UL (ref 0–0.6)
EOSINOPHILS RELATIVE PERCENT: 0 % (ref 0–5)
GFR NON-AFRICAN AMERICAN: >60
GLUCOSE BLD-MCNC: 101 MG/DL (ref 70–99)
GLUCOSE BLD-MCNC: 119 MG/DL (ref 70–99)
GLUCOSE BLD-MCNC: 119 MG/DL (ref 74–109)
GLUCOSE BLD-MCNC: 135 MG/DL (ref 70–99)
GLUCOSE BLD-MCNC: 179 MG/DL (ref 70–99)
HCT VFR BLD CALC: 37.2 % (ref 37–47)
HEMOGLOBIN: 10.8 G/DL (ref 12–16)
LYMPHOCYTES ABSOLUTE: 1.7 K/UL (ref 1.1–4.5)
LYMPHOCYTES RELATIVE PERCENT: 8.9 % (ref 20–40)
MCH RBC QN AUTO: 27.3 PG (ref 27–31)
MCHC RBC AUTO-ENTMCNC: 29 G/DL (ref 33–37)
MCV RBC AUTO: 94.2 FL (ref 81–99)
MONOCYTES ABSOLUTE: 1 K/UL (ref 0–0.9)
MONOCYTES RELATIVE PERCENT: 4.9 % (ref 0–10)
NEUTROPHILS ABSOLUTE: 16.4 K/UL (ref 1.5–7.5)
NEUTROPHILS RELATIVE PERCENT: 85.5 % (ref 50–65)
PDW BLD-RTO: 14.1 % (ref 11.5–14.5)
PERFORMED ON: ABNORMAL
PLATELET # BLD: 326 K/UL (ref 130–400)
PMV BLD AUTO: 9.4 FL (ref 9.4–12.3)
POTASSIUM REFLEX MAGNESIUM: 4.4 MMOL/L (ref 3.5–5)
RBC # BLD: 3.95 M/UL (ref 4.2–5.4)
SODIUM BLD-SCNC: 135 MMOL/L (ref 136–145)
TOTAL PROTEIN: 6.8 G/DL (ref 6.6–8.7)
WBC # BLD: 19.2 K/UL (ref 4.8–10.8)

## 2019-08-05 PROCEDURE — 6360000002 HC RX W HCPCS: Performed by: INTERNAL MEDICINE

## 2019-08-05 PROCEDURE — 3430000000 HC RX DIAGNOSTIC RADIOPHARMACEUTICAL: Performed by: INTERNAL MEDICINE

## 2019-08-05 PROCEDURE — 82948 REAGENT STRIP/BLOOD GLUCOSE: CPT

## 2019-08-05 PROCEDURE — 6360000002 HC RX W HCPCS: Performed by: HOSPITALIST

## 2019-08-05 PROCEDURE — 99232 SBSQ HOSP IP/OBS MODERATE 35: CPT | Performed by: INTERNAL MEDICINE

## 2019-08-05 PROCEDURE — 2700000000 HC OXYGEN THERAPY PER DAY

## 2019-08-05 PROCEDURE — 36415 COLL VENOUS BLD VENIPUNCTURE: CPT

## 2019-08-05 PROCEDURE — 85025 COMPLETE CBC W/AUTO DIFF WBC: CPT

## 2019-08-05 PROCEDURE — 6370000000 HC RX 637 (ALT 250 FOR IP): Performed by: HOSPITALIST

## 2019-08-05 PROCEDURE — A9500 TC99M SESTAMIBI: HCPCS | Performed by: INTERNAL MEDICINE

## 2019-08-05 PROCEDURE — 2580000003 HC RX 258: Performed by: INTERNAL MEDICINE

## 2019-08-05 PROCEDURE — 2100000000 HC CCU R&B

## 2019-08-05 PROCEDURE — 2580000003 HC RX 258: Performed by: HOSPITALIST

## 2019-08-05 PROCEDURE — 94640 AIRWAY INHALATION TREATMENT: CPT

## 2019-08-05 PROCEDURE — 78452 HT MUSCLE IMAGE SPECT MULT: CPT

## 2019-08-05 PROCEDURE — 93017 CV STRESS TEST TRACING ONLY: CPT

## 2019-08-05 PROCEDURE — 6370000000 HC RX 637 (ALT 250 FOR IP): Performed by: INTERNAL MEDICINE

## 2019-08-05 PROCEDURE — 94660 CPAP INITIATION&MGMT: CPT

## 2019-08-05 PROCEDURE — 80053 COMPREHEN METABOLIC PANEL: CPT

## 2019-08-05 RX ADMIN — REGADENOSON 0.4 MG: 0.08 INJECTION, SOLUTION INTRAVENOUS at 10:17

## 2019-08-05 RX ADMIN — IPRATROPIUM BROMIDE AND ALBUTEROL SULFATE 1 AMPULE: .5; 3 SOLUTION RESPIRATORY (INHALATION) at 15:04

## 2019-08-05 RX ADMIN — LOSARTAN POTASSIUM 100 MG: 100 TABLET ORAL at 20:21

## 2019-08-05 RX ADMIN — MONTELUKAST SODIUM 10 MG: 10 TABLET, FILM COATED ORAL at 20:21

## 2019-08-05 RX ADMIN — IPRATROPIUM BROMIDE AND ALBUTEROL SULFATE 1 AMPULE: .5; 3 SOLUTION RESPIRATORY (INHALATION) at 06:44

## 2019-08-05 RX ADMIN — Medication 10 ML: at 08:02

## 2019-08-05 RX ADMIN — METHYLPREDNISOLONE SODIUM SUCCINATE 40 MG: 40 INJECTION, POWDER, FOR SOLUTION INTRAMUSCULAR; INTRAVENOUS at 23:30

## 2019-08-05 RX ADMIN — ATORVASTATIN CALCIUM 40 MG: 40 TABLET, FILM COATED ORAL at 20:20

## 2019-08-05 RX ADMIN — ASPIRIN 81 MG: 81 TABLET ORAL at 20:21

## 2019-08-05 RX ADMIN — METHYLPREDNISOLONE SODIUM SUCCINATE 40 MG: 40 INJECTION, POWDER, FOR SOLUTION INTRAMUSCULAR; INTRAVENOUS at 11:22

## 2019-08-05 RX ADMIN — METHYLPREDNISOLONE SODIUM SUCCINATE 40 MG: 40 INJECTION, POWDER, FOR SOLUTION INTRAMUSCULAR; INTRAVENOUS at 17:20

## 2019-08-05 RX ADMIN — TETRAKIS(2-METHOXYISOBUTYLISOCYANIDE)COPPER(I) TETRAFLUOROBORATE 10 MILLICURIE: 1 INJECTION, POWDER, LYOPHILIZED, FOR SOLUTION INTRAVENOUS at 11:34

## 2019-08-05 RX ADMIN — IPRATROPIUM BROMIDE AND ALBUTEROL SULFATE 1 AMPULE: .5; 3 SOLUTION RESPIRATORY (INHALATION) at 11:44

## 2019-08-05 RX ADMIN — Medication 10 ML: at 20:21

## 2019-08-05 RX ADMIN — METOPROLOL SUCCINATE 50 MG: 50 TABLET, EXTENDED RELEASE ORAL at 08:22

## 2019-08-05 RX ADMIN — VENLAFAXINE HYDROCHLORIDE 150 MG: 75 CAPSULE, EXTENDED RELEASE ORAL at 08:22

## 2019-08-05 RX ADMIN — ENOXAPARIN SODIUM 40 MG: 40 INJECTION SUBCUTANEOUS at 08:22

## 2019-08-05 RX ADMIN — PANTOPRAZOLE SODIUM 40 MG: 40 TABLET, DELAYED RELEASE ORAL at 05:40

## 2019-08-05 RX ADMIN — FLUTICASONE PROPIONATE 2 SPRAY: 50 SPRAY, METERED NASAL at 08:21

## 2019-08-05 RX ADMIN — IPRATROPIUM BROMIDE AND ALBUTEROL SULFATE 1 AMPULE: .5; 3 SOLUTION RESPIRATORY (INHALATION) at 18:24

## 2019-08-05 RX ADMIN — Medication 5 MG: at 20:20

## 2019-08-05 RX ADMIN — Medication 10 ML: at 11:22

## 2019-08-05 RX ADMIN — METHYLPREDNISOLONE SODIUM SUCCINATE 40 MG: 40 INJECTION, POWDER, FOR SOLUTION INTRAMUSCULAR; INTRAVENOUS at 05:41

## 2019-08-05 RX ADMIN — TETRAKIS(2-METHOXYISOBUTYLISOCYANIDE)COPPER(I) TETRAFLUOROBORATE 30 MILLICURIE: 1 INJECTION, POWDER, LYOPHILIZED, FOR SOLUTION INTRAVENOUS at 11:34

## 2019-08-05 RX ADMIN — AZITHROMYCIN DIHYDRATE 500 MG: 500 INJECTION, POWDER, LYOPHILIZED, FOR SOLUTION INTRAVENOUS at 23:30

## 2019-08-05 ASSESSMENT — ENCOUNTER SYMPTOMS
COUGH: 0
DIARRHEA: 0
CONSTIPATION: 0
SHORTNESS OF BREATH: 1
VOMITING: 0
BACK PAIN: 0
NAUSEA: 0

## 2019-08-05 ASSESSMENT — PAIN SCALES - GENERAL
PAINLEVEL_OUTOF10: 0
PAINLEVEL_OUTOF10: 0

## 2019-08-05 NOTE — PROGRESS NOTES
Hospitalist Progress Note    Patient:  Ramirez Kitchen  YOB: 1940  Date of Service: 8/5/2019  MRN: [id number]   Acct: [de-identified]   Primary Care Physician: Connor Pat MD  Advance Directive: Full Code  Admit Date: 8/3/2019       Hospital Day: 2  Referring Provider: Julienne Bamberger, DO    Patient Seen, Chart, Consults, Notes, Labs, Radiology studies reviewed. Subjective:  Ramirez Kitchen is a 70 y.o. female  whom we are following for chest pain and hypoxia. She underwent a Lexiscan this morning. The results are pending. She states that her breathing is significantly better than when she presented to the hospital.  She denies any chest pain at present. She did have a positive troponin on admission.     Allergies:  Doxycycline; Naproxen; and Lactose intolerance (gi)    Medicines:  Current Facility-Administered Medications   Medication Dose Route Frequency Provider Last Rate Last Dose    albuterol (PROVENTIL) nebulizer solution 2.5 mg  2.5 mg Nebulization Once Federico Lai MD        methylPREDNISolone sodium (SOLU-MEDROL) injection 40 mg  40 mg Intravenous Q6H Federico Lai MD   40 mg at 08/05/19 1122    enoxaparin (LOVENOX) injection 40 mg  40 mg Subcutaneous Daily Uma Bush MD   40 mg at 08/05/19 9423    aspirin EC tablet 81 mg  81 mg Oral Daily Beth Puentes MD   81 mg at 08/04/19 2019    atorvastatin (LIPITOR) tablet 40 mg  40 mg Oral Daily Beth Puentes MD   40 mg at 08/04/19 2019    fluticasone (FLONASE) 50 MCG/ACT nasal spray 2 spray  2 spray Nasal Daily Beth Puentes MD   2 spray at 08/05/19 0854    losartan (COZAAR) tablet 100 mg  100 mg Oral Daily Beth Puentes MD   100 mg at 08/04/19 2019    metoprolol succinate (TOPROL XL) extended release tablet 50 mg  50 mg Oral Daily Beth Puentes MD   50 mg at 08/05/19 2952    montelukast (SINGULAIR) tablet 10 mg  10 mg Oral Nightly Beth Puentes MD   10 mg at 08/04/19 2019    pantoprazole (PROTONIX) tablet 40 mg  40 mg Oral QAM AC Janeth Real MD   40 mg at 08/05/19 0540    venlafaxine (EFFEXOR XR) extended release capsule 150 mg  150 mg Oral Daily Janeth Real MD   150 mg at 08/05/19 9338    sodium chloride flush 0.9 % injection 10 mL  10 mL Intravenous 2 times per day Janeth Real MD   10 mL at 08/05/19 0802    sodium chloride flush 0.9 % injection 10 mL  10 mL Intravenous PRN Janeth Real MD   10 mL at 08/05/19 1122    magnesium hydroxide (MILK OF MAGNESIA) 400 MG/5ML suspension 30 mL  30 mL Oral Daily PRN Janeth Real MD        acetaminophen (TYLENOL) tablet 650 mg  650 mg Oral Q6H PRN Janeth Real MD   650 mg at 08/04/19 2327    albuterol sulfate  (90 Base) MCG/ACT inhaler 2 puff  2 puff Inhalation Q6H PRN Brenton Townsend MD   2 puff at 08/04/19 0211    ipratropium-albuterol (DUONEB) nebulizer solution 1 ampule  1 ampule Inhalation 4x daily Brenton Townsend MD   1 ampule at 08/05/19 1144    insulin lispro (HUMALOG) injection vial 0-6 Units  0-6 Units Subcutaneous TID WC Brenton Townsend MD   1 Units at 08/03/19 1322    insulin lispro (HUMALOG) injection vial 0-3 Units  0-3 Units Subcutaneous Nightly Brenton Townsend MD        glucose (GLUTOSE) 40 % oral gel 15 g  15 g Oral PRN Brenton Townsend MD        dextrose 50 % IV solution  12.5 g Intravenous PRN Brenton Townsend MD        glucagon (rDNA) injection 1 mg  1 mg Intramuscular PRN Brenton Townsend MD        dextrose 5 % solution  100 mL/hr Intravenous PRN Brenton Townsend MD        melatonin tablet 5 mg  5 mg Oral Nightly PRN Brenton Townsend MD   5 mg at 08/03/19 2108    regadenoson (LEXISCAN) injection 0.4 mg  0.4 mg Intravenous Once Cheikh Freedman MD        cefTRIAXone (ROCEPHIN) 1 g in sodium chloride (PF) 10 mL IV syringe  1 g Intravenous Q24H Brenton Townsend MD   1 g at 08/04/19 8422    And    azithromycin troponin. Plan:  Continue supportive care. Follow-up on the results of [place]. Further recommendations pending results.     Bon Bettencourt,

## 2019-08-06 ENCOUNTER — TELEPHONE (OUTPATIENT)
Dept: INTERNAL MEDICINE CLINIC | Age: 79
End: 2019-08-06

## 2019-08-06 PROBLEM — Z51.5 PALLIATIVE CARE PATIENT: Status: ACTIVE | Noted: 2019-08-06

## 2019-08-06 LAB
ALBUMIN SERPL-MCNC: 3.8 G/DL (ref 3.5–5.2)
ALP BLD-CCNC: 77 U/L (ref 35–104)
ALT SERPL-CCNC: 35 U/L (ref 5–33)
ANION GAP SERPL CALCULATED.3IONS-SCNC: 14 MMOL/L (ref 7–19)
AST SERPL-CCNC: 28 U/L (ref 5–32)
BASOPHILS ABSOLUTE: 0 K/UL (ref 0–0.2)
BASOPHILS RELATIVE PERCENT: 0.1 % (ref 0–1)
BILIRUB SERPL-MCNC: 0.3 MG/DL (ref 0.2–1.2)
BUN BLDV-MCNC: 34 MG/DL (ref 8–23)
CALCIUM SERPL-MCNC: 8.7 MG/DL (ref 8.8–10.2)
CHLORIDE BLD-SCNC: 92 MMOL/L (ref 98–111)
CO2: 26 MMOL/L (ref 22–29)
CREAT SERPL-MCNC: 1 MG/DL (ref 0.5–0.9)
EOSINOPHILS ABSOLUTE: 0 K/UL (ref 0–0.6)
EOSINOPHILS RELATIVE PERCENT: 0 % (ref 0–5)
GFR NON-AFRICAN AMERICAN: 54
GLUCOSE BLD-MCNC: 113 MG/DL (ref 70–99)
GLUCOSE BLD-MCNC: 128 MG/DL (ref 70–99)
GLUCOSE BLD-MCNC: 167 MG/DL (ref 74–109)
HCT VFR BLD CALC: 35 % (ref 37–47)
HEMOGLOBIN: 10.6 G/DL (ref 12–16)
LV EF: 52 %
LVEF MODALITY: NORMAL
LYMPHOCYTES ABSOLUTE: 1.5 K/UL (ref 1.1–4.5)
LYMPHOCYTES RELATIVE PERCENT: 8.4 % (ref 20–40)
MCH RBC QN AUTO: 27.1 PG (ref 27–31)
MCHC RBC AUTO-ENTMCNC: 30.3 G/DL (ref 33–37)
MCV RBC AUTO: 89.5 FL (ref 81–99)
MONOCYTES ABSOLUTE: 1.3 K/UL (ref 0–0.9)
MONOCYTES RELATIVE PERCENT: 7.5 % (ref 0–10)
NEUTROPHILS ABSOLUTE: 14.6 K/UL (ref 1.5–7.5)
NEUTROPHILS RELATIVE PERCENT: 82.6 % (ref 50–65)
PDW BLD-RTO: 14 % (ref 11.5–14.5)
PERFORMED ON: ABNORMAL
PERFORMED ON: ABNORMAL
PLATELET # BLD: 413 K/UL (ref 130–400)
PMV BLD AUTO: 9.6 FL (ref 9.4–12.3)
POTASSIUM REFLEX MAGNESIUM: 4.1 MMOL/L (ref 3.5–5)
RBC # BLD: 3.91 M/UL (ref 4.2–5.4)
SODIUM BLD-SCNC: 132 MMOL/L (ref 136–145)
TOTAL PROTEIN: 6.9 G/DL (ref 6.6–8.7)
WBC # BLD: 17.7 K/UL (ref 4.8–10.8)

## 2019-08-06 PROCEDURE — 85025 COMPLETE CBC W/AUTO DIFF WBC: CPT

## 2019-08-06 PROCEDURE — 94660 CPAP INITIATION&MGMT: CPT

## 2019-08-06 PROCEDURE — 82948 REAGENT STRIP/BLOOD GLUCOSE: CPT

## 2019-08-06 PROCEDURE — 6370000000 HC RX 637 (ALT 250 FOR IP): Performed by: HOSPITALIST

## 2019-08-06 PROCEDURE — 2100000000 HC CCU R&B

## 2019-08-06 PROCEDURE — 2580000003 HC RX 258: Performed by: HOSPITALIST

## 2019-08-06 PROCEDURE — 6370000000 HC RX 637 (ALT 250 FOR IP): Performed by: INTERNAL MEDICINE

## 2019-08-06 PROCEDURE — 94640 AIRWAY INHALATION TREATMENT: CPT

## 2019-08-06 PROCEDURE — 2580000003 HC RX 258: Performed by: INTERNAL MEDICINE

## 2019-08-06 PROCEDURE — 2700000000 HC OXYGEN THERAPY PER DAY

## 2019-08-06 PROCEDURE — 36415 COLL VENOUS BLD VENIPUNCTURE: CPT

## 2019-08-06 PROCEDURE — 80053 COMPREHEN METABOLIC PANEL: CPT

## 2019-08-06 PROCEDURE — 99231 SBSQ HOSP IP/OBS SF/LOW 25: CPT | Performed by: INTERNAL MEDICINE

## 2019-08-06 PROCEDURE — 6360000002 HC RX W HCPCS: Performed by: HOSPITALIST

## 2019-08-06 PROCEDURE — 6360000002 HC RX W HCPCS: Performed by: INTERNAL MEDICINE

## 2019-08-06 RX ORDER — ALPRAZOLAM 0.5 MG/1
0.5 TABLET ORAL 3 TIMES DAILY PRN
Status: DISCONTINUED | OUTPATIENT
Start: 2019-08-06 | End: 2019-08-07 | Stop reason: HOSPADM

## 2019-08-06 RX ORDER — DILTIAZEM HYDROCHLORIDE 120 MG/1
120 CAPSULE, COATED, EXTENDED RELEASE ORAL DAILY
Status: DISCONTINUED | OUTPATIENT
Start: 2019-08-06 | End: 2019-08-07 | Stop reason: HOSPADM

## 2019-08-06 RX ADMIN — IPRATROPIUM BROMIDE AND ALBUTEROL SULFATE 1 AMPULE: .5; 3 SOLUTION RESPIRATORY (INHALATION) at 18:18

## 2019-08-06 RX ADMIN — METHYLPREDNISOLONE SODIUM SUCCINATE 40 MG: 40 INJECTION, POWDER, FOR SOLUTION INTRAMUSCULAR; INTRAVENOUS at 09:22

## 2019-08-06 RX ADMIN — INSULIN LISPRO 1 UNITS: 100 INJECTION, SOLUTION INTRAVENOUS; SUBCUTANEOUS at 09:23

## 2019-08-06 RX ADMIN — ALPRAZOLAM 0.5 MG: 0.5 TABLET ORAL at 14:26

## 2019-08-06 RX ADMIN — IPRATROPIUM BROMIDE AND ALBUTEROL SULFATE 1 AMPULE: .5; 3 SOLUTION RESPIRATORY (INHALATION) at 10:29

## 2019-08-06 RX ADMIN — FLUTICASONE PROPIONATE 2 SPRAY: 50 SPRAY, METERED NASAL at 09:22

## 2019-08-06 RX ADMIN — METHYLPREDNISOLONE SODIUM SUCCINATE 40 MG: 40 INJECTION, POWDER, FOR SOLUTION INTRAMUSCULAR; INTRAVENOUS at 04:41

## 2019-08-06 RX ADMIN — Medication 10 ML: at 21:47

## 2019-08-06 RX ADMIN — AZITHROMYCIN DIHYDRATE 500 MG: 500 INJECTION, POWDER, LYOPHILIZED, FOR SOLUTION INTRAVENOUS at 23:15

## 2019-08-06 RX ADMIN — DILTIAZEM HYDROCHLORIDE 120 MG: 120 CAPSULE, COATED, EXTENDED RELEASE ORAL at 12:05

## 2019-08-06 RX ADMIN — VENLAFAXINE HYDROCHLORIDE 150 MG: 75 CAPSULE, EXTENDED RELEASE ORAL at 09:21

## 2019-08-06 RX ADMIN — ENOXAPARIN SODIUM 40 MG: 40 INJECTION SUBCUTANEOUS at 09:22

## 2019-08-06 RX ADMIN — PANTOPRAZOLE SODIUM 40 MG: 40 TABLET, DELAYED RELEASE ORAL at 09:21

## 2019-08-06 RX ADMIN — Medication 1 G: at 01:09

## 2019-08-06 RX ADMIN — Medication 5 MG: at 21:47

## 2019-08-06 RX ADMIN — METOPROLOL SUCCINATE 50 MG: 50 TABLET, EXTENDED RELEASE ORAL at 09:21

## 2019-08-06 RX ADMIN — LOSARTAN POTASSIUM 100 MG: 100 TABLET ORAL at 21:47

## 2019-08-06 RX ADMIN — ATORVASTATIN CALCIUM 40 MG: 40 TABLET, FILM COATED ORAL at 21:47

## 2019-08-06 RX ADMIN — IPRATROPIUM BROMIDE AND ALBUTEROL SULFATE 1 AMPULE: .5; 3 SOLUTION RESPIRATORY (INHALATION) at 06:28

## 2019-08-06 RX ADMIN — ASPIRIN 81 MG: 81 TABLET ORAL at 21:47

## 2019-08-06 RX ADMIN — MONTELUKAST SODIUM 10 MG: 10 TABLET, FILM COATED ORAL at 21:47

## 2019-08-06 RX ADMIN — Medication 10 ML: at 12:14

## 2019-08-06 RX ADMIN — IPRATROPIUM BROMIDE AND ALBUTEROL SULFATE 1 AMPULE: .5; 3 SOLUTION RESPIRATORY (INHALATION) at 14:24

## 2019-08-06 ASSESSMENT — PAIN SCALES - GENERAL
PAINLEVEL_OUTOF10: 0

## 2019-08-06 NOTE — CONSULTS
Palliative Care: Met with pt to initiate palliative care. Pt is focused on all her lines, BP cuff, monitor, O2. She is alert and oriented. Answers my questions, however, pt is restless throughout visit. Past Medical History:        Past Medical History:   Diagnosis Date    Acid reflux     Allergic rhinitis     Hyperlipidemia     Hypertension     Irritable bowel syndrome with diarrhea 7/5/2019    [facility]     Mild single [facility] of [facility] 5/2/2018    Osteoarthritis     Other [facility] 12/22/2017    Stage 3 severe COPD by GOLD classification ([facility] 6/6/2018    FEV1 42%    Tobacco abuse 2/8/2018    Urinary incontinence     stress incontinence       Advance Directives: Pt has POA, no living will but would like paperwork. This nurse will obtain copy and provide per pt request.     Pain/Other Symptoms:   Denies(restlessness noted by [facility] nurse). Activity:  As kush           Psychological/Spiritual:  Family and neighbor support. Does not attend S[employer] at this time but states she is of [employer]. Patient/Family Discussion:   Pleasant 70 yr old who presented to ED with SOA, elevated troponin, hypoxic and hypercapnia. Pt admits she is anxious about her breathing. She does not use O2 in the home. Pt tells me \"I know I need to do something about my panic because I can't just keep running to the hospital\". Discussed with pt about speaking with her PCP about something to help her with \"panic attacks\". She states she did try something once and felt it worked against her. Encouraged pt to talk with him again to find something that may work better for her. SHe states that she will. Pt lives at home. Her son lives with her. SHe tells me she has 2 dogs. Plan:  Continue supportive care      Patients goal, what they hope for:  Unable to assess at present time.       Palliative care will follow for support/goals of care        Electronically signed
TSH  UA:   Lab Results   Component Value Date    NITRITE NEG 10/07/2017    COLORU Yellow 2019    PHUR 7.0 2019    WBCUA 1 2019    RBCUA 3 2019    BACTERIA 4+ 2019    CLARITYU Clear 2019    SPECGRAV 1.015 2019    LEUKOCYTESUR Negative 2019    UROBILINOGEN 0.2 2019    BILIRUBINUR Negative 2019    BILIRUBINUR NEG 10/07/2017    BLOODU TRACE-INTACT 2019    GLUCOSEU Negative 2019             ALL THE CARDIOLOGY PROBLEMS ARE LISTED ABOVE; HOWEVER, THE FOLLOWING SPECIFIC CARDIAC PROBLEMS WERE ADDRESSED AND  TREATED DURING THE HOSPITAL     MEDICAL DECISION MAKING               Cardiac Specific Problem / Diagnosis  Discussion and Data Reviewed Diagnostic Procedures Ordered Management Options Selected           1. Presenting problem / symptom    Dyspnea and chest discomfort  are improving   Perhaps representing myocardial ischemia Yes: lexiscan / cardiolyte, 19   Continue current medications:     Yes:            2. Elevated troponin Initial presentation during this evaluation   Review and summation of old records:    2013  DSE negative for myocardial ischemia  5/3/2013  Cath  Mild CAD, normal LVFX  10/23/2017 echo  Normal LVFX   - 2017  zio NSR       lexiscan / cardiolyte, 19   Continue current medications:    Yes:            3. Systemic arterial hypertension Initial presentation during this evaluation Systolic (22YSL), ZDE:837 , Min:107 , VE    Diastolic (00VYK), RKF:06, Min:54, Max:96   No Continue current medications:       yes         DISCUSSION AND PLAN:      1. I had a detailed discussion with the patient and / or family regarding the historical points, physical examination findings, and any diagnostic results supporting the admission / consultation diagnosis. The patient was educated on care and need for admission. Questions were invited and answered.   The patient and / or family shows understanding of admission /

## 2019-08-06 NOTE — CARE COORDINATION
[address] Transitions Interview     2019    Patient: Tobi Espinoza Patient : 1940   MRN: [id number]  RARS: Readmission Risk Score: 24         Spoke with: Tobi Espinoza    Readmission Risk  Patient Active Problem List   Diagnosis    [facility]al [facility] infarction [facility]    Mild single current episode of major depressive disorder ([facility]    COPD (chronic obstructive pulmonary disease) ([facility]    Troponin level elevated    Inflammation of left sacroiliac joint (HCC)    Inflammation of right sacroiliac joint (HCC)    Cigarette smoker    Irritable bowel syndrome with diarrhea    Acute respiratory failure (HCC)    Lactic acidosis    Leukocytosis    Chest pain    Chest discomfort       Inpatient Assessment  Care Transitions Summary    Care Transitions Inpatient Review  Medication Review  Are you able to afford your medications?:  Yes  How often do you have difficulty taking your medications?:  I always take them as prescribed. Housing Review  Who do you live with?:  Child  Are you an active caregiver in your home?:  No  Social Support  Do you have a ?:  No  Do you have a [address] [place] [address]?:  No  Durable Medical Equipment  Patient DME:  Straight cane, Walker  Functional Review  Ability to seek help/take action for Emergent/Urgent situations i.e. fire, crime, inclement weather or health crisis. :  Independent  Ability handle personal hygiene needs (bathing/dressing/grooming): Independent  Ability to manage medications: Independent  Ability to prepare food:  Independent  Ability to maintain home (clean home, laundry): Independent  Ability to drive and/or has transportation:  Independent  Ability to do shopping:  Independent  Ability to manage finances:   Independent  Is patient able to live independently?:  Yes  Hearing and Vision  Visual Impairment:  Visual impairment (Glasses/contacts)  Hearing Impairment:  None  Care Transitions Interventions         Follow Up: Met at bedside with patient and discussed CTC process. Contact information given. Patient is agreeable with appropriate follow up as indicated after discharge. Patient reported that she lives at home alone, but her son is there now for a short time and will stay with her until she is more able to be by herself. She is fairly independent with most ADLs. She does need some help intermittently with household needs. She denied any need for DME, except a small shower chair. Informed her that this is not something that Medicare pays on. Did discuss options. She denied any issues with obtaining medications, supplies, etc.  No other needs noted at this time. Will follow along while here and after discharge as indicated. Future Appointments   Date Time Provider Jaylon Joseph   8/15/2019 12:00 PM [facility]   9/17/2019 10:00 AM DIANA Mccabe NP [facility]   11/27/2019 11:00 AM SCHEDULE, [facility]       Health Maintenance  There are no preventive care reminders to display for this patient.     Angie Stroud RN

## 2019-08-06 NOTE — TELEPHONE ENCOUNTER
[facility] has a referral on this pt from [facility]  ? Will Dr. Gage Lui follow for [facility] for this pt  ?

## 2019-08-06 NOTE — PROGRESS NOTES
07/05/2019     Priority: Low    Cigarette smoker 07/05/2019     Priority: Low    Irritable bowel syndrome with diarrhea 07/05/2019     Priority: Low    Inflammation of left sacroiliac joint ([facility] 12/18/2018     Priority: Low    Troponin level elevated      Priority: Low    COPD (chronic obstructive pulmonary disease) ([facility] 06/06/2018     Priority: Low    Mild single current episode of major depressive disorder ([facility] 05/02/2018     Priority: Low    Basal ganglia infarction ([facility] 10/14/2017     Priority: Low     Current Facility-Administered Medications   Medication Dose Route Frequency Provider Last Rate Last Dose    albuterol (PROVENTIL) nebulizer solution 2.5 mg  2.5 mg Nebulization Once Delicia Tamez MD        methylPREDNISolone sodium (SOLU-MEDROL) injection 40 mg  40 mg Intravenous Q6H Delicia Tamez MD   40 mg at 08/05/19 1720    enoxaparin (LOVENOX) injection 40 mg  40 mg Subcutaneous Daily Sammie Oneal MD   40 mg at 08/05/19 4000    aspirin EC tablet 81 mg  81 mg Oral Daily Charisma Nelson MD   81 mg at 08/05/19 2021    atorvastatin (LIPITOR) tablet 40 mg  40 mg Oral Daily Charisma Nelson MD   40 mg at 08/05/19 2020    fluticasone (FLONASE) 50 MCG/ACT nasal spray 2 spray  2 spray Nasal Daily Charisma Nelson MD   2 spray at 08/05/19 8379    losartan (COZAAR) tablet 100 mg  100 mg Oral Daily Charisma Nelson MD   100 mg at 08/05/19 2021    metoprolol succinate (TOPROL XL) extended release tablet 50 mg  50 mg Oral Daily Charisma Nelson MD   50 mg at 08/05/19 9035    montelukast (SINGULAIR) tablet 10 mg  10 mg Oral Nightly Charisma Nelson MD   10 mg at 08/05/19 2021    pantoprazole (PROTONIX) tablet 40 mg  40 mg Oral QAM AC Charisma Nelson MD   40 mg at 08/05/19 0540    venlafaxine (EFFEXOR XR) extended release capsule 150 mg  150 mg Oral Daily Charisma Nelson MD   150 mg at 08/05/19 0481    sodium chloride flush 0.9 % injection 10 mL  10 mL in this examination  HEENT -  PERRLA, Hearing appears normal, conjunctiva and lids are normal, ears and nose appear normal  NECK - no thyromegaly, no JVD, trachea is in the midline  CARDIOVASCULAR - PMI is in the left mid line clavicular position, Normal S1 and S2 with a grade 1/6 systolic murmur. No S3 or S4    PULMONARY - Yes: mild respiratory distress. scattered wheezes and rales. Breath sounds in both  lung fields are Decreased  ABDOMEN  - soft, non tender, no rebound, no hepatomegaly or splenomegaly  MUSCULOSKELETAL  - Prone/Supine, digitals and nails are without clubbing or cyanosis  EXTREMITIES - trace edema  NEUROLOGIC - cranial nerves, II-XII, are normal  SKIN - turgor is normal, no rash  PSYCHIATRIC - normal mood and affect, alert and orientated x 3, judgement and insight appear appropriate      ASSESSMENT:    ALL THE CARDIOLOGY PROBLEMS ARE LISTED ABOVE; HOWEVER, THE FOLLOWING SPECIFIC CARDIAC PROBLEMS / CONDITIONS WERE ADDRESSED AND TREATED DURING THE OFFICE VISIT TODAY:                                                                                            MEDICAL DECISION MAKING                   Cardiac Specific Problem / Diagnosis   Discussion and Data Reviewed Diagnostic Procedures Ordered Management Options Selected                 1. Presenting problem / symptom     Dyspnea and chest discomfort  are improving    Perhaps representing myocardial ischemia     lexiscan negative for ischemia Yes: lexiscan / cardiolyte, 08/04/19   Continue current medications:      Yes:                  2. Elevated troponin Initial presentation during this evaluation    Review and summation of old records:     4/9/2013  DSE negative for myocardial ischemia  5/3/2013  Cath  Mild CAD, normal LVFX  10/23/2017 echo  Normal LVFX  11/22/ - 11/29/2017  zio NSR          lexiscan / cardiolyte, 08/04/19   Continue current medications:     Yes:                  3.  Systemic arterial hypertension Initial presentation during

## 2019-08-06 NOTE — PROGRESS NOTES
[address]    Daily HOSPITAL Progress Note                            Date:  8/6/19  Patient: Juanis Nava  Admission:  8/3/2019  1:32 AM  Admit DX: Acute respiratory failure ([address] [J96.00]  Age:  70 y. o., 1940        Date of Admission 8/3/2019  1:32 AM   Hospital Length of Stay  LOS: 3 days            I personally saw the patient and rounded with:  Xavier Harding RN Nurse Navigator on 8/6/19      The observations documented in this note, including the assessment and plan are mine         Reason for initial evaluation or the patient's initial complaint    dyspnea      SUBJECTIVE:      Chief Complaint / Reason for the Visit   Follow up of:  dyspnea and elevated troponin and systemic arterial hypertension    Family present and in room during examination:  No      Specialty Problems        Cardiology Problems    Basal ganglia infarction [facility])              Current Status Today According to the patient:  \"ok\"    Subjective:  Ms. Juanis Nava is generally feeling unchanged. Anxious to go home    Ms. Juanis Nava has the following cardiac complaints / symptoms today:    1. dyspnea, seem predominately pulmonary to me    2. Elevated troponin, the luis antonio was negative for ischemia    3.  Hypertension    The blood pressure for the lastr 36 hours has been:  Systolic (76MJP), EYB:228 , Min:116 , YTP:137    Diastolic (24ZLS), MSH:65, Min:54, Max:125        uJanis Nava is a 70 y.o. female with the following history as recorded in EpicCare:    Patient Active Problem List    Diagnosis Date Noted    Chest discomfort 08/03/2019     Priority: High    Palliative care patient 08/06/2019     Priority: Low    Acute respiratory failure ([address] 08/03/2019     Priority: Low    Lactic acidosis 08/03/2019     Priority: Low    Leukocytosis 08/03/2019     Priority: Low    Chest pain 08/03/2019     Priority: Low    Inflammation of right sacroiliac joint ([address] 07/05/2019     Priority: Low    Cigarette smoker 07/05/2019 rhinitis     Hyperlipidemia     Hypertension     Irritable bowel syndrome with diarrhea 7/5/2019    Lung cancer (HCC)     Mild single current episode of major depressive disorder ([facility] 5/2/2018    Osteoarthritis     Other emphysema ([facility] 12/22/2017    Palliative care patient 08/06/2019    Stage 3 severe COPD by GOLD classification ([facility] 6/6/2018    FEV1 42%    Tobacco abuse 2/8/2018    Urinary incontinence     stress incontinence     Past Surgical History:   Procedure Laterality Date    APPENDECTOMY      CARDIAC CATHETERIZATION  5/3/2013   MDL    EF 60%    HEMORRHOID SURGERY      HYSTERECTOMY      HYSTERECTOMY, TOTAL ABDOMINAL      LUNG CANCER SURGERY      TONSILLECTOMY       Family History   Problem Relation Age of Onset    High Blood Pressure Mother     High Blood Pressure Father     Diabetes Sister     High Blood Pressure Brother      Social History     Tobacco Use    Smoking status: Current Every Day Smoker     Packs/day: 0.50     Years: 30.00     Pack years: 15.00     Types: Cigarettes     Start date: 6/6/1970    Smokeless tobacco: Never Used   Substance Use Topics    Alcohol use: No          Review of Systems:    General:      Complaint / Symptom Yes / No / Description if Yes       Fatigue Yes:  chronic   Weight gain NA   Insomnia NA       Respiratory:        Complaint / Symptom Yes / No / Description if Yes       Cough No   Horseness NA       Cardiovascular:    Complaint / Symptom Yes / No / Description if Yes       Chest Pain No   Shortness of Air / Orthopnea Yes: chronic and stable   Presyncope / Syncope No   Palpitations No         Objective:    BP (!) 178/87   Pulse 97   Temp 97 °F (36.1 °C) (Temporal)   Resp 24   Ht 5' 2\" (1.575 m)   Wt 142 lb 8 oz (64.6 kg)   SpO2 94%   BMI 26.06 kg/m² ,     Intake/Output Summary (Last 24 hours) at 8/6/2019 1402  Last data filed at 8/6/2019 1205  Gross per 24 hour   Intake 1330 ml   Output 1840 ml   Net -510 ml       GENERAL - well developed and well nourished, is an active participant in this examination  HEENT -  PERRLA, Hearing appears normal, conjunctiva and lids are normal, ears and nose appear normal  NECK - no thyromegaly, no JVD, trachea is in the midline  CARDIOVASCULAR - PMI is in the left mid line clavicular position, Normal S1 and S2 with a grade 1/6 systolic murmur. No S3 or S4    PULMONARY - No respiratory distress. scattered wheezes and rales. Breath sounds in both  lung fields are Decreased  ABDOMEN  - soft, non tender, no rebound, no hepatomegaly or splenomegaly  MUSCULOSKELETAL  - Prone/Supine, digitals and nails are without clubbing or cyanosis  EXTREMITIES - trace edema  NEUROLOGIC - cranial nerves, II-XII, are normal  SKIN - turgor is normal, no rash  PSYCHIATRIC - normal mood and affect, alert and orientated x 3, judgement and insight appear appropriate      ASSESSMENT:    ALL THE CARDIOLOGY PROBLEMS ARE LISTED ABOVE; HOWEVER, THE FOLLOWING SPECIFIC CARDIAC PROBLEMS / CONDITIONS WERE ADDRESSED AND TREATED DURING THE OFFICE VISIT TODAY:                                                                                            MEDICAL DECISION MAKING                   Cardiac Specific Problem / Diagnosis   Discussion and Data Reviewed Diagnostic Procedures Ordered Management Options Selected                 1. Presenting problem / symptom     Dyspnea and chest discomfort  are improving    Perhaps representing myocardial ischemia     lexiscan negative for ischemia Yes: lexiscan / cardiolyte, 08/04/19   Continue current medications:      Yes:                  2. Elevated troponin Initial presentation during this evaluation    Review and summation of old records:     4/9/2013  DSE negative for myocardial ischemia  5/3/2013  Cath  Mild CAD, normal LVFX  10/23/2017 echo  Normal LVFX  11/22/ - 11/29/2017  zio NSR          lexiscan / cardiolyte, 08/04/19   Continue current medications:     Yes:                  3.  Systemic

## 2019-08-07 VITALS
OXYGEN SATURATION: 95 % | SYSTOLIC BLOOD PRESSURE: 133 MMHG | DIASTOLIC BLOOD PRESSURE: 79 MMHG | HEART RATE: 69 BPM | RESPIRATION RATE: 21 BRPM | BODY MASS INDEX: 26.75 KG/M2 | WEIGHT: 145.38 LBS | HEIGHT: 62 IN | TEMPERATURE: 98.6 F

## 2019-08-07 PROBLEM — D72.829 LEUKOCYTOSIS: Status: RESOLVED | Noted: 2019-08-03 | Resolved: 2019-08-07

## 2019-08-07 PROBLEM — E87.20 LACTIC ACIDOSIS: Status: RESOLVED | Noted: 2019-08-03 | Resolved: 2019-08-07

## 2019-08-07 PROBLEM — J96.00 ACUTE RESPIRATORY FAILURE (HCC): Status: RESOLVED | Noted: 2019-08-03 | Resolved: 2019-08-07

## 2019-08-07 PROBLEM — R07.89 CHEST DISCOMFORT: Status: RESOLVED | Noted: 2019-08-03 | Resolved: 2019-08-07

## 2019-08-07 PROBLEM — R07.9 CHEST PAIN: Status: RESOLVED | Noted: 2019-08-03 | Resolved: 2019-08-07

## 2019-08-07 LAB
ALBUMIN SERPL-MCNC: 3.2 G/DL (ref 3.5–5.2)
ALP BLD-CCNC: 64 U/L (ref 35–104)
ALT SERPL-CCNC: 31 U/L (ref 5–33)
ANION GAP SERPL CALCULATED.3IONS-SCNC: 13 MMOL/L (ref 7–19)
AST SERPL-CCNC: 18 U/L (ref 5–32)
BASOPHILS ABSOLUTE: 0 K/UL (ref 0–0.2)
BASOPHILS RELATIVE PERCENT: 0.1 % (ref 0–1)
BILIRUB SERPL-MCNC: <0.2 MG/DL (ref 0.2–1.2)
BUN BLDV-MCNC: 40 MG/DL (ref 8–23)
CALCIUM SERPL-MCNC: 8.3 MG/DL (ref 8.8–10.2)
CHLORIDE BLD-SCNC: 93 MMOL/L (ref 98–111)
CO2: 23 MMOL/L (ref 22–29)
CREAT SERPL-MCNC: 1.2 MG/DL (ref 0.5–0.9)
EOSINOPHILS ABSOLUTE: 0 K/UL (ref 0–0.6)
EOSINOPHILS RELATIVE PERCENT: 0.1 % (ref 0–5)
GFR NON-AFRICAN AMERICAN: 44
GLUCOSE BLD-MCNC: 111 MG/DL (ref 70–99)
GLUCOSE BLD-MCNC: 124 MG/DL (ref 70–99)
GLUCOSE BLD-MCNC: 229 MG/DL (ref 74–109)
HCT VFR BLD CALC: 30.5 % (ref 37–47)
HEMOGLOBIN: 9.5 G/DL (ref 12–16)
LYMPHOCYTES ABSOLUTE: 1.6 K/UL (ref 1.1–4.5)
LYMPHOCYTES RELATIVE PERCENT: 11.7 % (ref 20–40)
MCH RBC QN AUTO: 27.8 PG (ref 27–31)
MCHC RBC AUTO-ENTMCNC: 31.1 G/DL (ref 33–37)
MCV RBC AUTO: 89.2 FL (ref 81–99)
MONOCYTES ABSOLUTE: 1 K/UL (ref 0–0.9)
MONOCYTES RELATIVE PERCENT: 7.2 % (ref 0–10)
NEUTROPHILS ABSOLUTE: 11.1 K/UL (ref 1.5–7.5)
NEUTROPHILS RELATIVE PERCENT: 78.5 % (ref 50–65)
PDW BLD-RTO: 13.9 % (ref 11.5–14.5)
PERFORMED ON: ABNORMAL
PERFORMED ON: ABNORMAL
PLATELET # BLD: 353 K/UL (ref 130–400)
PMV BLD AUTO: 9.6 FL (ref 9.4–12.3)
POTASSIUM REFLEX MAGNESIUM: 4.9 MMOL/L (ref 3.5–5)
RBC # BLD: 3.42 M/UL (ref 4.2–5.4)
SODIUM BLD-SCNC: 129 MMOL/L (ref 136–145)
TOTAL PROTEIN: 5.7 G/DL (ref 6.6–8.7)
WBC # BLD: 14.1 K/UL (ref 4.8–10.8)

## 2019-08-07 PROCEDURE — 2580000003 HC RX 258: Performed by: HOSPITALIST

## 2019-08-07 PROCEDURE — 6370000000 HC RX 637 (ALT 250 FOR IP): Performed by: INTERNAL MEDICINE

## 2019-08-07 PROCEDURE — 6370000000 HC RX 637 (ALT 250 FOR IP): Performed by: HOSPITALIST

## 2019-08-07 PROCEDURE — 2580000003 HC RX 258: Performed by: INTERNAL MEDICINE

## 2019-08-07 PROCEDURE — 6360000002 HC RX W HCPCS: Performed by: HOSPITALIST

## 2019-08-07 PROCEDURE — 85025 COMPLETE CBC W/AUTO DIFF WBC: CPT

## 2019-08-07 PROCEDURE — 36415 COLL VENOUS BLD VENIPUNCTURE: CPT

## 2019-08-07 PROCEDURE — 80053 COMPREHEN METABOLIC PANEL: CPT

## 2019-08-07 PROCEDURE — 94640 AIRWAY INHALATION TREATMENT: CPT

## 2019-08-07 PROCEDURE — 99239 HOSP IP/OBS DSCHRG MGMT >30: CPT | Performed by: INTERNAL MEDICINE

## 2019-08-07 PROCEDURE — 82948 REAGENT STRIP/BLOOD GLUCOSE: CPT

## 2019-08-07 RX ORDER — DILTIAZEM HYDROCHLORIDE 120 MG/1
120 CAPSULE, COATED, EXTENDED RELEASE ORAL DAILY
Qty: 30 CAPSULE | Refills: 3 | Status: SHIPPED | OUTPATIENT
Start: 2019-08-08 | End: 2019-08-15

## 2019-08-07 RX ADMIN — VENLAFAXINE HYDROCHLORIDE 150 MG: 75 CAPSULE, EXTENDED RELEASE ORAL at 08:13

## 2019-08-07 RX ADMIN — METOPROLOL SUCCINATE 50 MG: 50 TABLET, EXTENDED RELEASE ORAL at 08:13

## 2019-08-07 RX ADMIN — IPRATROPIUM BROMIDE AND ALBUTEROL SULFATE 1 AMPULE: .5; 3 SOLUTION RESPIRATORY (INHALATION) at 10:30

## 2019-08-07 RX ADMIN — IPRATROPIUM BROMIDE AND ALBUTEROL SULFATE 1 AMPULE: .5; 3 SOLUTION RESPIRATORY (INHALATION) at 06:34

## 2019-08-07 RX ADMIN — ALPRAZOLAM 0.5 MG: 0.5 TABLET ORAL at 00:31

## 2019-08-07 RX ADMIN — FLUTICASONE PROPIONATE 2 SPRAY: 50 SPRAY, METERED NASAL at 08:13

## 2019-08-07 RX ADMIN — Medication 10 ML: at 08:13

## 2019-08-07 RX ADMIN — DILTIAZEM HYDROCHLORIDE 120 MG: 120 CAPSULE, COATED, EXTENDED RELEASE ORAL at 08:13

## 2019-08-07 RX ADMIN — PANTOPRAZOLE SODIUM 40 MG: 40 TABLET, DELAYED RELEASE ORAL at 06:01

## 2019-08-07 RX ADMIN — Medication 1 G: at 01:32

## 2019-08-07 ASSESSMENT — PAIN SCALES - GENERAL
PAINLEVEL_OUTOF10: 0

## 2019-08-07 NOTE — PROGRESS NOTES
Herminio Anderson transferred to [phone] from [id number] via wheelchair. Reason for transfer: Lower level of care   Explained reason for transfer to Patient. Belongings: Dentures lower with patient at bedside . Soft chart transferred with patient: Yes. Telemetry box number [id number] transferred with patient: yes. Report given to: Torrie Borges, via telephone.       Electronically signed by Ziggy Perez RN on 8/7/2019 at 5:50 AM

## 2019-08-07 NOTE — PROGRESS NOTES
Patient's O2 sat before ambulating 93% on room air. During ambulation of 250 feet patient's O2 sat dropped to 88%. Upon entering room and sitting back down on bed patient's O2 sat returned to 90 % on room air. Patient was asymptomatic and was without complaints during ambulation.

## 2019-08-08 ENCOUNTER — CARE COORDINATION (OUTPATIENT)
Dept: CASE MANAGEMENT | Age: 79
End: 2019-08-08

## 2019-08-08 LAB
BLOOD CULTURE, ROUTINE: NORMAL
CULTURE, BLOOD 2: NORMAL

## 2019-08-09 ENCOUNTER — CARE COORDINATION (OUTPATIENT)
Dept: CASE MANAGEMENT | Age: 79
End: 2019-08-09

## 2019-08-09 ENCOUNTER — TELEPHONE (OUTPATIENT)
Dept: PRIMARY CARE CLINIC | Age: 79
End: 2019-08-09

## 2019-08-09 DIAGNOSIS — J96.01 ACUTE RESPIRATORY FAILURE WITH HYPOXIA AND HYPERCAPNIA (HCC): Primary | ICD-10-CM

## 2019-08-09 DIAGNOSIS — F32.A ANXIETY AND DEPRESSION: Primary | ICD-10-CM

## 2019-08-09 DIAGNOSIS — J96.02 ACUTE RESPIRATORY FAILURE WITH HYPOXIA AND HYPERCAPNIA (HCC): Primary | ICD-10-CM

## 2019-08-09 DIAGNOSIS — F41.9 ANXIETY AND DEPRESSION: Primary | ICD-10-CM

## 2019-08-09 RX ORDER — LORAZEPAM 0.5 MG/1
0.5 TABLET ORAL EVERY 8 HOURS PRN
Qty: 30 TABLET | Refills: 0 | Status: SHIPPED | OUTPATIENT
Start: 2019-08-09 | End: 2020-01-01 | Stop reason: SDUPTHER

## 2019-08-12 ENCOUNTER — CARE COORDINATION (OUTPATIENT)
Dept: CASE MANAGEMENT | Age: 79
End: 2019-08-12

## 2019-08-13 ENCOUNTER — CARE COORDINATION (OUTPATIENT)
Dept: CASE MANAGEMENT | Age: 79
End: 2019-08-13

## 2019-08-14 ENCOUNTER — CARE COORDINATION (OUTPATIENT)
Dept: CASE MANAGEMENT | Age: 79
End: 2019-08-14

## 2019-08-15 ENCOUNTER — OFFICE VISIT (OUTPATIENT)
Dept: PRIMARY CARE CLINIC | Age: 79
End: 2019-08-15
Payer: MEDICARE

## 2019-08-15 VITALS
DIASTOLIC BLOOD PRESSURE: 80 MMHG | WEIGHT: 151 LBS | OXYGEN SATURATION: 95 % | SYSTOLIC BLOOD PRESSURE: 136 MMHG | BODY MASS INDEX: 27.62 KG/M2 | HEART RATE: 72 BPM | TEMPERATURE: 98.2 F

## 2019-08-15 DIAGNOSIS — Z01.89 ROUTINE LAB DRAW: Primary | ICD-10-CM

## 2019-08-15 DIAGNOSIS — G47.33 OSA AND COPD OVERLAP SYNDROME (HCC): ICD-10-CM

## 2019-08-15 DIAGNOSIS — I27.23 PULMONARY HYPERTENSION DUE TO COPD (HCC): ICD-10-CM

## 2019-08-15 DIAGNOSIS — F41.0 PANIC DISORDER: ICD-10-CM

## 2019-08-15 DIAGNOSIS — J44.9 PULMONARY HYPERTENSION DUE TO COPD (HCC): ICD-10-CM

## 2019-08-15 DIAGNOSIS — K58.0 IRRITABLE BOWEL SYNDROME WITH DIARRHEA: ICD-10-CM

## 2019-08-15 DIAGNOSIS — J44.9 OSA AND COPD OVERLAP SYNDROME (HCC): ICD-10-CM

## 2019-08-15 DIAGNOSIS — Z09 HOSPITAL DISCHARGE FOLLOW-UP: Primary | ICD-10-CM

## 2019-08-15 DIAGNOSIS — G47.33 OSA (OBSTRUCTIVE SLEEP APNEA): ICD-10-CM

## 2019-08-15 PROCEDURE — 99495 TRANSJ CARE MGMT MOD F2F 14D: CPT | Performed by: FAMILY MEDICINE

## 2019-08-15 RX ORDER — FUROSEMIDE 20 MG/1
20 TABLET ORAL DAILY
Qty: 90 TABLET | Refills: 1 | Status: SHIPPED | OUTPATIENT
Start: 2019-08-15 | End: 2019-12-05

## 2019-08-15 RX ORDER — DIPHENOXYLATE HYDROCHLORIDE AND ATROPINE SULFATE 2.5; .025 MG/1; MG/1
1 TABLET ORAL 4 TIMES DAILY PRN
Qty: 120 TABLET | Refills: 1 | Status: SHIPPED | OUTPATIENT
Start: 2019-08-15 | End: 2019-09-14

## 2019-08-15 RX ORDER — CEPHALEXIN 500 MG/1
500 CAPSULE ORAL 2 TIMES DAILY
Qty: 14 CAPSULE | Refills: 0 | Status: SHIPPED | OUTPATIENT
Start: 2019-08-15 | End: 2019-08-22

## 2019-08-15 RX ORDER — PROPRANOLOL HYDROCHLORIDE 40 MG/1
40 TABLET ORAL 3 TIMES DAILY
Qty: 270 TABLET | Refills: 3 | Status: SHIPPED | OUTPATIENT
Start: 2019-08-15 | End: 2019-12-06

## 2019-08-15 NOTE — PROGRESS NOTES
tablet 1    UNABLE TO FIND Indications: CBD Oil in a Vap Pen       propranolol (INDERAL) 40 MG tablet Take 1 tablet by mouth 3 times daily Take one tablet po  tablet 3    cephALEXin (KEFLEX) 500 MG capsule Take 1 capsule by mouth 2 times daily for 7 days 14 capsule 0    furosemide (LASIX) 20 MG tablet Take 1 tablet by mouth daily 90 tablet 1    LORazepam (ATIVAN) 0.5 MG tablet Take 1 tablet by mouth every 8 hours as needed for Anxiety (panic attacks, not daily use) for up to 30 days. 30 tablet 0    CALCIUM PO Take 500 mg by mouth daily      Black Cohosh 20 MG TABS Take by mouth every evening      cetirizine (ZYRTEC) 10 MG tablet Take 1 tablet by mouth daily (Patient taking differently: Take 10 mg by mouth every evening ) 90 tablet 3    atorvastatin (LIPITOR) 40 MG tablet Take 1 tablet by mouth daily (Patient taking differently: Take 40 mg by mouth every evening ) 90 tablet 3    losartan (COZAAR) 100 MG tablet Take 1 tablet by mouth daily (Patient taking differently: Take 100 mg by mouth every evening ) 90 tablet 3    montelukast (SINGULAIR) 10 MG tablet Take 1 tablet by mouth nightly (Patient taking differently: Take 10 mg by mouth daily ) 90 tablet 3    omeprazole (PRILOSEC) 20 MG delayed release capsule TAKE 1 CAPSULE BY MOUTH DAILY 90 capsule 3    potassium chloride (KLOR-CON M) 10 MEQ extended release tablet TAKE 2 TABLETS BY MOUTH EVERY DAY (Patient taking differently: Take 10 mEq by mouth 2 times daily TAKE 2 TABLETS BY MOUTH EVERY DAY) 180 tablet 3    umeclidinium-vilanterol (ANORO ELLIPTA) 62.5-25 MCG/INH AEPB inhaler INHALE 1 PUFF INTO LUNGS DAILY.  3 each 3    venlafaxine (EFFEXOR XR) 150 MG extended release capsule Take 1 capsule by mouth daily 90 capsule 3    aspirin EC 81 MG EC tablet Take 1 tablet by mouth daily (Patient taking differently: Take 81 mg by mouth every evening ) 90 tablet 3    fluticasone (FLONASE) 50 MCG/ACT nasal spray 2 sprays by Nasal route daily 1 Bottle 3    ipratropium-albuterol (DUONEB) 0.5-2.5 (3) MG/3ML SOLN nebulizer solution Inhale 3 mLs into the lungs every 4 hours 360 mL 0    albuterol sulfate HFA (PROAIR HFA) 108 (90 Base) MCG/ACT inhaler Inhale 2 puffs into the lungs every 6 hours as needed for Wheezing 1 Inhaler 3    melatonin 5 MG TABS tablet Take 5 mg by mouth nightly as needed       triamcinolone (KENALOG) 0.025 % cream Apply topically 2 times daily. (Patient not taking: Reported on 8/15/2019) 1 Tube 1     No current facility-administered medications for this visit. Allergies   Allergen Reactions    Doxycycline      Patient states it caused chills and hot flashes severe.  Celebrex [Celecoxib]      swelling    Naproxen Hives    Lactose Intolerance (Gi) Nausea And Vomiting       Past Surgical History:   Procedure Laterality Date    APPENDECTOMY      CARDIAC CATHETERIZATION  5/3/2013   MDL    EF 60%    HEMORRHOID SURGERY      HYSTERECTOMY      HYSTERECTOMY, TOTAL ABDOMINAL      LUNG CANCER SURGERY      TONSILLECTOMY         Social History     Tobacco Use    Smoking status: Current Every Day Smoker     Packs/day: 0.50     Years: 30.00     Pack years: 15.00     Types: Cigarettes     Start date: 6/6/1970    Smokeless tobacco: Never Used    Tobacco comment: has not smoked since she was hospitalized   Substance Use Topics    Alcohol use: No    Drug use: No       Family History   Problem Relation Age of Onset    High Blood Pressure Mother     High Blood Pressure Father     Diabetes Sister     High Blood Pressure Brother        /80   Pulse 72   Temp 98.2 °F (36.8 °C)   Wt 151 lb (68.5 kg)   SpO2 95%   BMI 27.62 kg/m²     Physical Exam   Constitutional: She is oriented to person, place, and time. She appears well-developed and well-nourished. Non-toxic appearance. She does not have a sickly appearance. She appears ill (chronic, slightly worse than baseline). No distress. HENT:   Head: Normocephalic and atraumatic.  Not

## 2019-08-16 ENCOUNTER — CARE COORDINATION (OUTPATIENT)
Dept: CASE MANAGEMENT | Age: 79
End: 2019-08-16

## 2019-08-16 PROBLEM — F41.0 PANIC DISORDER: Status: ACTIVE | Noted: 2019-08-16

## 2019-08-16 PROBLEM — Z09 HOSPITAL DISCHARGE FOLLOW-UP: Status: RESOLVED | Noted: 2019-08-16 | Resolved: 2019-08-16

## 2019-08-16 PROBLEM — Z09 HOSPITAL DISCHARGE FOLLOW-UP: Status: ACTIVE | Noted: 2019-08-16

## 2019-08-16 PROBLEM — Z51.5 PALLIATIVE CARE PATIENT: Status: RESOLVED | Noted: 2019-08-06 | Resolved: 2019-08-16

## 2019-08-16 ASSESSMENT — ENCOUNTER SYMPTOMS
COUGH: 1
CHEST TIGHTNESS: 0
WHEEZING: 0
VOMITING: 0
EYE ITCHING: 0
SORE THROAT: 0
RHINORRHEA: 0
DIARRHEA: 0
ABDOMINAL PAIN: 0
SHORTNESS OF BREATH: 1
CONSTIPATION: 0
NAUSEA: 0
COLOR CHANGE: 0

## 2019-08-16 NOTE — CARE COORDINATION
[facility] Transitions Follow Up Call    2019    Patient: Pietro Knutson  Patient : 1940   MRN: [id number]  Reason for Admission:   Discharge Date: 19 RARS: Readmission Risk Score: 20         Spoke with: N/A    Care Transitions Subsequent and Final Call    Subsequent and Final Calls  Are you currently active with any services?:  Home Health  Care Transitions Interventions  Other Interventions: Follow Up : Attempted to follow up with patient after breathing treatment today. No answer, left message with CTN contact information and time left in office. Will follow up with patient next business day.   Future Appointments   Date Time Provider Jaylon Joseph   2019  2:45 PM MD [facility]   2019 10:00 AM DIANA Francis NP Cardio [place]   2019 11:00 AM SCHEDULE, [facility] LPN JUAN MANUEL CABRALES [place]       Maynor Merrill [place]

## 2019-08-19 ENCOUNTER — TELEPHONE (OUTPATIENT)
Dept: PRIMARY CARE CLINIC | Age: 79
End: 2019-08-19

## 2019-08-19 ENCOUNTER — CARE COORDINATION (OUTPATIENT)
Dept: CASE MANAGEMENT | Age: 79
End: 2019-08-19

## 2019-08-19 NOTE — TELEPHONE ENCOUNTER
Call part 2 - received call from [address] [place] [address]. She wanted the OK to draw the aide CMP scheduled for 8/29 because she will be there that day. Also, patient is have some white discoloration on her pinky finger on her R hand, feels a little tingling, no pain. They will observe this for a couple of days and report back if no improvement or if this spreads to other fingers or hand.   Earlie Flatness [facility]

## 2019-08-20 ENCOUNTER — CARE COORDINATION (OUTPATIENT)
Dept: CASE MANAGEMENT | Age: 79
End: 2019-08-20

## 2019-08-20 NOTE — CARE COORDINATION
[facility] Transitions Follow Up Call    2019    Patient: Tobi Espinoza  Patient : 1940   MRN: [id number]  Reason for Admission:   Discharge Date: 19 RARS: Readmission Risk Score: 20         Spoke with: Fallon Mckeon Transitions Subsequent and Final Call    Subsequent and Final Calls  Do you have any ongoing symptoms?:  Yes  Onset of Patient-reported symptoms: In the past 7 days  Patient-reported symptoms:  Other  Have your medications changed?:  No  Do you have any questions related to your medications?:  No  Do you currently have any active services?:  Yes  Are you currently active with any services?:  Home Health  Do you have any needs or concerns that I can assist you with?:  No  Identified Barriers:  None  Care Transitions Interventions  Other Interventions: Follow Up : Spoke with patient for follow up phone call. She says she is feeling better, she is not as short of breath as she was during the previous call. She does say that she has some swelling bilaterally in her legs, and home care was out yesterday. Per notes patient's doctor states he is aware of legs being swollen. She says home care nurse is coming to draw labs on her when she is next scheduled a visit. She says her anxiety is better, but it does come and go. She has medication to treat in home with her, and takes it prn. Patient has been doing breathing treatments QID PRN, and she says that is helping her with her SOA. She says she has not problems with appetite. Encouraged to watch for s/s of panic attacks coming on, SOA, tremors, tachycardia. Patient to call with any problems or concerns prn. Will follow up with patient later in the week.    Future Appointments   Date Time Provider Jaylon Joseph   2019  2:45 PM Cassy Jonas MD [facility]   2019 10:00 AM DIANA Kendall - NP [facility]   2019 11:00 AM SCHEDULE, [facility]       [place]

## 2019-08-23 ENCOUNTER — CARE COORDINATION (OUTPATIENT)
Dept: CASE MANAGEMENT | Age: 79
End: 2019-08-23

## 2019-08-27 ENCOUNTER — TELEPHONE (OUTPATIENT)
Dept: PRIMARY CARE CLINIC | Age: 79
End: 2019-08-27

## 2019-08-27 DIAGNOSIS — M46.1 INFLAMMATION OF RIGHT SACROILIAC JOINT (HCC): ICD-10-CM

## 2019-08-27 DIAGNOSIS — M46.1 INFLAMMATION OF LEFT SACROILIAC JOINT (HCC): ICD-10-CM

## 2019-08-27 DIAGNOSIS — M25.559 ARTHRALGIA OF HIP, UNSPECIFIED LATERALITY: Primary | ICD-10-CM

## 2019-08-27 DIAGNOSIS — R26.81 UNSTEADY GAIT: Primary | ICD-10-CM

## 2019-08-29 LAB
ALBUMIN SERPL-MCNC: 3.7 G/DL (ref 3.5–5.2)
ALP BLD-CCNC: 100 U/L (ref 35–104)
ALT SERPL-CCNC: 15 U/L (ref 5–33)
ANION GAP SERPL CALCULATED.3IONS-SCNC: 16 MMOL/L (ref 7–19)
AST SERPL-CCNC: 16 U/L (ref 5–32)
BILIRUB SERPL-MCNC: <0.2 MG/DL (ref 0.2–1.2)
BUN BLDV-MCNC: 28 MG/DL (ref 8–23)
CALCIUM SERPL-MCNC: 9.3 MG/DL (ref 8.8–10.2)
CHLORIDE BLD-SCNC: 104 MMOL/L (ref 98–111)
CO2: 24 MMOL/L (ref 22–29)
CREAT SERPL-MCNC: 1 MG/DL (ref 0.5–0.9)
GFR NON-AFRICAN AMERICAN: 54
GLUCOSE BLD-MCNC: 87 MG/DL (ref 74–109)
POTASSIUM SERPL-SCNC: 4.2 MMOL/L (ref 3.5–5)
SODIUM BLD-SCNC: 144 MMOL/L (ref 136–145)
TOTAL PROTEIN: 6.9 G/DL (ref 6.6–8.7)

## 2019-09-04 ENCOUNTER — OFFICE VISIT (OUTPATIENT)
Dept: PRIMARY CARE CLINIC | Age: 79
End: 2019-09-04
Payer: MEDICARE

## 2019-09-04 VITALS
SYSTOLIC BLOOD PRESSURE: 138 MMHG | OXYGEN SATURATION: 98 % | WEIGHT: 145.2 LBS | RESPIRATION RATE: 18 BRPM | DIASTOLIC BLOOD PRESSURE: 88 MMHG | HEIGHT: 60 IN | BODY MASS INDEX: 28.51 KG/M2 | TEMPERATURE: 97.8 F | HEART RATE: 71 BPM

## 2019-09-04 DIAGNOSIS — I83.11 VARICOSE VEINS OF BOTH LOWER EXTREMITIES WITH INFLAMMATION: ICD-10-CM

## 2019-09-04 DIAGNOSIS — I27.23 PULMONARY HYPERTENSION DUE TO COPD (HCC): Primary | ICD-10-CM

## 2019-09-04 DIAGNOSIS — R09.89 ABNORMAL PERIPHERAL PULSE: ICD-10-CM

## 2019-09-04 DIAGNOSIS — I83.12 VARICOSE VEINS OF BOTH LOWER EXTREMITIES WITH INFLAMMATION: ICD-10-CM

## 2019-09-04 DIAGNOSIS — F41.0 PANIC DISORDER: ICD-10-CM

## 2019-09-04 DIAGNOSIS — J44.9 PULMONARY HYPERTENSION DUE TO COPD (HCC): Primary | ICD-10-CM

## 2019-09-04 PROCEDURE — 1090F PRES/ABSN URINE INCON ASSESS: CPT | Performed by: FAMILY MEDICINE

## 2019-09-04 PROCEDURE — 4040F PNEUMOC VAC/ADMIN/RCVD: CPT | Performed by: FAMILY MEDICINE

## 2019-09-04 PROCEDURE — G8926 SPIRO NO PERF OR DOC: HCPCS | Performed by: FAMILY MEDICINE

## 2019-09-04 PROCEDURE — 1036F TOBACCO NON-USER: CPT | Performed by: FAMILY MEDICINE

## 2019-09-04 PROCEDURE — G8427 DOCREV CUR MEDS BY ELIG CLIN: HCPCS | Performed by: FAMILY MEDICINE

## 2019-09-04 PROCEDURE — G8417 CALC BMI ABV UP PARAM F/U: HCPCS | Performed by: FAMILY MEDICINE

## 2019-09-04 PROCEDURE — G8598 ASA/ANTIPLAT THER USED: HCPCS | Performed by: FAMILY MEDICINE

## 2019-09-04 PROCEDURE — 1123F ACP DISCUSS/DSCN MKR DOCD: CPT | Performed by: FAMILY MEDICINE

## 2019-09-04 PROCEDURE — 3023F SPIROM DOC REV: CPT | Performed by: FAMILY MEDICINE

## 2019-09-04 PROCEDURE — G8400 PT W/DXA NO RESULTS DOC: HCPCS | Performed by: FAMILY MEDICINE

## 2019-09-04 PROCEDURE — 1111F DSCHRG MED/CURRENT MED MERGE: CPT | Performed by: FAMILY MEDICINE

## 2019-09-04 PROCEDURE — 99214 OFFICE O/P EST MOD 30 MIN: CPT | Performed by: FAMILY MEDICINE

## 2019-09-04 RX ORDER — FLUTICASONE PROPIONATE 50 MCG
2 SPRAY, SUSPENSION (ML) NASAL DAILY
Qty: 1 BOTTLE | Refills: 3 | Status: SHIPPED | OUTPATIENT
Start: 2019-09-04 | End: 2020-01-03 | Stop reason: SDUPTHER

## 2019-09-04 ASSESSMENT — ENCOUNTER SYMPTOMS
VOMITING: 0
COUGH: 0
CHEST TIGHTNESS: 0
SHORTNESS OF BREATH: 0
CONSTIPATION: 0
NAUSEA: 0
DIARRHEA: 0
ABDOMINAL PAIN: 0
WHEEZING: 0

## 2019-09-17 ENCOUNTER — HOSPITAL ENCOUNTER (OUTPATIENT)
Dept: NON INVASIVE DIAGNOSTICS | Age: 79
Discharge: HOME | End: 2019-09-17
Payer: MEDICARE

## 2019-09-17 ENCOUNTER — OFFICE VISIT (OUTPATIENT)
Dept: VASCULAR SURGERY | Age: 79
End: 2019-09-17
Payer: MEDICARE

## 2019-09-17 ENCOUNTER — OFFICE VISIT (OUTPATIENT)
Dept: CARDIOLOGY | Age: 79
End: 2019-09-17
Payer: MEDICARE

## 2019-09-17 VITALS
DIASTOLIC BLOOD PRESSURE: 70 MMHG | HEIGHT: 60 IN | OXYGEN SATURATION: 99 % | BODY MASS INDEX: 28.27 KG/M2 | WEIGHT: 144 LBS | HEART RATE: 63 BPM | SYSTOLIC BLOOD PRESSURE: 126 MMHG

## 2019-09-17 VITALS
SYSTOLIC BLOOD PRESSURE: 132 MMHG | RESPIRATION RATE: 20 BRPM | BODY MASS INDEX: 28.27 KG/M2 | HEART RATE: 74 BPM | DIASTOLIC BLOOD PRESSURE: 76 MMHG | HEIGHT: 60 IN | WEIGHT: 144 LBS

## 2019-09-17 DIAGNOSIS — I73.9 CLAUDICATION (HCC): ICD-10-CM

## 2019-09-17 DIAGNOSIS — I10 ESSENTIAL HYPERTENSION: Primary | ICD-10-CM

## 2019-09-17 DIAGNOSIS — I78.1 SPIDER VEINS: ICD-10-CM

## 2019-09-17 DIAGNOSIS — J44.9 CHRONIC OBSTRUCTIVE PULMONARY DISEASE, UNSPECIFIED COPD TYPE (HCC): ICD-10-CM

## 2019-09-17 DIAGNOSIS — I73.9 CLAUDICATION (HCC): Primary | ICD-10-CM

## 2019-09-17 DIAGNOSIS — E78.5 HYPERLIPIDEMIA, UNSPECIFIED HYPERLIPIDEMIA TYPE: ICD-10-CM

## 2019-09-17 DIAGNOSIS — I70.213 ATHEROSCLEROSIS OF NATIVE ARTERY OF BOTH LOWER EXTREMITIES WITH INTERMITTENT CLAUDICATION (HCC): ICD-10-CM

## 2019-09-17 PROCEDURE — 1123F ACP DISCUSS/DSCN MKR DOCD: CPT | Performed by: NURSE PRACTITIONER

## 2019-09-17 PROCEDURE — 4040F PNEUMOC VAC/ADMIN/RCVD: CPT | Performed by: NURSE PRACTITIONER

## 2019-09-17 PROCEDURE — 99214 OFFICE O/P EST MOD 30 MIN: CPT | Performed by: NURSE PRACTITIONER

## 2019-09-17 PROCEDURE — G8417 CALC BMI ABV UP PARAM F/U: HCPCS | Performed by: NURSE PRACTITIONER

## 2019-09-17 PROCEDURE — G8598 ASA/ANTIPLAT THER USED: HCPCS | Performed by: NURSE PRACTITIONER

## 2019-09-17 PROCEDURE — 93923 UPR/LXTR ART STDY 3+ LVLS: CPT

## 2019-09-17 PROCEDURE — 3023F SPIROM DOC REV: CPT | Performed by: NURSE PRACTITIONER

## 2019-09-17 PROCEDURE — G8400 PT W/DXA NO RESULTS DOC: HCPCS | Performed by: NURSE PRACTITIONER

## 2019-09-17 PROCEDURE — 1036F TOBACCO NON-USER: CPT | Performed by: NURSE PRACTITIONER

## 2019-09-17 PROCEDURE — 1090F PRES/ABSN URINE INCON ASSESS: CPT | Performed by: NURSE PRACTITIONER

## 2019-09-17 PROCEDURE — G8427 DOCREV CUR MEDS BY ELIG CLIN: HCPCS | Performed by: NURSE PRACTITIONER

## 2019-09-17 PROCEDURE — G8926 SPIRO NO PERF OR DOC: HCPCS | Performed by: NURSE PRACTITIONER

## 2019-09-17 RX ORDER — DIPHENOXYLATE HYDROCHLORIDE AND ATROPINE SULFATE 2.5; .025 MG/1; MG/1
1 TABLET ORAL 4 TIMES DAILY PRN
COMMUNITY
End: 2020-01-01

## 2019-09-17 NOTE — PROGRESS NOTES
INTO LUNGS DAILY. 3 each 3    venlafaxine (EFFEXOR XR) 150 MG extended release capsule Take 1 capsule by mouth daily 90 capsule 3    aspirin EC 81 MG EC tablet Take 1 tablet by mouth daily (Patient taking differently: Take 81 mg by mouth every evening ) 90 tablet 3    triamcinolone (KENALOG) 0.025 % cream Apply topically 2 times daily. 1 Tube 1    albuterol sulfate HFA (PROAIR HFA) 108 (90 Base) MCG/ACT inhaler Inhale 2 puffs into the lungs every 6 hours as needed for Wheezing 1 Inhaler 3    melatonin 5 MG TABS tablet Take 5 mg by mouth nightly as needed        No current facility-administered medications for this visit. Allergies: Doxycycline; Celebrex [celecoxib];  Naproxen; and Lactose intolerance (gi)  Past Medical History:   Diagnosis Date    Acid reflux     Allergic rhinitis     Hyperlipidemia     Hypertension     Irritable bowel syndrome with diarrhea 2019    [facility]     Mild single current episode of major depressive disorder ([facility] 2018    Osteoarthritis     Other emphysema ([facility] 2017    Palliative care patient 2019    Panic disorder 2019    Stage 3 severe COPD by GOLD classification ([facility] 2018    FEV1 42%    Tobacco abuse 2018    Urinary incontinence     stress incontinence     Past Surgical History:   Procedure Laterality Date    APPENDECTOMY      CARDIAC CATHETERIZATION  5/3/2013   MDL    EF 60%    HEMORRHOID SURGERY      HYSTERECTOMY      HYSTERECTOMY, TOTAL ABDOMINAL      LUNG CANCER SURGERY      TONSILLECTOMY       Family History   Problem Relation Age of Onset    High Blood Pressure Mother     High Blood Pressure Father     Diabetes Sister     High Blood Pressure Brother      Social History     Tobacco Use    Smoking status: Former Smoker     Packs/day: 0.50     Years: 30.00     Pack years: 15.00     Types: Cigarettes     Start date: 1970     Last attempt to quit: 2019     Years since quittin.1    Smokeless tobacco: Never Used    Tobacco comment: has not smoked since she was hospitalized   Substance Use Topics    Alcohol use: No          Review of System:      Except as noted in HPI, cardiovascular and respiratory systems are otherwise negative. All other systems reviewed and are negative    Objective:    /70   Pulse 63   Ht 5' (1.524 m)   Wt 144 lb (65.3 kg)   SpO2 99%   BMI 28.12 kg/m²     GENERAL - well developed and well nourished    HEENT -  PERRLA, Hearing appears normal, conjunctive and lids are normal.  External inspection of ears and nose appear normal.  NECK - no thyromegaly, no JVD, trachea is in the midline  CARDIOVASCULAR - PMI is in the mid line clavicular position, Normal S1 and S2 with no systolic murmur. No S3 or S4    PULMONARY - no respiratory distress. No wheezes or rales. Lungs are clear to ausculation, normal respiratory effort. ABDOMEN  - soft, non tender, no rebound  MUSCULOSKELETAL  - range of motion of the upper and lower extermites appears normal and equal and is without pain   EXTREMITIES - no significant edema   NEUROLOGIC - gait and station are normal  SKIN - turgor is normal, skin warm and dry. PSYCHIATRIC - normal mood and affect, alert and orientated x 3,      ASSESSMENT:    ALL THE CARDIOLOGY PROBLEMS ARE LISTED ABOVE; HOWEVER, THE FOLLOWING SPECIFIC CARDIAC PROBLEMS / CONDITIONS WERE ADDRESSED AND TREATED DURING THE OFFICE VISIT TODAY:       Cardiac Specific Problem  Discussion and Plan         1. CAD  initial encounter   4/9/2013  DSE negative for myocardial ischemia  5/3/2013  Cath  Mild CAD, normal LVFX  10/23/2017 echo  Normal LVFX  11/22/ - 11/29/2017  zio NSR  8/5/2019 grossly negative Lexiscan. No chest pain. Patient is on statin, ARB, and ASA         2. Hypertension  initial encounter   Blood pressure in the office today 126/70.         3.   COPD  initial encounter   Managed by primary care provider. O2 sat in the office 99%.    4.  Hyperlipidemia

## 2019-09-18 ENCOUNTER — TELEPHONE (OUTPATIENT)
Dept: VASCULAR SURGERY | Age: 79
End: 2019-09-18

## 2019-09-18 PROBLEM — I70.213 ATHEROSCLEROSIS OF NATIVE ARTERY OF BOTH LOWER EXTREMITIES WITH INTERMITTENT CLAUDICATION (HCC): Status: ACTIVE | Noted: 2019-09-18

## 2019-09-24 ENCOUNTER — TELEPHONE (OUTPATIENT)
Dept: PRIMARY CARE CLINIC | Age: 79
End: 2019-09-24

## 2019-09-25 RX ORDER — NICOTINE 21 MG/24HR
1 PATCH, TRANSDERMAL 24 HOURS TRANSDERMAL DAILY
Qty: 30 PATCH | Refills: 0 | Status: SHIPPED | OUTPATIENT
Start: 2019-09-25 | End: 2020-01-17

## 2019-09-26 ENCOUNTER — TELEPHONE (OUTPATIENT)
Dept: INTERNAL MEDICINE CLINIC | Age: 79
End: 2019-09-26

## 2019-10-04 ENCOUNTER — PREP FOR PROCEDURE (OUTPATIENT)
Dept: VASCULAR SURGERY | Age: 79
End: 2019-10-04

## 2019-10-04 RX ORDER — ASPIRIN 81 MG/1
81 TABLET ORAL ONCE
Status: CANCELLED | OUTPATIENT
Start: 2019-10-04 | End: 2019-10-04

## 2019-10-04 RX ORDER — CLONIDINE HYDROCHLORIDE 0.1 MG/1
0.1 TABLET ORAL PRN
Status: CANCELLED | OUTPATIENT
Start: 2019-10-04

## 2019-10-04 RX ORDER — SODIUM CHLORIDE 0.9 % (FLUSH) 0.9 %
10 SYRINGE (ML) INJECTION PRN
Status: CANCELLED | OUTPATIENT
Start: 2019-10-04

## 2019-10-07 ENCOUNTER — HOSPITAL ENCOUNTER (OUTPATIENT)
Dept: INTERVENTIONAL RADIOLOGY/VASCULAR | Age: 79
Discharge: HOME | End: 2019-10-07
Payer: MEDICARE

## 2019-10-07 VITALS
HEART RATE: 65 BPM | OXYGEN SATURATION: 96 % | DIASTOLIC BLOOD PRESSURE: 59 MMHG | RESPIRATION RATE: 15 BRPM | TEMPERATURE: 98.2 F | SYSTOLIC BLOOD PRESSURE: 131 MMHG | HEIGHT: 60 IN | WEIGHT: 144 LBS | BODY MASS INDEX: 28.27 KG/M2

## 2019-10-07 DIAGNOSIS — I70.213 ATHEROSCLEROSIS OF NATIVE ARTERY OF BOTH LOWER EXTREMITIES WITH INTERMITTENT CLAUDICATION (HCC): ICD-10-CM

## 2019-10-07 DIAGNOSIS — I70.213 ATHEROSCLEROSIS OF NATIVE ARTERIES OF EXTREMITIES WITH INTERMITTENT CLAUDICATION, BILATERAL LEGS (HCC): Primary | ICD-10-CM

## 2019-10-07 LAB
ANION GAP SERPL CALCULATED.3IONS-SCNC: 12 MMOL/L (ref 7–19)
BUN BLDV-MCNC: 20 MG/DL (ref 8–23)
CALCIUM SERPL-MCNC: 9.7 MG/DL (ref 8.8–10.2)
CHLORIDE BLD-SCNC: 103 MMOL/L (ref 98–111)
CO2: 25 MMOL/L (ref 22–29)
CREAT SERPL-MCNC: 1 MG/DL (ref 0.5–0.9)
GFR NON-AFRICAN AMERICAN: 54
GLUCOSE BLD-MCNC: 104 MG/DL (ref 74–109)
HCT VFR BLD CALC: 38.8 % (ref 37–47)
HEMOGLOBIN: 11.7 G/DL (ref 12–16)
MCH RBC QN AUTO: 27.1 PG (ref 27–31)
MCHC RBC AUTO-ENTMCNC: 30.2 G/DL (ref 33–37)
MCV RBC AUTO: 89.8 FL (ref 81–99)
PDW BLD-RTO: 15 % (ref 11.5–14.5)
PLATELET # BLD: 337 K/UL (ref 130–400)
PMV BLD AUTO: 10.4 FL (ref 9.4–12.3)
POTASSIUM SERPL-SCNC: 4.3 MMOL/L (ref 3.5–5)
RBC # BLD: 4.32 M/UL (ref 4.2–5.4)
SODIUM BLD-SCNC: 140 MMOL/L (ref 136–145)
WBC # BLD: 10 K/UL (ref 4.8–10.8)

## 2019-10-07 PROCEDURE — 99152 MOD SED SAME PHYS/QHP 5/>YRS: CPT | Performed by: SURGERY

## 2019-10-07 PROCEDURE — 2580000003 HC RX 258: Performed by: NURSE PRACTITIONER

## 2019-10-07 PROCEDURE — 37224 HC PLASTY UNI FEMPOP: CPT | Performed by: SURGERY

## 2019-10-07 PROCEDURE — 75625 CONTRAST EXAM ABDOMINL AORTA: CPT | Performed by: SURGERY

## 2019-10-07 PROCEDURE — 2500000003 HC RX 250 WO HCPCS: Performed by: SURGERY

## 2019-10-07 PROCEDURE — C1769 GUIDE WIRE: HCPCS

## 2019-10-07 PROCEDURE — 37221 HC PLASTY ILIAC W STENT: CPT | Performed by: SURGERY

## 2019-10-07 PROCEDURE — 75716 ARTERY X-RAYS ARMS/LEGS: CPT | Performed by: SURGERY

## 2019-10-07 PROCEDURE — 6360000004 HC RX CONTRAST MEDICATION: Performed by: SURGERY

## 2019-10-07 PROCEDURE — 99153 MOD SED SAME PHYS/QHP EA: CPT | Performed by: SURGERY

## 2019-10-07 PROCEDURE — 93922 UPR/L XTREMITY ART 2 LEVELS: CPT

## 2019-10-07 PROCEDURE — 37221 PR REVSC OPN/PRQ ILIAC ART W/STNT PLMT & ANGIOPLSTY: CPT | Performed by: SURGERY

## 2019-10-07 PROCEDURE — 36415 COLL VENOUS BLD VENIPUNCTURE: CPT

## 2019-10-07 PROCEDURE — 80048 BASIC METABOLIC PNL TOTAL CA: CPT

## 2019-10-07 PROCEDURE — 6360000002 HC RX W HCPCS: Performed by: SURGERY

## 2019-10-07 PROCEDURE — 2500000003 HC RX 250 WO HCPCS: Performed by: NURSE PRACTITIONER

## 2019-10-07 PROCEDURE — 6370000000 HC RX 637 (ALT 250 FOR IP): Performed by: NURSE PRACTITIONER

## 2019-10-07 PROCEDURE — 85027 COMPLETE CBC AUTOMATED: CPT

## 2019-10-07 PROCEDURE — 37224 PR REVSC OPN/PRG FEM/POP W/ANGIOPLASTY UNI: CPT | Performed by: SURGERY

## 2019-10-07 PROCEDURE — 6360000002 HC RX W HCPCS: Performed by: NURSE PRACTITIONER

## 2019-10-07 RX ORDER — MIDAZOLAM HYDROCHLORIDE 1 MG/ML
INJECTION INTRAMUSCULAR; INTRAVENOUS
Status: COMPLETED | OUTPATIENT
Start: 2019-10-07 | End: 2019-10-07

## 2019-10-07 RX ORDER — CLONIDINE HYDROCHLORIDE 0.1 MG/1
0.1 TABLET ORAL PRN
Status: DISCONTINUED | OUTPATIENT
Start: 2019-10-07 | End: 2019-10-09 | Stop reason: HOSPADM

## 2019-10-07 RX ORDER — HEPARIN SODIUM 5000 [USP'U]/ML
INJECTION, SOLUTION INTRAVENOUS; SUBCUTANEOUS
Status: COMPLETED | OUTPATIENT
Start: 2019-10-07 | End: 2019-10-07

## 2019-10-07 RX ORDER — IODIXANOL 320 MG/ML
INJECTION, SOLUTION INTRAVASCULAR
Status: COMPLETED | OUTPATIENT
Start: 2019-10-07 | End: 2019-10-07

## 2019-10-07 RX ORDER — HYDROCODONE BITARTRATE AND ACETAMINOPHEN 5; 325 MG/1; MG/1
2 TABLET ORAL EVERY 4 HOURS PRN
Status: DISCONTINUED | OUTPATIENT
Start: 2019-10-07 | End: 2019-10-09 | Stop reason: HOSPADM

## 2019-10-07 RX ORDER — FENTANYL CITRATE 50 UG/ML
INJECTION, SOLUTION INTRAMUSCULAR; INTRAVENOUS
Status: COMPLETED | OUTPATIENT
Start: 2019-10-07 | End: 2019-10-07

## 2019-10-07 RX ORDER — ACETAMINOPHEN 325 MG/1
650 TABLET ORAL EVERY 4 HOURS PRN
Status: DISCONTINUED | OUTPATIENT
Start: 2019-10-07 | End: 2019-10-09 | Stop reason: HOSPADM

## 2019-10-07 RX ORDER — ONDANSETRON 2 MG/ML
4 INJECTION INTRAMUSCULAR; INTRAVENOUS EVERY 8 HOURS PRN
Status: DISCONTINUED | OUTPATIENT
Start: 2019-10-07 | End: 2019-10-09 | Stop reason: HOSPADM

## 2019-10-07 RX ORDER — LIDOCAINE HYDROCHLORIDE 20 MG/ML
INJECTION, SOLUTION INFILTRATION; PERINEURAL
Status: COMPLETED | OUTPATIENT
Start: 2019-10-07 | End: 2019-10-07

## 2019-10-07 RX ORDER — ASPIRIN 81 MG/1
81 TABLET ORAL ONCE
Status: COMPLETED | OUTPATIENT
Start: 2019-10-07 | End: 2019-10-07

## 2019-10-07 RX ORDER — SODIUM CHLORIDE 0.9 % (FLUSH) 0.9 %
10 SYRINGE (ML) INJECTION PRN
Status: DISCONTINUED | OUTPATIENT
Start: 2019-10-07 | End: 2019-10-09 | Stop reason: HOSPADM

## 2019-10-07 RX ORDER — SODIUM CHLORIDE 9 MG/ML
INJECTION, SOLUTION INTRAVENOUS CONTINUOUS
Status: DISCONTINUED | OUTPATIENT
Start: 2019-10-07 | End: 2019-10-09 | Stop reason: HOSPADM

## 2019-10-07 RX ORDER — HYDROCODONE BITARTRATE AND ACETAMINOPHEN 5; 325 MG/1; MG/1
1 TABLET ORAL EVERY 4 HOURS PRN
Status: DISCONTINUED | OUTPATIENT
Start: 2019-10-07 | End: 2019-10-09 | Stop reason: HOSPADM

## 2019-10-07 RX ORDER — SODIUM CHLORIDE 9 MG/ML
INJECTION, SOLUTION INTRAVENOUS CONTINUOUS
Status: DISCONTINUED | OUTPATIENT
Start: 2019-10-07 | End: 2019-10-07 | Stop reason: SDUPTHER

## 2019-10-07 RX ADMIN — LIDOCAINE HYDROCHLORIDE 10 ML: 20 INJECTION, SOLUTION INFILTRATION; PERINEURAL at 10:17

## 2019-10-07 RX ADMIN — HEPARIN SODIUM 5000 UNITS: 5000 INJECTION, SOLUTION INTRAVENOUS; SUBCUTANEOUS at 10:17

## 2019-10-07 RX ADMIN — Medication 2 G: at 09:54

## 2019-10-07 RX ADMIN — IODIXANOL 200 ML: 320 INJECTION, SOLUTION INTRAVASCULAR at 11:11

## 2019-10-07 RX ADMIN — FENTANYL CITRATE 25 MCG: 50 INJECTION INTRAMUSCULAR; INTRAVENOUS at 10:09

## 2019-10-07 RX ADMIN — FENTANYL CITRATE 25 MCG: 50 INJECTION INTRAMUSCULAR; INTRAVENOUS at 10:33

## 2019-10-07 RX ADMIN — ASPIRIN 81 MG: 81 TABLET, COATED ORAL at 08:20

## 2019-10-07 RX ADMIN — HEPARIN SODIUM 3000 UNITS: 5000 INJECTION, SOLUTION INTRAVENOUS; SUBCUTANEOUS at 10:27

## 2019-10-07 RX ADMIN — MIDAZOLAM 1 MG: 1 INJECTION INTRAMUSCULAR; INTRAVENOUS at 10:33

## 2019-10-07 RX ADMIN — MIDAZOLAM 1 MG: 1 INJECTION INTRAMUSCULAR; INTRAVENOUS at 10:09

## 2019-10-07 RX ADMIN — Medication: at 08:21

## 2019-10-21 ENCOUNTER — TELEPHONE (OUTPATIENT)
Dept: PRIMARY CARE CLINIC | Age: 79
End: 2019-10-21

## 2019-10-22 ENCOUNTER — OFFICE VISIT (OUTPATIENT)
Dept: VASCULAR SURGERY | Age: 79
End: 2019-10-22
Payer: MEDICARE

## 2019-10-22 VITALS — TEMPERATURE: 97.5 F | HEART RATE: 68 BPM | SYSTOLIC BLOOD PRESSURE: 156 MMHG | DIASTOLIC BLOOD PRESSURE: 81 MMHG

## 2019-10-22 DIAGNOSIS — I70.213 ATHEROSCLEROSIS OF NATIVE ARTERY OF BOTH LOWER EXTREMITIES WITH INTERMITTENT CLAUDICATION (HCC): Primary | ICD-10-CM

## 2019-10-22 PROCEDURE — G8417 CALC BMI ABV UP PARAM F/U: HCPCS | Performed by: NURSE PRACTITIONER

## 2019-10-22 PROCEDURE — 1090F PRES/ABSN URINE INCON ASSESS: CPT | Performed by: NURSE PRACTITIONER

## 2019-10-22 PROCEDURE — G8484 FLU IMMUNIZE NO ADMIN: HCPCS | Performed by: NURSE PRACTITIONER

## 2019-10-22 PROCEDURE — 1123F ACP DISCUSS/DSCN MKR DOCD: CPT | Performed by: NURSE PRACTITIONER

## 2019-10-22 PROCEDURE — 4040F PNEUMOC VAC/ADMIN/RCVD: CPT | Performed by: NURSE PRACTITIONER

## 2019-10-22 PROCEDURE — 1036F TOBACCO NON-USER: CPT | Performed by: NURSE PRACTITIONER

## 2019-10-22 PROCEDURE — G8400 PT W/DXA NO RESULTS DOC: HCPCS | Performed by: NURSE PRACTITIONER

## 2019-10-22 PROCEDURE — G8427 DOCREV CUR MEDS BY ELIG CLIN: HCPCS | Performed by: NURSE PRACTITIONER

## 2019-10-22 PROCEDURE — G8598 ASA/ANTIPLAT THER USED: HCPCS | Performed by: NURSE PRACTITIONER

## 2019-10-22 PROCEDURE — 99212 OFFICE O/P EST SF 10 MIN: CPT | Performed by: NURSE PRACTITIONER

## 2019-12-05 ENCOUNTER — OFFICE VISIT (OUTPATIENT)
Dept: PRIMARY CARE CLINIC | Age: 79
End: 2019-12-05
Payer: MEDICARE

## 2019-12-05 VITALS
RESPIRATION RATE: 20 BRPM | HEIGHT: 60 IN | DIASTOLIC BLOOD PRESSURE: 82 MMHG | SYSTOLIC BLOOD PRESSURE: 120 MMHG | BODY MASS INDEX: 29.45 KG/M2 | TEMPERATURE: 98 F | WEIGHT: 150 LBS

## 2019-12-05 DIAGNOSIS — Z78.0 ENCOUNTER FOR OSTEOPOROSIS SCREENING IN ASYMPTOMATIC POSTMENOPAUSAL PATIENT: ICD-10-CM

## 2019-12-05 DIAGNOSIS — F41.9 ANXIETY AND DEPRESSION: ICD-10-CM

## 2019-12-05 DIAGNOSIS — F32.A ANXIETY AND DEPRESSION: ICD-10-CM

## 2019-12-05 DIAGNOSIS — Z13.820 ENCOUNTER FOR OSTEOPOROSIS SCREENING IN ASYMPTOMATIC POSTMENOPAUSAL PATIENT: ICD-10-CM

## 2019-12-05 DIAGNOSIS — Z23 NEED FOR PROPHYLACTIC VACCINATION AND INOCULATION AGAINST VARICELLA: ICD-10-CM

## 2019-12-05 DIAGNOSIS — Z00.00 ROUTINE GENERAL MEDICAL EXAMINATION AT A HEALTH CARE FACILITY: Primary | ICD-10-CM

## 2019-12-05 DIAGNOSIS — Z13.820 SCREENING FOR OSTEOPOROSIS: ICD-10-CM

## 2019-12-05 PROCEDURE — G0444 DEPRESSION SCREEN ANNUAL: HCPCS | Performed by: NURSE PRACTITIONER

## 2019-12-05 PROCEDURE — G0439 PPPS, SUBSEQ VISIT: HCPCS | Performed by: NURSE PRACTITIONER

## 2019-12-05 ASSESSMENT — PATIENT HEALTH QUESTIONNAIRE - PHQ9: SUM OF ALL RESPONSES TO PHQ QUESTIONS 1-9: 13

## 2019-12-06 RX ORDER — PROPRANOLOL HYDROCHLORIDE 40 MG/1
TABLET ORAL
Qty: 270 TABLET | Refills: 2 | Status: SHIPPED | OUTPATIENT
Start: 2019-12-06 | End: 2020-01-01 | Stop reason: SDUPTHER

## 2019-12-09 ENCOUNTER — HOSPITAL ENCOUNTER (OUTPATIENT)
Dept: WOMENS IMAGING | Age: 79
Discharge: HOME | End: 2019-12-09
Payer: MEDICARE

## 2019-12-09 DIAGNOSIS — Z78.0 ENCOUNTER FOR OSTEOPOROSIS SCREENING IN ASYMPTOMATIC POSTMENOPAUSAL PATIENT: ICD-10-CM

## 2019-12-09 DIAGNOSIS — Z13.820 ENCOUNTER FOR OSTEOPOROSIS SCREENING IN ASYMPTOMATIC POSTMENOPAUSAL PATIENT: ICD-10-CM

## 2019-12-09 PROCEDURE — 77080 DXA BONE DENSITY AXIAL: CPT

## 2019-12-10 ENCOUNTER — TELEPHONE (OUTPATIENT)
Dept: PRIMARY CARE CLINIC | Age: 79
End: 2019-12-10

## 2019-12-27 ENCOUNTER — OFFICE VISIT (OUTPATIENT)
Dept: PRIMARY CARE CLINIC | Age: 79
End: 2019-12-27
Payer: MEDICARE

## 2019-12-27 VITALS
WEIGHT: 151 LBS | HEART RATE: 78 BPM | BODY MASS INDEX: 29.64 KG/M2 | SYSTOLIC BLOOD PRESSURE: 120 MMHG | RESPIRATION RATE: 24 BRPM | HEIGHT: 60 IN | DIASTOLIC BLOOD PRESSURE: 82 MMHG | OXYGEN SATURATION: 86 % | TEMPERATURE: 98 F

## 2019-12-27 DIAGNOSIS — J44.1 COPD WITH ACUTE EXACERBATION (HCC): Primary | ICD-10-CM

## 2019-12-27 DIAGNOSIS — J18.9 COMMUNITY ACQUIRED PNEUMONIA OF LEFT LOWER LOBE OF LUNG: ICD-10-CM

## 2019-12-27 PROCEDURE — 99214 OFFICE O/P EST MOD 30 MIN: CPT | Performed by: NURSE PRACTITIONER

## 2019-12-27 PROCEDURE — 96372 THER/PROPH/DIAG INJ SC/IM: CPT | Performed by: NURSE PRACTITIONER

## 2019-12-27 RX ORDER — AMOXICILLIN 500 MG/1
500 CAPSULE ORAL 2 TIMES DAILY
Qty: 20 CAPSULE | Refills: 0 | Status: SHIPPED | OUTPATIENT
Start: 2019-12-27 | End: 2020-01-06

## 2019-12-27 RX ORDER — PREDNISONE 20 MG/1
40 TABLET ORAL DAILY
Qty: 10 TABLET | Refills: 0 | Status: SHIPPED | OUTPATIENT
Start: 2019-12-27 | End: 2020-01-01 | Stop reason: SDUPTHER

## 2019-12-27 RX ORDER — METHYLPREDNISOLONE 4 MG/1
TABLET ORAL
Qty: 1 KIT | Refills: 0 | Status: SHIPPED | OUTPATIENT
Start: 2019-12-27 | End: 2020-01-02

## 2019-12-27 RX ORDER — CEFTRIAXONE 1 G/1
1 INJECTION, POWDER, FOR SOLUTION INTRAMUSCULAR; INTRAVENOUS ONCE
Status: COMPLETED | OUTPATIENT
Start: 2019-12-27 | End: 2019-12-27

## 2019-12-27 RX ORDER — METHYLPREDNISOLONE ACETATE 40 MG/ML
40 INJECTION, SUSPENSION INTRA-ARTICULAR; INTRALESIONAL; INTRAMUSCULAR; SOFT TISSUE ONCE
Status: COMPLETED | OUTPATIENT
Start: 2019-12-27 | End: 2019-12-27

## 2019-12-27 RX ORDER — AZITHROMYCIN 250 MG/1
250 TABLET, FILM COATED ORAL SEE ADMIN INSTRUCTIONS
Qty: 6 TABLET | Refills: 0 | Status: SHIPPED | OUTPATIENT
Start: 2019-12-27 | End: 2020-01-01 | Stop reason: SDUPTHER

## 2019-12-27 RX ADMIN — METHYLPREDNISOLONE ACETATE 40 MG: 40 INJECTION, SUSPENSION INTRA-ARTICULAR; INTRALESIONAL; INTRAMUSCULAR; SOFT TISSUE at 13:21

## 2019-12-27 RX ADMIN — CEFTRIAXONE 1 G: 1 INJECTION, POWDER, FOR SOLUTION INTRAMUSCULAR; INTRAVENOUS at 13:17

## 2019-12-27 ASSESSMENT — PATIENT HEALTH QUESTIONNAIRE - PHQ9
SUM OF ALL RESPONSES TO PHQ QUESTIONS 1-9: 2
SUM OF ALL RESPONSES TO PHQ QUESTIONS 1-9: 2

## 2020-01-01 ENCOUNTER — TELEPHONE (OUTPATIENT)
Dept: PRIMARY CARE CLINIC | Age: 80
End: 2020-01-01

## 2020-01-01 ENCOUNTER — APPOINTMENT (OUTPATIENT)
Dept: GENERAL RADIOLOGY | Age: 80
DRG: 208 | End: 2020-01-01
Payer: MEDICARE

## 2020-01-01 ENCOUNTER — VIRTUAL VISIT (OUTPATIENT)
Dept: PRIMARY CARE CLINIC | Age: 80
End: 2020-01-01
Payer: MEDICARE

## 2020-01-01 ENCOUNTER — CARE COORDINATION (OUTPATIENT)
Dept: CASE MANAGEMENT | Age: 80
End: 2020-01-01

## 2020-01-01 ENCOUNTER — APPOINTMENT (OUTPATIENT)
Dept: NUCLEAR MEDICINE | Age: 80
DRG: 190 | End: 2020-01-01
Payer: MEDICARE

## 2020-01-01 ENCOUNTER — HOSPITAL ENCOUNTER (OUTPATIENT)
Dept: VASCULAR LAB | Age: 80
Discharge: HOME | DRG: 208 | End: 2020-06-29
Payer: MEDICARE

## 2020-01-01 ENCOUNTER — TELEPHONE (OUTPATIENT)
Dept: CARDIOLOGY | Age: 80
End: 2020-01-01

## 2020-01-01 ENCOUNTER — TELEPHONE (OUTPATIENT)
Dept: VASCULAR SURGERY | Age: 80
End: 2020-01-01

## 2020-01-01 ENCOUNTER — APPOINTMENT (OUTPATIENT)
Dept: GENERAL RADIOLOGY | Age: 80
DRG: 190 | End: 2020-01-01
Payer: MEDICARE

## 2020-01-01 ENCOUNTER — HOSPITAL ENCOUNTER (INPATIENT)
Age: 80
LOS: 3 days | Discharge: HOME | DRG: 190 | End: 2020-10-10
Attending: EMERGENCY MEDICINE | Admitting: HOSPITALIST
Payer: MEDICARE

## 2020-01-01 ENCOUNTER — VIRTUAL VISIT (OUTPATIENT)
Dept: VASCULAR SURGERY | Age: 80
End: 2020-01-01
Payer: MEDICARE

## 2020-01-01 ENCOUNTER — TELEPHONE (OUTPATIENT)
Dept: NEUROSURGERY | Age: 80
End: 2020-01-01

## 2020-01-01 ENCOUNTER — APPOINTMENT (OUTPATIENT)
Dept: CT IMAGING | Age: 80
DRG: 208 | End: 2020-01-01
Payer: MEDICARE

## 2020-01-01 ENCOUNTER — OFFICE VISIT (OUTPATIENT)
Dept: PRIMARY CARE CLINIC | Age: 80
End: 2020-01-01
Payer: MEDICARE

## 2020-01-01 ENCOUNTER — OFFICE VISIT (OUTPATIENT)
Dept: CARDIOLOGY | Age: 80
End: 2020-01-01
Payer: MEDICARE

## 2020-01-01 ENCOUNTER — HOSPITAL ENCOUNTER (INPATIENT)
Age: 80
LOS: 5 days | Discharge: HOME | DRG: 208 | End: 2020-07-04
Attending: PEDIATRICS | Admitting: HOSPITALIST
Payer: MEDICARE

## 2020-01-01 ENCOUNTER — OFFICE VISIT (OUTPATIENT)
Dept: URGENT CARE | Age: 80
End: 2020-01-01
Payer: MEDICARE

## 2020-01-01 ENCOUNTER — OFFICE VISIT (OUTPATIENT)
Dept: NEUROLOGY | Age: 80
End: 2020-01-01
Payer: MEDICARE

## 2020-01-01 ENCOUNTER — TELEMEDICINE (OUTPATIENT)
Dept: PRIMARY CARE CLINIC | Age: 80
End: 2020-01-01
Payer: MEDICARE

## 2020-01-01 VITALS — BODY MASS INDEX: 29.45 KG/M2 | WEIGHT: 150 LBS | RESPIRATION RATE: 22 BRPM | HEIGHT: 60 IN

## 2020-01-01 VITALS
DIASTOLIC BLOOD PRESSURE: 74 MMHG | TEMPERATURE: 97.9 F | WEIGHT: 141 LBS | BODY MASS INDEX: 27.68 KG/M2 | SYSTOLIC BLOOD PRESSURE: 132 MMHG | HEIGHT: 60 IN

## 2020-01-01 VITALS
WEIGHT: 150 LBS | HEART RATE: 67 BPM | TEMPERATURE: 97.8 F | DIASTOLIC BLOOD PRESSURE: 88 MMHG | SYSTOLIC BLOOD PRESSURE: 162 MMHG | HEIGHT: 60 IN | BODY MASS INDEX: 29.45 KG/M2 | RESPIRATION RATE: 14 BRPM | OXYGEN SATURATION: 100 %

## 2020-01-01 VITALS
HEIGHT: 60 IN | HEART RATE: 68 BPM | SYSTOLIC BLOOD PRESSURE: 157 MMHG | DIASTOLIC BLOOD PRESSURE: 82 MMHG | BODY MASS INDEX: 28.41 KG/M2 | OXYGEN SATURATION: 90 % | TEMPERATURE: 97.1 F | WEIGHT: 144.7 LBS | RESPIRATION RATE: 18 BRPM

## 2020-01-01 VITALS
BODY MASS INDEX: 26.7 KG/M2 | OXYGEN SATURATION: 95 % | DIASTOLIC BLOOD PRESSURE: 80 MMHG | HEIGHT: 60 IN | HEART RATE: 78 BPM | WEIGHT: 136 LBS | SYSTOLIC BLOOD PRESSURE: 132 MMHG

## 2020-01-01 VITALS
SYSTOLIC BLOOD PRESSURE: 132 MMHG | HEIGHT: 60 IN | DIASTOLIC BLOOD PRESSURE: 60 MMHG | RESPIRATION RATE: 22 BRPM | WEIGHT: 140 LBS | BODY MASS INDEX: 27.48 KG/M2 | HEART RATE: 60 BPM | TEMPERATURE: 97.7 F | OXYGEN SATURATION: 92 %

## 2020-01-01 VITALS
SYSTOLIC BLOOD PRESSURE: 112 MMHG | HEIGHT: 60 IN | HEART RATE: 76 BPM | DIASTOLIC BLOOD PRESSURE: 72 MMHG | BODY MASS INDEX: 26.7 KG/M2 | WEIGHT: 136 LBS

## 2020-01-01 DIAGNOSIS — R60.0 BILATERAL LOWER EXTREMITY EDEMA: ICD-10-CM

## 2020-01-01 DIAGNOSIS — I63.81 BASAL GANGLIA INFARCTION (HCC): Chronic | ICD-10-CM

## 2020-01-01 DIAGNOSIS — J44.1 CHRONIC OBSTRUCTIVE PULMONARY DISEASE WITH ACUTE EXACERBATION (HCC): ICD-10-CM

## 2020-01-01 DIAGNOSIS — I50.30 DIASTOLIC HEART FAILURE WITH PRESERVED EJECTION FRACTION (HCC): ICD-10-CM

## 2020-01-01 DIAGNOSIS — J44.9 STAGE 3 SEVERE COPD BY GOLD CLASSIFICATION (HCC): Chronic | ICD-10-CM

## 2020-01-01 LAB
ADENOVIRUS BY PCR: NOT DETECTED
ALBUMIN SERPL-MCNC: 3.1 G/DL (ref 3.5–5.2)
ALBUMIN SERPL-MCNC: 3.1 G/DL (ref 3.5–5.2)
ALBUMIN SERPL-MCNC: 3.5 G/DL (ref 3.5–5.2)
ALBUMIN SERPL-MCNC: 3.7 G/DL (ref 3.5–5.2)
ALBUMIN SERPL-MCNC: 4.1 G/DL (ref 3.5–5.2)
ALP BLD-CCNC: 106 U/L (ref 35–104)
ALP BLD-CCNC: 111 U/L (ref 35–104)
ALP BLD-CCNC: 119 U/L (ref 35–104)
ALP BLD-CCNC: 73 U/L (ref 35–104)
ALP BLD-CCNC: 75 U/L (ref 35–104)
ALT SERPL-CCNC: 13 U/L (ref 5–33)
ALT SERPL-CCNC: 19 U/L (ref 5–33)
ALT SERPL-CCNC: 23 U/L (ref 5–33)
ALT SERPL-CCNC: 31 U/L (ref 5–33)
ALT SERPL-CCNC: 9 U/L (ref 5–33)
ANION GAP SERPL CALCULATED.3IONS-SCNC: 12 MMOL/L (ref 7–19)
ANION GAP SERPL CALCULATED.3IONS-SCNC: 13 MMOL/L (ref 7–19)
ANION GAP SERPL CALCULATED.3IONS-SCNC: 15 MMOL/L (ref 7–19)
ANISOCYTOSIS: ABNORMAL
APTT: 38.9 SEC (ref 26–36.2)
AST SERPL-CCNC: 12 U/L (ref 5–32)
AST SERPL-CCNC: 14 U/L (ref 5–32)
AST SERPL-CCNC: 19 U/L (ref 5–32)
AST SERPL-CCNC: 26 U/L (ref 5–32)
AST SERPL-CCNC: 26 U/L (ref 5–32)
ATYPICAL LYMPHOCYTE RELATIVE PERCENT: 8 % (ref 0–8)
BACTERIA: ABNORMAL /HPF
BACTERIA: NEGATIVE /HPF
BASE EXCESS ARTERIAL: -11.2 MMOL/L (ref -2–2)
BASE EXCESS ARTERIAL: -5.5 MMOL/L (ref -2–2)
BASE EXCESS ARTERIAL: -7.3 MMOL/L (ref -2–2)
BASE EXCESS ARTERIAL: -7.4 MMOL/L (ref -2–2)
BASE EXCESS ARTERIAL: -8.2 MMOL/L (ref -2–2)
BASE EXCESS ARTERIAL: -8.5 MMOL/L (ref -2–2)
BASOPHILS ABSOLUTE: 0 K/UL (ref 0–0.2)
BASOPHILS RELATIVE PERCENT: 0 % (ref 0–1)
BASOPHILS RELATIVE PERCENT: 0.1 % (ref 0–1)
BASOPHILS RELATIVE PERCENT: 0.1 % (ref 0–1)
BASOPHILS RELATIVE PERCENT: 0.2 % (ref 0–1)
BILIRUB SERPL-MCNC: 0.3 MG/DL (ref 0.2–1.2)
BILIRUB SERPL-MCNC: 0.4 MG/DL (ref 0.2–1.2)
BILIRUB SERPL-MCNC: 0.4 MG/DL (ref 0.2–1.2)
BILIRUB SERPL-MCNC: 0.5 MG/DL (ref 0.2–1.2)
BILIRUB SERPL-MCNC: <0.2 MG/DL (ref 0.2–1.2)
BILIRUBIN URINE: NEGATIVE
BILIRUBIN URINE: NEGATIVE
BLOOD CULTURE, ROUTINE: NORMAL
BLOOD, URINE: ABNORMAL
BLOOD, URINE: NEGATIVE
BORDETELLA PARAPERTUSSIS BY PCR: NOT DETECTED
BORDETELLA PERTUSSIS BY PCR: NOT DETECTED
BUN BLDV-MCNC: 24 MG/DL (ref 8–23)
BUN BLDV-MCNC: 27 MG/DL (ref 8–23)
BUN BLDV-MCNC: 28 MG/DL (ref 8–23)
BUN BLDV-MCNC: 32 MG/DL (ref 8–23)
BUN BLDV-MCNC: 33 MG/DL (ref 8–23)
BUN BLDV-MCNC: 34 MG/DL (ref 8–23)
BUN BLDV-MCNC: 34 MG/DL (ref 8–23)
BUN BLDV-MCNC: 35 MG/DL (ref 8–23)
BUN BLDV-MCNC: 41 MG/DL (ref 8–23)
BUN BLDV-MCNC: 44 MG/DL (ref 8–23)
CALCIUM SERPL-MCNC: 8.4 MG/DL (ref 8.8–10.2)
CALCIUM SERPL-MCNC: 8.6 MG/DL (ref 8.8–10.2)
CALCIUM SERPL-MCNC: 8.7 MG/DL (ref 8.8–10.2)
CALCIUM SERPL-MCNC: 8.8 MG/DL (ref 8.8–10.2)
CALCIUM SERPL-MCNC: 9 MG/DL (ref 8.8–10.2)
CALCIUM SERPL-MCNC: 9.2 MG/DL (ref 8.8–10.2)
CALCIUM SERPL-MCNC: 9.3 MG/DL (ref 8.8–10.2)
CALCIUM SERPL-MCNC: 9.4 MG/DL (ref 8.8–10.2)
CARBOXYHEMOGLOBIN ARTERIAL: 1.7 % (ref 0–5)
CARBOXYHEMOGLOBIN ARTERIAL: 1.9 % (ref 0–5)
CARBOXYHEMOGLOBIN ARTERIAL: 2.5 % (ref 0–5)
CARBOXYHEMOGLOBIN ARTERIAL: 3 % (ref 0–5)
CARBOXYHEMOGLOBIN ARTERIAL: 3.2 % (ref 0–5)
CARBOXYHEMOGLOBIN ARTERIAL: 3.3 % (ref 0–5)
CHLAMYDOPHILIA PNEUMONIAE BY PCR: NOT DETECTED
CHLORIDE BLD-SCNC: 100 MMOL/L (ref 98–111)
CHLORIDE BLD-SCNC: 100 MMOL/L (ref 98–111)
CHLORIDE BLD-SCNC: 101 MMOL/L (ref 98–111)
CHLORIDE BLD-SCNC: 103 MMOL/L (ref 98–111)
CHLORIDE BLD-SCNC: 105 MMOL/L (ref 98–111)
CHLORIDE BLD-SCNC: 106 MMOL/L (ref 98–111)
CHLORIDE BLD-SCNC: 106 MMOL/L (ref 98–111)
CHLORIDE BLD-SCNC: 107 MMOL/L (ref 98–111)
CHLORIDE BLD-SCNC: 97 MMOL/L (ref 98–111)
CHLORIDE BLD-SCNC: 99 MMOL/L (ref 98–111)
CHOLESTEROL, TOTAL: 100 MG/DL (ref 160–199)
CHOLESTEROL, TOTAL: 136 MG/DL (ref 160–199)
CLARITY: ABNORMAL
CLARITY: CLEAR
CO2: 17 MMOL/L (ref 22–29)
CO2: 18 MMOL/L (ref 22–29)
CO2: 18 MMOL/L (ref 22–29)
CO2: 19 MMOL/L (ref 22–29)
CO2: 22 MMOL/L (ref 22–29)
CO2: 23 MMOL/L (ref 22–29)
CO2: 24 MMOL/L (ref 22–29)
CO2: 24 MMOL/L (ref 22–29)
COLOR: YELLOW
COLOR: YELLOW
CORONAVIRUS 229E BY PCR: NOT DETECTED
CORONAVIRUS HKU1 BY PCR: NOT DETECTED
CORONAVIRUS NL63 BY PCR: NOT DETECTED
CORONAVIRUS OC43 BY PCR: NOT DETECTED
CREAT SERPL-MCNC: 0.9 MG/DL (ref 0.5–0.9)
CREAT SERPL-MCNC: 0.9 MG/DL (ref 0.5–0.9)
CREAT SERPL-MCNC: 1.1 MG/DL (ref 0.5–0.9)
CREAT SERPL-MCNC: 1.2 MG/DL (ref 0.5–0.9)
CREAT SERPL-MCNC: 1.2 MG/DL (ref 0.5–0.9)
CREAT SERPL-MCNC: 1.3 MG/DL (ref 0.5–0.9)
CREAT SERPL-MCNC: 1.3 MG/DL (ref 0.5–0.9)
CREAT SERPL-MCNC: 1.5 MG/DL (ref 0.5–0.9)
CRYSTALS, UA: ABNORMAL /HPF
CULTURE, BLOOD 2: NORMAL
CULTURE, RESPIRATORY: NORMAL
DISTANCE WALKED: 10 FT
EKG P AXIS: 64 DEGREES
EKG P AXIS: 70 DEGREES
EKG P-R INTERVAL: 174 MS
EKG P-R INTERVAL: 192 MS
EKG Q-T INTERVAL: 430 MS
EKG Q-T INTERVAL: 462 MS
EKG QRS DURATION: 110 MS
EKG QRS DURATION: 80 MS
EKG QTC CALCULATION (BAZETT): 456 MS
EKG QTC CALCULATION (BAZETT): 462 MS
EKG T AXIS: 38 DEGREES
EKG T AXIS: 85 DEGREES
EOSINOPHILS ABSOLUTE: 0 K/UL (ref 0–0.6)
EOSINOPHILS RELATIVE PERCENT: 0 % (ref 0–5)
EOSINOPHILS RELATIVE PERCENT: 0.2 % (ref 0–5)
EOSINOPHILS RELATIVE PERCENT: 0.2 % (ref 0–5)
EOSINOPHILS RELATIVE PERCENT: 0.3 % (ref 0–5)
EPITHELIAL CELLS, UA: 6 /HPF (ref 0–5)
EPITHELIAL CELLS, UA: ABNORMAL /HPF
FERRITIN: 34.8 NG/ML (ref 13–150)
GFR AFRICAN AMERICAN: 41
GFR AFRICAN AMERICAN: 48
GFR AFRICAN AMERICAN: 52
GFR AFRICAN AMERICAN: 58
GFR NON-AFRICAN AMERICAN: 34
GFR NON-AFRICAN AMERICAN: 40
GFR NON-AFRICAN AMERICAN: 40
GFR NON-AFRICAN AMERICAN: 43
GFR NON-AFRICAN AMERICAN: 43
GFR NON-AFRICAN AMERICAN: 48
GFR NON-AFRICAN AMERICAN: >60
GFR NON-AFRICAN AMERICAN: >60
GLUCOSE BLD-MCNC: 105 MG/DL (ref 74–109)
GLUCOSE BLD-MCNC: 110 MG/DL (ref 74–109)
GLUCOSE BLD-MCNC: 116 MG/DL (ref 74–109)
GLUCOSE BLD-MCNC: 124 MG/DL (ref 74–109)
GLUCOSE BLD-MCNC: 193 MG/DL (ref 74–109)
GLUCOSE BLD-MCNC: 197 MG/DL (ref 74–109)
GLUCOSE BLD-MCNC: 211 MG/DL (ref 74–109)
GLUCOSE BLD-MCNC: 332 MG/DL (ref 74–109)
GLUCOSE BLD-MCNC: 71 MG/DL (ref 74–109)
GLUCOSE BLD-MCNC: 93 MG/DL (ref 74–109)
GLUCOSE URINE: NEGATIVE MG/DL
GLUCOSE URINE: NEGATIVE MG/DL
GRAM STAIN RESULT: NORMAL
HCO3 ARTERIAL: 17.3 MMOL/L (ref 22–26)
HCO3 ARTERIAL: 17.9 MMOL/L (ref 22–26)
HCO3 ARTERIAL: 18 MMOL/L (ref 22–26)
HCO3 ARTERIAL: 18.8 MMOL/L (ref 22–26)
HCO3 ARTERIAL: 19.2 MMOL/L (ref 22–26)
HCO3 ARTERIAL: 19.4 MMOL/L (ref 22–26)
HCT VFR BLD CALC: 24.5 % (ref 37–47)
HCT VFR BLD CALC: 25.4 % (ref 37–47)
HCT VFR BLD CALC: 26.4 % (ref 37–47)
HCT VFR BLD CALC: 26.8 % (ref 37–47)
HCT VFR BLD CALC: 28.5 % (ref 37–47)
HCT VFR BLD CALC: 29.5 % (ref 37–47)
HCT VFR BLD CALC: 29.8 % (ref 37–47)
HCT VFR BLD CALC: 30 % (ref 37–47)
HCT VFR BLD CALC: 31.6 % (ref 37–47)
HCT VFR BLD CALC: 33.3 % (ref 37–47)
HCT VFR BLD CALC: 37.3 % (ref 37–47)
HDLC SERPL-MCNC: 26 MG/DL (ref 65–121)
HDLC SERPL-MCNC: 37 MG/DL (ref 65–121)
HEMOGLOBIN, ART, EXTENDED: 8 G/DL (ref 12–16)
HEMOGLOBIN, ART, EXTENDED: 9.2 G/DL (ref 12–16)
HEMOGLOBIN, ART, EXTENDED: 9.3 G/DL (ref 12–16)
HEMOGLOBIN, ART, EXTENDED: 9.9 G/DL (ref 12–16)
HEMOGLOBIN: 10.4 G/DL (ref 12–16)
HEMOGLOBIN: 7 G/DL (ref 12–16)
HEMOGLOBIN: 7.2 G/DL (ref 12–16)
HEMOGLOBIN: 7.3 G/DL (ref 12–16)
HEMOGLOBIN: 7.4 G/DL (ref 12–16)
HEMOGLOBIN: 8 G/DL (ref 12–16)
HEMOGLOBIN: 8.3 G/DL (ref 12–16)
HEMOGLOBIN: 8.3 G/DL (ref 12–16)
HEMOGLOBIN: 8.7 G/DL (ref 12–16)
HEMOGLOBIN: 9 G/DL (ref 12–16)
HEMOGLOBIN: 9.4 G/DL (ref 12–16)
HUMAN METAPNEUMOVIRUS BY PCR: NOT DETECTED
HUMAN RHINOVIRUS/ENTEROVIRUS BY PCR: NOT DETECTED
HYALINE CASTS: 3 /HPF (ref 0–8)
HYPOCHROMIA: ABNORMAL
HYPOCHROMIA: ABNORMAL
IMMATURE GRANULOCYTES #: 0.1 K/UL
INFLUENZA A BY PCR: NOT DETECTED
INFLUENZA B BY PCR: NOT DETECTED
INR BLD: 1.13 (ref 0.88–1.18)
IRON SATURATION: 6 % (ref 14–50)
IRON: 13 UG/DL (ref 37–145)
KETONES, URINE: ABNORMAL MG/DL
KETONES, URINE: NEGATIVE MG/DL
L. PNEUMOPHILA SEROGP 1 UR AG: NORMAL
LACTIC ACID: 1.5 MMOL/L (ref 0.5–1.9)
LACTIC ACID: 2 MMOL/L (ref 0.5–1.9)
LDL CHOLESTEROL CALCULATED: 57 MG/DL
LDL CHOLESTEROL CALCULATED: 72 MG/DL
LEUKOCYTE ESTERASE, URINE: NEGATIVE
LEUKOCYTE ESTERASE, URINE: NEGATIVE
LV EF: 58 %
LV EF: 74 %
LVEF MODALITY: NORMAL
LVEF MODALITY: NORMAL
LYMPHOCYTES ABSOLUTE: 1.7 K/UL (ref 1.1–4.5)
LYMPHOCYTES ABSOLUTE: 1.8 K/UL (ref 1.1–4.5)
LYMPHOCYTES ABSOLUTE: 2.5 K/UL (ref 1.1–4.5)
LYMPHOCYTES ABSOLUTE: 4.8 K/UL (ref 1.1–4.5)
LYMPHOCYTES RELATIVE PERCENT: 12.3 % (ref 20–40)
LYMPHOCYTES RELATIVE PERCENT: 13.9 % (ref 20–40)
LYMPHOCYTES RELATIVE PERCENT: 18.7 % (ref 20–40)
LYMPHOCYTES RELATIVE PERCENT: 28 % (ref 20–40)
MAGNESIUM: 1.4 MG/DL (ref 1.6–2.4)
MAGNESIUM: 1.8 MG/DL (ref 1.6–2.4)
MAGNESIUM: 2 MG/DL (ref 1.6–2.4)
MAGNESIUM: 2.1 MG/DL (ref 1.6–2.4)
MCH RBC QN AUTO: 21.4 PG (ref 27–31)
MCH RBC QN AUTO: 21.6 PG (ref 27–31)
MCH RBC QN AUTO: 21.7 PG (ref 27–31)
MCH RBC QN AUTO: 23.3 PG (ref 27–31)
MCH RBC QN AUTO: 23.6 PG (ref 27–31)
MCH RBC QN AUTO: 23.7 PG (ref 27–31)
MCHC RBC AUTO-ENTMCNC: 27.2 G/DL (ref 33–37)
MCHC RBC AUTO-ENTMCNC: 27.9 G/DL (ref 33–37)
MCHC RBC AUTO-ENTMCNC: 27.9 G/DL (ref 33–37)
MCHC RBC AUTO-ENTMCNC: 28 G/DL (ref 33–37)
MCHC RBC AUTO-ENTMCNC: 28.1 G/DL (ref 33–37)
MCHC RBC AUTO-ENTMCNC: 28.2 G/DL (ref 33–37)
MCHC RBC AUTO-ENTMCNC: 28.3 G/DL (ref 33–37)
MCHC RBC AUTO-ENTMCNC: 28.5 G/DL (ref 33–37)
MCHC RBC AUTO-ENTMCNC: 28.6 G/DL (ref 33–37)
MCHC RBC AUTO-ENTMCNC: 29 G/DL (ref 33–37)
MCV RBC AUTO: 75.4 FL (ref 81–99)
MCV RBC AUTO: 76.1 FL (ref 81–99)
MCV RBC AUTO: 77 FL (ref 81–99)
MCV RBC AUTO: 77.2 FL (ref 81–99)
MCV RBC AUTO: 79.8 FL (ref 81–99)
MCV RBC AUTO: 81.3 FL (ref 81–99)
MCV RBC AUTO: 82.4 FL (ref 81–99)
MCV RBC AUTO: 82.7 FL (ref 81–99)
MCV RBC AUTO: 84.7 FL (ref 81–99)
MCV RBC AUTO: 85.2 FL (ref 81–99)
METHEMOGLOBIN ARTERIAL: 0.2 %
METHEMOGLOBIN ARTERIAL: 0.7 %
METHEMOGLOBIN ARTERIAL: 0.8 %
METHEMOGLOBIN ARTERIAL: 1.2 %
METHEMOGLOBIN ARTERIAL: 1.2 %
METHEMOGLOBIN ARTERIAL: 1.4 %
MICROCYTES: ABNORMAL
MONOCYTES ABSOLUTE: 0.5 K/UL (ref 0–0.9)
MONOCYTES ABSOLUTE: 1.1 K/UL (ref 0–0.9)
MONOCYTES ABSOLUTE: 1.5 K/UL (ref 0–0.9)
MONOCYTES ABSOLUTE: 1.8 K/UL (ref 0–0.9)
MONOCYTES RELATIVE PERCENT: 11 % (ref 0–10)
MONOCYTES RELATIVE PERCENT: 13.5 % (ref 0–10)
MONOCYTES RELATIVE PERCENT: 4 % (ref 0–10)
MONOCYTES RELATIVE PERCENT: 8.5 % (ref 0–10)
MONONUCLEAR UNIDENTIFIED CELLS: 1 %
MYCOPLASMA PNEUMONIAE BY PCR: NOT DETECTED
NEUTROPHILS ABSOLUTE: 10.6 K/UL (ref 1.5–7.5)
NEUTROPHILS ABSOLUTE: 7.8 K/UL (ref 1.5–7.5)
NEUTROPHILS ABSOLUTE: 8.8 K/UL (ref 1.5–7.5)
NEUTROPHILS ABSOLUTE: 9.7 K/UL (ref 1.5–7.5)
NEUTROPHILS RELATIVE PERCENT: 59 % (ref 50–65)
NEUTROPHILS RELATIVE PERCENT: 66.9 % (ref 50–65)
NEUTROPHILS RELATIVE PERCENT: 75.8 % (ref 50–65)
NEUTROPHILS RELATIVE PERCENT: 76.6 % (ref 50–65)
NITRITE, URINE: NEGATIVE
NITRITE, URINE: NEGATIVE
O2 CONTENT ARTERIAL: 10.9 ML/DL
O2 CONTENT ARTERIAL: 12.3 ML/DL
O2 CONTENT ARTERIAL: 12.3 ML/DL
O2 CONTENT ARTERIAL: 12.7 ML/DL
O2 CONTENT ARTERIAL: 12.8 ML/DL
O2 CONTENT ARTERIAL: 14.1 ML/DL
O2 SAT, ARTERIAL: 94.1 %
O2 SAT, ARTERIAL: 94.5 %
O2 SAT, ARTERIAL: 95.1 %
O2 SAT, ARTERIAL: 95.2 %
O2 SAT, ARTERIAL: 96.6 %
O2 SAT, ARTERIAL: 96.8 %
O2 THERAPY: ABNORMAL
OCCULT BLOOD QC: NORMAL
OCCULT BLOOD SCREENING: NORMAL
OVALOCYTES: ABNORMAL
PARAINFLUENZA VIRUS 1 BY PCR: NOT DETECTED
PARAINFLUENZA VIRUS 2 BY PCR: NOT DETECTED
PARAINFLUENZA VIRUS 3 BY PCR: NOT DETECTED
PARAINFLUENZA VIRUS 4 BY PCR: NOT DETECTED
PCO2 ARTERIAL: 31 MMHG (ref 35–45)
PCO2 ARTERIAL: 34 MMHG (ref 35–45)
PCO2 ARTERIAL: 35 MMHG (ref 35–45)
PCO2 ARTERIAL: 36 MMHG (ref 35–45)
PCO2 ARTERIAL: 47 MMHG (ref 35–45)
PCO2 ARTERIAL: 70 MMHG (ref 35–45)
PDW BLD-RTO: 17.3 % (ref 11.5–14.5)
PDW BLD-RTO: 17.5 % (ref 11.5–14.5)
PDW BLD-RTO: 17.5 % (ref 11.5–14.5)
PDW BLD-RTO: 17.7 % (ref 11.5–14.5)
PDW BLD-RTO: 19.8 % (ref 11.5–14.5)
PDW BLD-RTO: 20 % (ref 11.5–14.5)
PDW BLD-RTO: 20.2 % (ref 11.5–14.5)
PDW BLD-RTO: 20.2 % (ref 11.5–14.5)
PDW BLD-RTO: 20.3 % (ref 11.5–14.5)
PDW BLD-RTO: 20.8 % (ref 11.5–14.5)
PH ARTERIAL: 7.05 (ref 7.35–7.45)
PH ARTERIAL: 7.22 (ref 7.35–7.45)
PH ARTERIAL: 7.29 (ref 7.35–7.45)
PH ARTERIAL: 7.32 (ref 7.35–7.45)
PH ARTERIAL: 7.33 (ref 7.35–7.45)
PH ARTERIAL: 7.39 (ref 7.35–7.45)
PH UA: 5 (ref 5–8)
PH UA: 6.5 (ref 5–8)
PLATELET # BLD: 286 K/UL (ref 130–400)
PLATELET # BLD: 287 K/UL (ref 130–400)
PLATELET # BLD: 302 K/UL (ref 130–400)
PLATELET # BLD: 306 K/UL (ref 130–400)
PLATELET # BLD: 328 K/UL (ref 130–400)
PLATELET # BLD: 340 K/UL (ref 130–400)
PLATELET # BLD: 380 K/UL (ref 130–400)
PLATELET # BLD: 411 K/UL (ref 130–400)
PLATELET # BLD: 481 K/UL (ref 130–400)
PLATELET # BLD: 532 K/UL (ref 130–400)
PLATELET SLIDE REVIEW: ABNORMAL
PLATELET SLIDE REVIEW: ADEQUATE
PMV BLD AUTO: 10 FL (ref 9.4–12.3)
PMV BLD AUTO: 10 FL (ref 9.4–12.3)
PMV BLD AUTO: 10.1 FL (ref 9.4–12.3)
PMV BLD AUTO: 10.1 FL (ref 9.4–12.3)
PMV BLD AUTO: 10.2 FL (ref 9.4–12.3)
PMV BLD AUTO: 10.4 FL (ref 9.4–12.3)
PMV BLD AUTO: 10.5 FL (ref 9.4–12.3)
PMV BLD AUTO: 10.8 FL (ref 9.4–12.3)
PMV BLD AUTO: 9.7 FL (ref 9.4–12.3)
PMV BLD AUTO: 9.9 FL (ref 9.4–12.3)
PO2 ARTERIAL: 117 MMHG (ref 80–100)
PO2 ARTERIAL: 172 MMHG (ref 80–100)
PO2 ARTERIAL: 258 MMHG (ref 80–100)
PO2 ARTERIAL: 81 MMHG (ref 80–100)
PO2 ARTERIAL: 82 MMHG (ref 80–100)
PO2 ARTERIAL: 91 MMHG (ref 80–100)
POLYCHROMASIA: ABNORMAL
POTASSIUM REFLEX MAGNESIUM: 4.8 MMOL/L (ref 3.5–5)
POTASSIUM REFLEX MAGNESIUM: 5.6 MMOL/L (ref 3.5–5)
POTASSIUM SERPL-SCNC: 4.1 MMOL/L (ref 3.5–5)
POTASSIUM SERPL-SCNC: 4.1 MMOL/L (ref 3.5–5)
POTASSIUM SERPL-SCNC: 4.2 MMOL/L (ref 3.5–5)
POTASSIUM SERPL-SCNC: 4.3 MMOL/L (ref 3.5–5)
POTASSIUM SERPL-SCNC: 4.4 MMOL/L (ref 3.5–5)
POTASSIUM SERPL-SCNC: 4.4 MMOL/L (ref 3.5–5)
POTASSIUM SERPL-SCNC: 4.5 MMOL/L (ref 3.5–5)
POTASSIUM SERPL-SCNC: 4.6 MMOL/L (ref 3.5–5)
POTASSIUM, WHOLE BLOOD: 3.9
POTASSIUM, WHOLE BLOOD: 4
POTASSIUM, WHOLE BLOOD: 4
POTASSIUM, WHOLE BLOOD: 4.1
POTASSIUM, WHOLE BLOOD: 4.3
POTASSIUM, WHOLE BLOOD: 5.6
PRO-BNP: 1496 PG/ML (ref 0–1800)
PRO-BNP: 6201 PG/ML (ref 0–1800)
PROTEIN UA: 100 MG/DL
PROTEIN UA: 30 MG/DL
PROTHROMBIN TIME: 14.4 SEC (ref 12–14.6)
RBC # BLD: 3.22 M/UL (ref 4.2–5.4)
RBC # BLD: 3.36 M/UL (ref 4.2–5.4)
RBC # BLD: 3.37 M/UL (ref 4.2–5.4)
RBC # BLD: 3.42 M/UL (ref 4.2–5.4)
RBC # BLD: 3.52 M/UL (ref 4.2–5.4)
RBC # BLD: 3.69 M/UL (ref 4.2–5.4)
RBC # BLD: 3.82 M/UL (ref 4.2–5.4)
RBC # BLD: 3.83 M/UL (ref 4.2–5.4)
RBC # BLD: 4.04 M/UL (ref 4.2–5.4)
RBC # BLD: 4.38 M/UL (ref 4.2–5.4)
RBC UA: 1 /HPF (ref 0–4)
RBC UA: ABNORMAL /HPF (ref 0–2)
RESPIRATORY SYNCYTIAL VIRUS BY PCR: NOT DETECTED
SARS-COV-2, NAAT: NOT DETECTED
SARS-COV-2, PCR: NOT DETECTED
SODIUM BLD-SCNC: 132 MMOL/L (ref 136–145)
SODIUM BLD-SCNC: 135 MMOL/L (ref 136–145)
SODIUM BLD-SCNC: 136 MMOL/L (ref 136–145)
SODIUM BLD-SCNC: 137 MMOL/L (ref 136–145)
SODIUM BLD-SCNC: 138 MMOL/L (ref 136–145)
SODIUM BLD-SCNC: 138 MMOL/L (ref 136–145)
SODIUM BLD-SCNC: 141 MMOL/L (ref 136–145)
SPECIFIC GRAVITY UA: 1.02 (ref 1–1.03)
SPECIFIC GRAVITY UA: 1.03 (ref 1–1.03)
SPO2: 82 %
STREP PNEUMONIAE ANTIGEN, URINE: NORMAL
TOTAL IRON BINDING CAPACITY: 232 UG/DL (ref 250–400)
TOTAL PROTEIN: 5.9 G/DL (ref 6.6–8.7)
TOTAL PROTEIN: 6 G/DL (ref 6.6–8.7)
TOTAL PROTEIN: 6.3 G/DL (ref 6.6–8.7)
TOTAL PROTEIN: 7.4 G/DL (ref 6.6–8.7)
TOTAL PROTEIN: 7.5 G/DL (ref 6.6–8.7)
TRIGL SERPL-MCNC: 135 MG/DL (ref 0–149)
TRIGL SERPL-MCNC: 84 MG/DL (ref 0–149)
TROPONIN: 0.01 NG/ML (ref 0–0.03)
TROPONIN: 0.01 NG/ML (ref 0–0.03)
TROPONIN: <0.01 NG/ML (ref 0–0.03)
TSH SERPL DL<=0.05 MIU/L-ACNC: 2.54 UIU/ML (ref 0.27–4.2)
TSH SERPL DL<=0.05 MIU/L-ACNC: 3.45 UIU/ML (ref 0.27–4.2)
UROBILINOGEN, URINE: 0.2 E.U./DL
UROBILINOGEN, URINE: 1 E.U./DL
WAXY CASTS: ABNORMAL /LPF
WBC # BLD: 12 K/UL (ref 4.8–10.8)
WBC # BLD: 12.3 K/UL (ref 4.8–10.8)
WBC # BLD: 12.7 K/UL (ref 4.8–10.8)
WBC # BLD: 12.7 K/UL (ref 4.8–10.8)
WBC # BLD: 13.1 K/UL (ref 4.8–10.8)
WBC # BLD: 13.3 K/UL (ref 4.8–10.8)
WBC # BLD: 13.3 K/UL (ref 4.8–10.8)
WBC # BLD: 14 K/UL (ref 4.8–10.8)
WBC # BLD: 16.9 K/UL (ref 4.8–10.8)
WBC # BLD: 17 K/UL (ref 4.8–10.8)
WBC UA: 1 /HPF (ref 0–5)
WBC UA: ABNORMAL /HPF (ref 0–5)

## 2020-01-01 PROCEDURE — 1111F DSCHRG MED/CURRENT MED MERGE: CPT | Performed by: FAMILY MEDICINE

## 2020-01-01 PROCEDURE — 1123F ACP DISCUSS/DSCN MKR DOCD: CPT | Performed by: FAMILY MEDICINE

## 2020-01-01 PROCEDURE — 6360000002 HC RX W HCPCS: Performed by: INTERNAL MEDICINE

## 2020-01-01 PROCEDURE — 2580000003 HC RX 258: Performed by: HOSPITALIST

## 2020-01-01 PROCEDURE — 1036F TOBACCO NON-USER: CPT | Performed by: FAMILY MEDICINE

## 2020-01-01 PROCEDURE — 1036F TOBACCO NON-USER: CPT | Performed by: NURSE PRACTITIONER

## 2020-01-01 PROCEDURE — 6370000000 HC RX 637 (ALT 250 FOR IP): Performed by: PHYSICIAN ASSISTANT

## 2020-01-01 PROCEDURE — 1036F TOBACCO NON-USER: CPT | Performed by: PSYCHIATRY & NEUROLOGY

## 2020-01-01 PROCEDURE — 2700000000 HC OXYGEN THERAPY PER DAY

## 2020-01-01 PROCEDURE — 6360000002 HC RX W HCPCS: Performed by: EMERGENCY MEDICINE

## 2020-01-01 PROCEDURE — 94660 CPAP INITIATION&MGMT: CPT

## 2020-01-01 PROCEDURE — 6360000002 HC RX W HCPCS: Performed by: HOSPITALIST

## 2020-01-01 PROCEDURE — 36415 COLL VENOUS BLD VENIPUNCTURE: CPT

## 2020-01-01 PROCEDURE — 93010 ELECTROCARDIOGRAM REPORT: CPT | Performed by: INTERNAL MEDICINE

## 2020-01-01 PROCEDURE — G8428 CUR MEDS NOT DOCUMENT: HCPCS | Performed by: FAMILY MEDICINE

## 2020-01-01 PROCEDURE — 80048 BASIC METABOLIC PNL TOTAL CA: CPT

## 2020-01-01 PROCEDURE — 4040F PNEUMOC VAC/ADMIN/RCVD: CPT | Performed by: NURSE PRACTITIONER

## 2020-01-01 PROCEDURE — 6370000000 HC RX 637 (ALT 250 FOR IP): Performed by: INTERNAL MEDICINE

## 2020-01-01 PROCEDURE — 1090F PRES/ABSN URINE INCON ASSESS: CPT | Performed by: PSYCHIATRY & NEUROLOGY

## 2020-01-01 PROCEDURE — 4040F PNEUMOC VAC/ADMIN/RCVD: CPT | Performed by: PSYCHIATRY & NEUROLOGY

## 2020-01-01 PROCEDURE — 2580000003 HC RX 258: Performed by: PHYSICIAN ASSISTANT

## 2020-01-01 PROCEDURE — 1090F PRES/ABSN URINE INCON ASSESS: CPT | Performed by: FAMILY MEDICINE

## 2020-01-01 PROCEDURE — 2500000003 HC RX 250 WO HCPCS: Performed by: PEDIATRICS

## 2020-01-01 PROCEDURE — G8484 FLU IMMUNIZE NO ADMIN: HCPCS | Performed by: PSYCHIATRY & NEUROLOGY

## 2020-01-01 PROCEDURE — 1090F PRES/ABSN URINE INCON ASSESS: CPT | Performed by: NURSE PRACTITIONER

## 2020-01-01 PROCEDURE — G8399 PT W/DXA RESULTS DOCUMENT: HCPCS | Performed by: FAMILY MEDICINE

## 2020-01-01 PROCEDURE — 4040F PNEUMOC VAC/ADMIN/RCVD: CPT | Performed by: FAMILY MEDICINE

## 2020-01-01 PROCEDURE — 84132 ASSAY OF SERUM POTASSIUM: CPT

## 2020-01-01 PROCEDURE — 2500000003 HC RX 250 WO HCPCS: Performed by: HOSPITALIST

## 2020-01-01 PROCEDURE — 94640 AIRWAY INHALATION TREATMENT: CPT

## 2020-01-01 PROCEDURE — 6370000000 HC RX 637 (ALT 250 FOR IP): Performed by: HOSPITALIST

## 2020-01-01 PROCEDURE — 85027 COMPLETE CBC AUTOMATED: CPT

## 2020-01-01 PROCEDURE — 99282 EMERGENCY DEPT VISIT SF MDM: CPT

## 2020-01-01 PROCEDURE — 2580000003 HC RX 258: Performed by: INTERNAL MEDICINE

## 2020-01-01 PROCEDURE — 94618 PULMONARY STRESS TESTING: CPT | Performed by: FAMILY MEDICINE

## 2020-01-01 PROCEDURE — 3023F SPIROM DOC REV: CPT | Performed by: NURSE PRACTITIONER

## 2020-01-01 PROCEDURE — 83735 ASSAY OF MAGNESIUM: CPT

## 2020-01-01 PROCEDURE — 94760 N-INVAS EAR/PLS OXIMETRY 1: CPT

## 2020-01-01 PROCEDURE — 99214 OFFICE O/P EST MOD 30 MIN: CPT | Performed by: NURSE PRACTITIONER

## 2020-01-01 PROCEDURE — 6360000002 HC RX W HCPCS: Performed by: PHYSICIAN ASSISTANT

## 2020-01-01 PROCEDURE — G8926 SPIRO NO PERF OR DOC: HCPCS | Performed by: FAMILY MEDICINE

## 2020-01-01 PROCEDURE — 99214 OFFICE O/P EST MOD 30 MIN: CPT | Performed by: FAMILY MEDICINE

## 2020-01-01 PROCEDURE — 92522 EVALUATE SPEECH PRODUCTION: CPT

## 2020-01-01 PROCEDURE — 82803 BLOOD GASES ANY COMBINATION: CPT

## 2020-01-01 PROCEDURE — 99285 EMERGENCY DEPT VISIT HI MDM: CPT

## 2020-01-01 PROCEDURE — 5A1945Z RESPIRATORY VENTILATION, 24-96 CONSECUTIVE HOURS: ICD-10-PCS | Performed by: INTERNAL MEDICINE

## 2020-01-01 PROCEDURE — 0202U NFCT DS 22 TRGT SARS-COV-2: CPT

## 2020-01-01 PROCEDURE — 83540 ASSAY OF IRON: CPT

## 2020-01-01 PROCEDURE — 93923 UPR/LXTR ART STDY 3+ LVLS: CPT

## 2020-01-01 PROCEDURE — 96374 THER/PROPH/DIAG INJ IV PUSH: CPT

## 2020-01-01 PROCEDURE — 2000000000 HC ICU R&B

## 2020-01-01 PROCEDURE — 92507 TX SP LANG VOICE COMM INDIV: CPT

## 2020-01-01 PROCEDURE — 80061 LIPID PANEL: CPT

## 2020-01-01 PROCEDURE — G8428 CUR MEDS NOT DOCUMENT: HCPCS | Performed by: NURSE PRACTITIONER

## 2020-01-01 PROCEDURE — 6360000002 HC RX W HCPCS

## 2020-01-01 PROCEDURE — G8417 CALC BMI ABV UP PARAM F/U: HCPCS | Performed by: NURSE PRACTITIONER

## 2020-01-01 PROCEDURE — 85014 HEMATOCRIT: CPT

## 2020-01-01 PROCEDURE — 94002 VENT MGMT INPAT INIT DAY: CPT

## 2020-01-01 PROCEDURE — 85025 COMPLETE CBC W/AUTO DIFF WBC: CPT

## 2020-01-01 PROCEDURE — G8417 CALC BMI ABV UP PARAM F/U: HCPCS | Performed by: FAMILY MEDICINE

## 2020-01-01 PROCEDURE — G8399 PT W/DXA RESULTS DOCUMENT: HCPCS | Performed by: NURSE PRACTITIONER

## 2020-01-01 PROCEDURE — U0002 COVID-19 LAB TEST NON-CDC: HCPCS

## 2020-01-01 PROCEDURE — 2500000003 HC RX 250 WO HCPCS: Performed by: NURSE PRACTITIONER

## 2020-01-01 PROCEDURE — 36600 WITHDRAWAL OF ARTERIAL BLOOD: CPT

## 2020-01-01 PROCEDURE — 99221 1ST HOSP IP/OBS SF/LOW 40: CPT | Performed by: INTERNAL MEDICINE

## 2020-01-01 PROCEDURE — G0439 PPPS, SUBSEQ VISIT: HCPCS | Performed by: FAMILY MEDICINE

## 2020-01-01 PROCEDURE — 99496 TRANSJ CARE MGMT HIGH F2F 7D: CPT | Performed by: FAMILY MEDICINE

## 2020-01-01 PROCEDURE — 99999 PR OFFICE/OUTPT VISIT,PROCEDURE ONLY: CPT | Performed by: EMERGENCY MEDICINE

## 2020-01-01 PROCEDURE — G8427 DOCREV CUR MEDS BY ELIG CLIN: HCPCS | Performed by: NURSE PRACTITIONER

## 2020-01-01 PROCEDURE — G8427 DOCREV CUR MEDS BY ELIG CLIN: HCPCS | Performed by: FAMILY MEDICINE

## 2020-01-01 PROCEDURE — 2140000000 HC CCU INTERMEDIATE R&B

## 2020-01-01 PROCEDURE — 1111F DSCHRG MED/CURRENT MED MERGE: CPT | Performed by: PSYCHIATRY & NEUROLOGY

## 2020-01-01 PROCEDURE — 71045 X-RAY EXAM CHEST 1 VIEW: CPT

## 2020-01-01 PROCEDURE — 99213 OFFICE O/P EST LOW 20 MIN: CPT | Performed by: NURSE PRACTITIONER

## 2020-01-01 PROCEDURE — 87449 NOS EACH ORGANISM AG IA: CPT

## 2020-01-01 PROCEDURE — 80053 COMPREHEN METABOLIC PANEL: CPT

## 2020-01-01 PROCEDURE — 87070 CULTURE OTHR SPECIMN AEROBIC: CPT

## 2020-01-01 PROCEDURE — 81003 URINALYSIS AUTO W/O SCOPE: CPT

## 2020-01-01 PROCEDURE — 36415 COLL VENOUS BLD VENIPUNCTURE: CPT | Performed by: NURSE PRACTITIONER

## 2020-01-01 PROCEDURE — 1123F ACP DISCUSS/DSCN MKR DOCD: CPT | Performed by: NURSE PRACTITIONER

## 2020-01-01 PROCEDURE — 97116 GAIT TRAINING THERAPY: CPT

## 2020-01-01 PROCEDURE — 85610 PROTHROMBIN TIME: CPT

## 2020-01-01 PROCEDURE — A9500 TC99M SESTAMIBI: HCPCS | Performed by: INTERNAL MEDICINE

## 2020-01-01 PROCEDURE — G8417 CALC BMI ABV UP PARAM F/U: HCPCS | Performed by: PSYCHIATRY & NEUROLOGY

## 2020-01-01 PROCEDURE — 84484 ASSAY OF TROPONIN QUANT: CPT

## 2020-01-01 PROCEDURE — 70450 CT HEAD/BRAIN W/O DYE: CPT

## 2020-01-01 PROCEDURE — 1210000000 HC MED SURG R&B

## 2020-01-01 PROCEDURE — 87205 SMEAR GRAM STAIN: CPT

## 2020-01-01 PROCEDURE — 3430000000 HC RX DIAGNOSTIC RADIOPHARMACEUTICAL: Performed by: INTERNAL MEDICINE

## 2020-01-01 PROCEDURE — 3023F SPIROM DOC REV: CPT | Performed by: FAMILY MEDICINE

## 2020-01-01 PROCEDURE — 1123F ACP DISCUSS/DSCN MKR DOCD: CPT | Performed by: PSYCHIATRY & NEUROLOGY

## 2020-01-01 PROCEDURE — 92526 ORAL FUNCTION THERAPY: CPT

## 2020-01-01 PROCEDURE — 82728 ASSAY OF FERRITIN: CPT

## 2020-01-01 PROCEDURE — G8399 PT W/DXA RESULTS DOCUMENT: HCPCS | Performed by: PSYCHIATRY & NEUROLOGY

## 2020-01-01 PROCEDURE — 97530 THERAPEUTIC ACTIVITIES: CPT

## 2020-01-01 PROCEDURE — 99232 SBSQ HOSP IP/OBS MODERATE 35: CPT | Performed by: INTERNAL MEDICINE

## 2020-01-01 PROCEDURE — 99203 OFFICE O/P NEW LOW 30 MIN: CPT | Performed by: PSYCHIATRY & NEUROLOGY

## 2020-01-01 PROCEDURE — 87040 BLOOD CULTURE FOR BACTERIA: CPT

## 2020-01-01 PROCEDURE — 93307 TTE W/O DOPPLER COMPLETE: CPT

## 2020-01-01 PROCEDURE — 6360000004 HC RX CONTRAST MEDICATION: Performed by: PEDIATRICS

## 2020-01-01 PROCEDURE — G8482 FLU IMMUNIZE ORDER/ADMIN: HCPCS | Performed by: FAMILY MEDICINE

## 2020-01-01 PROCEDURE — 92610 EVALUATE SWALLOWING FUNCTION: CPT

## 2020-01-01 PROCEDURE — 99212 OFFICE O/P EST SF 10 MIN: CPT | Performed by: NURSE PRACTITIONER

## 2020-01-01 PROCEDURE — 83605 ASSAY OF LACTIC ACID: CPT

## 2020-01-01 PROCEDURE — 94003 VENT MGMT INPAT SUBQ DAY: CPT

## 2020-01-01 PROCEDURE — G0328 FECAL BLOOD SCRN IMMUNOASSAY: HCPCS

## 2020-01-01 PROCEDURE — 1111F DSCHRG MED/CURRENT MED MERGE: CPT | Performed by: NURSE PRACTITIONER

## 2020-01-01 PROCEDURE — 84443 ASSAY THYROID STIM HORMONE: CPT

## 2020-01-01 PROCEDURE — 31500 INSERT EMERGENCY AIRWAY: CPT

## 2020-01-01 PROCEDURE — 83550 IRON BINDING TEST: CPT

## 2020-01-01 PROCEDURE — 85018 HEMOGLOBIN: CPT

## 2020-01-01 PROCEDURE — 83880 ASSAY OF NATRIURETIC PEPTIDE: CPT

## 2020-01-01 PROCEDURE — 2580000003 HC RX 258: Performed by: PEDIATRICS

## 2020-01-01 PROCEDURE — 99213 OFFICE O/P EST LOW 20 MIN: CPT | Performed by: INTERNAL MEDICINE

## 2020-01-01 PROCEDURE — 93005 ELECTROCARDIOGRAM TRACING: CPT | Performed by: PEDIATRICS

## 2020-01-01 PROCEDURE — G8926 SPIRO NO PERF OR DOC: HCPCS | Performed by: NURSE PRACTITIONER

## 2020-01-01 PROCEDURE — 93005 ELECTROCARDIOGRAM TRACING: CPT | Performed by: EMERGENCY MEDICINE

## 2020-01-01 PROCEDURE — 93017 CV STRESS TEST TRACING ONLY: CPT

## 2020-01-01 PROCEDURE — 99213 OFFICE O/P EST LOW 20 MIN: CPT | Performed by: FAMILY MEDICINE

## 2020-01-01 PROCEDURE — 85730 THROMBOPLASTIN TIME PARTIAL: CPT

## 2020-01-01 PROCEDURE — G8427 DOCREV CUR MEDS BY ELIG CLIN: HCPCS | Performed by: PSYCHIATRY & NEUROLOGY

## 2020-01-01 PROCEDURE — 6360000002 HC RX W HCPCS: Performed by: PEDIATRICS

## 2020-01-01 PROCEDURE — 97161 PT EVAL LOW COMPLEX 20 MIN: CPT

## 2020-01-01 PROCEDURE — 0BH17EZ INSERTION OF ENDOTRACHEAL AIRWAY INTO TRACHEA, VIA NATURAL OR ARTIFICIAL OPENING: ICD-10-PCS | Performed by: PEDIATRICS

## 2020-01-01 PROCEDURE — 71275 CT ANGIOGRAPHY CHEST: CPT

## 2020-01-01 RX ORDER — FLUCONAZOLE 150 MG/1
150 TABLET ORAL ONCE
Qty: 2 TABLET | Refills: 0 | Status: SHIPPED | OUTPATIENT
Start: 2020-01-01 | End: 2020-01-01

## 2020-01-01 RX ORDER — PAROXETINE 10 MG/1
TABLET, FILM COATED ORAL
Qty: 180 TABLET | Refills: 1 | OUTPATIENT
Start: 2020-01-01 | End: 2020-01-01 | Stop reason: SDUPTHER

## 2020-01-01 RX ORDER — PREDNISONE 20 MG/1
20 TABLET ORAL DAILY
Status: DISCONTINUED | OUTPATIENT
Start: 2020-01-01 | End: 2020-01-01 | Stop reason: HOSPADM

## 2020-01-01 RX ORDER — PANTOPRAZOLE SODIUM 40 MG/1
40 TABLET, DELAYED RELEASE ORAL
Status: DISCONTINUED | OUTPATIENT
Start: 2020-01-01 | End: 2020-01-01 | Stop reason: HOSPADM

## 2020-01-01 RX ORDER — LANOLIN ALCOHOL/MO/W.PET/CERES
325 CREAM (GRAM) TOPICAL 2 TIMES DAILY
Qty: 60 TABLET | Refills: 0 | Status: SHIPPED | OUTPATIENT
Start: 2020-01-01

## 2020-01-01 RX ORDER — PAROXETINE HYDROCHLORIDE 20 MG/1
20 TABLET, FILM COATED ORAL DAILY
Status: DISCONTINUED | OUTPATIENT
Start: 2020-01-01 | End: 2020-01-01 | Stop reason: HOSPADM

## 2020-01-01 RX ORDER — PAROXETINE HYDROCHLORIDE 20 MG/1
10 TABLET, FILM COATED ORAL DAILY
Status: DISCONTINUED | OUTPATIENT
Start: 2020-01-01 | End: 2020-01-01 | Stop reason: HOSPADM

## 2020-01-01 RX ORDER — PROPOFOL 10 MG/ML
10 INJECTION, EMULSION INTRAVENOUS
Status: DISCONTINUED | OUTPATIENT
Start: 2020-01-01 | End: 2020-01-01

## 2020-01-01 RX ORDER — POTASSIUM CHLORIDE 7.45 MG/ML
20 INJECTION INTRAVENOUS ONCE
Status: DISCONTINUED | OUTPATIENT
Start: 2020-01-01 | End: 2020-01-01

## 2020-01-01 RX ORDER — LOSARTAN POTASSIUM 100 MG/1
100 TABLET ORAL DAILY
Qty: 90 TABLET | Refills: 3 | Status: SHIPPED | OUTPATIENT
Start: 2020-01-01

## 2020-01-01 RX ORDER — PAROXETINE 10 MG/1
TABLET, FILM COATED ORAL
Qty: 180 TABLET | Refills: 0 | Status: SHIPPED | OUTPATIENT
Start: 2020-01-01 | End: 2020-01-01

## 2020-01-01 RX ORDER — PROPRANOLOL HYDROCHLORIDE 10 MG/1
40 TABLET ORAL 3 TIMES DAILY
Status: DISCONTINUED | OUTPATIENT
Start: 2020-01-01 | End: 2020-01-01 | Stop reason: HOSPADM

## 2020-01-01 RX ORDER — GUAIFENESIN 600 MG/1
1200 TABLET, EXTENDED RELEASE ORAL 2 TIMES DAILY
Status: DISCONTINUED | OUTPATIENT
Start: 2020-01-01 | End: 2020-01-01 | Stop reason: HOSPADM

## 2020-01-01 RX ORDER — METHYLPREDNISOLONE 4 MG/1
TABLET ORAL
Qty: 1 KIT | Refills: 0 | Status: SHIPPED | OUTPATIENT
Start: 2020-01-01 | End: 2020-01-01

## 2020-01-01 RX ORDER — POTASSIUM CHLORIDE 7.45 MG/ML
10 INJECTION INTRAVENOUS PRN
Status: DISCONTINUED | OUTPATIENT
Start: 2020-01-01 | End: 2020-01-01 | Stop reason: HOSPADM

## 2020-01-01 RX ORDER — ACETAMINOPHEN 325 MG/1
650 TABLET ORAL EVERY 6 HOURS PRN
Status: DISCONTINUED | OUTPATIENT
Start: 2020-01-01 | End: 2020-01-01 | Stop reason: HOSPADM

## 2020-01-01 RX ORDER — ARFORMOTEROL TARTRATE 15 UG/2ML
15 SOLUTION RESPIRATORY (INHALATION) 2 TIMES DAILY
Status: DISCONTINUED | OUTPATIENT
Start: 2020-01-01 | End: 2020-01-01 | Stop reason: HOSPADM

## 2020-01-01 RX ORDER — FUROSEMIDE 40 MG/1
40 TABLET ORAL DAILY
Status: DISCONTINUED | OUTPATIENT
Start: 2020-01-01 | End: 2020-01-01 | Stop reason: HOSPADM

## 2020-01-01 RX ORDER — DIPHENOXYLATE HYDROCHLORIDE AND ATROPINE SULFATE 2.5; .025 MG/1; MG/1
1 TABLET ORAL 4 TIMES DAILY PRN
Qty: 120 TABLET | Refills: 0 | OUTPATIENT
Start: 2020-01-01 | End: 2020-01-01

## 2020-01-01 RX ORDER — PROPOFOL 10 MG/ML
INJECTION, EMULSION INTRAVENOUS
Status: COMPLETED
Start: 2020-01-01 | End: 2020-01-01

## 2020-01-01 RX ORDER — OXYBUTYNIN CHLORIDE 5 MG/1
5 TABLET ORAL 3 TIMES DAILY
Status: DISCONTINUED | OUTPATIENT
Start: 2020-01-01 | End: 2020-01-01 | Stop reason: HOSPADM

## 2020-01-01 RX ORDER — FUROSEMIDE 20 MG/1
20 TABLET ORAL DAILY
Qty: 3 TABLET | Refills: 0 | Status: ON HOLD | OUTPATIENT
Start: 2020-01-01 | End: 2020-01-01 | Stop reason: SDUPTHER

## 2020-01-01 RX ORDER — TOBRAMYCIN 3 MG/ML
1 SOLUTION/ DROPS OPHTHALMIC 4 TIMES DAILY
Qty: 5 ML | Refills: 0 | Status: SHIPPED | OUTPATIENT
Start: 2020-01-01 | End: 2020-01-01

## 2020-01-01 RX ORDER — FUROSEMIDE 20 MG/1
20 TABLET ORAL DAILY
Qty: 30 TABLET | Refills: 2 | Status: SHIPPED | OUTPATIENT
Start: 2020-01-01

## 2020-01-01 RX ORDER — ATORVASTATIN CALCIUM 40 MG/1
40 TABLET, FILM COATED ORAL DAILY
Qty: 90 TABLET | Refills: 3 | Status: SHIPPED | OUTPATIENT
Start: 2020-01-01

## 2020-01-01 RX ORDER — PAROXETINE 10 MG/1
TABLET, FILM COATED ORAL
Qty: 166 TABLET | Refills: 0 | Status: SHIPPED | OUTPATIENT
Start: 2020-01-01 | End: 2020-01-01 | Stop reason: SDUPTHER

## 2020-01-01 RX ORDER — ROPINIROLE 0.25 MG/1
0.25 TABLET, FILM COATED ORAL 3 TIMES DAILY
Status: DISCONTINUED | OUTPATIENT
Start: 2020-01-01 | End: 2020-01-01

## 2020-01-01 RX ORDER — MONTELUKAST SODIUM 10 MG/1
10 TABLET ORAL DAILY
Qty: 90 TABLET | Refills: 1 | Status: SHIPPED | OUTPATIENT
Start: 2020-01-01 | End: 2021-01-01

## 2020-01-01 RX ORDER — CETIRIZINE HYDROCHLORIDE 10 MG/1
10 TABLET ORAL DAILY
Status: DISCONTINUED | OUTPATIENT
Start: 2020-01-01 | End: 2020-01-01 | Stop reason: HOSPADM

## 2020-01-01 RX ORDER — SODIUM CHLORIDE 0.9 % (FLUSH) 0.9 %
10 SYRINGE (ML) INJECTION PRN
Status: DISCONTINUED | OUTPATIENT
Start: 2020-01-01 | End: 2020-01-01 | Stop reason: HOSPADM

## 2020-01-01 RX ORDER — WHEELCHAIR
EACH MISCELLANEOUS
Qty: 1 EACH | Refills: 0 | Status: SHIPPED | OUTPATIENT
Start: 2020-01-01

## 2020-01-01 RX ORDER — ATORVASTATIN CALCIUM 40 MG/1
40 TABLET, FILM COATED ORAL DAILY
Status: DISCONTINUED | OUTPATIENT
Start: 2020-01-01 | End: 2020-01-01 | Stop reason: HOSPADM

## 2020-01-01 RX ORDER — METHYLPREDNISOLONE SODIUM SUCCINATE 40 MG/ML
40 INJECTION, POWDER, LYOPHILIZED, FOR SOLUTION INTRAMUSCULAR; INTRAVENOUS DAILY
Status: ACTIVE | OUTPATIENT
Start: 2020-01-01 | End: 2020-01-01

## 2020-01-01 RX ORDER — ROPINIROLE 0.25 MG/1
.25-.5 TABLET, FILM COATED ORAL 3 TIMES DAILY
Qty: 180 TABLET | Refills: 3 | Status: SHIPPED | OUTPATIENT
Start: 2020-01-01 | End: 2021-01-01

## 2020-01-01 RX ORDER — MORPHINE SULFATE 4 MG/ML
1 INJECTION, SOLUTION INTRAMUSCULAR; INTRAVENOUS EVERY 4 HOURS PRN
Status: DISCONTINUED | OUTPATIENT
Start: 2020-01-01 | End: 2020-01-01 | Stop reason: HOSPADM

## 2020-01-01 RX ORDER — PREDNISONE 10 MG/1
10 TABLET ORAL DAILY
Status: DISCONTINUED | OUTPATIENT
Start: 2020-01-01 | End: 2020-01-01

## 2020-01-01 RX ORDER — PROPOFOL 10 MG/ML
10 INJECTION, EMULSION INTRAVENOUS ONCE
Status: COMPLETED | OUTPATIENT
Start: 2020-01-01 | End: 2020-01-01

## 2020-01-01 RX ORDER — PREDNISONE 20 MG/1
40 TABLET ORAL DAILY
Qty: 10 TABLET | Refills: 0 | Status: SHIPPED | OUTPATIENT
Start: 2020-01-01 | End: 2020-01-01

## 2020-01-01 RX ORDER — PREDNISONE 10 MG/1
TABLET ORAL
Qty: 40 TABLET | Refills: 0 | Status: SHIPPED | OUTPATIENT
Start: 2020-01-01 | End: 2020-01-01 | Stop reason: ALTCHOICE

## 2020-01-01 RX ORDER — FLUCONAZOLE 100 MG/1
100 TABLET ORAL DAILY
Qty: 7 TABLET | Refills: 0 | Status: SHIPPED | OUTPATIENT
Start: 2020-01-01 | End: 2020-01-01

## 2020-01-01 RX ORDER — PREDNISONE 10 MG/1
TABLET ORAL
Qty: 39 TABLET | Refills: 0 | Status: ON HOLD
Start: 2020-01-01 | End: 2020-01-01 | Stop reason: HOSPADM

## 2020-01-01 RX ORDER — LOSARTAN POTASSIUM 100 MG/1
100 TABLET ORAL DAILY
Status: DISCONTINUED | OUTPATIENT
Start: 2020-01-01 | End: 2020-01-01 | Stop reason: HOSPADM

## 2020-01-01 RX ORDER — LOSARTAN POTASSIUM 50 MG/1
50 TABLET ORAL 2 TIMES DAILY
Status: DISCONTINUED | OUTPATIENT
Start: 2020-01-01 | End: 2020-01-01 | Stop reason: HOSPADM

## 2020-01-01 RX ORDER — LEVOFLOXACIN 500 MG/1
500 TABLET, FILM COATED ORAL DAILY
Qty: 7 TABLET | Refills: 0 | Status: SHIPPED | OUTPATIENT
Start: 2020-01-01 | End: 2020-01-01

## 2020-01-01 RX ORDER — FLUTICASONE PROPIONATE 50 MCG
2 SPRAY, SUSPENSION (ML) NASAL DAILY
Status: DISCONTINUED | OUTPATIENT
Start: 2020-01-01 | End: 2020-01-01 | Stop reason: HOSPADM

## 2020-01-01 RX ORDER — SUCCINYLCHOLINE CHLORIDE 20 MG/ML
80 INJECTION INTRAMUSCULAR; INTRAVENOUS ONCE
Status: COMPLETED | OUTPATIENT
Start: 2020-01-01 | End: 2020-01-01

## 2020-01-01 RX ORDER — LORAZEPAM 0.5 MG/1
0.5 TABLET ORAL 3 TIMES DAILY PRN
Status: DISCONTINUED | OUTPATIENT
Start: 2020-01-01 | End: 2020-01-01 | Stop reason: HOSPADM

## 2020-01-01 RX ORDER — UMECLIDINIUM BROMIDE AND VILANTEROL TRIFENATATE 62.5; 25 UG/1; UG/1
POWDER RESPIRATORY (INHALATION)
Qty: 3 EACH | Refills: 3 | Status: SHIPPED | OUTPATIENT
Start: 2020-01-01 | End: 2020-01-01 | Stop reason: ALTCHOICE

## 2020-01-01 RX ORDER — IPRATROPIUM BROMIDE AND ALBUTEROL SULFATE 2.5; .5 MG/3ML; MG/3ML
1 SOLUTION RESPIRATORY (INHALATION)
Status: DISCONTINUED | OUTPATIENT
Start: 2020-01-01 | End: 2020-01-01 | Stop reason: HOSPADM

## 2020-01-01 RX ORDER — PREDNISONE 1 MG/1
5 TABLET ORAL DAILY
Qty: 90 TABLET | Refills: 0 | Status: ON HOLD
Start: 2020-01-01 | End: 2020-01-01 | Stop reason: HOSPADM

## 2020-01-01 RX ORDER — OMEPRAZOLE 20 MG/1
CAPSULE, DELAYED RELEASE ORAL
Qty: 90 CAPSULE | Refills: 3 | Status: SHIPPED | OUTPATIENT
Start: 2020-01-01

## 2020-01-01 RX ORDER — LORAZEPAM 0.5 MG/1
0.5 TABLET ORAL EVERY 8 HOURS PRN
Qty: 30 TABLET | Refills: 0 | OUTPATIENT
Start: 2020-01-01 | End: 2020-01-01

## 2020-01-01 RX ORDER — FUROSEMIDE 10 MG/ML
40 INJECTION INTRAMUSCULAR; INTRAVENOUS DAILY
Status: DISCONTINUED | OUTPATIENT
Start: 2020-01-01 | End: 2020-01-01

## 2020-01-01 RX ORDER — POLYETHYLENE GLYCOL 3350 17 G/17G
17 POWDER, FOR SOLUTION ORAL DAILY PRN
Status: DISCONTINUED | OUTPATIENT
Start: 2020-01-01 | End: 2020-01-01 | Stop reason: HOSPADM

## 2020-01-01 RX ORDER — LEVOFLOXACIN 500 MG/1
250 TABLET, FILM COATED ORAL DAILY
Status: DISCONTINUED | OUTPATIENT
Start: 2020-01-01 | End: 2020-01-01 | Stop reason: HOSPADM

## 2020-01-01 RX ORDER — PROMETHAZINE HYDROCHLORIDE 25 MG/1
12.5 TABLET ORAL EVERY 6 HOURS PRN
Status: DISCONTINUED | OUTPATIENT
Start: 2020-01-01 | End: 2020-01-01 | Stop reason: HOSPADM

## 2020-01-01 RX ORDER — PROMETHAZINE HYDROCHLORIDE 12.5 MG/1
12.5 TABLET ORAL EVERY 6 HOURS PRN
Status: DISCONTINUED | OUTPATIENT
Start: 2020-01-01 | End: 2020-01-01 | Stop reason: HOSPADM

## 2020-01-01 RX ORDER — ONDANSETRON 2 MG/ML
4 INJECTION INTRAMUSCULAR; INTRAVENOUS EVERY 6 HOURS PRN
Status: DISCONTINUED | OUTPATIENT
Start: 2020-01-01 | End: 2020-01-01 | Stop reason: HOSPADM

## 2020-01-01 RX ORDER — MORPHINE SULFATE 4 MG/ML
INJECTION, SOLUTION INTRAMUSCULAR; INTRAVENOUS
Status: COMPLETED
Start: 2020-01-01 | End: 2020-01-01

## 2020-01-01 RX ORDER — ROPINIROLE 0.25 MG/1
0.25 TABLET, FILM COATED ORAL NIGHTLY
Status: DISCONTINUED | OUTPATIENT
Start: 2020-01-01 | End: 2020-01-01

## 2020-01-01 RX ORDER — ROPINIROLE 0.25 MG/1
0.25 TABLET, FILM COATED ORAL 3 TIMES DAILY
Status: DISCONTINUED | OUTPATIENT
Start: 2020-01-01 | End: 2020-01-01 | Stop reason: HOSPADM

## 2020-01-01 RX ORDER — METHYLPREDNISOLONE SODIUM SUCCINATE 125 MG/2ML
60 INJECTION, POWDER, LYOPHILIZED, FOR SOLUTION INTRAMUSCULAR; INTRAVENOUS EVERY 6 HOURS
Status: DISCONTINUED | OUTPATIENT
Start: 2020-01-01 | End: 2020-01-01

## 2020-01-01 RX ORDER — ETOMIDATE 2 MG/ML
20 INJECTION INTRAVENOUS ONCE
Status: COMPLETED | OUTPATIENT
Start: 2020-01-01 | End: 2020-01-01

## 2020-01-01 RX ORDER — POTASSIUM CHLORIDE 750 MG/1
TABLET, EXTENDED RELEASE ORAL
Qty: 180 TABLET | Refills: 2 | Status: SHIPPED | OUTPATIENT
Start: 2020-01-01

## 2020-01-01 RX ORDER — PAROXETINE HYDROCHLORIDE 40 MG/1
40 TABLET, FILM COATED ORAL EVERY MORNING
Qty: 90 TABLET | Refills: 3 | Status: SHIPPED | OUTPATIENT
Start: 2020-01-01

## 2020-01-01 RX ORDER — PROPRANOLOL HYDROCHLORIDE 20 MG/1
40 TABLET ORAL 3 TIMES DAILY
Status: DISCONTINUED | OUTPATIENT
Start: 2020-01-01 | End: 2020-01-01 | Stop reason: HOSPADM

## 2020-01-01 RX ORDER — BUDESONIDE 0.5 MG/2ML
0.5 INHALANT ORAL 2 TIMES DAILY
Status: DISCONTINUED | OUTPATIENT
Start: 2020-01-01 | End: 2020-01-01 | Stop reason: HOSPADM

## 2020-01-01 RX ORDER — CETIRIZINE HYDROCHLORIDE 10 MG/1
10 TABLET ORAL DAILY
Qty: 90 TABLET | Refills: 3 | Status: SHIPPED | OUTPATIENT
Start: 2020-01-01

## 2020-01-01 RX ORDER — ACETAMINOPHEN 650 MG/1
650 SUPPOSITORY RECTAL EVERY 6 HOURS PRN
Status: DISCONTINUED | OUTPATIENT
Start: 2020-01-01 | End: 2020-01-01 | Stop reason: HOSPADM

## 2020-01-01 RX ORDER — PREDNISONE 10 MG/1
TABLET ORAL
Qty: 30 EACH | Refills: 0 | Status: SHIPPED | OUTPATIENT
Start: 2020-01-01 | End: 2020-01-01 | Stop reason: ALTCHOICE

## 2020-01-01 RX ORDER — LORAZEPAM 2 MG/ML
0.5 INJECTION INTRAMUSCULAR EVERY 6 HOURS PRN
Status: DISCONTINUED | OUTPATIENT
Start: 2020-01-01 | End: 2020-01-01 | Stop reason: HOSPADM

## 2020-01-01 RX ORDER — CHOLECALCIFEROL (VITAMIN D3) 125 MCG
5 CAPSULE ORAL NIGHTLY PRN
Status: DISCONTINUED | OUTPATIENT
Start: 2020-01-01 | End: 2020-01-01 | Stop reason: HOSPADM

## 2020-01-01 RX ORDER — PROPRANOLOL HYDROCHLORIDE 40 MG/1
TABLET ORAL
Qty: 270 TABLET | Refills: 2 | Status: SHIPPED | OUTPATIENT
Start: 2020-01-01

## 2020-01-01 RX ORDER — VENLAFAXINE HYDROCHLORIDE 37.5 MG/1
37.5 CAPSULE, EXTENDED RELEASE ORAL DAILY
Qty: 30 CAPSULE | Refills: 0 | Status: SHIPPED | OUTPATIENT
Start: 2020-01-01 | End: 2020-01-01 | Stop reason: ALTCHOICE

## 2020-01-01 RX ORDER — PREDNISONE 20 MG/1
40 TABLET ORAL DAILY
Status: DISCONTINUED | OUTPATIENT
Start: 2020-01-01 | End: 2020-01-01 | Stop reason: HOSPADM

## 2020-01-01 RX ORDER — SODIUM CHLORIDE 0.9 % (FLUSH) 0.9 %
10 SYRINGE (ML) INJECTION EVERY 12 HOURS SCHEDULED
Status: DISCONTINUED | OUTPATIENT
Start: 2020-01-01 | End: 2020-01-01 | Stop reason: HOSPADM

## 2020-01-01 RX ORDER — LORAZEPAM 0.5 MG/1
0.5 TABLET ORAL 3 TIMES DAILY PRN
Qty: 30 TABLET | Refills: 0 | Status: SHIPPED | OUTPATIENT
Start: 2020-01-01 | End: 2021-01-01

## 2020-01-01 RX ORDER — DEXMEDETOMIDINE HYDROCHLORIDE 4 UG/ML
0.2 INJECTION, SOLUTION INTRAVENOUS CONTINUOUS
Status: DISCONTINUED | OUTPATIENT
Start: 2020-01-01 | End: 2020-01-01

## 2020-01-01 RX ORDER — FUROSEMIDE 10 MG/ML
60 INJECTION INTRAMUSCULAR; INTRAVENOUS ONCE
Status: COMPLETED | OUTPATIENT
Start: 2020-01-01 | End: 2020-01-01

## 2020-01-01 RX ORDER — DIPHENOXYLATE HYDROCHLORIDE AND ATROPINE SULFATE 2.5; .025 MG/1; MG/1
1 TABLET ORAL 4 TIMES DAILY PRN
Qty: 120 TABLET | Refills: 1 | OUTPATIENT
Start: 2020-01-01 | End: 2020-01-01 | Stop reason: SDUPTHER

## 2020-01-01 RX ORDER — HEPARIN SODIUM 5000 [USP'U]/ML
5000 INJECTION, SOLUTION INTRAVENOUS; SUBCUTANEOUS EVERY 8 HOURS SCHEDULED
Status: DISCONTINUED | OUTPATIENT
Start: 2020-01-01 | End: 2020-01-01 | Stop reason: HOSPADM

## 2020-01-01 RX ORDER — IPRATROPIUM BROMIDE AND ALBUTEROL SULFATE 2.5; .5 MG/3ML; MG/3ML
1 SOLUTION RESPIRATORY (INHALATION) EVERY 4 HOURS
Qty: 180 ML | Refills: 1 | Status: SHIPPED | OUTPATIENT
Start: 2020-01-01 | End: 2021-01-01

## 2020-01-01 RX ORDER — LEVOFLOXACIN 250 MG/1
250 TABLET ORAL DAILY
Qty: 5 TABLET | Refills: 0 | Status: SHIPPED | OUTPATIENT
Start: 2020-01-01 | End: 2020-01-01

## 2020-01-01 RX ORDER — IPRATROPIUM BROMIDE AND ALBUTEROL SULFATE 2.5; .5 MG/3ML; MG/3ML
SOLUTION RESPIRATORY (INHALATION)
Qty: 180 ML | Refills: 0 | Status: SHIPPED | OUTPATIENT
Start: 2020-01-01 | End: 2020-01-01 | Stop reason: SDUPTHER

## 2020-01-01 RX ORDER — METHYLPREDNISOLONE SODIUM SUCCINATE 40 MG/ML
40 INJECTION, POWDER, LYOPHILIZED, FOR SOLUTION INTRAMUSCULAR; INTRAVENOUS EVERY 12 HOURS
Status: DISCONTINUED | OUTPATIENT
Start: 2020-01-01 | End: 2020-01-01

## 2020-01-01 RX ORDER — OXYBUTYNIN CHLORIDE 5 MG/1
5 TABLET ORAL 3 TIMES DAILY
Qty: 90 TABLET | Refills: 3 | Status: SHIPPED | OUTPATIENT
Start: 2020-01-01

## 2020-01-01 RX ORDER — FLUTICASONE FUROATE, UMECLIDINIUM BROMIDE AND VILANTEROL TRIFENATATE 100; 62.5; 25 UG/1; UG/1; UG/1
1 POWDER RESPIRATORY (INHALATION) DAILY
Qty: 1 EACH | Refills: 5 | Status: SHIPPED | OUTPATIENT
Start: 2020-01-01 | End: 2021-01-01 | Stop reason: SDUPTHER

## 2020-01-01 RX ORDER — ASPIRIN 81 MG/1
81 TABLET ORAL DAILY
Qty: 90 TABLET | Refills: 2 | Status: SHIPPED | OUTPATIENT
Start: 2020-01-01

## 2020-01-01 RX ORDER — FUROSEMIDE 40 MG/1
40 TABLET ORAL DAILY
Qty: 30 TABLET | Refills: 0 | Status: SHIPPED | OUTPATIENT
Start: 2020-01-01 | End: 2020-01-01 | Stop reason: SDUPTHER

## 2020-01-01 RX ORDER — SODIUM CHLORIDE 9 MG/ML
INJECTION, SOLUTION INTRAVENOUS CONTINUOUS
Status: DISCONTINUED | OUTPATIENT
Start: 2020-01-01 | End: 2020-01-01

## 2020-01-01 RX ORDER — MAGNESIUM SULFATE IN WATER 40 MG/ML
2 INJECTION, SOLUTION INTRAVENOUS PRN
Status: DISCONTINUED | OUTPATIENT
Start: 2020-01-01 | End: 2020-01-01 | Stop reason: HOSPADM

## 2020-01-01 RX ORDER — MONTELUKAST SODIUM 10 MG/1
10 TABLET ORAL NIGHTLY
Status: DISCONTINUED | OUTPATIENT
Start: 2020-01-01 | End: 2020-01-01 | Stop reason: HOSPADM

## 2020-01-01 RX ORDER — POTASSIUM CHLORIDE 20 MEQ/1
40 TABLET, EXTENDED RELEASE ORAL PRN
Status: DISCONTINUED | OUTPATIENT
Start: 2020-01-01 | End: 2020-01-01

## 2020-01-01 RX ORDER — FLUTICASONE PROPIONATE 50 MCG
1 SPRAY, SUSPENSION (ML) NASAL 2 TIMES DAILY
Status: DISCONTINUED | OUTPATIENT
Start: 2020-01-01 | End: 2020-01-01 | Stop reason: HOSPADM

## 2020-01-01 RX ORDER — AZITHROMYCIN 250 MG/1
250 TABLET, FILM COATED ORAL SEE ADMIN INSTRUCTIONS
Qty: 6 TABLET | Refills: 0 | Status: SHIPPED | OUTPATIENT
Start: 2020-01-01 | End: 2020-01-01

## 2020-01-01 RX ORDER — LORAZEPAM 0.5 MG/1
0.5 TABLET ORAL 3 TIMES DAILY PRN
Qty: 30 TABLET | Refills: 0 | Status: SHIPPED | OUTPATIENT
Start: 2020-01-01 | End: 2020-01-01 | Stop reason: SDUPTHER

## 2020-01-01 RX ORDER — POTASSIUM CHLORIDE 7.45 MG/ML
10 INJECTION INTRAVENOUS PRN
Status: DISCONTINUED | OUTPATIENT
Start: 2020-01-01 | End: 2020-01-01

## 2020-01-01 RX ORDER — LORAZEPAM 2 MG/ML
INJECTION INTRAMUSCULAR
Status: COMPLETED
Start: 2020-01-01 | End: 2020-01-01

## 2020-01-01 RX ORDER — NITROGLYCERIN 0.4 MG/1
0.4 TABLET SUBLINGUAL EVERY 5 MIN PRN
Status: DISCONTINUED | OUTPATIENT
Start: 2020-01-01 | End: 2020-01-01 | Stop reason: HOSPADM

## 2020-01-01 RX ORDER — METHYLPREDNISOLONE SODIUM SUCCINATE 125 MG/2ML
125 INJECTION, POWDER, LYOPHILIZED, FOR SOLUTION INTRAMUSCULAR; INTRAVENOUS ONCE
Status: COMPLETED | OUTPATIENT
Start: 2020-01-01 | End: 2020-01-01

## 2020-01-01 RX ORDER — POTASSIUM CHLORIDE 20 MEQ/1
40 TABLET, EXTENDED RELEASE ORAL PRN
Status: DISCONTINUED | OUTPATIENT
Start: 2020-01-01 | End: 2020-01-01 | Stop reason: HOSPADM

## 2020-01-01 RX ORDER — GUAIFENESIN 600 MG/1
1200 TABLET, EXTENDED RELEASE ORAL 2 TIMES DAILY
Qty: 60 TABLET | Refills: 0 | Status: SHIPPED | OUTPATIENT
Start: 2020-01-01

## 2020-01-01 RX ORDER — ASPIRIN 81 MG/1
81 TABLET ORAL DAILY
Status: DISCONTINUED | OUTPATIENT
Start: 2020-01-01 | End: 2020-01-01 | Stop reason: HOSPADM

## 2020-01-01 RX ORDER — PAROXETINE 10 MG/1
TABLET, FILM COATED ORAL
Qty: 60 TABLET | Refills: 0 | Status: SHIPPED | OUTPATIENT
Start: 2020-01-01 | End: 2020-01-01 | Stop reason: SDUPTHER

## 2020-01-01 RX ADMIN — PANTOPRAZOLE SODIUM 40 MG: 40 TABLET, DELAYED RELEASE ORAL at 06:47

## 2020-01-01 RX ADMIN — PAROXETINE HYDROCHLORIDE 10 MG: 20 TABLET, FILM COATED ORAL at 14:13

## 2020-01-01 RX ADMIN — PROPOFOL 10 MCG/KG/MIN: 10 INJECTION, EMULSION INTRAVENOUS at 16:30

## 2020-01-01 RX ADMIN — IPRATROPIUM BROMIDE AND ALBUTEROL SULFATE 1 AMPULE: .5; 3 SOLUTION RESPIRATORY (INHALATION) at 19:29

## 2020-01-01 RX ADMIN — SODIUM CHLORIDE: 9 INJECTION, SOLUTION INTRAVENOUS at 15:47

## 2020-01-01 RX ADMIN — MIRABEGRON 25 MG: 25 TABLET, FILM COATED, EXTENDED RELEASE ORAL at 09:23

## 2020-01-01 RX ADMIN — PREDNISONE 10 MG: 10 TABLET ORAL at 14:14

## 2020-01-01 RX ADMIN — FLUTICASONE PROPIONATE 1 SPRAY: 50 SPRAY, METERED NASAL at 10:07

## 2020-01-01 RX ADMIN — PREDNISONE 40 MG: 20 TABLET ORAL at 09:23

## 2020-01-01 RX ADMIN — ROPINIROLE HYDROCHLORIDE 0.25 MG: 0.25 TABLET, FILM COATED ORAL at 20:34

## 2020-01-01 RX ADMIN — ENOXAPARIN SODIUM 40 MG: 40 INJECTION SUBCUTANEOUS at 09:24

## 2020-01-01 RX ADMIN — IPRATROPIUM BROMIDE AND ALBUTEROL SULFATE 1 AMPULE: .5; 3 SOLUTION RESPIRATORY (INHALATION) at 18:22

## 2020-01-01 RX ADMIN — IPRATROPIUM BROMIDE AND ALBUTEROL SULFATE 1 AMPULE: .5; 3 SOLUTION RESPIRATORY (INHALATION) at 10:41

## 2020-01-01 RX ADMIN — SODIUM CHLORIDE, PRESERVATIVE FREE 10 ML: 5 INJECTION INTRAVENOUS at 09:24

## 2020-01-01 RX ADMIN — SODIUM CHLORIDE, PRESERVATIVE FREE 10 ML: 5 INJECTION INTRAVENOUS at 20:35

## 2020-01-01 RX ADMIN — FLUTICASONE PROPIONATE 1 SPRAY: 50 SPRAY, METERED NASAL at 19:46

## 2020-01-01 RX ADMIN — FLUTICASONE PROPIONATE 2 SPRAY: 50 SPRAY, METERED NASAL at 09:24

## 2020-01-01 RX ADMIN — IPRATROPIUM BROMIDE AND ALBUTEROL SULFATE 1 AMPULE: .5; 3 SOLUTION RESPIRATORY (INHALATION) at 10:40

## 2020-01-01 RX ADMIN — SODIUM CHLORIDE, PRESERVATIVE FREE 10 ML: 5 INJECTION INTRAVENOUS at 22:04

## 2020-01-01 RX ADMIN — MONTELUKAST SODIUM 10 MG: 10 TABLET, FILM COATED ORAL at 23:15

## 2020-01-01 RX ADMIN — LOSARTAN POTASSIUM 50 MG: 50 TABLET, FILM COATED ORAL at 10:06

## 2020-01-01 RX ADMIN — PROPRANOLOL HYDROCHLORIDE 40 MG: 20 TABLET ORAL at 09:22

## 2020-01-01 RX ADMIN — IPRATROPIUM BROMIDE AND ALBUTEROL SULFATE 1 AMPULE: .5; 3 SOLUTION RESPIRATORY (INHALATION) at 06:54

## 2020-01-01 RX ADMIN — IPRATROPIUM BROMIDE AND ALBUTEROL SULFATE 1 AMPULE: .5; 3 SOLUTION RESPIRATORY (INHALATION) at 14:52

## 2020-01-01 RX ADMIN — IPRATROPIUM BROMIDE AND ALBUTEROL SULFATE 1 AMPULE: .5; 3 SOLUTION RESPIRATORY (INHALATION) at 06:44

## 2020-01-01 RX ADMIN — IPRATROPIUM BROMIDE AND ALBUTEROL SULFATE 1 AMPULE: .5; 3 SOLUTION RESPIRATORY (INHALATION) at 19:04

## 2020-01-01 RX ADMIN — IPRATROPIUM BROMIDE AND ALBUTEROL SULFATE 1 AMPULE: .5; 3 SOLUTION RESPIRATORY (INHALATION) at 07:16

## 2020-01-01 RX ADMIN — Medication 1000 MG: at 18:37

## 2020-01-01 RX ADMIN — SODIUM CHLORIDE, PRESERVATIVE FREE 10 ML: 5 INJECTION INTRAVENOUS at 09:35

## 2020-01-01 RX ADMIN — PANTOPRAZOLE SODIUM 40 MG: 40 TABLET, DELAYED RELEASE ORAL at 06:06

## 2020-01-01 RX ADMIN — SODIUM CHLORIDE, PRESERVATIVE FREE 10 ML: 5 INJECTION INTRAVENOUS at 20:39

## 2020-01-01 RX ADMIN — IPRATROPIUM BROMIDE AND ALBUTEROL SULFATE 1 AMPULE: .5; 3 SOLUTION RESPIRATORY (INHALATION) at 09:59

## 2020-01-01 RX ADMIN — NYSTATIN 500000 UNITS: 100000 SUSPENSION ORAL at 18:39

## 2020-01-01 RX ADMIN — MORPHINE SULFATE 1 MG: 4 INJECTION INTRAVENOUS at 08:04

## 2020-01-01 RX ADMIN — MAGNESIUM SULFATE IN WATER 2 G: 40 INJECTION, SOLUTION INTRAVENOUS at 04:58

## 2020-01-01 RX ADMIN — IPRATROPIUM BROMIDE AND ALBUTEROL SULFATE 1 AMPULE: .5; 3 SOLUTION RESPIRATORY (INHALATION) at 14:36

## 2020-01-01 RX ADMIN — ASPIRIN 81 MG: 81 TABLET, COATED ORAL at 09:22

## 2020-01-01 RX ADMIN — ARFORMOTEROL TARTRATE 15 MCG: 15 SOLUTION RESPIRATORY (INHALATION) at 19:35

## 2020-01-01 RX ADMIN — FLUTICASONE PROPIONATE 1 SPRAY: 50 SPRAY, METERED NASAL at 20:26

## 2020-01-01 RX ADMIN — ATORVASTATIN CALCIUM 40 MG: 40 TABLET, FILM COATED ORAL at 09:24

## 2020-01-01 RX ADMIN — OXYBUTYNIN CHLORIDE 5 MG: 5 TABLET ORAL at 09:23

## 2020-01-01 RX ADMIN — PAROXETINE HYDROCHLORIDE 10 MG: 20 TABLET, FILM COATED ORAL at 10:06

## 2020-01-01 RX ADMIN — PROPRANOLOL HYDROCHLORIDE 40 MG: 10 TABLET ORAL at 15:08

## 2020-01-01 RX ADMIN — DEXMEDETOMIDINE HYDROCHLORIDE 0.2 MCG/KG/HR: 4 INJECTION, SOLUTION INTRAVENOUS at 09:40

## 2020-01-01 RX ADMIN — METHYLPREDNISOLONE SODIUM SUCCINATE 125 MG: 125 INJECTION, POWDER, FOR SOLUTION INTRAMUSCULAR; INTRAVENOUS at 23:15

## 2020-01-01 RX ADMIN — PROPOFOL 25 MCG/KG/MIN: 10 INJECTION, EMULSION INTRAVENOUS at 11:35

## 2020-01-01 RX ADMIN — PAROXETINE HYDROCHLORIDE 10 MG: 20 TABLET, FILM COATED ORAL at 08:08

## 2020-01-01 RX ADMIN — IPRATROPIUM BROMIDE AND ALBUTEROL SULFATE 1 AMPULE: .5; 3 SOLUTION RESPIRATORY (INHALATION) at 18:05

## 2020-01-01 RX ADMIN — OXYBUTYNIN CHLORIDE 5 MG: 5 TABLET ORAL at 20:34

## 2020-01-01 RX ADMIN — PAROXETINE HYDROCHLORIDE 20 MG: 20 TABLET, FILM COATED ORAL at 09:22

## 2020-01-01 RX ADMIN — PANTOPRAZOLE SODIUM 40 MG: 40 TABLET, DELAYED RELEASE ORAL at 21:10

## 2020-01-01 RX ADMIN — IPRATROPIUM BROMIDE AND ALBUTEROL SULFATE 1 AMPULE: .5; 3 SOLUTION RESPIRATORY (INHALATION) at 06:12

## 2020-01-01 RX ADMIN — SODIUM CHLORIDE, PRESERVATIVE FREE 1 G: 5 INJECTION INTRAVENOUS at 23:15

## 2020-01-01 RX ADMIN — BUDESONIDE 500 MCG: 0.5 INHALANT RESPIRATORY (INHALATION) at 19:04

## 2020-01-01 RX ADMIN — LEVOFLOXACIN 250 MG: 500 TABLET, FILM COATED ORAL at 10:06

## 2020-01-01 RX ADMIN — ASPIRIN 81 MG: 81 TABLET, COATED ORAL at 09:24

## 2020-01-01 RX ADMIN — REGADENOSON 0.4 MG: 0.08 INJECTION, SOLUTION INTRAVENOUS at 10:42

## 2020-01-01 RX ADMIN — NYSTATIN 500000 UNITS: 100000 SUSPENSION ORAL at 21:00

## 2020-01-01 RX ADMIN — LOSARTAN POTASSIUM 50 MG: 50 TABLET, FILM COATED ORAL at 22:03

## 2020-01-01 RX ADMIN — METHYLPREDNISOLONE SODIUM SUCCINATE 60 MG: 125 INJECTION, POWDER, FOR SOLUTION INTRAMUSCULAR; INTRAVENOUS at 08:47

## 2020-01-01 RX ADMIN — OXYBUTYNIN CHLORIDE 5 MG: 5 TABLET ORAL at 20:26

## 2020-01-01 RX ADMIN — IPRATROPIUM BROMIDE AND ALBUTEROL SULFATE 1 AMPULE: .5; 3 SOLUTION RESPIRATORY (INHALATION) at 10:03

## 2020-01-01 RX ADMIN — GUAIFENESIN 1200 MG: 600 TABLET, EXTENDED RELEASE ORAL at 20:25

## 2020-01-01 RX ADMIN — TETRAKIS(2-METHOXYISOBUTYLISOCYANIDE)COPPER(I) TETRAFLUOROBORATE 10 MILLICURIE: 1 INJECTION, POWDER, LYOPHILIZED, FOR SOLUTION INTRAVENOUS at 10:30

## 2020-01-01 RX ADMIN — FAMOTIDINE 20 MG: 10 INJECTION INTRAVENOUS at 08:47

## 2020-01-01 RX ADMIN — MORPHINE SULFATE 1 MG: 4 INJECTION INTRAVENOUS at 22:39

## 2020-01-01 RX ADMIN — MEROPENEM 1 G: 1 INJECTION, POWDER, FOR SOLUTION INTRAVENOUS at 06:00

## 2020-01-01 RX ADMIN — PROPRANOLOL HYDROCHLORIDE 40 MG: 20 TABLET ORAL at 13:28

## 2020-01-01 RX ADMIN — ENOXAPARIN SODIUM 40 MG: 40 INJECTION SUBCUTANEOUS at 19:46

## 2020-01-01 RX ADMIN — PROPRANOLOL HYDROCHLORIDE 40 MG: 10 TABLET ORAL at 08:08

## 2020-01-01 RX ADMIN — MORPHINE SULFATE 1 MG: 4 INJECTION INTRAVENOUS at 10:18

## 2020-01-01 RX ADMIN — Medication 5 MG: at 20:25

## 2020-01-01 RX ADMIN — OXYBUTYNIN CHLORIDE 5 MG: 5 TABLET ORAL at 01:10

## 2020-01-01 RX ADMIN — ROPINIROLE HYDROCHLORIDE 0.25 MG: 0.25 TABLET, FILM COATED ORAL at 20:25

## 2020-01-01 RX ADMIN — BUDESONIDE 500 MCG: 0.5 INHALANT RESPIRATORY (INHALATION) at 18:22

## 2020-01-01 RX ADMIN — ARFORMOTEROL TARTRATE 15 MCG: 15 SOLUTION RESPIRATORY (INHALATION) at 18:15

## 2020-01-01 RX ADMIN — SODIUM CHLORIDE: 9 INJECTION, SOLUTION INTRAVENOUS at 20:36

## 2020-01-01 RX ADMIN — ROPINIROLE HYDROCHLORIDE 0.25 MG: 0.25 TABLET, FILM COATED ORAL at 23:15

## 2020-01-01 RX ADMIN — HEPARIN SODIUM 5000 UNITS: 5000 INJECTION INTRAVENOUS; SUBCUTANEOUS at 20:26

## 2020-01-01 RX ADMIN — SODIUM CHLORIDE, PRESERVATIVE FREE 10 ML: 5 INJECTION INTRAVENOUS at 20:26

## 2020-01-01 RX ADMIN — PROPRANOLOL HYDROCHLORIDE 40 MG: 20 TABLET ORAL at 23:15

## 2020-01-01 RX ADMIN — METHYLPREDNISOLONE SODIUM SUCCINATE 60 MG: 125 INJECTION, POWDER, FOR SOLUTION INTRAMUSCULAR; INTRAVENOUS at 07:59

## 2020-01-01 RX ADMIN — BUDESONIDE 500 MCG: 0.5 INHALANT RESPIRATORY (INHALATION) at 07:01

## 2020-01-01 RX ADMIN — FLUTICASONE PROPIONATE 1 SPRAY: 50 SPRAY, METERED NASAL at 22:04

## 2020-01-01 RX ADMIN — FLUTICASONE PROPIONATE 2 SPRAY: 50 SPRAY, METERED NASAL at 09:00

## 2020-01-01 RX ADMIN — MEROPENEM 1 G: 1 INJECTION, POWDER, FOR SOLUTION INTRAVENOUS at 05:54

## 2020-01-01 RX ADMIN — FLUTICASONE PROPIONATE 1 SPRAY: 50 SPRAY, METERED NASAL at 08:36

## 2020-01-01 RX ADMIN — METHYLPREDNISOLONE SODIUM SUCCINATE 60 MG: 125 INJECTION, POWDER, FOR SOLUTION INTRAMUSCULAR; INTRAVENOUS at 20:39

## 2020-01-01 RX ADMIN — HEPARIN SODIUM 5000 UNITS: 5000 INJECTION INTRAVENOUS; SUBCUTANEOUS at 06:45

## 2020-01-01 RX ADMIN — Medication 5 MG: at 23:16

## 2020-01-01 RX ADMIN — FUROSEMIDE 40 MG: 40 TABLET ORAL at 09:22

## 2020-01-01 RX ADMIN — ROPINIROLE HYDROCHLORIDE 0.25 MG: 0.25 TABLET, FILM COATED ORAL at 10:45

## 2020-01-01 RX ADMIN — ROPINIROLE HYDROCHLORIDE 0.25 MG: 0.25 TABLET, FILM COATED ORAL at 13:28

## 2020-01-01 RX ADMIN — BUDESONIDE 500 MCG: 0.5 INHALANT RESPIRATORY (INHALATION) at 18:04

## 2020-01-01 RX ADMIN — SODIUM CHLORIDE, PRESERVATIVE FREE 10 ML: 5 INJECTION INTRAVENOUS at 19:47

## 2020-01-01 RX ADMIN — PROPRANOLOL HYDROCHLORIDE 40 MG: 10 TABLET ORAL at 19:46

## 2020-01-01 RX ADMIN — CETIRIZINE HYDROCHLORIDE 10 MG: 10 TABLET, FILM COATED ORAL at 09:22

## 2020-01-01 RX ADMIN — PROPRANOLOL HYDROCHLORIDE 40 MG: 20 TABLET ORAL at 09:23

## 2020-01-01 RX ADMIN — LOSARTAN POTASSIUM 50 MG: 50 TABLET, FILM COATED ORAL at 19:46

## 2020-01-01 RX ADMIN — ATORVASTATIN CALCIUM 40 MG: 40 TABLET, FILM COATED ORAL at 09:22

## 2020-01-01 RX ADMIN — NYSTATIN 500000 UNITS: 100000 SUSPENSION ORAL at 20:45

## 2020-01-01 RX ADMIN — IPRATROPIUM BROMIDE AND ALBUTEROL SULFATE 1 AMPULE: .5; 3 SOLUTION RESPIRATORY (INHALATION) at 07:31

## 2020-01-01 RX ADMIN — LOSARTAN POTASSIUM 100 MG: 100 TABLET, FILM COATED ORAL at 09:24

## 2020-01-01 RX ADMIN — ROPINIROLE HYDROCHLORIDE 0.25 MG: 0.25 TABLET, FILM COATED ORAL at 09:23

## 2020-01-01 RX ADMIN — ETOMIDATE 20 MG: 2 INJECTION, SOLUTION INTRAVENOUS at 16:22

## 2020-01-01 RX ADMIN — NYSTATIN 500000 UNITS: 100000 SUSPENSION ORAL at 13:32

## 2020-01-01 RX ADMIN — DEXMEDETOMIDINE HYDROCHLORIDE 0.6 MCG/KG/HR: 4 INJECTION, SOLUTION INTRAVENOUS at 21:16

## 2020-01-01 RX ADMIN — ENOXAPARIN SODIUM 40 MG: 40 INJECTION SUBCUTANEOUS at 20:39

## 2020-01-01 RX ADMIN — PREDNISONE 20 MG: 20 TABLET ORAL at 08:34

## 2020-01-01 RX ADMIN — SUCCINYLCHOLINE CHLORIDE 80 MG: 20 INJECTION, SOLUTION INTRAMUSCULAR; INTRAVENOUS at 16:22

## 2020-01-01 RX ADMIN — TETRAKIS(2-METHOXYISOBUTYLISOCYANIDE)COPPER(I) TETRAFLUOROBORATE 30 MILLICURIE: 1 INJECTION, POWDER, LYOPHILIZED, FOR SOLUTION INTRAVENOUS at 10:30

## 2020-01-01 RX ADMIN — BUDESONIDE 500 MCG: 0.5 INHALANT RESPIRATORY (INHALATION) at 22:21

## 2020-01-01 RX ADMIN — GUAIFENESIN 1200 MG: 600 TABLET, EXTENDED RELEASE ORAL at 23:15

## 2020-01-01 RX ADMIN — FAMOTIDINE 20 MG: 10 INJECTION INTRAVENOUS at 07:59

## 2020-01-01 RX ADMIN — BUDESONIDE 500 MCG: 0.5 INHALANT RESPIRATORY (INHALATION) at 07:16

## 2020-01-01 RX ADMIN — METHYLPREDNISOLONE SODIUM SUCCINATE 60 MG: 125 INJECTION, POWDER, FOR SOLUTION INTRAMUSCULAR; INTRAVENOUS at 15:22

## 2020-01-01 RX ADMIN — PANTOPRAZOLE SODIUM 40 MG: 40 TABLET, DELAYED RELEASE ORAL at 06:49

## 2020-01-01 RX ADMIN — LORAZEPAM 0.5 MG: 2 INJECTION INTRAMUSCULAR; INTRAVENOUS at 09:58

## 2020-01-01 RX ADMIN — BUDESONIDE 500 MCG: 0.5 INHALANT RESPIRATORY (INHALATION) at 11:06

## 2020-01-01 RX ADMIN — PROPRANOLOL HYDROCHLORIDE 40 MG: 10 TABLET ORAL at 13:52

## 2020-01-01 RX ADMIN — LOSARTAN POTASSIUM 50 MG: 50 TABLET, FILM COATED ORAL at 08:34

## 2020-01-01 RX ADMIN — METHYLPREDNISOLONE SODIUM SUCCINATE 60 MG: 125 INJECTION, POWDER, FOR SOLUTION INTRAMUSCULAR; INTRAVENOUS at 21:16

## 2020-01-01 RX ADMIN — PROPOFOL 10 MCG/KG/MIN: 10 INJECTION, EMULSION INTRAVENOUS at 19:31

## 2020-01-01 RX ADMIN — IPRATROPIUM BROMIDE AND ALBUTEROL SULFATE 1 AMPULE: .5; 3 SOLUTION RESPIRATORY (INHALATION) at 14:30

## 2020-01-01 RX ADMIN — MONTELUKAST SODIUM 10 MG: 10 TABLET, FILM COATED ORAL at 20:34

## 2020-01-01 RX ADMIN — IPRATROPIUM BROMIDE AND ALBUTEROL SULFATE 1 AMPULE: .5; 3 SOLUTION RESPIRATORY (INHALATION) at 06:18

## 2020-01-01 RX ADMIN — FLUTICASONE PROPIONATE 1 SPRAY: 50 SPRAY, METERED NASAL at 08:20

## 2020-01-01 RX ADMIN — GUAIFENESIN 1200 MG: 600 TABLET, EXTENDED RELEASE ORAL at 09:22

## 2020-01-01 RX ADMIN — SODIUM CHLORIDE, PRESERVATIVE FREE 10 ML: 5 INJECTION INTRAVENOUS at 07:59

## 2020-01-01 RX ADMIN — BUDESONIDE 500 MCG: 0.5 INHALANT RESPIRATORY (INHALATION) at 19:35

## 2020-01-01 RX ADMIN — IPRATROPIUM BROMIDE AND ALBUTEROL SULFATE 1 AMPULE: .5; 3 SOLUTION RESPIRATORY (INHALATION) at 07:01

## 2020-01-01 RX ADMIN — Medication 1000 MG: at 19:05

## 2020-01-01 RX ADMIN — MONTELUKAST SODIUM 10 MG: 10 TABLET, FILM COATED ORAL at 20:25

## 2020-01-01 RX ADMIN — LEVOFLOXACIN 250 MG: 500 TABLET, FILM COATED ORAL at 08:09

## 2020-01-01 RX ADMIN — BUDESONIDE 500 MCG: 0.5 INHALANT RESPIRATORY (INHALATION) at 06:55

## 2020-01-01 RX ADMIN — PROPRANOLOL HYDROCHLORIDE 40 MG: 10 TABLET ORAL at 14:13

## 2020-01-01 RX ADMIN — LORAZEPAM 0.5 MG: 0.5 TABLET ORAL at 20:25

## 2020-01-01 RX ADMIN — METHYLPREDNISOLONE SODIUM SUCCINATE 60 MG: 125 INJECTION, POWDER, FOR SOLUTION INTRAMUSCULAR; INTRAVENOUS at 02:11

## 2020-01-01 RX ADMIN — PROPOFOL 15 MCG/KG/MIN: 10 INJECTION, EMULSION INTRAVENOUS at 01:55

## 2020-01-01 RX ADMIN — IPRATROPIUM BROMIDE AND ALBUTEROL SULFATE 1 AMPULE: .5; 3 SOLUTION RESPIRATORY (INHALATION) at 18:04

## 2020-01-01 RX ADMIN — LOSARTAN POTASSIUM 100 MG: 100 TABLET, FILM COATED ORAL at 09:23

## 2020-01-01 RX ADMIN — FAMOTIDINE 20 MG: 10 INJECTION INTRAVENOUS at 20:39

## 2020-01-01 RX ADMIN — IRON SUCROSE 200 MG: 20 INJECTION, SOLUTION INTRAVENOUS at 10:21

## 2020-01-01 RX ADMIN — PROPOFOL 25 MCG/KG/MIN: 10 INJECTION, EMULSION INTRAVENOUS at 03:33

## 2020-01-01 RX ADMIN — SODIUM CHLORIDE, PRESERVATIVE FREE 10 ML: 5 INJECTION INTRAVENOUS at 08:48

## 2020-01-01 RX ADMIN — ENOXAPARIN SODIUM 40 MG: 40 INJECTION SUBCUTANEOUS at 21:16

## 2020-01-01 RX ADMIN — IPRATROPIUM BROMIDE AND ALBUTEROL SULFATE 1 AMPULE: .5; 3 SOLUTION RESPIRATORY (INHALATION) at 18:40

## 2020-01-01 RX ADMIN — IPRATROPIUM BROMIDE AND ALBUTEROL SULFATE 1 AMPULE: .5; 3 SOLUTION RESPIRATORY (INHALATION) at 14:31

## 2020-01-01 RX ADMIN — PROPRANOLOL HYDROCHLORIDE 40 MG: 10 TABLET ORAL at 20:25

## 2020-01-01 RX ADMIN — SODIUM CHLORIDE, PRESERVATIVE FREE 10 ML: 5 INJECTION INTRAVENOUS at 23:17

## 2020-01-01 RX ADMIN — IPRATROPIUM BROMIDE AND ALBUTEROL SULFATE 1 AMPULE: .5; 3 SOLUTION RESPIRATORY (INHALATION) at 10:35

## 2020-01-01 RX ADMIN — SODIUM CHLORIDE, PRESERVATIVE FREE 10 ML: 5 INJECTION INTRAVENOUS at 21:18

## 2020-01-01 RX ADMIN — NYSTATIN 500000 UNITS: 100000 SUSPENSION ORAL at 23:15

## 2020-01-01 RX ADMIN — ARFORMOTEROL TARTRATE 15 MCG: 15 SOLUTION RESPIRATORY (INHALATION) at 06:55

## 2020-01-01 RX ADMIN — LEVOFLOXACIN 250 MG: 500 TABLET, FILM COATED ORAL at 15:22

## 2020-01-01 RX ADMIN — SODIUM CHLORIDE: 9 INJECTION, SOLUTION INTRAVENOUS at 00:14

## 2020-01-01 RX ADMIN — NYSTATIN 500000 UNITS: 100000 SUSPENSION ORAL at 09:28

## 2020-01-01 RX ADMIN — GUAIFENESIN 1200 MG: 600 TABLET, EXTENDED RELEASE ORAL at 20:34

## 2020-01-01 RX ADMIN — LOSARTAN POTASSIUM 50 MG: 50 TABLET, FILM COATED ORAL at 20:25

## 2020-01-01 RX ADMIN — ARFORMOTEROL TARTRATE 15 MCG: 15 SOLUTION RESPIRATORY (INHALATION) at 22:21

## 2020-01-01 RX ADMIN — PROPRANOLOL HYDROCHLORIDE 40 MG: 20 TABLET ORAL at 20:34

## 2020-01-01 RX ADMIN — HEPARIN SODIUM 5000 UNITS: 5000 INJECTION INTRAVENOUS; SUBCUTANEOUS at 06:06

## 2020-01-01 RX ADMIN — IOPAMIDOL 90 ML: 755 INJECTION, SOLUTION INTRAVENOUS at 17:35

## 2020-01-01 RX ADMIN — PANTOPRAZOLE SODIUM 40 MG: 40 TABLET, DELAYED RELEASE ORAL at 05:58

## 2020-01-01 RX ADMIN — ARFORMOTEROL TARTRATE 15 MCG: 15 SOLUTION RESPIRATORY (INHALATION) at 06:18

## 2020-01-01 RX ADMIN — MEROPENEM 1 G: 1 INJECTION, POWDER, FOR SOLUTION INTRAVENOUS at 21:25

## 2020-01-01 RX ADMIN — PREDNISONE 10 MG: 10 TABLET ORAL at 08:09

## 2020-01-01 RX ADMIN — FUROSEMIDE 60 MG: 10 INJECTION, SOLUTION INTRAMUSCULAR; INTRAVENOUS at 19:28

## 2020-01-01 RX ADMIN — ENOXAPARIN SODIUM 40 MG: 40 INJECTION SUBCUTANEOUS at 22:04

## 2020-01-01 RX ADMIN — PROPRANOLOL HYDROCHLORIDE 40 MG: 10 TABLET ORAL at 10:06

## 2020-01-01 RX ADMIN — PREDNISONE 20 MG: 20 TABLET ORAL at 10:06

## 2020-01-01 RX ADMIN — PROPOFOL 20 MCG/KG/MIN: 10 INJECTION, EMULSION INTRAVENOUS at 21:46

## 2020-01-01 RX ADMIN — BUDESONIDE 500 MCG: 0.5 INHALANT RESPIRATORY (INHALATION) at 18:15

## 2020-01-01 RX ADMIN — IPRATROPIUM BROMIDE AND ALBUTEROL SULFATE 1 AMPULE: .5; 3 SOLUTION RESPIRATORY (INHALATION) at 10:00

## 2020-01-01 RX ADMIN — BUDESONIDE 500 MCG: 0.5 INHALANT RESPIRATORY (INHALATION) at 06:18

## 2020-01-01 RX ADMIN — PROPRANOLOL HYDROCHLORIDE 40 MG: 10 TABLET ORAL at 22:03

## 2020-01-01 RX ADMIN — SODIUM CHLORIDE: 9 INJECTION, SOLUTION INTRAVENOUS at 09:41

## 2020-01-01 RX ADMIN — GUAIFENESIN 1200 MG: 600 TABLET, EXTENDED RELEASE ORAL at 09:24

## 2020-01-01 RX ADMIN — Medication 5 MG: at 00:06

## 2020-01-01 RX ADMIN — MIRABEGRON 25 MG: 25 TABLET, FILM COATED, EXTENDED RELEASE ORAL at 09:22

## 2020-01-01 RX ADMIN — FENTANYL CITRATE 25 MCG/HR: 0.05 INJECTION, SOLUTION INTRAMUSCULAR; INTRAVENOUS at 21:44

## 2020-01-01 RX ADMIN — PAROXETINE HYDROCHLORIDE 20 MG: 20 TABLET, FILM COATED ORAL at 09:23

## 2020-01-01 RX ADMIN — IPRATROPIUM BROMIDE AND ALBUTEROL SULFATE 1 AMPULE: .5; 3 SOLUTION RESPIRATORY (INHALATION) at 10:29

## 2020-01-01 RX ADMIN — LOSARTAN POTASSIUM 50 MG: 50 TABLET, FILM COATED ORAL at 08:09

## 2020-01-01 RX ADMIN — PROPRANOLOL HYDROCHLORIDE 40 MG: 20 TABLET ORAL at 20:25

## 2020-01-01 RX ADMIN — ENOXAPARIN SODIUM 40 MG: 40 INJECTION SUBCUTANEOUS at 20:25

## 2020-01-01 RX ADMIN — CEFEPIME HYDROCHLORIDE 1 G: 1 INJECTION, POWDER, FOR SOLUTION INTRAMUSCULAR; INTRAVENOUS at 19:05

## 2020-01-01 RX ADMIN — PAROXETINE HYDROCHLORIDE 10 MG: 20 TABLET, FILM COATED ORAL at 08:35

## 2020-01-01 RX ADMIN — PROPRANOLOL HYDROCHLORIDE 40 MG: 10 TABLET ORAL at 08:36

## 2020-01-01 RX ADMIN — LEVOFLOXACIN 250 MG: 500 TABLET, FILM COATED ORAL at 02:50

## 2020-01-01 RX ADMIN — IRON SUCROSE 200 MG: 20 INJECTION, SOLUTION INTRAVENOUS at 21:00

## 2020-01-01 RX ADMIN — LORAZEPAM 0.5 MG: 2 INJECTION INTRAMUSCULAR; INTRAVENOUS at 00:47

## 2020-01-01 RX ADMIN — PREDNISONE 40 MG: 20 TABLET ORAL at 09:27

## 2020-01-01 RX ADMIN — FUROSEMIDE 40 MG: 10 INJECTION, SOLUTION INTRAMUSCULAR; INTRAVENOUS at 09:24

## 2020-01-01 RX ADMIN — SODIUM CHLORIDE, PRESERVATIVE FREE 1 G: 5 INJECTION INTRAVENOUS at 20:45

## 2020-01-01 RX ADMIN — MEROPENEM 1 G: 1 INJECTION, POWDER, FOR SOLUTION INTRAVENOUS at 18:49

## 2020-01-01 RX ADMIN — METHYLPREDNISOLONE SODIUM SUCCINATE 60 MG: 125 INJECTION, POWDER, FOR SOLUTION INTRAMUSCULAR; INTRAVENOUS at 02:08

## 2020-01-01 RX ADMIN — OXYBUTYNIN CHLORIDE 5 MG: 5 TABLET ORAL at 13:28

## 2020-01-01 RX ADMIN — IPRATROPIUM BROMIDE AND ALBUTEROL SULFATE 1 AMPULE: .5; 3 SOLUTION RESPIRATORY (INHALATION) at 14:25

## 2020-01-01 ASSESSMENT — ENCOUNTER SYMPTOMS
VOMITING: 0
COUGH: 0
COLOR CHANGE: 0
BACK PAIN: 0
SHORTNESS OF BREATH: 1
WHEEZING: 1
CONSTIPATION: 0
ANAL BLEEDING: 0
SHORTNESS OF BREATH: 0
NAUSEA: 0
COUGH: 1
DIARRHEA: 0
WHEEZING: 0
EYES NEGATIVE: 1
NAUSEA: 0
BACK PAIN: 0
DIARRHEA: 0
NAUSEA: 0
COUGH: 1
EYES NEGATIVE: 1
SHORTNESS OF BREATH: 0
WHEEZING: 0
SHORTNESS OF BREATH: 1
CONSTIPATION: 0
VOMITING: 0
COLOR CHANGE: 0
EYE PAIN: 0
WHEEZING: 0
BLOOD IN STOOL: 0
PHOTOPHOBIA: 0
COUGH: 1
EYE ITCHING: 1
VOMITING: 0
SORE THROAT: 0
SHORTNESS OF BREATH: 1
NAUSEA: 0
WHEEZING: 1
RHINORRHEA: 0
NAUSEA: 0
CONSTIPATION: 0
NAUSEA: 0
ABDOMINAL PAIN: 0
NAUSEA: 0
SHORTNESS OF BREATH: 1
EYES NEGATIVE: 1
WHEEZING: 0
EYES NEGATIVE: 1
GASTROINTESTINAL NEGATIVE: 1
SHORTNESS OF BREATH: 1
VOMITING: 0
BACK PAIN: 0
WHEEZING: 0
VOMITING: 0
PHOTOPHOBIA: 0
COUGH: 0
VOMITING: 0
DIARRHEA: 0
COUGH: 0
COUGH: 1
DIARRHEA: 0
VOICE CHANGE: 0
DIARRHEA: 0
DIARRHEA: 0
COUGH: 1
BACK PAIN: 0
VOMITING: 0
BACK PAIN: 0
WHEEZING: 0
WHEEZING: 0
DIARRHEA: 0
COUGH: 1
WHEEZING: 0
SHORTNESS OF BREATH: 1
NAUSEA: 0
VOMITING: 0
WHEEZING: 1
GASTROINTESTINAL NEGATIVE: 1
CHEST TIGHTNESS: 0
VOICE CHANGE: 0
ABDOMINAL PAIN: 0
SHORTNESS OF BREATH: 0
SHORTNESS OF BREATH: 1
CONSTIPATION: 0
CHEST TIGHTNESS: 0
COUGH: 0
CONSTIPATION: 0
SHORTNESS OF BREATH: 1
DIARRHEA: 0
ABDOMINAL PAIN: 0
ABDOMINAL PAIN: 0
VOMITING: 0
VOMITING: 0
ABDOMINAL DISTENTION: 0
COUGH: 1
DIARRHEA: 0
DIARRHEA: 0
BACK PAIN: 1
WHEEZING: 0
RHINORRHEA: 0
VOMITING: 0
NAUSEA: 0
CONSTIPATION: 0
VOICE CHANGE: 0
CHEST TIGHTNESS: 0
CONSTIPATION: 0
EYE REDNESS: 1
DIARRHEA: 0
NAUSEA: 0
SHORTNESS OF BREATH: 1
ABDOMINAL PAIN: 0
CHEST TIGHTNESS: 0
EYE REDNESS: 0
RHINORRHEA: 0
CONSTIPATION: 0
SHORTNESS OF BREATH: 1
COUGH: 0
VOMITING: 0
COUGH: 0
CHEST TIGHTNESS: 0
EYE DISCHARGE: 1
CHEST TIGHTNESS: 0
NAUSEA: 0
ABDOMINAL PAIN: 0
COUGH: 1
COUGH: 1
COUGH: 0
CHEST TIGHTNESS: 0
CONSTIPATION: 0
SHORTNESS OF BREATH: 1
GASTROINTESTINAL NEGATIVE: 1
CONSTIPATION: 0
DIARRHEA: 0
SHORTNESS OF BREATH: 1
CONSTIPATION: 0
WHEEZING: 0
COUGH: 0
VOMITING: 0
VOMITING: 0
SHORTNESS OF BREATH: 1
ABDOMINAL PAIN: 0
EYE DISCHARGE: 0
ABDOMINAL PAIN: 0
CHEST TIGHTNESS: 0
SHORTNESS OF BREATH: 0
BLOOD IN STOOL: 0
NAUSEA: 0

## 2020-01-01 ASSESSMENT — PULMONARY FUNCTION TESTS
PIF_VALUE: 27
PIF_VALUE: 65
PIF_VALUE: 37
PIF_VALUE: 27
PIF_VALUE: 46
PIF_VALUE: 25
PIF_VALUE: 24
PIF_VALUE: 10
PIF_VALUE: 28
PIF_VALUE: 27
PIF_VALUE: 28
PIF_VALUE: 33
PIF_VALUE: 41
PIF_VALUE: 11
PIF_VALUE: 29
PIF_VALUE: 23
PIF_VALUE: 23
PIF_VALUE: 26
PIF_VALUE: 16
PIF_VALUE: 24
PIF_VALUE: 23
PIF_VALUE: 27
PIF_VALUE: 60
PIF_VALUE: 23
PIF_VALUE: 35
PIF_VALUE: 24
PIF_VALUE: 26
PIF_VALUE: 41
PIF_VALUE: 31
PIF_VALUE: 22
PIF_VALUE: 30
PIF_VALUE: 22
PIF_VALUE: 36
PIF_VALUE: 17
PIF_VALUE: 28
PIF_VALUE: 43
PIF_VALUE: 46
PIF_VALUE: 56
PIF_VALUE: 29
PIF_VALUE: 25
PIF_VALUE: 45
PIF_VALUE: 33
PIF_VALUE: 26
PIF_VALUE: 37
PIF_VALUE: 29
PIF_VALUE: 25
PIF_VALUE: 23
PIF_VALUE: 21
PIF_VALUE: 25
PIF_VALUE: 27
PIF_VALUE: 42
PIF_VALUE: 24
PIF_VALUE: 29

## 2020-01-01 ASSESSMENT — PATIENT HEALTH QUESTIONNAIRE - PHQ9
SUM OF ALL RESPONSES TO PHQ9 QUESTIONS 1 & 2: 0
SUM OF ALL RESPONSES TO PHQ QUESTIONS 1-9: 1
SUM OF ALL RESPONSES TO PHQ QUESTIONS 1-9: 0
1. LITTLE INTEREST OR PLEASURE IN DOING THINGS: 0
SUM OF ALL RESPONSES TO PHQ QUESTIONS 1-9: 1
SUM OF ALL RESPONSES TO PHQ QUESTIONS 1-9: 1
SUM OF ALL RESPONSES TO PHQ QUESTIONS 1-9: 0
SUM OF ALL RESPONSES TO PHQ9 QUESTIONS 1 & 2: 1
2. FEELING DOWN, DEPRESSED OR HOPELESS: 0
1. LITTLE INTEREST OR PLEASURE IN DOING THINGS: 0
2. FEELING DOWN, DEPRESSED OR HOPELESS: 1

## 2020-01-01 ASSESSMENT — PAIN SCALES - GENERAL
PAINLEVEL_OUTOF10: 0
PAINLEVEL_OUTOF10: 3
PAINLEVEL_OUTOF10: 7
PAINLEVEL_OUTOF10: 0
PAINLEVEL_OUTOF10: 4
PAINLEVEL_OUTOF10: 0
PAINLEVEL_OUTOF10: 5
PAINLEVEL_OUTOF10: 0
PAINLEVEL_OUTOF10: 1
PAINLEVEL_OUTOF10: 0

## 2020-01-01 ASSESSMENT — LIFESTYLE VARIABLES: HOW OFTEN DO YOU HAVE A DRINK CONTAINING ALCOHOL: 0

## 2020-01-03 ENCOUNTER — OFFICE VISIT (OUTPATIENT)
Dept: PRIMARY CARE CLINIC | Age: 80
End: 2020-01-03
Payer: MEDICARE

## 2020-01-03 VITALS
DIASTOLIC BLOOD PRESSURE: 78 MMHG | HEART RATE: 78 BPM | RESPIRATION RATE: 24 BRPM | OXYGEN SATURATION: 94 % | BODY MASS INDEX: 29.29 KG/M2 | SYSTOLIC BLOOD PRESSURE: 110 MMHG | TEMPERATURE: 98 F | WEIGHT: 150 LBS

## 2020-01-03 PROCEDURE — 99213 OFFICE O/P EST LOW 20 MIN: CPT | Performed by: NURSE PRACTITIONER

## 2020-01-03 RX ORDER — FLUTICASONE PROPIONATE 50 MCG
2 SPRAY, SUSPENSION (ML) NASAL DAILY
Qty: 1 BOTTLE | Refills: 3 | Status: SHIPPED | OUTPATIENT
Start: 2020-01-03

## 2020-01-03 ASSESSMENT — ENCOUNTER SYMPTOMS
COUGH: 1
GASTROINTESTINAL NEGATIVE: 1
SHORTNESS OF BREATH: 1
EYES NEGATIVE: 1

## 2020-01-03 NOTE — PROGRESS NOTES
Kati Jang is a 70 y.o. female who presents today for   Chief Complaint   Patient presents with    2 Week Follow-Up       HPI  Patient is here for     2 Week Follow-Up  Patient reports she is feeling better, but her oxygen is bothering her nose and making it \"run\"  Patient reports she hates having to carry the small tanks of oxygen because they are so heavy  Patient would like to reach the point of only having to use the oxygen at night if necessary, but understands her diagnosis and knows that will be difficult      No change in PMH, family, social, or surgical history unless mentioned above. Review of Systems   Constitutional: Negative. HENT: Negative. Eyes: Negative. Respiratory: Positive for cough (productive) and shortness of breath (improved). Cardiovascular: Negative. Negative for chest pain and palpitations. Gastrointestinal: Negative. Endocrine: Negative. Genitourinary: Negative. Musculoskeletal: Positive for arthralgias and myalgias. Skin: Negative. Allergic/Immunologic: Positive for environmental allergies. Neurological: Negative. Hematological: Negative. Psychiatric/Behavioral: Negative.         Past Medical History:   Diagnosis Date    Acid reflux     Allergic rhinitis     Hyperlipidemia     Hypertension     Irritable bowel syndrome with diarrhea 7/5/2019    Lung [facility] ([facility]     Lung with chemo    Mild single current episode of major depressive disorder ([facility] 5/2/2018    Osteoarthritis     Osteoporosis     Other emphysema ([facility] 12/22/2017    Palliative care patient 08/06/2019    Panic disorder 8/16/2019    Stage 3 severe [facility] 6/6/2018    FEV1 42%    Tobacco abuse 2/8/2018    Urinary incontinence     stress incontinence       Current Outpatient Medications   Medication Sig Dispense Refill    fluticasone (FLONASE) 50 MCG/ACT nasal spray 2 sprays by Nasal route daily 1 Bottle 3    amoxicillin (AMOXIL) 500 MG capsule pulmonary disease, unspecified COPD type ([facility]  - currently on O2 NC 2 LPM   - patient has zone meds if needed    3. Non-seasonal allergic rhinitis, unspecified trigger  - flonase refilled     No orders of the defined types were placed in this encounter. Orders Placed This Encounter   Medications    fluticasone (FLONASE) 50 MCG/ACT nasal spray     Si sprays by Nasal route daily     Dispense:  1 Bottle     Refill:  3     Medications Discontinued During This Encounter   Medication Reason    fluticasone (FLONASE) 50 MCG/ACT nasal spray REORDER     There are no Patient Instructions on file for this visit. Patient given educational handouts and has had all questions answered. Patient voices understanding and agrees to plans along with risks and benefits of plan. Patient isinstructed to continue prior meds, diet, and exercise plans unless instructed otherwise. Patient agrees to follow up as instructed and sooner if needed. Patient agrees to go to ER if condition becomes emergent. Notesmay be completed with dictation device and spelling errors may occur. Return in about 3 months (around 4/3/2020), or if symptoms worsen or fail to improve.

## 2020-01-17 ENCOUNTER — APPOINTMENT (OUTPATIENT)
Dept: GENERAL RADIOLOGY | Age: 80
End: 2020-01-17
Payer: MEDICARE

## 2020-01-17 ENCOUNTER — APPOINTMENT (OUTPATIENT)
Dept: CT IMAGING | Age: 80
End: 2020-01-17
Payer: MEDICARE

## 2020-01-17 ENCOUNTER — HOSPITAL ENCOUNTER (EMERGENCY)
Age: 80
Discharge: HOME | End: 2020-01-17
Attending: EMERGENCY MEDICINE
Payer: MEDICARE

## 2020-01-17 VITALS
HEART RATE: 59 BPM | BODY MASS INDEX: 29.45 KG/M2 | HEIGHT: 60 IN | TEMPERATURE: 97.6 F | SYSTOLIC BLOOD PRESSURE: 129 MMHG | RESPIRATION RATE: 17 BRPM | WEIGHT: 150 LBS | OXYGEN SATURATION: 92 % | DIASTOLIC BLOOD PRESSURE: 85 MMHG

## 2020-01-17 LAB
ALBUMIN SERPL-MCNC: 4.5 G/DL (ref 3.5–5.2)
ALP BLD-CCNC: 122 U/L (ref 35–104)
ALT SERPL-CCNC: 11 U/L (ref 5–33)
ANION GAP SERPL CALCULATED.3IONS-SCNC: 15 MMOL/L (ref 7–19)
APTT: 36.3 SEC (ref 26–36.2)
AST SERPL-CCNC: 17 U/L (ref 5–32)
BACTERIA: ABNORMAL /HPF
BASOPHILS ABSOLUTE: 0 K/UL (ref 0–0.2)
BASOPHILS RELATIVE PERCENT: 0.3 % (ref 0–1)
BILIRUB SERPL-MCNC: <0.2 MG/DL (ref 0.2–1.2)
BILIRUBIN URINE: NEGATIVE
BLOOD, URINE: ABNORMAL
BUN BLDV-MCNC: 22 MG/DL (ref 8–23)
C-REACTIVE PROTEIN: 0.36 MG/DL (ref 0–0.5)
CALCIUM SERPL-MCNC: 10 MG/DL (ref 8.8–10.2)
CHLORIDE BLD-SCNC: 101 MMOL/L (ref 98–111)
CLARITY: ABNORMAL
CO2: 22 MMOL/L (ref 22–29)
COLOR: YELLOW
CREAT SERPL-MCNC: 1 MG/DL (ref 0.5–0.9)
EOSINOPHILS ABSOLUTE: 0.1 K/UL (ref 0–0.6)
EOSINOPHILS RELATIVE PERCENT: 0.5 % (ref 0–5)
EPITHELIAL CELLS, UA: ABNORMAL /HPF
GFR NON-AFRICAN AMERICAN: 54
GLUCOSE BLD-MCNC: 134 MG/DL (ref 74–109)
GLUCOSE URINE: NEGATIVE MG/DL
HCT VFR BLD CALC: 41.9 % (ref 37–47)
HEMOGLOBIN: 12.5 G/DL (ref 12–16)
IMMATURE GRANULOCYTES #: 0 K/UL
INR BLD: 1.02 (ref 0.88–1.18)
KETONES, URINE: NEGATIVE MG/DL
LEUKOCYTE ESTERASE, URINE: ABNORMAL
LYMPHOCYTES ABSOLUTE: 3.8 K/UL (ref 1.1–4.5)
LYMPHOCYTES RELATIVE PERCENT: 35.4 % (ref 20–40)
MCH RBC QN AUTO: 25.9 PG (ref 27–31)
MCHC RBC AUTO-ENTMCNC: 29.8 G/DL (ref 33–37)
MCV RBC AUTO: 86.9 FL (ref 81–99)
MONOCYTES ABSOLUTE: 0.9 K/UL (ref 0–0.9)
MONOCYTES RELATIVE PERCENT: 8.3 % (ref 0–10)
NEUTROPHILS ABSOLUTE: 5.9 K/UL (ref 1.5–7.5)
NEUTROPHILS RELATIVE PERCENT: 55.2 % (ref 50–65)
NITRITE, URINE: NEGATIVE
PDW BLD-RTO: 15.7 % (ref 11.5–14.5)
PH UA: 6 (ref 5–8)
PLATELET # BLD: 385 K/UL (ref 130–400)
PMV BLD AUTO: 10.3 FL (ref 9.4–12.3)
POTASSIUM REFLEX MAGNESIUM: 4.5 MMOL/L (ref 3.5–5)
PROTEIN UA: ABNORMAL MG/DL
PROTHROMBIN TIME: 12.8 SEC (ref 12–14.6)
RBC # BLD: 4.82 M/UL (ref 4.2–5.4)
RBC UA: ABNORMAL /HPF (ref 0–2)
SEDIMENTATION RATE, ERYTHROCYTE: 18 MM/HR (ref 0–25)
SODIUM BLD-SCNC: 138 MMOL/L (ref 136–145)
SPECIFIC GRAVITY UA: 1.03 (ref 1–1.03)
TOTAL PROTEIN: 7.9 G/DL (ref 6.6–8.7)
TROPONIN: <0.01 NG/ML (ref 0–0.03)
URINE REFLEX TO CULTURE: YES
UROBILINOGEN, URINE: 0.2 E.U./DL
WBC # BLD: 10.6 K/UL (ref 4.8–10.8)
WBC UA: ABNORMAL /HPF (ref 0–5)

## 2020-01-17 PROCEDURE — 85025 COMPLETE CBC W/AUTO DIFF WBC: CPT

## 2020-01-17 PROCEDURE — 86140 C-REACTIVE PROTEIN: CPT

## 2020-01-17 PROCEDURE — 70498 CT ANGIOGRAPHY NECK: CPT

## 2020-01-17 PROCEDURE — 70450 CT HEAD/BRAIN W/O DYE: CPT

## 2020-01-17 PROCEDURE — 81001 URINALYSIS AUTO W/SCOPE: CPT

## 2020-01-17 PROCEDURE — 87086 URINE CULTURE/COLONY COUNT: CPT

## 2020-01-17 PROCEDURE — 85730 THROMBOPLASTIN TIME PARTIAL: CPT

## 2020-01-17 PROCEDURE — 6360000004 HC RX CONTRAST MEDICATION: Performed by: EMERGENCY MEDICINE

## 2020-01-17 PROCEDURE — 93005 ELECTROCARDIOGRAM TRACING: CPT | Performed by: EMERGENCY MEDICINE

## 2020-01-17 PROCEDURE — 99284 EMERGENCY DEPT VISIT MOD MDM: CPT

## 2020-01-17 PROCEDURE — 85610 PROTHROMBIN TIME: CPT

## 2020-01-17 PROCEDURE — 85652 RBC SED RATE AUTOMATED: CPT

## 2020-01-17 PROCEDURE — 80053 COMPREHEN METABOLIC PANEL: CPT

## 2020-01-17 PROCEDURE — 84484 ASSAY OF TROPONIN QUANT: CPT

## 2020-01-17 PROCEDURE — 71045 X-RAY EXAM CHEST 1 VIEW: CPT

## 2020-01-17 PROCEDURE — 36415 COLL VENOUS BLD VENIPUNCTURE: CPT

## 2020-01-17 RX ADMIN — IOPAMIDOL 95 ML: 755 INJECTION, SOLUTION INTRAVENOUS at 19:43

## 2020-01-17 ASSESSMENT — ENCOUNTER SYMPTOMS
SHORTNESS OF BREATH: 0
DIARRHEA: 0
VOMITING: 0
ABDOMINAL PAIN: 0
CHEST TIGHTNESS: 0
EYE PAIN: 0
SORE THROAT: 0
COUGH: 0
RHINORRHEA: 0
BACK PAIN: 0
NAUSEA: 0
PHOTOPHOBIA: 0

## 2020-01-17 ASSESSMENT — VISUAL ACUITY
OD: 2050
OS: 2070
OU: 2040

## 2020-01-17 NOTE — ED TRIAGE NOTES
Pt presents to the ED in respiratory distress bc she \"left my oxygen in the car\" that she normally wears all the time.    Pt seen at eye  today bc she has a \"black spot\" in her right eye since this am, Eye dr told her to come to ER

## 2020-01-18 NOTE — ED NOTES
Report given and care transferred from [facility]  01/17/20 9588

## 2020-01-18 NOTE — ED PROVIDER NOTES
difficulty, weakness, light-headedness and headaches. Psychiatric/Behavioral: Negative for confusion, hallucinations and suicidal ideas. PAST MEDICAL HISTORY     Past Medical History:   Diagnosis Date    Acid reflux     Allergic rhinitis     Hyperlipidemia     Hypertension     Irritable bowel syndrome with diarrhea 7/5/2019    Lung [facility] ([facility]     Lung with chemo    Mild single current [facility] of [facility] 5/2/2018    Osteoarthritis     Osteoporosis     Other [facility] 12/22/2017    Palliative care patient 08/06/2019    Panic disorder 8/16/2019    Stage 3 severe COPD by GOLD classification ([facility] 6/6/2018    FEV1 42%    Tobacco abuse 2/8/2018    Urinary incontinence     stress incontinence       SURGICAL HISTORY       Past Surgical History:   Procedure Laterality Date    APPENDECTOMY      CARDIAC CATHETERIZATION  5/3/2013   MDL    EF 60%    HEMORRHOID SURGERY      HYSTERECTOMY      HYSTERECTOMY, TOTAL ABDOMINAL      LUNG CANCER SURGERY      TONSILLECTOMY      VASCULAR SURGERY  10/11/2019    TJR. Aortogram and runoff. PTA and stenting of the left external iliac artery with a 7x 80 and innova stent dilated to 7.2mm. PTA of the left popliteal artery with 5mm x 2cm cutting balloon. attempted recalization of the peroneal artery aborted. CURRENT MEDICATIONS       Discharge Medication List as of 1/17/2020  9:27 PM      CONTINUE these medications which have NOT CHANGED    Details   fluticasone (FLONASE) 50 MCG/ACT nasal spray 2 sprays by Nasal route daily, Disp-1 Bottle, R-3Normal      OXYGEN Inhale 2 L/min into the lungs continuous, Disp-1 Units, R-1Print      propranolol (INDERAL) 40 MG tablet Take 1 tablet by mouth 3 times a day, Disp-270 tablet, R-2Refill [id number]      diphenoxylate-atropine (LOMOTIL) 2.5-0.025 MG per tablet Take 1 tablet by mouth 4 times daily as needed for Diarrhea. Historical Med      UNABLE TO FIND Indications: CBD Oil in a Vap Pen either signs or Co-signs this chart in the absence of a cardiologist.    Sinus rhythm, 78 bpm, no STEMI, no ischemia, no ectopy, normal axis, normal intervals, normal EKG. RADIOLOGY:   Non-plain film images such as CT, Ultrasound and MRI are read by theradiologist. Plain radiographic images are visualized and preliminarily interpreted by the emergency physician with the below findings:      CTA [address]   Final Result   1. Calcified atherosclerotic plaque is identified within both carotid   bifurcations, right greater than left, with no evidence of a   flow-limiting stenosis or occlusion. There is less than 50% carotid   stenosis bilaterally. There is no obvious carotid ulceration to   indicate a site for emboli. 2. Patency of the vertebrobasilar system, left vertebral artery is   dominant compared to the right. 3. No carotid or vertebral dissection is identified. 4. Both ophthalmic arteries appear patent. Signed by Dr Henry Goldberg on 1/17/2020 8:45 PM      XR CHEST PORTABLE   Final Result   Chronic lung changes with volume loss and scarring in the   lung bases, no consolidating infiltrates. There is no pneumothorax or   definite pleural effusion. Signed by Dr Henry Goldberg on 1/17/2020 7:07 PM      CT Head WO Contrast   Final Result   Impression: No acute intracranial abnormality. There are chronic   findings associated with aging. Signed by Dr Henry Goldberg on 1/17/2020 6:34 PM          LABS:  Labs Reviewed   CBC WITH AUTO DIFFERENTIAL - Abnormal; Notable for the following components:       Result Value    MCH 25.9 (*)     MCHC 29.8 (*)     RDW 15.7 (*)     All other components within normal limits   COMPREHENSIVE METABOLIC PANEL W/ REFLEX TO MG FOR LOW K - Abnormal; Notable for the following components:    Glucose 134 (*)     CREATININE 1.0 (*)     GFR Non-African American 54 (*)     Alkaline Phosphatase 122 (*)     All other components within normal limits   APTT - Abnormal; Notable for the following components:    aPTT 36.3 (*)     All other components within normal limits   URINE RT REFLEX TO CULTURE - Abnormal; Notable for the following components:    Clarity, UA CLOUDY (*)     Blood, Urine TRACE (*)     Protein, UA TRACE (*)     Leukocyte Esterase, Urine LARGE (*)     All other components within normal limits   MICROSCOPIC URINALYSIS - Abnormal; Notable for the following components:    WBC, UA 11-15 (*)     All other components within normal limits   URINE CULTURE   TROPONIN   PROTIME-INR   SEDIMENTATION RATE   C-REACTIVE PROTEIN     other labs were within normal range or not returned as of this dictation. EMERGENCY DEPARTMENT COURSE and DIFFERENTIAL DIAGNOSIS/MDM:   Vitals:    Vitals:    01/17/20 1757 01/17/20 2102 01/17/20 2133   BP: (!) 164/108 (!) 159/77 129/85   Pulse: 79 59 59   Resp: 28 24 17   Temp: 97.6 °F (36.4 °C)     TempSrc: Oral     SpO2: 92% 94% 92%   Weight: 150 lb (68 kg)     Height: 5' (1.524 m)         MDM  Number of Diagnoses or Management Options  Diagnosis management comments: 11year-old female sent in by her ophthalmologist for concern of impending large stroke due to branch artery occlusion in the right eye. Symptoms noticed at 11 AM.  Plan for IV, labs, CT, EKG, chest x-ray and likely neurology consult for further recommendations. ED Course       Valuated for her left eye visual disturbance. She was sent from Dr. Danelle Del Angel office for concern for a arterial occlusion of the right eye. CT of the head was negative labs unremarkable. Will not treat the urine as there are too many epithelial cells. Wait for culture. Spoke with Dr. Checo Bellamy from neurology regarding the initial work-up, recommended ESR CRP to rule out any temporal arteritis, and recommended a CT angios of the neck to look for proximal occlusions that could be related to the patient's visual changes. Those were negative.   Plan for discharge home and follow-up with  Estelle Rios. Patient is well-appearing, stable for discharge and follow-up without fail with his/her medical doctor. I had a detailed discussion with the patient and/or guardian regarding the historical points, exam findings, and any diagnostic results supporting the discharge diagnosis. The patient was educated on care and need for follow-up. Strict return instructions including red flag signs and symptoms were discussed with the patient. Medications for discharge discussed, and adverse effects reviewed. Questions invited and answered. Patient shows understanding of the discharge information and agrees to follow-up. CONSULTS:  None    PROCEDURES:  None unless otherwise noted below,     Procedures    FINAL IMPRESSION      1.  Visual disturbance          DISPOSITION/PLAN   DISPOSITION Decision To Discharge 01/17/2020 09:25:36 PM      PATIENT REFERRED TO:  Karmen Dick MD  [address] [facility] [phone]    Call in 2 days  For post emergency department visit follow-up      DISCHARGE MEDICATIONS:  Discharge Medication List as of 1/17/2020  9:27 PM             (Pleasenote that portions of this note were completed with a voice recognition program.  Efforts were made to edit the dictations but occasionally words are mis-transcribed.)    Michael Gardner MD (electronically signed)  Attending Emergency Physician     Michael Gardner MD  01/17/20 2844

## 2020-01-20 LAB
EKG P AXIS: 84 DEGREES
EKG P-R INTERVAL: 200 MS
EKG Q-T INTERVAL: 402 MS
EKG QRS DURATION: 80 MS
EKG QTC CALCULATION (BAZETT): 433 MS
EKG T AXIS: 96 DEGREES
ORGANISM: ABNORMAL
URINE CULTURE, ROUTINE: ABNORMAL
URINE CULTURE, ROUTINE: ABNORMAL

## 2020-01-20 PROCEDURE — 93010 ELECTROCARDIOGRAM REPORT: CPT | Performed by: INTERNAL MEDICINE

## 2020-01-21 ENCOUNTER — TELEPHONE (OUTPATIENT)
Dept: NEUROLOGY | Age: 80
End: 2020-01-21

## 2020-01-27 ENCOUNTER — HOSPITAL ENCOUNTER (OUTPATIENT)
Dept: VASCULAR LAB | Age: 80
Discharge: HOME | End: 2020-01-27
Payer: MEDICARE

## 2020-01-27 ENCOUNTER — OFFICE VISIT (OUTPATIENT)
Dept: VASCULAR SURGERY | Age: 80
End: 2020-01-27
Payer: MEDICARE

## 2020-01-27 VITALS
HEIGHT: 60 IN | WEIGHT: 145 LBS | HEART RATE: 57 BPM | RESPIRATION RATE: 18 BRPM | TEMPERATURE: 98 F | OXYGEN SATURATION: 98 % | SYSTOLIC BLOOD PRESSURE: 120 MMHG | DIASTOLIC BLOOD PRESSURE: 69 MMHG | BODY MASS INDEX: 28.47 KG/M2

## 2020-01-27 PROCEDURE — G8427 DOCREV CUR MEDS BY ELIG CLIN: HCPCS | Performed by: NURSE PRACTITIONER

## 2020-01-27 PROCEDURE — G8482 FLU IMMUNIZE ORDER/ADMIN: HCPCS | Performed by: NURSE PRACTITIONER

## 2020-01-27 PROCEDURE — 1036F TOBACCO NON-USER: CPT | Performed by: NURSE PRACTITIONER

## 2020-01-27 PROCEDURE — G8399 PT W/DXA RESULTS DOCUMENT: HCPCS | Performed by: NURSE PRACTITIONER

## 2020-01-27 PROCEDURE — 1090F PRES/ABSN URINE INCON ASSESS: CPT | Performed by: NURSE PRACTITIONER

## 2020-01-27 PROCEDURE — G8417 CALC BMI ABV UP PARAM F/U: HCPCS | Performed by: NURSE PRACTITIONER

## 2020-01-27 PROCEDURE — 4040F PNEUMOC VAC/ADMIN/RCVD: CPT | Performed by: NURSE PRACTITIONER

## 2020-01-27 PROCEDURE — 93923 UPR/LXTR ART STDY 3+ LVLS: CPT

## 2020-01-27 PROCEDURE — 99213 OFFICE O/P EST LOW 20 MIN: CPT | Performed by: NURSE PRACTITIONER

## 2020-01-27 PROCEDURE — 1123F ACP DISCUSS/DSCN MKR DOCD: CPT | Performed by: NURSE PRACTITIONER

## 2020-01-27 RX ORDER — FUROSEMIDE 20 MG/1
20 TABLET ORAL 2 TIMES DAILY
Status: ON HOLD | COMMUNITY
End: 2020-01-01 | Stop reason: HOSPADM

## 2020-01-28 PROBLEM — I65.23 BILATERAL CAROTID ARTERY STENOSIS: Status: ACTIVE | Noted: 2020-01-28

## 2020-01-28 NOTE — PROGRESS NOTES
furosemide (LASIX) 20 MG tablet Take 20 mg by mouth 2 times daily      fluticasone (FLONASE) 50 MCG/ACT nasal spray 2 sprays by Nasal route daily 1 Bottle 3    OXYGEN Inhale 2 L/min into the lungs continuous 1 Units 1    propranolol (INDERAL) 40 MG tablet Take 1 tablet by mouth 3 times a day 270 tablet 2    diphenoxylate-atropine (LOMOTIL) 2.5-0.025 MG per tablet Take 1 tablet by mouth 4 times daily as needed for Diarrhea.  UNABLE TO FIND Indications: CBD Oil in a Vap Pen       CALCIUM PO Take 500 mg by mouth daily      Black Cohosh 20 MG TABS Take by mouth every evening      cetirizine (ZYRTEC) 10 MG tablet Take 1 tablet by mouth daily (Patient taking differently: Take 10 mg by mouth every evening ) 90 tablet 3    atorvastatin (LIPITOR) 40 MG tablet Take 1 tablet by mouth daily (Patient taking differently: Take 40 mg by mouth every evening ) 90 tablet 3    losartan (COZAAR) 100 MG tablet Take 1 tablet by mouth daily (Patient taking differently: Take 100 mg by mouth every evening ) 90 tablet 3    montelukast (SINGULAIR) 10 MG tablet Take 1 tablet by mouth nightly (Patient taking differently: Take 10 mg by mouth daily ) 90 tablet 3    omeprazole (PRILOSEC) 20 MG delayed release capsule TAKE 1 CAPSULE BY MOUTH DAILY 90 capsule 3    potassium chloride (KLOR-CON M) 10 MEQ extended release tablet TAKE 2 TABLETS BY MOUTH EVERY DAY (Patient taking differently: Take 10 mEq by mouth 2 times daily TAKE 2 TABLETS BY MOUTH EVERY DAY) 180 tablet 3    umeclidinium-vilanterol (ANORO ELLIPTA) 62.5-25 MCG/INH AEPB inhaler INHALE 1 PUFF INTO LUNGS DAILY.  3 each 3    venlafaxine (EFFEXOR XR) 150 MG extended release capsule Take 1 capsule by mouth daily 90 capsule 3    aspirin EC 81 MG EC tablet Take 1 tablet by mouth daily (Patient taking differently: Take 81 mg by mouth every evening ) 90 tablet 3    albuterol sulfate HFA (PROAIR HFA) 108 (90 Base) MCG/ACT inhaler Inhale 2 puffs into the lungs every 6 hours as needed for Wheezing 1 Inhaler 3    melatonin 5 MG TABS tablet Take 5 mg by mouth nightly as needed        No current facility-administered medications for this visit. Allergies: Doxycycline; Abilify [aripiprazole]; Celebrex [celecoxib]; Naproxen; and Lactose intolerance (gi)  Past Medical History:   Diagnosis Date    Acid reflux     Allergic rhinitis     Hyperlipidemia     Hypertension     Irritable bowel syndrome with diarrhea 2019    Lung [facility] ([facility]     Lung with chemo    Mild single [facility] of [facility]    Osteoarthritis     Osteoporosis     Other emph[facility]sema ([facility] 2017    Palliative care patient 2019    Panic disorder 2019    Stage 3 severe COPD by GOLD classification ([facility] 2018    FEV1 42%    Tobacco abuse 2018    Urinary incontinence     stress incontinence     Past Surgical History:   Procedure Laterality Date    APPENDECTOMY      CARDIAC CATHETERIZATION  5/3/2013   MDL    EF 60%    HEMORRHOID SURGERY      HYSTERECTOMY      HYSTERECTOMY, TOTAL ABDOMINAL      LUNG CANCER SURGERY      TONSILLECTOMY      VASCULAR SURGERY  10/11/2019    TJR. Aortogram and runoff. PTA and stenting of the left external iliac artery with a 7x 80 and innova stent dilated to 7.2mm. PTA of the left popliteal artery with 5mm x 2cm cutting balloon. attempted recalization of the peroneal artery aborted.      Family History   Problem Relation Age of Onset    High Blood Pressure Mother     High Blood Pressure Father     Diabetes Sister     High Blood Pressure Brother      Social History     Tobacco Use    Smoking status: Former Smoker     Packs/day: 0.50     Years: 30.00     Pack years: 15.00     Types: Cigarettes     Start date: 1970     Last attempt to quit: 2019     Years since quittin.4    Smokeless tobacco: Never Used   Substance Use Topics    Alcohol use: Yes     Comment: occcasionally         Review of Systems    Constitutional - no significant activity change, appetite change, or unexpected weight change. No fever or chills. No diaphoresis or significant fatigue. HENT - no significant rhinorrhea or epistaxis. No tinnitus or significant hearing loss. Eyes - no sudden vision change or amaurosis. Respiratory - no significant shortness of breath, wheezing, or stridor. No apnea, cough, or chest tightness associated with shortness of breath. Cardiovascular - no chest pain, syncope, or significant dizziness. No palpitations or significant leg swelling. Patient reports has claudication. Gastrointestinal - no abdominal swelling or pain. No blood in stool. No severe constipation, diarrhea, nausea, or vomiting. Genitourinary - No difficulty urinating, dysuria, frequency, or urgency. No flank pain or hematuria. Musculoskeletal - no back pain, gait disturbance, or myalgia. Skin - no color change, rash, pallor, or new wound. Neurologic - no dizziness, facial asymmetry, or light headedness. No seizures. No speech difficulty or lateralizing weakness. Hematologic - no easy bruising or excessive bleeding. Psychiatric - no severe anxiety or nervousness. No confusion. All other review of systems are negative. Physical Exam    /69 (Site: Left Upper Arm, Position: Sitting, Cuff Size: Medium Adult)   Pulse 57   Temp 98 °F (36.7 °C) (Temporal)   Resp 18   Ht 5' (1.524 m)   Wt 145 lb (65.8 kg)   SpO2 98%   BMI 28.32 kg/m²     Constitutional - well developed, well nourished. No diaphoresis or acute distress. HENT - head normocephalic. Right external ear canal appears normal.  Left external ear canal appears normal.  Septum appears midline. Eyes - conjunctiva normal.  EOMS normal.  No exudate. No icterus. Neck- ROM appears normal, no tracheal deviation. Cardiovascular - Regular rate and rhythm. Heart sounds are normal.  No murmur, rub, or gallop.   Carotid pulses are 2+ to palpation bilaterally without bruit. Extremities - Radial and brachial pulses are 2+ to palpation bilaterally. Right femoral pulse: present 2+; Right popliteal pulse: absent Right DP: absent; Right PT absent; Left femoral pulse: present 2+; Left popliteal pulse: absent; Left DP: absent; Left PT: absent No cyanosis, clubbing, or significant edema. No signs atheroembolic event. Pulmonary - effort appears normal.  No respiratory distress. Lungs - Breath sounds normal. No wheezes or rales. GI - Abdomen - soft, non tender, bowel sounds X 4 quadrants. No guarding or rebound tenderness. No distension or palpable mass. Genitourinary - deferred. Musculoskeletal - ROM appears normal.  No significant edema. Neurologic - alert and oriented X 3. Physiologic. Skin - warm, dry, and intact. No rash, erythema, or pallor. Psychiatric - mood, affect, and behavior appear normal.  Judgment and thought processes appear normal.    Risk factors for atherosclerosis of all vascular beds have been reviewed with the patient including:  Family history, tobacco abuse in all forms, elevated cholesterol, hyperlipidemia, and diabetes. Lower extremity arterial study: Right BERT 0.87, Left BERT 0.86. Individual films reviewed: Yes. Test results were reviewed with the patient. Disease process has progressed minimally but stent in iliac looks stable        Options have been discussed with the patient including continued medical management. Patient has opted to proceed with continued medical management. Assessment    1.  Atherosclerosis of native artery of both lower extremities with intermittent claudication (HCC)          Plan    Start/Continue ASA EC 81 mg daily  Education about caludication causes and treatment discussed  Call with any new wound development or progressive claudication  Walking program  Leg elevation  Good moisturization  Good skin care  Follow up in  3 months with repeat arterial study  Recommend no

## 2020-04-06 NOTE — TELEPHONE ENCOUNTER
[4/6/2020 1:14 PM]  Allyn Cueto:    i have instructions for her on both the paxil and effexor that were sent in.      just a quick reminder to her,     she will stop the effexor 150 today,     start just 2 tabs of effexor 37.5 (total 75 mg) daily for 10 days,     then 1 tab a day for 6 day,     then 1 tab every 48 hrs for 8 days,     then stop the effexor      in the meant time, on day 10 start paxil 1 tab daily and     continue for 10 days then increase to 2 tab daily   [4/6/2020 1:14 PM]  Jean Marie Rodrigues

## 2020-04-14 NOTE — TELEPHONE ENCOUNTER
Pili Restrepo called to request a refill on her medication. Last office visit : 4/6/2020   Next office visit : 5/11/2020     Last UDS: No results found for: Libra Scarce, LABBENZ, BUPRENUR, COCAIMETSCRU, GABAPENTIN, MDMA, METAMPU, OPIATESCREENURINE, OXTCOSU, PHENCYCLIDINESCREENURINE, PROPOXYPHENE, THCSCREENUR, TRICYUR    Last Capri Mari: 9-  Medication Contract: [id number]   Last Fill: miranda deutsch [id number]    Requested Prescriptions     Pending Prescriptions Disp Refills    diphenoxylate-atropine (LOMOTIL) 2.5-0.025 MG per tablet [Pharmacy Med Name: DIPHENOXY-ATROP 2.5-0.025] 120 tablet 0     Sig: Take 1 tablet by mouth 4 times daily as needed for Diarrhea.              rx called to pharmacy  Winifred Dandy, LPN

## 2020-04-28 NOTE — PROGRESS NOTES
nightly as needed  Yes Historical Provider, MD   mirabegron (MYRBETRIQ) 25 MG TB24 Take 1 tablet by mouth daily  Alecia Pablo MD       Social History     Tobacco Use    Smoking status: Former Smoker     Packs/day: 0.50     Years: 30.00     Pack years: 15.00     Types: Cigarettes     Start date: 1970     Last attempt to quit: 2019     Years since quittin.7    Smokeless tobacco: Never Used   Substance Use Topics    Alcohol use: Yes     Comment: occcasionally    Drug use: No        Allergies   Allergen Reactions    Doxycycline      Patient states it caused chills and hot flashes severe.  Abilify [Aripiprazole]     Celebrex [Celecoxib]      swelling    Naproxen Hives    Lactose Intolerance (Gi) Nausea And Vomiting   ,   Past Medical History:   Diagnosis Date    Acid reflux     Allergic rhinitis     Bilateral carotid artery stenosis 2020    Hyperlipidemia     Hypertension     Irritable bowel syndrome with diarrhea 2019    [facility]     Lung with chemo    Mild single current episode of major depressive disorder ([facility] 2018    Osteoarthritis     Osteoporosis     Other emphysema ([facility] 2017    Palliative care patient 2019    Panic disorder 2019    Stage 3 severe COPD by GOLD classification ([facility] 2018    FEV1 42%    Tobacco abuse 2018    Urinary incontinence     stress incontinence   ,   Past Surgical History:   Procedure Laterality Date    APPENDECTOMY      CARDIAC CATHETERIZATION  5/3/2013   MDL    EF 60%    HEMORRHOID SURGERY      HYSTERECTOMY      HYSTERECTOMY, TOTAL ABDOMINAL      LUNG CANCER SURGERY      TONSILLECTOMY      VASCULAR SURGERY  10/11/2019    TJR. Aortogram and runoff. PTA and stenting of the left external iliac artery with a 7x 80 and innova stent dilated to 7.2mm. PTA of the left popliteal artery with 5mm x 2cm cutting balloon. attempted recalization of the peroneal artery aborted.   ,   Social History     Tobacco Use

## 2020-05-11 NOTE — PROGRESS NOTES
2 puffs into the lungs every 6 hours as needed for Wheezing  Lorri Morris MD   melatonin 5 MG TABS tablet Take 5 mg by mouth nightly as needed   Historical Provider, MD       Social History     Tobacco Use    Smoking status: Former Smoker     Packs/day: 0.50     Years: 30.00     Pack years: 15.00     Types: Cigarettes     Start date: 1970     Last attempt to quit: 2019     Years since quittin.7    Smokeless tobacco: Never Used   Substance Use Topics    Alcohol use: Yes     Comment: occcasionally    Drug use: No        Allergies   Allergen Reactions    Doxycycline      Patient states it caused chills and hot flashes severe.  Abilify [Aripiprazole]     Celebrex [Celecoxib]      swelling    Naproxen Hives    Lactose Intolerance (Gi) Nausea And Vomiting   ,   Past Medical History:   Diagnosis Date    Acid reflux     Allergic rhinitis     Bilateral carotid artery stenosis 2020    Hyperlipidemia     Hypertension     Irritable bowel syndrome with diarrhea 2019    Lung cancer ([facility] [address]     Lung with chemo    Mild single current episode of major depressive disorder ([facility] [address] 2018    Osteoarthritis     Osteoporosis     Other emphysema ([facility] [address] 2017    Palliative care patient 2019    Panic disorder 2019    Stage 3 severe COPD by GOLD classification ([facility] [address] 2018    FEV1 42%    Tobacco abuse 2018    Urinary incontinence     stress incontinence   ,   Past Surgical History:   Procedure Laterality Date    APPENDECTOMY      CARDIAC CATHETERIZATION  5/3/2013   MDL    EF 60%    HEMORRHOID SURGERY      HYSTERECTOMY      HYSTERECTOMY, TOTAL ABDOMINAL      LUNG CANCER SURGERY      TONSILLECTOMY      VASCULAR SURGERY  10/11/2019    TJR. Aortogram and runoff. PTA and stenting of the left external iliac artery with a 7x 80 and innova stent dilated to 7.2mm. PTA of the left popliteal artery with 5mm x 2cm cutting balloon. attempted recalization of the peroneal artery

## 2020-06-29 PROBLEM — J96.01 ACUTE HYPOXEMIC RESPIRATORY FAILURE (HCC): Status: ACTIVE | Noted: 2020-01-01

## 2020-06-29 NOTE — PROGRESS NOTES
Contains critical data Blood Gas, Arterial   Status:  Final result    Ref Range & Units 06/29/20 1657   pH, Arterial 7.350 - 7.450 7.050Low Panic     pCO2, Arterial 35.0 - 45.0 mmHg 70.0High Panic     pO2, Arterial 80.0 - 100.0 mmHg 258. 0High     HCO3, Arterial 22.0 - 26.0 mmol/L 19.4Low     Base Excess, Arterial -2.0 - 2.0 mmol/L -11.2Low     Hemoglobin, Art, Extended 12.0 - 16.0 g/dL 9.9Low     O2 Sat, Arterial >92 % 96.8    Carboxyhgb, Arterial 0.0 - 5.0 % 3.2    Comment:      0.0-1.5   (Smokers 1.5-5.0)    Methemoglobin, Arterial <1.5 % 0.2    O2 Content, Arterial Not Established mL/dL 14.1    O2 Therapy  Unknown    Resulting Agency  [facility]   Narrative     CALL  [place] Chelsea García RN ER, 06/29/2020 16:58, by HARLAN        Specimen Collected: 06/29/20 1657 Last Resulted: 06/29/20 1658 View Other Order Details        Pt on AC/VC rate 14 450 vt 60% 5 peep site RR (of Choice)

## 2020-06-29 NOTE — PROGRESS NOTES
Pt intubated at 1623 with 7.5 Etube at 22 cm liss. Pt transferred to ER1 and placed on vent at 1637.  Simv/VC 14 450 60 % 5 peep

## 2020-06-29 NOTE — ED NOTES
Bed: 01-A  Expected date:   Expected time:   Means of arrival:   Comments:  9 will move here     Zaheer Sierra RN  06/29/20 [id number]

## 2020-06-29 NOTE — H&P
[facility]      Hospitalist - History & Physical      PCP: Elda Rodriguez MD    Date of Admission: 6/29/2020    Date of Service: 6/29/2020    Chief Complaint:  Became unresponsive in cath lab, very short of breath, obtunded, was intubated , placed on propofol, CTA found RLL PN     History Of Present Illness: The patient is a 68 y.o. female with PMH of COPD on 2 LNC O2 dependent, anxiety, depression , lung cancer who was in vascular lab for procedure  Became unresponsive in cath lab, very short of breath, obtunded, was intubated , placed on propofol, CTA found RLL PN , found to have acute on chronic hypercapnic hypoxemic respiratory failure, COVID-19 -ve, BC obtained started on abx   Past Medical History:        Diagnosis Date    Acid reflux     Allergic rhinitis     Bilateral carotid artery stenosis 1/28/2020    Hyperlipidemia     Hypertension     Irritable bowel syndrome with diarrhea 7/5/2019    Lung cancer ([address]     Lung with chemo    Mild single current episode of major depressive disorder ([address] 5/2/2018    Osteoarthritis     Osteoporosis     Other emphysema ([address] 12/22/2017    Palliative care patient 08/06/2019    Panic disorder 8/16/2019    Stage 3 severe COPD by GOLD classification ([address] 6/6/2018    FEV1 42%    Tobacco abuse 2/8/2018    Urinary incontinence     stress incontinence       Past Surgical History:        Procedure Laterality Date    APPENDECTOMY      CARDIAC CATHETERIZATION  5/3/2013   MDL    EF 60%    HEMORRHOID SURGERY      HYSTERECTOMY      HYSTERECTOMY, TOTAL ABDOMINAL      LUNG CANCER SURGERY      TONSILLECTOMY      VASCULAR SURGERY  10/11/2019    TJR. Aortogram and runoff. PTA and stenting of the left external iliac artery with a 7x 80 and innova stent dilated to 7.2mm. PTA of the left popliteal artery with 5mm x 2cm cutting balloon. attempted recalization of the peroneal artery aborted.        Home Medications:  Prior to Admission medications    Medication Sig Start Date End Date Taking? Authorizing Provider   ipratropium-albuterol (DUONEB) 0.5-2.5 (3) MG/3ML SOLN nebulizer solution USE 1 VIAL IN NEBULIZER EVERY 4 HOURS AS NEEDED 5/19/20   Redd Montana MD   PARoxetine (PAXIL) 10 MG tablet Starting in 14 days start 1 tab by mouth for 14 days, then increase 2 tabs a day 5/11/20   Redd Montana MD   mirabegron [facility]) 25 MG TB24 Take 1 tablet by mouth daily 5/11/20   Redd Montana MD   venlafaxine (EFFEXOR XR) 37.5 MG extended release capsule Take 1 capsule by mouth daily Take 2 caps daily for 10 days, then 1 cap for 6 days, then 1 cap every other day for 8 days.  4/6/20   Redd Montana MD   furosemide (LASIX) 20 MG tablet Take 20 mg by mouth 2 times daily    Historical Provider, MD   fluticasone Roselee Congo) 50 MCG/ACT nasal spray 2 sprays by Nasal route daily 1/3/20   DIANA Fontenot - NP   OXYGEN Inhale 2 L/min into the lungs continuous 12/27/19   DIANA Fontenot NP   propranolol (INDERAL) 40 MG tablet Take 1 tablet by mouth 3 times a day 12/6/19   Redd Montana MD   UNABLE TO FIND Indications: CBD Oil in a Vap Pen     Historical Provider, MD   CALCIUM PO Take 500 mg by mouth daily    Historical Provider, MD   Black Cohosh 20 MG TABS Take by mouth every evening    Historical Provider, MD   cetirizine (ZYRTEC) 10 MG tablet Take 1 tablet by mouth daily  Patient taking differently: Take 10 mg by mouth every evening  7/3/19   Redd Montana MD   atorvastatin (LIPITOR) 40 MG tablet Take 1 tablet by mouth daily  Patient taking differently: Take 40 mg by mouth every evening  7/3/19   Redd Montana MD   losartan (COZAAR) 100 MG tablet Take 1 tablet by mouth daily  Patient taking differently: Take 100 mg by mouth every evening  7/3/19   Redd Montana MD   montelukast (SINGULAIR) 10 MG tablet Take 1 tablet by mouth nightly  Patient taking differently: Take 10 mg by mouth daily  7/3/19   Redd Montana MD   omeprazole (PRILOSEC) 20 MG delayed release capsule TAKE 1 CAPSULE BY MOUTH DAILY 7/3/19   Windy Tate MD   potassium chloride (KLOR-CON M) 10 MEQ extended release tablet TAKE 2 TABLETS BY MOUTH EVERY DAY  Patient taking differently: Take 10 mEq by mouth 2 times daily TAKE 2 TABLETS BY MOUTH EVERY DAY 7/3/19   Windy Tate MD   umeclidinium-vilanterol (ANORO ELLIPTA) 62.5-25 MCG/INH AEPB inhaler INHALE 1 PUFF INTO LUNGS DAILY. 7/3/19   Windy Tate MD   venlafaxine (EFFEXOR XR) 150 MG extended release capsule Take 1 capsule by mouth daily 7/3/19   Windy Tate MD   aspirin EC 81 MG EC tablet Take 1 tablet by mouth daily  Patient taking differently: Take 81 mg by mouth every evening  7/3/19   Windy Tate MD   albuterol sulfate HFA (PROAIR HFA) 108 (90 Base) MCG/ACT inhaler Inhale 2 puffs into the lungs every 6 hours as needed for Wheezing 6/6/18   Windy Tate MD   melatonin 5 MG TABS tablet Take 5 mg by mouth nightly as needed     Historical Provider, MD       Allergies:    Doxycycline; Abilify [aripiprazole]; Celebrex [celecoxib]; Naproxen; and Lactose intolerance (gi)    Social History:      Tobacco:   reports that she quit smoking about 10 months ago. Her smoking use included cigarettes. She started smoking about 50 years ago. She has a 15.00 pack-year smoking history. She has never used smokeless tobacco.  Alcohol:   reports current alcohol use. Illicit Drugs: no     Family History:      Problem Relation Age of Onset    High Blood Pressure Mother     High Blood Pressure Father     Diabetes Sister     High Blood Pressure Brother        Review of Systems:  Unable to obtain     Physical Examination:       BP (!) 82/31   Pulse 65   Temp 98.4 °F (36.9 °C) (Oral)   Resp 16   Ht 5' (1.524 m)   Wt 150 lb (68 kg)   SpO2 100%   BMI 29.29 kg/m²   General appearance: sedated intubated . HEENT: eyes closed   Neck:  No jugular venous distention. Trachea midline. Thyroid no masses noted.   Respiratory:  Bilateral Rales/Wheezes/Rhonchi. Cardiovascular: Regular rate and rhythm with normal S1/S2 without murmurs, rubs or gallops. Abdomen: Soft, non-tender, non-distended with normal bowel sounds. Musculoskeletal: No clubbing, cyanosis or edema bilaterally. Skin: Skin color, texture, turgor normal.  No rashes or lesions. Neurologic:  Sedated intubated   Psychiatric:sedated intubated     Diagnostic Data:  Imaging:   XR CHEST PORTABLE   Final Result   1. Endotracheal tube appears appropriately positioned. 2. Consolidation in the right lung base concerning for infection. Follow-up PA and lateral chest radiographs are recommended in 4-6   weeks to document resolution and exclude an underlying abnormality. 3. Emphysema. Signed by Dr Efraín Alvarez on 6/29/2020 6:38 PM      CTA PULMONARY W CONTRAST   Final Result   Impression:   1. No evidence of PE or acute aortic pathology. 2. Right lower lobe airspace disease may reflect an area of scarring   or infection. Findings are new compared to the previous exam from   2013. 3. Severe emphysema. 4. Bilateral noncalcified pulmonary nodules as above. Follow-up CT   recommended in 3 months. 5. Atherosclerosis of the aorta and coronary arteries. 6. Cardiomegaly. 7. Trace left pleural fluid. Interlobular septal thickening is also   evident. Signed by Dr Efraín Alvarez on 6/29/2020 5:52 PM      CT Head WO Contrast   Final Result   Impression:   1. No acute intracranial findings. Sequela of chronic microvascular   ischemia. 2. Sinus disease.    Signed by Dr Efraín Alvarez on 6/29/2020 5:40 PM        CBC:  Recent Labs     06/29/20  1625   WBC 13.3*   HGB 10.4*   HCT 37.3   *     BMP:  Recent Labs     06/29/20  1625 06/29/20  1657   *  --    K 5.6* 5.6   CL 99  --    CO2 18*  --    BUN 28*  --    CREATININE 1.3*  --    CALCIUM 9.4  --      Recent Labs     06/29/20  1625   AST 26   ALT 13   BILITOT <0.2   ALKPHOS 119*     Coag Panel:   Recent Labs 06/29/20  1625   INR 1.13   PROTIME 14.4   APTT 38.9*     Cardiac Enzymes:   Recent Labs     06/29/20  1625   TROPONINI <0.01     ABGs:  Lab Results   Component Value Date    PHART 7.050 06/29/2020    PO2ART 258.0 06/29/2020    ZJY6DQF 70.0 06/29/2020     Urinalysis:  Lab Results   Component Value Date    NITRU Negative 01/17/2020    WBCUA 11-15 01/17/2020    BACTERIA trace 01/17/2020    RBCUA 0-1 01/17/2020    BLOODU TRACE 01/17/2020    SPECGRAV 1.026 01/17/2020    GLUCOSEU Negative 01/17/2020       Active Hospital Problems    Diagnosis Date Noted    Acute hypoxemic respiratory failure ([facility] [address] [J96.01] 06/29/2020       Assessment and Plan: Active Problems:    Acute hypoxemic respiratory failure (HCC)  Resolved Problems:    * No resolved hospital problems.  *    Admit, RT steroids abx, Bc, on vent, consult pulmonary   AE COPD  MS change 2 to hypoxemic hypercapnic respiratory failure   DVT prophylaxis with Lovenox           Devonte Avery  Hospitalist service  6/29/2020  6:44 PM

## 2020-06-29 NOTE — ED PROVIDER NOTES
[facility] EMERGENCY DEPT  eMERGENCY dEPARTMENT eNCOUnter      Pt Name: Nusrat Tadeo  MRN: [id number]  [place] 1940  Date of evaluation: 6/29/2020  Provider: Emery Leonardo MD    CHIEF COMPLAINT       Chief Complaint   Patient presents with    COPD         HISTORY OF PRESENT ILLNESS   (Location/Symptom, Timing/Onset,Context/Setting, Quality, Duration, Modifying Factors, Severity)  Note limiting factors. Nusrat Tadeo is a 68 y.o. female who presents to the emergency department with shortness of breath. Patient was sent to the emergency department from vascular lab secondary to shortness of breath. Patient is unable to give history at this time secondary to acuity of condition. HPI    NursingNotes were reviewed. REVIEW OF SYSTEMS    (2-9 systems for level 4, 10 or more for level 5)     Review of Systems   Unable to perform ROS: Acuity of condition            PAST MEDICALHISTORY     Past Medical History:   Diagnosis Date    Acid reflux     Allergic rhinitis     Bilateral carotid artery stenosis 1/28/2020    Hyperlipidemia     Hypertension     Irritable bowel syndrome with diarrhea 7/5/2019    Lung cancer ([address]     Lung with chemo    Mild single current episode of major depressive disorder ([address] 5/2/2018    Osteoarthritis     Osteoporosis     Other emphysema ([address] 12/22/2017    Palliative care patient 08/06/2019    Panic disorder 8/16/2019    Stage 3 severe COPD by GOLD classification ([address] 6/6/2018    FEV1 42%    Tobacco abuse 2/8/2018    Urinary incontinence     stress incontinence         SURGICAL HISTORY       Past Surgical History:   Procedure Laterality Date    APPENDECTOMY      CARDIAC CATHETERIZATION  5/3/2013   MDL    EF 60%    HEMORRHOID SURGERY      HYSTERECTOMY      HYSTERECTOMY, TOTAL ABDOMINAL      LUNG CANCER SURGERY      TONSILLECTOMY      VASCULAR SURGERY  10/11/2019    TJR. Aortogram and runoff. PTA and stenting of the left external iliac artery with a 7x 80 and innova stent dilated to 7.2mm. PTA of the left popliteal artery with 5mm x 2cm cutting balloon. attempted recalization of the peroneal artery aborted. CURRENT MEDICATIONS     Previous Medications    ALBUTEROL SULFATE HFA (PROAIR HFA) 108 (90 BASE) MCG/ACT INHALER    Inhale 2 puffs into the lungs every 6 hours as needed for Wheezing    ASPIRIN EC 81 MG EC TABLET    Take 1 tablet by mouth daily    ATORVASTATIN (LIPITOR) 40 MG TABLET    Take 1 tablet by mouth daily    BLACK COHOSH 20 MG TABS    Take by mouth every evening    CALCIUM PO    Take 500 mg by mouth daily    CETIRIZINE (ZYRTEC) 10 MG TABLET    Take 1 tablet by mouth daily    FLUTICASONE (FLONASE) 50 MCG/ACT NASAL SPRAY    2 sprays by Nasal route daily    FUROSEMIDE (LASIX) 20 MG TABLET    Take 20 mg by mouth 2 times daily    IPRATROPIUM-ALBUTEROL (DUONEB) 0.5-2.5 (3) MG/3ML SOLN NEBULIZER SOLUTION    USE 1 VIAL IN NEBULIZER EVERY 4 HOURS AS NEEDED    LOSARTAN (COZAAR) 100 MG TABLET    Take 1 tablet by mouth daily    MELATONIN 5 MG TABS TABLET    Take 5 mg by mouth nightly as needed     MIRABEGRON (MYRBETRIQ) 25 MG TB24    Take 1 tablet by mouth daily    MONTELUKAST (SINGULAIR) 10 MG TABLET    Take 1 tablet by mouth nightly    OMEPRAZOLE (PRILOSEC) 20 MG DELAYED RELEASE CAPSULE    TAKE 1 CAPSULE BY MOUTH DAILY    OXYGEN    Inhale 2 L/min into the lungs continuous    PAROXETINE (PAXIL) 10 MG TABLET    Starting in 14 days start 1 tab by mouth for 14 days, then increase 2 tabs a day    POTASSIUM CHLORIDE (KLOR-CON M) 10 MEQ EXTENDED RELEASE TABLET    TAKE 2 TABLETS BY MOUTH EVERY DAY    PROPRANOLOL (INDERAL) 40 MG TABLET    Take 1 tablet by mouth 3 times a day    UMECLIDINIUM-VILANTEROL (ANORO ELLIPTA) 62.5-25 MCG/INH AEPB INHALER    INHALE 1 PUFF INTO LUNGS DAILY.     UNABLE TO FIND    Indications: CBD Oil in a Vap Pen     VENLAFAXINE (EFFEXOR XR) 150 MG EXTENDED RELEASE CAPSULE    Take 1 capsule by mouth daily    VENLAFAXINE (EFFEXOR XR) 37.5 MG EXTENDED RELEASE CAPSULE    Take 1 capsule by mouth daily Take 2 caps daily for 10 days, then 1 cap for 6 days, then 1 cap every other day for 8 days. ALLERGIES     Doxycycline; Abilify [aripiprazole]; Celebrex [celecoxib];  Naproxen; and Lactose intolerance (gi)    FAMILY HISTORY       Family History   Problem Relation Age of Onset    High Blood Pressure Mother     High Blood Pressure Father     Diabetes Sister     High Blood Pressure Brother           SOCIAL HISTORY       Social History     Socioeconomic History    Marital status:      Spouse name: None    Number of children: None    Years of education: None    Highest education level: None   Occupational History    None   Social Needs    Financial resource strain: None    Food insecurity     Worry: None     Inability: None    Transportation needs     Medical: None     Non-medical: None   Tobacco Use    Smoking status: Former Smoker     Packs/day: 0.50     Years: 30.00     Pack years: 15.00     Types: Cigarettes     Start date: 1970     Last attempt to quit: 2019     Years since quittin.9    Smokeless tobacco: Never Used   Substance and Sexual Activity    Alcohol use: Yes     Comment: occcasionally    Drug use: No    Sexual activity: Yes     Partners: Male   Lifestyle    Physical activity     Days per week: None     Minutes per session: None    Stress: None   Relationships    Social connections     Talks on phone: None     Gets together: None     Attends Gnosticist service: None     Active member of club or organization: None     Attends meetings of clubs or organizations: None     Relationship status: None    Intimate partner violence     Fear of current or ex partner: None     Emotionally abused: None     Physically abused: None     Forced sexual activity: None   Other Topics Concern    None   Social History Narrative    None       SCREENINGS    Lucien Coma Scale  Eye Opening: Spontaneous  Best Verbal Response: Oriented  Best Motor Response: Obeys commands  Lucien Coma Scale Score: 15        PHYSICAL EXAM    (up to 7 for level 4, 8 or more for level 5)     ED Triage Vitals [06/29/20 1514]   BP Temp Temp Source Pulse Resp SpO2 Height Weight   -- 98.4 °F (36.9 °C) Oral 86 (!) 34 100 % 5' (1.524 m) 150 lb (68 kg)       Physical Exam  Vitals signs and nursing note reviewed. Constitutional:       General: She is not in acute distress. Appearance: She is ill-appearing, toxic-appearing and diaphoretic. Comments: Patient is diaphoretic and obtunded. Increased work of breathing with retractions and accessory muscle use. Prolonged expiratory phase. HENT:      Head: Normocephalic and atraumatic. Right Ear: External ear normal.      Left Ear: External ear normal.      Nose: Nose normal.      Mouth/Throat:      Pharynx: Oropharynx is clear. No oropharyngeal exudate. Comments: Tacky mucous membranes. Eyes:      General: No scleral icterus. Conjunctiva/sclera: Conjunctivae normal.   Neck:      Musculoskeletal: Neck supple. No neck rigidity. Cardiovascular:      Rate and Rhythm: Normal rate and regular rhythm. Pulses: Normal pulses. Heart sounds: Normal heart sounds. Pulmonary:      Effort: Respiratory distress present. Comments: Patient is in respiratory distress with a respiratory rate of 34, accessory muscle use, and retractions. Expiratory phase is prolonged. Air entry is diminished bilaterally. Abdominal:      Palpations: Abdomen is soft. Tenderness: There is no guarding. Musculoskeletal:         General: No deformity. Right lower leg: No edema. Left lower leg: No edema. Comments: Hyperpigmentation in bilateral lower extremities secondary to vascular insufficiency. Skin:     General: Skin is warm. Capillary Refill: Capillary refill takes 2 to 3 seconds. Coloration: Skin is not jaundiced.    Neurological:      Comments: Patient is obtunded and does not respond to verbal or painful stimuli. Positive corneals and gag reflex resident. Pupils are reactive bilaterally. Unable to perform remainder of exam.   Psychiatric:      Comments: Unable to obtain at this time. DIAGNOSTIC RESULTS     EKG: All EKG's areinterpreted by the Emergency Department Physician who either signs or Co-signs this chart in the absence of a cardiologist.    EKG dated 6/29/2020 at 16 11 PM: Normal sinus rhythm, rate 75. ME interval 267. Possible left atrial enlargement. Abnormal R wave progression. Left ventricular hypertrophy. Artifact versus minimal ST elevation in 2, 3 aVF T wave inversion in aVL. RADIOLOGY:  Non-plain film images such as CT, Ultrasound and MRI are read by the radiologist. Plain radiographic images are visualized and preliminarily interpreted bythe emergency physician with the below findings:          XR CHEST PORTABLE   Final Result   1. Endotracheal tube appears appropriately positioned. 2. Consolidation in the right lung base concerning for infection. Follow-up PA and lateral chest radiographs are recommended in 4-6   weeks to document resolution and exclude an underlying abnormality. 3. Emphysema. Signed by Dr Jamey Cantu on 6/29/2020 6:38 PM      CTA PULMONARY W CONTRAST   Final Result   Impression:   1. No evidence of PE or acute aortic pathology. 2. Right lower lobe airspace disease may reflect an area of scarring   or infection. Findings are new compared to the previous exam from   2013. 3. Severe emphysema. 4. Bilateral noncalcified pulmonary nodules as above. Follow-up CT   recommended in 3 months. 5. Atherosclerosis of the aorta and coronary arteries. 6. Cardiomegaly. 7. Trace left pleural fluid. Interlobular septal thickening is also   evident. Signed by Dr Jamey Cantu on 6/29/2020 5:52 PM      CT Head WO Contrast   Final Result   Impression:   1. No acute intracranial findings. Sequela of chronic microvascular   ischemia.    2. Sinus disease. Signed by Dr Christie Urias on 6/29/2020 5:40 PM              LABS:  Labs Reviewed   CBC WITH AUTO DIFFERENTIAL - Abnormal; Notable for the following components:       Result Value    WBC 13.3 (*)     Hemoglobin 10.4 (*)     MCH 23.7 (*)     MCHC 27.9 (*)     RDW 17.5 (*)     Platelets 124 (*)     Neutrophils Absolute 7.8 (*)     Lymphocytes Absolute 4.8 (*)     Unid. Mononu 1 (*)     Hypochromia 1+ (*)     All other components within normal limits   COMPREHENSIVE METABOLIC PANEL W/ REFLEX TO MG FOR LOW K - Abnormal; Notable for the following components:    Sodium 132 (*)     Potassium reflex Magnesium 5.6 (*)     CO2 18 (*)     Glucose 332 (*)     BUN 28 (*)     CREATININE 1.3 (*)     GFR Non-African American 40 (*)     Alkaline Phosphatase 119 (*)     All other components within normal limits   LACTIC ACID, PLASMA - Abnormal; Notable for the following components:    Lactic Acid 2.0 (*)     All other components within normal limits    Narrative:     CALL  Cedric ALDRICH tel. ,  Chemistry results called to and read back by Kennedy WEIR in ED, 06/29/2020 17:33,  by [facility]   APTT - Abnormal; Notable for the following components:    aPTT 38.9 (*)     All other components within normal limits   BLOOD GAS, ARTERIAL - Abnormal; Notable for the following components:    pH, Arterial 7.050 (*)     pCO2, Arterial 70.0 (*)     pO2, Arterial 258.0 (*)     HCO3, Arterial 19.4 (*)     Base Excess, Arterial -11.2 (*)     Hemoglobin, Art, Extended 9.9 (*)     All other components within normal limits    Narrative:     CALL  Cedric Nelson RN ER, 06/29/2020 16:58, by Kelin Storey   CULTURE, BLOOD 1   CULTURE, BLOOD 2   TROPONIN   BRAIN NATRIURETIC PEPTIDE   PROTIME-INR   COVID-19    Narrative:     CALL  Cedric ALDRICH tel. , Called result to Judi Carter RN ED @5841  Called result to [address] [place] [address] ED @9881, 06/29/2020 18:16, by Em Rodriguez   POTASSIUM, WHOLE BLOOD   URINE RT REFLEX TO CULTURE       All other labs were within normal range or not returned as of this dictation. EMERGENCY DEPARTMENT COURSE and DIFFERENTIAL DIAGNOSIS/MDM:   Vitals:    Vitals:    06/29/20 1803 06/29/20 1806 06/29/20 1817 06/29/20 1832   BP: (!) 79/26 (!) 83/44 (!) 82/31 (!) 88/39   Pulse: 57 56 65 56   Resp: 17 16 16 18   Temp:       TempSrc:       SpO2: 100% 100% 100% 100%   Weight:       Height:           MDM  46-year-old female with history of COPD presents from vascular lab with respiratory distress. Labs, EKG, and radiology results reviewed. Patient diagnosed with COPD exacerbation, pneumonia, and respiratory failure. Patient will be admitted to the ICU by Dr. Chon Saini, hospitalist.      18:50  Dr Chon Saini Is at bedside at this time. CONSULTS:  PHARMACY TO DOSE VANCOMYCIN  IP CONSULT TO PULMONOLOGY    PROCEDURES:  Unless otherwise noted below, none     Intubation  Date/Time: 7/1/2020 2:44 AM  Performed by: Joceline Corona MD  Authorized by: Cristina Blank DO     Consent:     Consent obtained:  Emergent situation  Pre-procedure details:     Patient status:  Unresponsive    Mallampati score:  III    Pretreatment meds: Etomidate. Paralytics:  Succinylcholine  Procedure details:     Preoxygenation:  Bag valve mask    CPR in progress: no      Intubation method:  Oral    Oral intubation technique:  Direct    Laryngoscope blade: Mac 4    Tube size (mm):  7.5    Tube type:  Cuffed    Number of attempts:  1    Tube visualized through cords: yes    Placement assessment:     ETT to lip:  22    Tube secured with:  ETT rachel    Breath sounds:  Equal    Placement verification: chest rise, condensation, CXR verification, direct visualization, equal breath sounds and ETCO2 detector      CXR findings:  ETT in proper place  Post-procedure details:     Patient tolerance of procedure: Tolerated well, no immediate complications        FINAL IMPRESSION      1. Acute respiratory failure with hypercapnia (HCC)    2. COPD exacerbation ([address]    3.  Pneumonia due to infectious organism, unspecified laterality, unspecified part of lung          DISPOSITION/PLAN   DISPOSITION Admitted 06/29/2020 06:44:04 PM      PATIENT REFERRED TO:  Liam Mejias MD  [facility] [id number]  [place] [id number] [facility] MD Freya  [address] [phone]            DISCHARGE MEDICATIONS:  New Prescriptions    No medications on file          (Please note that portions of this note were completed with a voice recognition program.  Efforts were made to edit thedictations but occasionally words are mis-transcribed.)    Mat Fritz MD (electronically signed)  Attending Emergency Physician          Mat Fritz MD  06/29/20 Glen Perry MD  07/01/20 [id number]

## 2020-06-30 ENCOUNTER — OUTSIDE FACILITY SERVICE (OUTPATIENT)
Dept: PULMONOLOGY | Facility: CLINIC | Age: 80
End: 2020-06-30

## 2020-06-30 PROCEDURE — 99223 1ST HOSP IP/OBS HIGH 75: CPT | Performed by: INTERNAL MEDICINE

## 2020-06-30 NOTE — PROGRESS NOTES
Pharmacy Renal Adjustment    Lisa Denton is a 68 y.o. female. Pharmacy has renally adjusted medications per protocol. Recent Labs     06/29/20  1625 06/30/20  0349   BUN 28* 34*       Recent Labs     06/29/20  1625 06/30/20  0349   CREATININE 1.3* 1.2*       Estimated Creatinine Clearance: 33 mL/min (A) (based on SCr of 1.2 mg/dL (H)). Height:   Ht Readings from Last 1 Encounters:   06/29/20 5' (1.524 m)     Weight:  Wt Readings from Last 1 Encounters:   06/29/20 150 lb (68 kg)       CKD stage: Unspecified         Baseline SCr: 1.0    Plan: Adjust the following medications based on renal function:           Meropenem 1 gm IV every eight hours adjusted to Meropenem 1 gm IV every twelve hours.     Electronically signed by Tj Miller, [address] [place] [address] on 6/30/2020 at 10:52 AM

## 2020-06-30 NOTE — PROGRESS NOTES
During second SBT for today, the patient got very anxious and inconsolable at about 1420. She had done well during her SBT but all of a sudden, RR was in the 30-40s, HR was 130-140, and SpO2 dropped in the mid 80's. She was switched back to previous vent settings. Expiratory wheezes heard in all lung fields. RT at bedside to administer breathing treatment once patient calmed down some. ETT was suctioned but had minimal output. Sedation was increased; duoneb given. HR now 80s - RR 20-25 - SpO2 98%.

## 2020-06-30 NOTE — PROGRESS NOTES
Attempted SBT. Patient tolerated at first but once pressure support was decreased to 10, patient did not tolerate. Her RR was in the 30s and required constant redirection to slow her breathing. She was also coughing and gagging on the tube. I notified Avis Tian, and she stated to flip her back to her previous vent settings and attempt a SBT later. Sedation was resumed. See EMAR for details.

## 2020-06-30 NOTE — PROGRESS NOTES
Pt transported from ER to ICU bagged with 100% Fio2 placed on previous vent settings without incident

## 2020-06-30 NOTE — PROGRESS NOTES
6/29/2020  8:22 PM - Marylou Christnie Incoming Lab Results From [facility]     Component Value Ref Range & Units Status Collected Lab   pH, Arterial 7.220Low Panic   7.350 - 7.450 Final 06/29/2020  8:20 PM [facility] Lab   pCO2, Arterial 47. 0High   35.0 - 45.0 mmHg Final 06/29/2020  8:20 PM [facility] Lab   pO2, Arterial 172.0High   80.0 - 100.0 mmHg Final 06/29/2020  8:20 PM [facility] Lab   HCO3, Arterial 19.2Low   22.0 - 26.0 mmol/L Final 06/29/2020  8:20 PM [facility] Excess, Arterial -8.2Low   -2.0 - 2.0 mmol/L Final 06/29/2020  8:20 PM [address] [place] [address] Lab   Hemoglobin, Art, Extended 9.3Low   12.0 - 16.0 g/dL Final 06/29/2020  8:20 PM [address] [facility] Lab   O2 Sat, Arterial 95.2  >92 % Final 06/29/2020  8:20 PM [facility] Lab   Carboxyhgb, Arterial 3.0  0.0 - 5.0 % Final 06/29/2020  8:20 PM [facility] Lab        0.0-1.5   (Smokers 1.5-5.0)    Methemoglobin, Arterial 1.4  <1.5 % Final 06/29/2020  8:20 PM [facility] Lab   O2 Content, Arterial 12.8  Not Established mL/dL Final 06/29/2020  8:20 PM [address] [facility] Lab     Pt on VC,16,450,40% + 5 PEEP  RIGHT RADIAL PUNCTRUE at+

## 2020-06-30 NOTE — PROGRESS NOTES
4 Eyes Skin Assessment    Lisa Denton is being assessed upon: Admission    I agree that I, Rebecca Reina, along with Catherine RN (either 2 RN's or 1 LPN and 1 RN) have performed a thorough Head to Toe Skin Assessment on the patient. ALL assessment sites listed below have been assessed. Areas assessed by both nurses:     [x]   Head, Face, and Ears   [x]   Shoulders, Back, and Chest  [x]   Arms, Elbows, and Hands   [x]   Coccyx, Sacrum, and Ischium  [x]   Legs, Feet, and Heels    Does the Patient have Skin Breakdown?  No    Parker Prevention initiated: Yes  Wound Care Orders initiated: NA    C nurse consulted for Pressure Injury (Stage 3,4, Unstageable, DTI, NWPT, and Complex wounds) and New or Established Ostomies: NA        Primary Nurse eSignature: Rebecca Reina RN on 6/29/2020 at 8:11 PM      Co-Signer eSignature: Electronically signed by Argenis Weber RN on 6/29/20 at 11:05 PM CDT

## 2020-06-30 NOTE — CONSULTS
 LUNG CANCER SURGERY      TONSILLECTOMY      VASCULAR SURGERY  10/11/2019    TJR. Aortogram and runoff. PTA and stenting of the left external iliac artery with a 7x 80 and innova stent dilated to 7.2mm. PTA of the left popliteal artery with 5mm x 2cm cutting balloon. attempted recalization of the peroneal artery aborted. Allergies  Allergies   Allergen Reactions    Doxycycline      Patient states it caused chills and hot flashes severe.  Abilify [Aripiprazole]     Celebrex [Celecoxib]      swelling    Naproxen Hives    Lactose Intolerance (Gi) Nausea And Vomiting     Medications    sodium chloride flush, 10 mL, Intravenous, 2 times per day    enoxaparin, 40 mg, Subcutaneous, Q24H    methylPREDNISolone, 60 mg, Intravenous, Q6H    meropenem, 1 g, Intravenous, Q8H    ipratropium-albuterol, 1 ampule, Inhalation, Q4H WA    vancomycin (VANCOCIN) intermittent dosing (placeholder), , Other, RX Placeholder    vancomycin, 1,000 mg, Intravenous, Q24H    famotidine (PEPCID) injection, 20 mg, Intravenous, Daily     Social History   reports that she quit smoking about 10 months ago. Her smoking use included cigarettes. She started smoking about 50 years ago. She has a 15.00 pack-year smoking history. She has never used smokeless tobacco. She reports current alcohol use. She reports that she does not use drugs. Family History  family history includes Diabetes in her sister; High Blood Pressure in her brother, father, and mother. Review of Systems:  Cannot obtain due to mechanical ventilation    Physical Exam:   height is 5' (1.524 m) and weight is 150 lb (68 kg). Her temporal temperature is 96.8 °F (36 °C). Her blood pressure is 166/68 (abnormal) and her pulse is 76. Her respiration is 18 and oxygen saturation is 98%.      Intake/Output Summary (Last 24 hours) at 6/30/2020 0806  Last data filed at 6/30/2020 0600  Gross per 24 hour   Intake 813.26 ml   Output 670 ml   Net 143.26 ml     Ventilator Scarring in the medial left upper lobe is stable from the prior exam. There is linear scarring versus atelectasis in the right lung base. There is new consolidation in the right lower lobe adjacent to the major fissure. A right middle lobe nodule measures approximately 4 mm in size with an adjacent smaller nodule (axial image 31). A pleural-based nodule in the posterior left lower lobe measures 10 mm in size, new from the prior exam (axial image 22). There are some pleural calcifications in the left lung apex. Review of the visualized portion of the upper abdomen demonstrates no acute abnormalities. Review of the visualized osseous structures demonstrates an age-indeterminate but likely chronic compression deformity of the T8 vertebral body. Degenerative changes and scoliotic changes are seen in the spine. An old sternal fracture is also evident. An old left rib fracture is seen. Impression: 1. No evidence of PE or acute aortic pathology. 2. Right lower lobe airspace disease may reflect an area of scarring or infection. Findings are new compared to the previous exam from 2013. 3. Severe emphysema. 4. Bilateral noncalcified pulmonary nodules as above. Follow-up CT recommended in 3 months. 5. Atherosclerosis of the aorta and coronary arteries. 6. Cardiomegaly. 7. Trace left pleural fluid. Interlobular septal thickening is also evident. Signed by Dr Lauren Winn on 6/29/2020 5:52 PM    My radiograph interpretation/independent review of imaging: ET tube in place, chronic lung changes    Other test results (not lab or imaging):   ECHO from 2017:  Summary   1. Mildly dilated left atrium   2. Normal LV systolic function (estimated LV EF 60-65%)   3. Grade I diastolic dysfunction (impaired relaxation) with normal left   atrial pressure   4. Mild mitral regurgitation   5. Significant posterior mitral annular calcification   6.  Mild tricuspid regurgitation; estimated RVSP of at least 39 mmHg    Independent review of ekg: Sinus rhythm 75, QTc 487    Problem list as generated by [employer]:  Patient Active Problem List   Diagnosis    [facility]al [facility] infarction [facility]    Mild single current episode of major depressive disorder ([facility]    COPD (chronic obstructive pulmonary disease) (HCC)    Inflammation of left sacroiliac joint (HCC)    Inflammation of right sacroiliac joint (HCC)    Cigarette smoker    Irritable bowel syndrome with diarrhea    Acute respiratory failure with hypoxia and hypercapnia (HCC)    Pulmonary hypertension due to COPD ([facility]    Panic disorder    Spider veins    Atherosclerosis of native arteries of extremities with intermittent claudication, bilateral legs (HCC)    Atherosclerosis of native artery of both lower extremities with intermittent claudication (HCC)    Bilateral carotid artery stenosis    Acute hypoxemic respiratory failure (HCC)     Pulmonary Assessment:  SEVERE ACUTE RESPIRATORY FAILURE REQUIRING MECHANICAL VENTILATION  This is a threat to life or pulmonary function, high risk, due to respiratory acidosis and possible pneumonia    New problem (to me), with additional workup planned: Right lower lobe infiltrate    New problem (to me), no additional workup planned: History of lower extremity claudication    Other problems either stable, failing to improve or worsenin. Emphysema  2. Reported history of COPD  3. Long smoking history, recently quit    Recommend/plan:   · Spontaneous breathing trial earlier this morning was not tolerated. Nursing will try again later. · Obtain sputum culture  · Continue bronchodilator treatments  · Currently on Merrem and vancomycin  · Decrease IV steroids as tolerated  · Daily ABG and chest x-ray while on ventilator  · Further recommendations per Dr. Michele Dent    Thank you for this consult. We will follow along. Electronically signed by Elaine Diez on 2020 at 8:06 AM      Physician Substantive portion:   This is a patient who is been intubated for respiratory acidosis and a possible right-sided pneumonia helping during the work-up for vascular disease. She is currently sedated but arousable, mechanically ventilated. Chest has wheezes and rhonchi bilaterally. She is known to have COPD per records in the chart. We have not seen her and we do not know her underlying pulmonary function. He is on steroids we can taper those. She needs bronchodilators. We will attempt spontaneous breathing trials as she seems to be improved with improvement in pH, although she is still mildly acidotic. Anticipate she will not be ready to extubate today but we will continue spontaneous breathing trials daily. She is no longer smoking. In the outpatient setting she would probably benefit from pulmonary function testing possibly pulmonary rehab and bronchodilators. She has mild abnormalities on her echocardiogram which do not appear to be elated to any of her current process. I have seen and examined patient personally, performing a face-to-face diagnostic evaluation with plan of care reviewed and developed with APRN and nursing staff. I have addended and/or modified the above history of present illness, physical examination, and assessment and plan to reflect my findings and impressions. Essential elements of the care plan were discussed with APRN above. Agree with findings and assessment/plan as documented above.     Electronically signed by Jose Decker, on 6/30/2020, 12:36 PM

## 2020-06-30 NOTE — PROGRESS NOTES
Patient arrived to room 147 sedated on vent. Unable to answer admission questions at this time. Will attempt to contact family.

## 2020-06-30 NOTE — PLAN OF CARE
Problem: Skin Integrity:  Goal: Will show no infection signs and symptoms  Description: Will show no infection signs and symptoms  6/30/2020 1708 by Yuriy Saavedra RN  Outcome: Ongoing  6/30/2020 1706 by Yuriy Saavedra RN  Outcome: Ongoing  Goal: Absence of new skin breakdown  Description: Absence of new skin breakdown  6/30/2020 1708 by Yuriy Saavedra RN  Outcome: Ongoing  6/30/2020 1706 by Yuriy Saavedra RN  Outcome: Ongoing     Problem: Falls - Risk of:  Goal: Will remain free from falls  Description: Will remain free from falls  6/30/2020 1708 by Yuriy Saavedra RN  Outcome: Ongoing  6/30/2020 1706 by Yuriy Saavedra RN  Outcome: Ongoing  Goal: Absence of physical injury  Description: Absence of physical injury  6/30/2020 1708 by Yuriy Saavedra RN  Outcome: Ongoing  6/30/2020 1706 by Yuriy Saavedra RN  Outcome: Ongoing     Problem: Gas Exchange - Impaired:  Goal: Levels of oxygenation will improve  Description: Levels of oxygenation will improve  6/30/2020 1708 by Yuriy Saavedra RN  Outcome: Ongoing  6/30/2020 1706 by Yuriy Saavedra RN  Outcome: Ongoing     Problem: Nutrition  Goal: Optimal nutrition therapy  Description: Nutrition Problem: Inadequate oral intake  Intervention: Food and/or Nutrient Delivery: Continue NPO  Nutritional Goals: Pt will extubate and progress to an oral diet or nutritional needs will be met via RODERICK     6/30/2020 1708 by Yuriy Saavedra RN  Outcome: Ongoing  6/30/2020 1706 by Yuriy Saavedra RN  Outcome: Ongoing  6/30/2020 0954 by Radha Grace, MS, RD, LD  Outcome: Ongoing     Problem: Infection - Ventilator-Associated Pneumonia:  Goal: Prevent a ventilator associated event (VAE)  Description: Prevent a ventilator associated event (VAE)  Outcome: Ongoing  Goal: Absence of pulmonary infection  Description: Absence of pulmonary infection  Outcome: Ongoing

## 2020-06-30 NOTE — PROGRESS NOTES
Nutrition Assessment    Type and Reason for Visit: Initial    Nutrition Assessment: Pt is nutritionally compromised AEB inadequate oral intake. Pt is at risk for further compromise d/t mechanical ventilation and NPO status. Will monitor nutrition progression and implement nutrition intervention as needed     Malnutrition Assessment:  · Malnutrition Status: At risk for malnutrition  · Context: Acute illness or injury  · Findings of the 6 clinical characteristics of malnutrition (Minimum of 2 out of 6 clinical characteristics is required to make the diagnosis of moderate or severe Protein Calorie Malnutrition based on AND/ASPEN Guidelines):  1. Energy Intake-Less than or equal to 50% of estimated energy requirement, Unable to assess    2. Weight Loss-No significant weight loss,    3. Fat Loss-No significant subcutaneous fat loss,    4. Muscle Loss-No significant muscle mass loss,    5. Fluid Accumulation-No significant fluid accumulation,    6.   Strength-Not measured    Nutrition Risk Level: High    Nutrient Needs:  · Estimated Daily Total Kcal: 2523-7478 kcals/day  · Estimated Daily Protein (g): 54-90 g/PRO/day  · Estimated Daily Total Fluid (ml/day): 9337-3594 mL/day    Nutrition Diagnosis:   · Problem: Inadequate oral intake  · Etiology: related to Impaired respiratory function-inability to consume food     Signs and symptoms:  as evidenced by Intubation, NPO status due to medical condition    Objective Information:  · Wound Type: Venous Stasis  · Current Nutrition Therapies:  · Oral Diet Orders: NPO   · Anthropometric Measures:  · Ht: 5' (152.4 cm)   · Current Body Wt: 150 lb (68 kg)  · Ideal Body Wt: 100 lb (45.4 kg)   · BMI Classification: BMI 25.0 - 29.9 Overweight    Nutrition Interventions:   Continue NPO  Continued Inpatient Monitoring    Nutrition Evaluation:   · Goals: Pt will extubate and progress to an oral diet or nutritional needs will be met via RODERICK    · Monitoring: Nutrition Progression, Skin Integrity, Wound Healing, Weight, Pertinent Labs      Electronically signed by Chula Arellano MS, RD, LD on 6/30/20 at 9:53 AM CDT    Contact Number: [phone]

## 2020-06-30 NOTE — PROGRESS NOTES
Pili Perry arrived to room #147. Presented with: respiratory failure  Mental Status: Patient is sedated on vent. Vitals:    06/29/20 1939   BP:    Pulse: 105   Resp: 17   Temp:    SpO2: 100%     Patient safety contract and falls prevention contract reviewed with patient. No  Unable to orient patient to room. Call light within reach. Unable to use.   Needs, issues or concerns expressed at this time: no.      Electronically signed by Qiana Urbano RN on 6/29/2020 at 8:12 PM

## 2020-06-30 NOTE — PLAN OF CARE
Nutrition Problem: Inadequate oral intake  Intervention: Food and/or Nutrient Delivery: Continue NPO  Nutritional Goals: Pt will extubate and progress to an oral diet or nutritional needs will be met via RODERICK

## 2020-06-30 NOTE — PROGRESS NOTES
[facility]      Patient:  Eric Millard  YOB: 1940  Date of Service: 6/30/2020  MRN: [id number]   Acct: [de-identified]   Primary Care Physician: Marine Rothman MD  Advance Directive: Full Code  Admit Date: 6/29/2020       Hospital Day: 1    Chief Complaint:   Chief Complaint   Patient presents with    COPD   The patient is a 68 y.o. female with PMH of COPD on 2 LNC O2 dependent, anxiety, depression , lung cancer who was in vascular lab for procedure  Became unresponsive in cath lab, very short of breath, obtunded, was intubated , placed on propofol, CTA found RLL PN , found to have acute on chronic hypercapnic hypoxemic respiratory failure, COVID-19 -ve, BC obtained started on abx    Subjective:remains intubated, sedated   6/30/20  Remains intubated, per family she has been vaping CBD, continue RT steroids abx, pulmonary pending   Objective:   VITALS:  BP (!) 145/55   Pulse 100   Temp 96.8 °F (36 °C) (Temporal)   Resp 20   Ht 5' (1.524 m)   Wt 150 lb (68 kg)   SpO2 96%   BMI 29.29 kg/m²   24HR INTAKE/OUTPUT:      Intake/Output Summary (Last 24 hours) at 6/30/2020 0744  Last data filed at 6/30/2020 0600  Gross per 24 hour   Intake 813.26 ml   Output 670 ml   Net 143.26 ml      General appearance: intubated, sedated   HEENT: atraumatic, eyes with clear conjunctiva and normal lids  Lungs: ventilating bilaterally  Heart: S1, S2 normal  Abdomen: soft, non-tender; bowel sounds normal; no masses,  no organomegaly  Extremities:atraumatic, no cyanosis no edema no calf tenderness   Neurologic:sedated   Skin: no rashes, nodules.           Medications:      sodium chloride 75 mL/hr at 06/29/20 2036    propofol 25 mcg/kg/min (06/30/20 0728)    fentaNYL 5 mcg/ml in 0.9%  ml infusion Stopped (06/30/20 0703)      sodium chloride flush  10 mL Intravenous 2 times per day    enoxaparin  40 mg Subcutaneous Q24H    methylPREDNISolone  60 mg Intravenous Q6H    meropenem  1 g Intravenous Q8H    ipratropium-albuterol  1 ampule Inhalation Q4H WA    vancomycin (VANCOCIN) intermittent dosing (placeholder)   Other RX Placeholder    vancomycin  1,000 mg Intravenous Q24H    famotidine (PEPCID) injection  20 mg Intravenous Daily     sodium chloride flush, acetaminophen **OR** acetaminophen, magnesium hydroxide, promethazine **OR** ondansetron, potassium chloride **OR** potassium alternative oral replacement **OR** potassium chloride, potassium chloride, magnesium sulfate  Diet NPO Effective Now         Lab and other Data:     Recent Labs     06/29/20 1625 06/30/20 0349   WBC 13.3* 12.3*   HGB 10.4* 9.0*   * 306     Recent Labs     06/29/20  1625 06/29/20 2020 06/30/20 0349 06/30/20  0431   *  --   --  137  --    K 5.6*   < > 4.1 4.8 4.3   CL 99  --   --  107  --    CO2 18*  --   --  17*  --    BUN 28*  --   --  34*  --    CREATININE 1.3*  --   --  1.2*  --    GLUCOSE 332*  --   --  116*  --     < > = values in this interval not displayed. Recent Labs     06/29/20  1625   AST 26   ALT 13   BILITOT <0.2   ALKPHOS 119*     Troponin T:   Recent Labs     06/29/20  1625   TROPONINI <0.01     Pro-BNP: No results for input(s): BNP in the last 72 hours. INR:   Recent Labs     06/29/20  1625   INR 1.13     ABGs:   Lab Results   Component Value Date    PHART 7.290 06/30/2020    PO2ART 82.0 06/30/2020    DCU5LEW 36.0 06/30/2020     UA:  Recent Labs     06/29/20  1918   COLORU YELLOW   PHUR 6.5   WBCUA 6-9   RBCUA 0-1   BACTERIA 4+*   CLARITYU CLOUDY*   SPECGRAV 1.034   LEUKOCYTESUR Negative   UROBILINOGEN 0.2   BILIRUBINUR Negative   BLOODU SMALL*   GLUCOSEU Negative       Imaging:   XR CHEST PORTABLE   Final Result   1. Stable chronic change. Signed by Dr Ame Celestin on 6/30/2020 7:33 AM      XR CHEST PORTABLE   Final Result   1. Endotracheal tube appears appropriately positioned. 2. Consolidation in the right lung base concerning for infection.    Follow-up PA and lateral chest radiographs are recommended in 4-6   weeks to document resolution and exclude an underlying abnormality. 3. Emphysema. Signed by Dr Efraín Alvarez on 6/29/2020 6:38 PM      CTA PULMONARY W CONTRAST   Final Result   Impression:   1. No evidence of PE or acute aortic pathology. 2. Right lower lobe airspace disease may reflect an area of scarring   or infection. Findings are new compared to the previous exam from   2013. 3. Severe emphysema. 4. Bilateral noncalcified pulmonary nodules as above. Follow-up CT   recommended in 3 months. 5. Atherosclerosis of the aorta and coronary arteries. 6. Cardiomegaly. 7. Trace left pleural fluid. Interlobular septal thickening is also   evident. Signed by Dr Efraín Alvarez on 6/29/2020 5:52 PM      CT Head WO Contrast   Final Result   Impression:   1. No acute intracranial findings. Sequela of chronic microvascular   ischemia. 2. Sinus disease. Signed by Dr Efraín Alvarez on 6/29/2020 5:40 PM      XR CHEST PORTABLE    (Results Pending)     Micro:   Blood Culture, Routine   Date Value Ref Range Status   08/03/2019 No growth after 5 days of incubation.   Final   , No components found for: LABURINE  Patient Active Problem List    Diagnosis Date Noted    Acute hypoxemic respiratory failure ([address] 06/29/2020    Bilateral carotid artery stenosis 01/28/2020    Atherosclerosis of native artery of both lower extremities with intermittent claudication (HCC)     Atherosclerosis of native arteries of extremities with intermittent claudication, bilateral legs ([address] 09/18/2019    Spider veins 09/17/2019    Panic disorder 08/16/2019    Pulmonary hypertension due to COPD ([address] 08/15/2019    Acute respiratory failure with hypoxia and hypercapnia (HCC)     Inflammation of right sacroiliac joint ([address] 07/05/2019    Cigarette smoker 07/05/2019    Irritable bowel syndrome with diarrhea 07/05/2019    Inflammation of left sacroiliac joint ([address] 12/18/2018    COPD (chronic obstructive pulmonary disease) ([facility] 06/06/2018     FEV1 42%      Mild single current episode of major depressive disorder ([facility] 05/02/2018    Basal ganglia infarction ([facility] 10/14/2017       Assessment/plan: Active Problems:    Acute hypoxemic respiratory failure (HCC)  Resolved Problems:    * No resolved hospital problems.  *      Plan:   Admit, RT steroids abx, Bc, on vent, consult pulmonary   AE COPD  MS change 2 to hypoxemic hypercapnic respiratory failure   DVT prophylaxis with Lovenox        6/30/20  Remains intubated, per family she has been vaping CBD, continue RT steroids abx, pulmonary pending     Tanya Pennington MD  Hospitalist Service  6/30/2020  7:44 AM

## 2020-07-01 ENCOUNTER — OUTSIDE FACILITY SERVICE (OUTPATIENT)
Dept: PULMONOLOGY | Facility: CLINIC | Age: 80
End: 2020-07-01

## 2020-07-01 PROBLEM — Z51.5 PALLIATIVE CARE PATIENT: Status: ACTIVE | Noted: 2019-08-06

## 2020-07-01 PROCEDURE — 99233 SBSQ HOSP IP/OBS HIGH 50: CPT | Performed by: INTERNAL MEDICINE

## 2020-07-01 NOTE — ACP (ADVANCE CARE PLANNING)
Advance Care Planning     Advance Care Planning Activator (Inpatient)  Conversation Note      Date of ACP Conversation: 7/1/2020    Conversation Conducted with: Pt Madeline Harper Justin, who is alert and oriented. Her son Gaston Gomez is also present    ACP Activator: Yee Rajan Decision Maker:   Pt has a DPOA, her son Cecil Lu is her primary decision maker. \"Who would you like to name as your primary health care decision-maker? \"               Name: Cecil Lu      Relationship: son and DPOA         Phone number: [phone]  Darrion Stair this person be reached easily? \"  YES  \"Who would you like to name as your back-up decision maker? \"   Name: Crystal Dixon       Relationship: son        Phone number: [phone]  Darrion Stair this person be reached easily? \" YES        Care Preferences    Ventilation: \"If you were in your present state of health and suddenly became very ill and were unable to breathe on your own, what would your preference be about the use of a ventilator (breathing machine) if it were available to you? \"      Would the patient desire the use of ventilator (breathing machine)?: YES    \"If your health worsens and it becomes clear that your chance of recovery is unlikely, what would your preference be about the use of a ventilator (breathing machine) if it were available to you? \"     Would the patient desire the use of ventilator (breathing machine)?: YES      Resuscitation  \"CPR works best to restart the heart when there is a sudden event, like a heart attack, in someone who is otherwise healthy. Unfortunately, CPR does not typically restart the heart for people who have serious health conditions or who are very sick. \"    \"In the event your heart stopped as a result of an underlying serious health condition, would you want attempts to be made to restart your heart (answer \"yes\" for attempt to resuscitate) or would you prefer a natural death (answer \"no\" for do not attempt to resuscitate)? \" Stuart Walton Conversation Outcomes:  [x] ACP discussion completed  [x] Existing advance directive reviewed with patient; no changes to patient's previously recorded wishes  [] New Advance Directive completed  [] Portable Do Not Rescitate prepared for Provider review and signature  [] POLST/POST/MOLST/MOST prepared for Provider review and signature        Electronically signed by Adria Bolanos RN on 7/1/2020 at 9:42 AM

## 2020-07-01 NOTE — PROGRESS NOTES
Pulmonary and Critical Care Progress Note [address] [facility]    Patient: Cecilia Tian    1940    MR# [id number]    Acct# [de-identified]   07/01/20   7:59 AM  Referring Provider: Milo Mahmood DO    Chief Complaint: Mechanically ventilated    Interval history: The patient failed spontaneous breathing trials twice yesterday but nursing has determined that she has a lot of underlying anxiety. She has now been on CPAP trial for 1 hour this morning and is doing very well when she is reminded to be calm. ABGs and chest x-ray are stable. We will get a follow-up ABG and hopefully proceed towards extubation. It is noted that the patient is on home medications to help her anxiety and depression and after a speech evaluation today those can be restarted. Medications:  meropenem, 1 g, Intravenous, Q12H    sodium chloride flush, 10 mL, Intravenous, 2 times per day    enoxaparin, 40 mg, Subcutaneous, Q24H    methylPREDNISolone, 60 mg, Intravenous, Q6H    ipratropium-albuterol, 1 ampule, Inhalation, Q4H WA    vancomycin (VANCOCIN) intermittent dosing (placeholder), , Other, RX Placeholder    vancomycin, 1,000 mg, Intravenous, Q24H    famotidine (PEPCID) injection, 20 mg, Intravenous, Daily   Review of Systems:   Cannot obtain due to mechanical ventilation    Physical Exam:  Blood pressure (!) 147/62, pulse 51, temperature 96.8 °F (36 °C), temperature source Temporal, resp. rate 21, height 5' (1.524 m), weight 150 lb (68 kg), SpO2 95 %, not currently breastfeeding.     Intake/Output Summary (Last 24 hours) at 7/1/2020 0759  Last data filed at 7/1/2020 0600  Gross per 24 hour   Intake 2184.49 ml   Output 1220 ml   Net 964.49 ml        Vent Mode: AC/VC/Vt Ordered: 450 mL/Rate Set: 20 bmp/Pressure Support: 5 cmH20/PEEP/CPAP: 5/FiO2 : 35 %/   Peak Inspiratory Pressure: 27 cmH2O  Mean Airway Pressure: 13 cmH20  I:E Ratio: 1:2.90  Rate Measured: 20 br/min  Minute Volume: 20 Liters  Plateau Pressure: 13 cmH20 Any change in status will be called. --    LABGRAM  --  Moderate WBC's (Polymorphonuclear)  Mixed bacterial morphotypes suggestive of  Normal respiratory sean     CULTRESP  --  Normal respiratory sean     Radiograph:   XR CHEST PORTABLE 7/1/2020 5:00 AM   HISTORY: Mechanical ventilation   Comparison: 6/30/2020    Findings:    ET tube is in stable position. Reidentified scarring in the right lung   base with pleural thickening in the right costophrenic angle. No new   consolidation. No pleural effusion or pneumothorax. The   cardiomediastinal silhouette and pulmonary vascularity are unchanged. No acute osseous or soft tissue abnormality is noted.        Impression   Impression:    1. No significant interval change. My radiograph interpretation: ET tube in place, chest x-ray stable from previous. Pulmonary Assessment:  1. Acute on chronic respiratory failure  2. Right lower lobe infiltrate  3. Emphysema  4. Reported history of COPD  5. Long smoking history, recently quit  6. History of lower extremity claudication  7. Anxiety    Recommend:   · Get follow-up ABG on spontaneous settings  · Can hopefully proceed towards extubation this morning  · If extubated will need speech eval and restart home meds. · Sputum culture without significant growth  · Continue on bronchodilator treatments  · We will decrease IV Solu-Medrol, which is likely not helping the patient's anxiety. Decreased to 40 mg twice daily. · Continues on Merrem and vancomycin. Electronically signed by Bird Calvin on 7/1/2020 at 7:59 AM     Physician Substantive portion:  Patient was revisited following extubation. She is extubated and in no distress. She is grateful to be unhooked off the machine. She is sitting up in the bed. Breath sounds are diminished on exam.  Nasal cannula in place. Abdomen is soft. Recommend continuing tapering steroids. Continue oxygen and titrate as needed.   She remains on broad-spectrum antibiotics. Defer continuation or discontinuation of those to others. Right lower lobe infiltrate noted peripherally, presumed right lower lobe pneumonia. This would require 6 to 10 days of antibiotic therapy altogether. Mobilize. Physical therapy. I have seen and examined patient personally, performing a face-to-face diagnostic evaluation with plan of care reviewed and developed with APRN and nursing staff. I have addended and/or modified the above history of present illness, physical examination, and assessment and plan to reflect my findings and impressions. Essential elements of the care plan were discussed with APRN above. Agree with findings and assessment/plan as documented above.     Electronically signed by Jose Weaver, on 7/1/2020, 3:40 PM

## 2020-07-01 NOTE — PROGRESS NOTES
Speech Language Pathology  Facility/Department: [facility]   CLINICAL BEDSIDE SWALLOW EVALUATION  SPEECH PRODUCTION EVALUATION     NAME: Norm Norman  : 1940  MRN: [id number]    ADMISSION DATE: 2020  ADMITTING DIAGNOSIS: has Basal ganglia infarction ([address] Mild single current episode of major depressive disorder ([address] COPD (chronic obstructive pulmonary disease) ([address] Inflammation of left sacroiliac joint ([address] Inflammation of right sacroiliac joint ([address] Cigarette smoker; Irritable bowel syndrome with diarrhea; Acute respiratory failure with hypoxia and hypercapnia ([address] Pulmonary hypertension due to COPD ([address] Panic disorder; Spider veins; Atherosclerosis of native arteries of extremities with intermittent claudication, bilateral legs ([address] Atherosclerosis of native artery of both lower extremities with intermittent claudication ([address] Bilateral carotid artery stenosis; Acute hypoxemic respiratory failure ([address] and Palliative care patient on their problem list.    Date of Eval: 2020  Evaluating Therapist: Shannon Ellis     Reason for Referral  Norm Norman was referred for a bedside swallow evaluation to assess the efficiency of her swallow function, identify signs and symptoms of aspiration and make recommendations regarding safe dietary consistencies, effective compensatory strategies, and safe eating environment. Impression  Assessed patient's swallowing function S/P extubation this date. Patient exhibits decreased oral prep of more solid consistencies, inconsistently fast oral transit and suspected swallow delay with thin liquids, and sluggish, mild-moderately decreased laryngeal elevation for swallow airway protection. No outward S/S penetration/aspiration was observed with any ice chip trial, puree consistency trial, honey thick liquid trial, nectar thick liquid trial, or thin H2O trial administered during evaluation this date.  Patient did however appear SOB within short period of time.     At this time, secondary to suspected quick onset of fatigue, would trial full liquid diet with honey thick liquids. TOTAL FEED. Recommend meds crushed in pudding/applesauce. If patient receives mouth care prior to intake, okay for ice chips IN BETWEEN MEALS for comfort. Will continue to follow. Thank you for this consult. Treatment Plan  Requires SLP Intervention: Yes     Recommended Diet and Intervention  Liquid Consistency Recommendation: Full liquid diet with honey thick liquids   Recommended Form of Meds: Meds crushed in puree as able  Therapeutic Interventions: Patient/Family education;Diet tolerance monitoring; Therapeutic PO trials with SLP     Compensatory Swallowing Strategies  Compensatory Swallowing Strategies: Upright as possible for all oral intake;TOTAL FEED;Small bites/sips;Eat/Feed slowly; Alternate solids and liquids; Remain upright for 30-45 minutes after meals     Treatment/Goals  Timeframe for Short-term Goals: 1x/day for 3 days   Goal 1: Patient will tolerate full liquid diet with honey thick liquids with min S/S penetration/aspiration during PO intake. Goal 2: Patient staff will follow swallow safety recommendations to decrease risk of penetration/aspiration during PO intake. Goal 3: Re-assessment of swallow function for potential diet upgrade. Goal 4: Monitor speech production. General  Chart Reviewed: Yes  Behavior/Cognition: Alert; Cooperative  O2 Device: Bi-pap   Communication Observation: (Assessed patient's speech production. Patient exhibits slow, decreased lingual movements during verbalizations. SLP still ranked functional intelligibility of speech for unfamiliar listeners at 100% in utterances without background noise present. Patient did present with mildly hoarse vocal quality during verbalizations.)  Follows Directions: Simple   Patient Positioning: Upright in bed  Consistencies Administered: Ice chips;Dysphagia Pureed (Dysphagia I); Honey - cup;Nectar - cup; Thin - cup      Oral Motor Examination   Labial ROM: (Adequate during labial retraction trials and labial protrusion trials.)  Labial Strength: (Adequate during labial compression trials.)  Labial Coordination: (Adequate movements were noted.)  Lingual ROM: (Adequate during lingual extension trials with full point achieved; decreased during lingual elevation trials without use of accessory jaw movement; adequate movements noted bilaterally.)  Lingual Strength: (Decreased.)  Lingual Coordination: (Mildly slowed movements were noted.)     Assessed patient's swallowing function with the following observations noted:     Oral Phase  Mastication: Ice chips (Patient exhibited decreased rotary jaw movement during oral prep of ice chip trials presented by SLP.)  Suspected Premature Bolus Loss: Puree; Thin - cup (Oral  transit of puree consistency trials, presented by SLP, varied from 1-3 seconds in length. Patient exhibited inconsistently fast oral transit of thin H2O trials presented via cup by SLP.)  Oral Phase - Comment: Oral transit of ice chip trials primarily measured 1-2 seconds in length. No puree consistency residue was observed post swallows. Oral transit of honey thick liquid trials and nectar thick liquid trials, all presented via cup by SLP, primarily measured 1-2 seconds in length. It is noted that patient exhibited inconsistent oral holding, with all PO consistencies, prior to what appeared to be coordination of breaths and initiation of oral transit. Pharyngeal Phase  Suspected Swallow Delay: Puree; Thin - cup (Suspect secondary to oral transit times.)  Decreased Laryngeal Elevation: (Patient exhibited sluggish, mild-moderately decreased laryngeal elevation for swallow airway protection.)  Pharyngeal: No outward S/S penetration/aspiration was noted with any ice chip trial, puree consistency trial, honey thick liquid trial, nectar thick liquid trial, or thin H2O trial administered during assessment this

## 2020-07-01 NOTE — PROGRESS NOTES
pH, Arterial 7.320Low   7.350 - 7.450 Final 07/01/2020  8:38 AM [facility]   pCO2, Arterial 35.0  35.0 - 45.0 mmHg Final 07/01/2020  8:38 AM [facility]   pO2, Arterial 117. 0High   80.0 - 100.0 mmHg Final 07/01/2020  8:38 AM [facility]   HCO3, Arterial 18. 0Low   22.0 - 26.0 mmol/L Final 07/01/2020  8:38 AM [facility] Excess, Arterial -7.4Low   -2.0 - 2.0 mmol/L Final 07/01/2020  8:38 AM [facility]   Hemoglobin, Art, Extended 9.2Low   12.0 - 16.0 g/dL Final 07/01/2020  8:38 AM [facility]   O2 Sat, Arterial 96.6  >92 % Final 07/01/2020  8:38 AM [facility]   Carboxyhgb, Arterial 1.7  0.0 - 5.0 % Final 07/01/2020  8:38 AM [facility]        0.0-1.5   (Smokers 1.5-5.0)    Methemoglobin, Arterial 0.7  <1.5 % Final 07/01/2020  8:38 AM [facility]   O2 Content, Arterial 12.7  Not Established mL/dL Final 07/01/2020  8:38 AM [facility]     CPAP 5/PSV 5, 35%. ABG's drawn from LR, AT+.

## 2020-07-01 NOTE — PROGRESS NOTES
[place] patient shouting from room. Found patient in severe distress. HR in 150's, SpO2 dropping into 80's. Placed on BiPAP 12/6, started scheduled DuoNeb tx.

## 2020-07-01 NOTE — PROGRESS NOTES
Called into patient's room at 0957 for respiratory distress. On assessment, the patient had very diminished lung sounds with expiratory wheezes. RT at bedside and placed the patient on Bipap 12/6 and gave a  breathing treatment. Her HR was in the 150s - RR 35-40. Dr. Monterroso Neither notified and ordered ativan which was given. The patient did not respond. Dr. Monterroso Neither then ordered morphine which was given. After about 10 minutes, the patient began to calm down. At this time, HR 80s - RR 20.

## 2020-07-01 NOTE — PROGRESS NOTES
06/30/20 2116    0.9 % sodium chloride infusion   Intravenous Continuous [facility], MD 75 mL/hr at 07/01/20 0014      potassium chloride (KLOR-CON M) extended release tablet 40 mEq  40 mEq Oral PRN [facility], [facility]        Or   [facility] potassium bicarb-citric acid (EFFER-K) effervescent tablet 40 mEq  40 mEq Oral PRN [facility], [facility]        Or   [facility] potassium chloride 10 mEq/100 mL IVPB (Peripheral Line)  10 mEq Intravenous PRN [facility], [facility]        potassium chloride 10 mEq/100 mL IVPB (Peripheral Line)  10 mEq Intravenous PRN [facility], [facility]        magnesium sulfate 2 g in 50 mL IVPB premix  2 g Intravenous PRN [facility], [facility]        methylPREDNISolone sodium (SOLU-MEDROL) injection 60 mg  60 mg Intravenous Q6H [facility], [facility]   60 mg at 07/01/20 0759    ipratropium-albuterol (DUONEB) nebulizer solution 1 ampule  1 ampule Inhalation Q4H [place] [facility], [facility]   1 ampule at 07/01/20 0612    vancomycin (VANCOCIN) intermittent dosing (placeholder)   Other RX Placeholder [facility], [facility]        vancomycin (VANCOCIN) 1 g in dextrose 5% 250 mL IVPB  1,000 mg Intravenous Q24H [facility], [facility]   Stopped at 06/30/20 1940    famotidine (PEPCID) injection 20 mg  20 mg Intravenous Daily DIANA Milton   20 mg at 07/01/20 7179    propofol injection  10 mcg/kg/min Intravenous Titrated Kaylyn Sinclair MD   Stopped at 07/01/20 0755    fentaNYL 5 mcg/ml in 0.9%  ml infusion  25 mcg/hr Intravenous Continuous Kaylyn Sinclair MD   Stopped at 06/30/20 0703       Past Medical History:  Past Medical History:   Diagnosis Date    Acid reflux     Allergic rhinitis     Bilateral carotid artery stenosis 1/28/2020    Hyperlipidemia     Hypertension     Irritable bowel syndrome with diarrhea 7/5/2019    Lung cancer ([facility] [address]     Lung with chemo    Mild single current episode of major depressive disorder ([facility] [address] 5/2/2018    Osteoarthritis     Osteoporosis     Other emphysema ([facility] [address] 12/22/2017    Palliative care patient 2019    Panic disorder 2019    Stage 3 severe COPD by GOLD classification ([facility] [address] 2018    FEV1 42%    Tobacco abuse 2018    Urinary incontinence     stress incontinence       Past Surgical History:  Past Surgical History:   Procedure Laterality Date    APPENDECTOMY      CARDIAC CATHETERIZATION  5/3/2013   MDL    EF 60%    HEMORRHOID SURGERY      HYSTERECTOMY      HYSTERECTOMY, TOTAL ABDOMINAL      LUNG CANCER SURGERY      TONSILLECTOMY      VASCULAR SURGERY  10/11/2019    [facility]. Aortogram and runoff. PTA and stenting of the left external iliac artery with a 7x 80 and innova stent dilated to 7.2mm. PTA of the left popliteal artery with 5mm x 2cm cutting balloon. attempted recalization of the peroneal artery aborted.        Family History  Family History   Problem Relation Age of Onset    High Blood Pressure Mother     High Blood Pressure Father     Diabetes Sister     High Blood Pressure Brother        Social History  Social History     Socioeconomic History    Marital status:      Spouse name: Not on file    Number of children: Not on file    Years of education: Not on file    Highest education level: Not on file   Occupational History    Not on file   Social Needs    Financial resource strain: Not on file    Food insecurity     Worry: Not on file     Inability: Not on file   Per [employer] needs     Medical: Not on file     Non-medical: Not on file   Tobacco Use    Smoking status: Former Smoker     Packs/day: 0.50     Years: 30.00     Pack years: 15.00     Types: Cigarettes     Start date: 1970     Last attempt to quit: 2019     Years since quittin.9    Smokeless tobacco: Never Used   Substance and Sexual Activity    Alcohol use: Yes     Comment: occcasionally    Drug use: No    Sexual activity: Yes     Partners: Male   Lifestyle    Physical activity     Days per week: Not on file     Minutes per session: Not on file    Stress: Not on file Relationships    Social connections     Talks on phone: Not on file     Gets together: Not on file     Attends Mandaeism service: Not on file     Active member of club or organization: Not on file     Attends meetings of clubs or organizations: Not on file     Relationship status: Not on file    Intimate partner violence     Fear of current or ex partner: Not on file     Emotionally abused: Not on file     Physically abused: Not on file     Forced sexual activity: Not on file   Other Topics Concern    Not on file   Social History Narrative    Not on file         Review of Systems:    Review of Systems   Constitutional: Negative for activity change and fatigue. Respiratory: Negative for cough and shortness of breath. Cardiovascular: Negative for chest pain and leg swelling. Gastrointestinal: Negative for constipation, diarrhea, nausea and vomiting. Genitourinary: Negative for difficulty urinating and dysuria. Musculoskeletal: Negative for arthralgias and back pain. Neurological: Negative for dizziness and headaches. Psychiatric/Behavioral: The patient is nervous/anxious. Objective:  Blood pressure (!) 178/128, pulse 65, temperature 97.2 °F (36.2 °C), temperature source Temporal, resp. rate 16, height 5' (1.524 m), weight 150 lb (68 kg), SpO2 98 %, not currently breastfeeding. Intake/Output Summary (Last 24 hours) at 7/1/2020 0909  Last data filed at 7/1/2020 0800  Gross per 24 hour   Intake 2862.7 ml   Output 1115 ml   Net 1747.7 ml       Physical Exam  Vitals signs reviewed. Constitutional:       Appearance: She is well-developed. HENT:      Head: Normocephalic and atraumatic. Eyes:      Conjunctiva/sclera: Conjunctivae normal.      Pupils: Pupils are equal, round, and reactive to light. Cardiovascular:      Rate and Rhythm: Normal rate and regular rhythm. Heart sounds: Normal heart sounds.    Pulmonary:      Effort: Pulmonary effort is normal.      Breath sounds: Normal breath sounds. Abdominal:      Palpations: Abdomen is soft. Musculoskeletal: Normal range of motion. Skin:     General: Skin is warm and dry. Neurological:      Mental Status: She is alert and oriented to person, place, and time. Labs:  BMP:   Recent Labs     06/29/20  1625  06/30/20  0349 06/30/20  0431 07/01/20  0358 07/01/20  0838   *  --  137  --   --   --    K 5.6*   < > 4.8 4.3 3.9 4.0   CL 99  --  107  --   --   --    CO2 18*  --  17*  --   --   --    BUN 28*  --  34*  --   --   --    CREATININE 1.3*  --  1.2*  --   --   --    CALCIUM 9.4  --  8.4*  --   --   --     < > = values in this interval not displayed. CBC:   Recent Labs     06/29/20 1625 06/30/20 0349   WBC 13.3* 12.3*   HGB 10.4* 9.0*   HCT 37.3 31.6*   MCV 85.2 82.7   * 306     LIVER PROFILE:   Recent Labs     06/29/20  1625   AST 26   ALT 13   BILITOT <0.2   ALKPHOS 119*     PT/INR:   Recent Labs     06/29/20 1625   PROTIME 14.4   INR 1.13     APTT:   Recent Labs     06/29/20 1625   APTT 38.9*     BNP:  No results for input(s): BNP in the last 72 hours. Ionized Calcium:No results for input(s): IONCA in the last 72 hours. Magnesium:No results for input(s): MG in the last 72 hours. Phosphorus:No results for input(s): PHOS in the last 72 hours. HgbA1C: No results for input(s): LABA1C in the last 72 hours. Hepatic:   Recent Labs     06/29/20  1625   ALKPHOS 119*   ALT 13   AST 26   PROT 7.5   BILITOT <0.2   LABALBU 4.1     Lactic Acid:   Recent Labs     06/29/20  1700   LACTA 2.0*     Troponin: No results for input(s): CKTOTAL, CKMB, TROPONINT in the last 72 hours. ABGs: No results for input(s): PH, PCO2, PO2, HCO3, O2SAT in the last 72 hours. CRP:  No results for input(s): CRP in the last 72 hours. Sed Rate:  No results for input(s): SEDRATE in the last 72 hours. Cultures:   No results for input(s): CULTURE in the last 72 hours. Recent Labs     06/29/20  1625   BC No Growth to date.   Any change in status will be called. BLOODCULT2 No Growth to date. Any change in status will be called. No results for input(s): CXSURG in the last 72 hours. Radiology reports as per the Radiologist  Radiology: Ct Head Wo Contrast    Result Date: 6/29/2020  EXAMINATION: CT HEAD WO CONTRAST 6/29/2020 5:38 PM HISTORY: Altered mental status Comparison: CT head without contrast 1/17/2020 Technique: Sequential imaging was performed from the vertex through the base of the skull without the use of IV contrast. Sagittal and coronal reformations were made from the original source data and reviewed. Automated exposure control was utilized for radiation dose reduction. Radiation dose:  mGy-cm Findings: There is mild diffuse cerebral atrophy. There is no evidence of acute intracranial hemorrhage or midline shift. There are periventricular and subcortical white matter changes, a nonspecific finding most often seen as sequela of chronic microvascular ischemia. There is no evidence of acute territorial infarct. The ventricles appear normal in configuration. The basilar cisterns are patent. There is some streak artifact through the posterior fossa. Allowing for this limitation, posterior fossa appears grossly normal. The calvarium is intact. There is ethmoid and maxillary sinus mucosal thickening. There is frothy material in the left maxillary sinus, suggesting sinusitis. The mastoid air cells appear clear. Calcifications are seen in the cavernous carotid arteries. Impression: 1. No acute intracranial findings. Sequela of chronic microvascular ischemia. 2. Sinus disease. Signed by Dr Jamey Cantu on 6/29/2020 5:40 PM    Xr Chest Portable    Result Date: 6/30/2020  XR CHEST PORTABLE 6/30/2020 7:33 AM History: Respiratory failure. Ventilator. Portable chest x-ray compared with yesterday. Endotracheal tube tip is 4 cm above the alis. Chronic interstitial lung disease with mild right basilar infiltrate again seen.  No pneumothorax. Heart size is normal and unchanged. 1. Stable chronic change. Signed by Dr Abby Orourke on 6/30/2020 7:33 AM    Xr Chest Portable    Result Date: 6/29/2020  EXAMINATION: XR CHEST PORTABLE 6/29/2020 6:36 PM HISTORY: Shortness of breath, intubation COMPARISON: 1/17/2020 FINDINGS: Endotracheal tube is in place with tip above the alis. The heart appears normal in size. Aorta is tortuous. Diffuse emphysematous changes are present. There is consolidation in the right lower lobe. Trace right pleural fluid not excluded. There is no appreciable pneumothorax. Pulmonary vasculature appears grossly normal. There is scoliosis of the thoracic spine. 1. Endotracheal tube appears appropriately positioned. 2. Consolidation in the right lung base concerning for infection. Follow-up PA and lateral chest radiographs are recommended in 4-6 weeks to document resolution and exclude an underlying abnormality. 3. Emphysema. Signed by Dr Nevin Real on 6/29/2020 6:38 PM    Cta Pulmonary W Contrast    Result Date: 6/29/2020  EXAMINATION: CTA PULMONARY W CONTRAST 6/29/2020 5:41 PM HISTORY: Shortness of breath Comparison: CT chest with contrast 4/9/2013 Technique: Sequential imaging was performed from the thoracic inlet through the upper abdomen after the administration of IV contrast. Sagittal and coronal reformations were made from the original source data and reviewed. Additionally, 3-D volume rendered images of the vessels were made per CTA protocol. Automated exposure control was utilized for radiation dose reduction. Radiation dose:  mGy-cm Findings: An endotracheal tube is in place with the tip approximately 2 cm above the alis. Thyroid gland is heterogeneous in appearance but not well evaluated. There is some inspissated material in the proximal trachea, presumably mucous. The esophagus is grossly normal in appearance. There appears to be a small hiatal hernia.  No pathologically enlarged axillary lymph nodes are identified. A mediastinal lymph node measures 7 mm in short axis. Calcified mediastinal lymph nodes indicate prior granulomatous exposure. A right paratracheal lymph node measures 9 mm in short axis. The heart is mildly enlarged. There is no pericardial effusion. There is trace left pleural fluid. Atherosclerotic calcifications are seen within the aorta and its branch vessels. The ascending thoracic aorta appears normal in caliber. The main pulmonary artery appears normal in caliber. The pulmonary arteries appear well opacified, and there are no filling defects to suggest PE. Extensive emphysematous changes are present. There is some associated interlobular septal thickening. A nodule in the posterior right upper lobe is similar to the prior exam and measures approximately 10 mm in size. A left upper lobe nodule anteriorly measures approximately 5 mm in size (axial image 20), new from the prior exam. A second anterior nodule in the left upper lobe measures approximately 5 mm in size (axial image 25). Scarring in the medial left upper lobe is stable from the prior exam. There is linear scarring versus atelectasis in the right lung base. There is new consolidation in the right lower lobe adjacent to the major fissure. A right middle lobe nodule measures approximately 4 mm in size with an adjacent smaller nodule (axial image 31). A pleural-based nodule in the posterior left lower lobe measures 10 mm in size, new from the prior exam (axial image 22). There are some pleural calcifications in the left lung apex. Review of the visualized portion of the upper abdomen demonstrates no acute abnormalities. Review of the visualized osseous structures demonstrates an age-indeterminate but likely chronic compression deformity of the T8 vertebral body. Degenerative changes and scoliotic changes are seen in the spine. An old sternal fracture is also evident. An old left rib fracture is seen. Impression: 1.  No evidence of PE or acute aortic pathology. 2. Right lower lobe airspace disease may reflect an area of scarring or infection. Findings are new compared to the previous exam from 2013. 3. Severe emphysema. 4. Bilateral noncalcified pulmonary nodules as above. Follow-up CT recommended in 3 months. 5. Atherosclerosis of the aorta and coronary arteries. 6. Cardiomegaly. 7. Trace left pleural fluid. Interlobular septal thickening is also evident. Signed by Dr Hermann Landaverde on 6/29/2020 5:52 PM    Vl Lower Extremity Arterial Segmental Pressures W Ppg Bilateral    Result Date: 6/29/2020  Vascular Lower Arterial Plethysmography Procedure  Demographics   Patient Name      Sherry Flores  Age                     68   Patient Number    [id number]       Gender                  Female   Visit Number      [id number]    Interpreting Physician  Humberto Abreu MD   Date of Birth     1940   Referring Physician     Eboni Julien   Accession Number  [id number]    [address] [place] [address]  Procedure Type of Study:   Extremities Arteries:Lower Arterial Plethysmography, VASC LOWER EXTREMITY  ART SEGMENTAL PRESSURES W PPG. Indications for Study:Claudication. Risk Factors   - The patient's risk factor(s) include: dyslipidemia and arterial     hypertension.   - The patient has a former tobacco history. - The patient's risk factor(s) include: Prior CVA, ,  Impression   Based on ankle brachial indices and doppler waveforms, the patient has  mildly diminished flow to the right lower extremity arterial system at  rest.  The patient has moderately diminished ankle-brachial indices left lower  extremity which would be consistent with claudication level symptoms. Based on segmental pressures and doppler waveforms, the patient likely has  disease in the bilateral superficial femoral arterial segments.    Signature   ----------------------------------------------------------------  Electronically signed by Humberto Abreu MD(Interpreting  physician) on 06/29/2020 05:44 PM  ----------------------------------------------------------------  Velocities are measured in cm/s ; Diameters are measured in mm Pressures +--------------------------------------++--------+-----+----+--------+-----+ ! ! !Right   ! ! Left!        !     ! +--------------------------------------++--------+-----+----+--------+-----+ ! Location                              ! !Pressure! Ratio! !Pressure! Ratio! +--------------------------------------++--------+-----+----+--------+-----+ ! Upper Thigh                           !!171     !1.07 !    !179     !1.12 ! +--------------------------------------++--------+-----+----+--------+-----+ ! Lower Thigh                           !!138     !0.86 !    !177     !1.11 ! +--------------------------------------++--------+-----+----+--------+-----+ ! Calf                                  !!128     !0.8  ! !124     ! 0.78 ! +--------------------------------------++--------+-----+----+--------+-----+ ! Ankle PT                              !!138     !0.86 !    !109     ! 0.68 ! +--------------------------------------++--------+-----+----+--------+-----+ ! DP                                    !!124     !0.78 !    !121     !0.76 ! +--------------------------------------++--------+-----+----+--------+-----+ ! Great Toe                             !!72      !0.45 !    !83      !0.52 ! +--------------------------------------++--------+-----+----+--------+-----+   - Brachial Pressure:Right: 160. Left:151.   - BERT:Right: 0.86. Left: 0.76. Plethysmographic Digit Evaluation +---------++--------+-----+---------------++--------+-----+----------------+ ! ! !Right   ! ! Left           !!        !     !                ! +---------++--------+-----+---------------++--------+-----+----------------+ ! Location ! !Pressure! Ratio! PPG Wave Form  ! !Pressure! Ratio! PPG Wave Form   ! +---------++--------+-----+---------------++--------+-----+----------------+ ! Great Toe!!72      !0.45 !               !!80      !0.52 !                ! +---------++--------+-----+---------------++--------+-----+----------------+       Assessment     Acute hypoxemic respiratory failure. Resolved. Successfully liberated from the ventilator. COPD. Continue medical management.       Branden Harley, DO

## 2020-07-02 ENCOUNTER — OUTSIDE FACILITY SERVICE (OUTPATIENT)
Dept: PULMONOLOGY | Facility: CLINIC | Age: 80
End: 2020-07-02

## 2020-07-02 PROCEDURE — 99232 SBSQ HOSP IP/OBS MODERATE 35: CPT | Performed by: INTERNAL MEDICINE

## 2020-07-02 NOTE — PLAN OF CARE
Nutrition Problem: Swallowing difficulty  Intervention: Food and/or Nutrient Delivery: Continue current diet  Nutritional Goals: New Goal: Pt will consume 50% or more of meals with no s/s intolerance

## 2020-07-02 NOTE — PROGRESS NOTES
Pulmonary and Critical Care Progress Note [facility]    Patient: Geetha Healy    1940    MR# [id number]    Acct# [de-identified]   07/02/20   7:32 AM  Referring Provider: Felice Muhammad DO    Chief Complaint: S/p extubation, respiratory failure/COPD    Interval history: The patient was extubated yesterday. She has been on and off BiPAP since. She's currently on 4L NC O2. Her home baseline is 2 L. She is sitting up at bedside. She has a lot of wheezing. She is alert and oriented. Medications:    budesonide, 0.5 mg, Nebulization, BID    predniSONE, 10 mg, Oral, Daily    PARoxetine, 10 mg, Oral, Daily    propranolol, 40 mg, Oral, TID    levoFLOXacin, 250 mg, Oral, Daily    fluticasone, 1 spray, Each Nostril, BID    losartan, 50 mg, Oral, BID    sodium chloride flush, 10 mL, Intravenous, 2 times per day    enoxaparin, 40 mg, Subcutaneous, Q24H    ipratropium-albuterol, 1 ampule, Inhalation, Q4H WA     Review of Systems:   Review of Systems   Constitutional: Negative for chills and fever. Eyes: Negative. Respiratory: Positive for cough (\"some\"), shortness of breath and wheezing. Cardiovascular: Negative for chest pain and palpitations. Gastrointestinal: Negative. Genitourinary: Negative. Musculoskeletal: Negative. Skin: Negative for rash. Neurological: Negative. Negative for headaches. Psychiatric/Behavioral: Negative. Physical Exam:  Blood pressure (!) 151/61, pulse 73, temperature 97 °F (36.1 °C), temperature source Temporal, resp. rate (!) 32, height 5' (1.524 m), weight 150 lb (68 kg), SpO2 99 %, not currently breastfeeding. Intake/Output Summary (Last 24 hours) at 7/2/2020 0732  Last data filed at 7/2/2020 0600  Gross per 24 hour   Intake 1918.99 ml   Output 550 ml   Net 1368.99 ml      Physical Exam  Vitals signs and nursing note reviewed. Constitutional:       Appearance: Normal appearance. She is well-developed. HENT:      Head: Normocephalic. Eyes:      Pupils: Pupils are equal, round, and reactive to light. Neck:      Musculoskeletal: Neck supple. Cardiovascular:      Rate and Rhythm: Normal rate and regular rhythm. Heart sounds: S1 normal and S2 normal.   Pulmonary:      Effort: Tachypnea present. Breath sounds: No stridor. Wheezing present. Abdominal:      General: Bowel sounds are normal.      Palpations: Abdomen is soft. Musculoskeletal:      Right lower leg: No edema. Left lower leg: No edema. Skin:     General: Skin is warm and dry. Neurological:      Mental Status: She is alert and oriented to person, place, and time. Psychiatric:         Attention and Perception: Attention normal.         Mood and Affect: Mood normal. Mood is not anxious. Behavior: Behavior normal.       Recent Labs     06/29/20 1625 06/30/20 0349   WBC 13.3* 12.3*   RBC 4.38 3.82*   HGB 10.4* 9.0*   HCT 37.3 31.6*   * 306   MCV 85.2 82.7   MCH 23.7* 23.6*   MCHC 27.9* 28.5*   RDW 17.5* 17.3*      Recent Labs     06/29/20 1625 06/30/20 0349 06/30/20 0431 07/01/20 0358 07/01/20  0838   *  --  137  --   --   --    K 5.6*   < > 4.8 4.3 3.9 4.0   CL 99  --  107  --   --   --    CO2 18*  --  17*  --   --   --    BUN 28*  --  34*  --   --   --    CREATININE 1.3*  --  1.2*  --   --   --    CALCIUM 9.4  --  8.4*  --   --   --    GLUCOSE 332*  --  116*  --   --   --     < > = values in this interval not displayed.       Recent Labs     06/30/20 0431 07/01/20 0358 07/01/20  0838   PHART 7.290* 7.330* 7.320*   [id number] 36.0 34.0* 35.0   PO2ART 82.0 81.0 117.0*   [id number] 17.3* 17.9* 18.0*   [id number] 94.1 94.5 96.6   BEART -8.5* -7.3* -7.4*     Recent Labs     06/29/20  1625 06/29/20  1700 06/30/20  0349   AST 26  --   --    ALT 13  --   --    ALKPHOS 119*  --   --    BILITOT <0.2  --   --    CALCIUM 9.4  --  8.4*   TROPONINI <0.01  --   --    LACTA  --  2.0*  --    INR 1.13  --   --      Recent Labs     06/29/20  1625 06/29/20  5510 BC No Growth to date. Any change in status will be called. --    LABGRAM  --  Moderate WBC's (Polymorphonuclear)  Mixed bacterial morphotypes suggestive of  Normal respiratory sean     CULTRESP  --  Normal respiratory sean     Radiograph:     My radiograph interpretation: No new today    Pulmonary Assessment:  1. Acute on chronic respiratory failure  2. Right lower lobe infiltrate  3. Emphysema  4. Reported history of COPD  5. Long smoking history, recently quit  6. History of lower extremity claudication  7. Anxiety    Recommend:   · Supplemental O2 to keep sats around 92-94%. Her home baseline is 2 L. · Bipap for sleep. · Continue on bronchodilator treatments  · Continue oral prednisone. This may need to be increased for a few days if wheezing persists. · Abx have now been changed to oral Levaquin  · Up to chair and ambulate with assist    Electronically signed by Tressa Hardy on 7/2/2020 at 7:32 AM     Physician Substantive portion:  He is off the ventilator but still is wheezy. Little bit anxious. Exam shows bilateral wheezing. She is sitting up. She is on nasal oxygen. Abdomen is soft. She is on oral prednisone now. I will give 1 dose of IV Solu-Medrol just to give her a little steroid boost for now. Continue ICU care today. Titrate oxygen. I have seen and examined patient personally, performing a face-to-face diagnostic evaluation with plan of care reviewed and developed with APRN and nursing staff. I have addended and/or modified the above history of present illness, physical examination, and assessment and plan to reflect my findings and impressions. Essential elements of the care plan were discussed with APRN above. Agree with findings and assessment/plan as documented above.     Electronically signed by Padmini Govea, on 7/2/2020, 2:44 PM

## 2020-07-02 NOTE — PROGRESS NOTES
Speech Language Pathology  Facility/Department: [facility] ICU  SWALLOW THERAPY   SPEECH THERAPY     NAME: Ulysses Wilkes  : 1940  MRN: [id number]    ADMISSION DATE: 2020  ADMITTING DIAGNOSIS: has Basal ganglia infarction ([address] Mild single current episode of major depressive disorder ([address] COPD (chronic obstructive pulmonary disease) ([address] Inflammation of left sacroiliac joint ([address] Inflammation of right sacroiliac joint ([address] Cigarette smoker; Irritable bowel syndrome with diarrhea; Acute respiratory failure with hypoxia and hypercapnia ([address] Pulmonary hypertension due to COPD ([address] Panic disorder; Spider veins; Atherosclerosis of native arteries of extremities with intermittent claudication, bilateral legs ([address] Atherosclerosis of native artery of both lower extremities with intermittent claudication ([address] Bilateral carotid artery stenosis; Acute hypoxemic respiratory failure ([address] and Palliative care patient on their problem list.    Date of Treat: 2020  Evaluating Therapist: Melody Kumar    Reason for Referral  Ulysses Wilkes was referred for a bedside swallow evaluation to assess the efficiency of her swallow function, identify signs and symptoms of aspiration and make recommendations regarding safe dietary consistencies, effective compensatory strategies, and safe eating environment. Impression  Re-assessed patient's swallowing function. Patient exhibits slow, decreased oral prep of more solid consistencies as well as sluggish, mild-moderately decreased laryngeal elevation for swallow airway protection. No outward S/S penetration/aspiration was observed with any puree consistency presentation, regular solid consistency trial, nectar thick liquid trial, or thin H2O trial administered during treatment session this date. Patient did however appear SOB within short period of time.     At this time, would trial bite sized consistency and nectar thick liquids. ASSIST DURING MEALS.  Recommend meds whole in pudding/applesauce. If patient receives mouth care prior to intake, okay for ice chips and small sips thin H2O IN BETWEEN MEALS for comfort. Will continue to follow.     Treatment Plan  Requires SLP Intervention: Yes     Recommended Diet and Intervention  Solid Consistency Recommendation: Bite sized   Liquid Consistency Recommendation: Nectar thick  Recommended Form of Meds: Meds whole in puree as able  Therapeutic Interventions: Patient/Family education;Diet tolerance monitoring; Therapeutic PO trials with SLP     Compensatory Swallowing Strategies  Compensatory Swallowing Strategies: Upright as possible for all oral intake;Assist during meals;Small bites/sips;Eat/Feed slowly; Alternate solids and liquids; Remain upright for 30-45 minutes after meals     Treatment/Goals  Timeframe for Short-term Goals: 1x/day for 3 days   Goal 1: Patient will tolerate bite sized consistency with nectar thick liquids with min S/S penetration/aspiration during PO intake. Goal 2: Patient staff will follow swallow safety recommendations to decrease risk of penetration/aspiration during PO intake. Goal 3: Re-assessment of swallow function for potential diet upgrade. Goal 4: Monitor speech production.      General  Chart Reviewed: Yes  Behavior/Cognition: Alert; Cooperative  O2 Device: Room Air   Communication Observation: (Monitored patient's speech production. Patient continues to exhibit slow, decreased lingual movements during verbalizations. SLP still ranked functional intelligibility of speech for unfamiliar listeners at 100% in utterances without background noise present. Patient continues to present with mildly hoarse vocal quality during verbalizations.)  Follows Directions: Simple   Dentition : Some missing teeth   Patient Positioning: Upright in bed  Consistencies Administered: Dysphagia Pureed (Dysphagia I); Regular solid; Nectar - cup; Thin - cup      Oral Motor Examination   Labial ROM: (Adequate during labial pudding/applesauce. If patient receives mouth care prior to intake, okay for ice chips and small sips thin H2O IN BETWEEN MEALS for comfort. Will continue to follow.     Electronically signed by YANETH Vigil on 7/2/2020 at 2:26 PM

## 2020-07-02 NOTE — PROGRESS NOTES
07/02/20 0809    sodium chloride flush 0.9 % injection 10 mL  10 mL Intravenous 2 times per day Nicole Pretty MD   10 mL at 07/02/20 0935    sodium chloride flush 0.9 % injection 10 mL  10 mL Intravenous PRN Nicole Pretty MD        acetaminophen (TYLENOL) tablet 650 mg  650 mg Oral Q6H PRN Nicole Pretty MD        Or   Woodrow Gutierres acetaminophen (TYLENOL) suppository 650 mg  650 mg Rectal Q6H PRN Nicole Pretty MD        magnesium hydroxide (MILK OF MAGNESIA) 400 MG/5ML suspension 30 mL  30 mL Oral Daily PRN Nicole Pretty MD        promethazine (PHENERGAN) tablet 12.5 mg  12.5 mg Oral Q6H PRN Nicole Pretty MD        Or    ondansetron (ZOFRAN) injection 4 mg  4 mg Intravenous Q6H PRN Nicole Pretty MD        enoxaparin (LOVENOX) injection 40 mg  40 mg Subcutaneous Q24H Nicole Pretty MD   40 mg at 07/01/20 1946    0.9 % sodium chloride infusion   Intravenous Continuous Roopa Trevor, DO 50 mL/hr at 07/01/20 1547      potassium chloride (KLOR-CON M) extended release tablet 40 mEq  40 mEq Oral PRN Nicole Pretty MD        Or   Woodrow Gutierres potassium bicarb-citric acid (EFFER-K) effervescent tablet 40 mEq  40 mEq Oral PRN Nicole Prtety MD        Or   Woodrow Gutierres potassium chloride 10 mEq/100 mL IVPB (Peripheral Line)  10 mEq Intravenous PRN Nicole Pretty MD        potassium chloride 10 mEq/100 mL IVPB (Peripheral Line)  10 mEq Intravenous PRN Nicole Pretty MD        magnesium sulfate 2 g in 50 mL IVPB premix  2 g Intravenous PRN Nicole Pretty MD        ipratropium-albuterol (DUONEB) nebulizer solution 1 ampule  1 ampule Inhalation Q4H WA Nicole Pretty MD   1 ampule at 07/02/20 0701       Past Medical History:  Past Medical History:   Diagnosis Date    Acid reflux     Allergic rhinitis     Bilateral carotid artery stenosis 1/28/2020    Hyperlipidemia     Hypertension     Irritable bowel syndrome with diarrhea 7/5/2019    Lung cancer ([facility] [address]     Lung with chemo    Mild single current episode of major depressive disorder activity     Days per week: Not on file     Minutes per session: Not on file    Stress: Not on file   Relationships    Social connections     Talks on phone: Not on file     Gets together: Not on file     Attends Anabaptism service: Not on file     Active member of club or organization: Not on file     Attends meetings of clubs or organizations: Not on file     Relationship status: Not on file    Intimate partner violence     Fear of current or ex partner: Not on file     Emotionally abused: Not on file     Physically abused: Not on file     Forced sexual activity: Not on file   Other Topics Concern    Not on file   Social History Narrative    Not on file         Review of Systems:    Review of Systems   Constitutional: Negative for activity change and fatigue. Respiratory: Positive for cough, shortness of breath and wheezing. Cardiovascular: Negative for chest pain and leg swelling. Gastrointestinal: Negative for constipation, diarrhea, nausea and vomiting. Genitourinary: Negative for difficulty urinating and dysuria. Musculoskeletal: Negative for arthralgias and back pain. Neurological: Negative for dizziness and headaches. Objective:  Blood pressure 101/78, pulse 83, temperature 97 °F (36.1 °C), temperature source Temporal, resp. rate 21, height 5' (1.524 m), weight 150 lb (68 kg), SpO2 90 %, not currently breastfeeding. Intake/Output Summary (Last 24 hours) at 7/2/2020 0955  Last data filed at 7/2/2020 0800  Gross per 24 hour   Intake 1340.78 ml   Output 610 ml   Net 730.78 ml       Physical Exam  Vitals signs reviewed. Constitutional:       Appearance: She is well-developed. She is ill-appearing. HENT:      Head: Normocephalic and atraumatic. Eyes:      Conjunctiva/sclera: Conjunctivae normal.      Pupils: Pupils are equal, round, and reactive to light. Cardiovascular:      Rate and Rhythm: Regular rhythm. Tachycardia present. Heart sounds: Normal heart sounds. Pulmonary:      Effort: Pulmonary effort is normal.      Breath sounds: Wheezing present. Abdominal:      General: Abdomen is flat. Palpations: Abdomen is soft. Musculoskeletal: Normal range of motion. Skin:     General: Skin is warm and dry. Neurological:      Mental Status: She is alert and oriented to person, place, and time. Labs:  BMP:   Recent Labs     06/29/20  1625  06/30/20  0349 06/30/20  0431 07/01/20  0358 07/01/20  0838   *  --  137  --   --   --    K 5.6*   < > 4.8 4.3 3.9 4.0   CL 99  --  107  --   --   --    CO2 18*  --  17*  --   --   --    BUN 28*  --  34*  --   --   --    CREATININE 1.3*  --  1.2*  --   --   --    CALCIUM 9.4  --  8.4*  --   --   --     < > = values in this interval not displayed. CBC:   Recent Labs     06/29/20 1625 06/30/20  0349   WBC 13.3* 12.3*   HGB 10.4* 9.0*   HCT 37.3 31.6*   MCV 85.2 82.7   * 306     LIVER PROFILE:   Recent Labs     06/29/20  1625   AST 26   ALT 13   BILITOT <0.2   ALKPHOS 119*     PT/INR:   Recent Labs     06/29/20  1625   PROTIME 14.4   INR 1.13     APTT:   Recent Labs     06/29/20  1625   APTT 38.9*     BNP:  No results for input(s): BNP in the last 72 hours. Ionized Calcium:No results for input(s): IONCA in the last 72 hours. Magnesium:No results for input(s): MG in the last 72 hours. Phosphorus:No results for input(s): PHOS in the last 72 hours. HgbA1C: No results for input(s): LABA1C in the last 72 hours. Hepatic:   Recent Labs     06/29/20  1625   ALKPHOS 119*   ALT 13   AST 26   PROT 7.5   BILITOT <0.2   LABALBU 4.1     Lactic Acid:   Recent Labs     06/29/20  1700   LACTA 2.0*     Troponin: No results for input(s): CKTOTAL, CKMB, TROPONINT in the last 72 hours. ABGs: No results for input(s): PH, PCO2, PO2, HCO3, O2SAT in the last 72 hours. CRP:  No results for input(s): CRP in the last 72 hours. Sed Rate:  No results for input(s): SEDRATE in the last 72 hours.     Cultures:   No results for input(s): CULTURE in the last 72 hours. Recent Labs     06/29/20  1625   BC No Growth to date. Any change in status will be called. BLOODCULT2 No Growth to date. Any change in status will be called. No results for input(s): CXSURG in the last 72 hours. Radiology reports as per the Radiologist  Radiology: Ct Head Wo Contrast    Result Date: 6/29/2020  EXAMINATION: CT HEAD WO CONTRAST 6/29/2020 5:38 PM HISTORY: Altered mental status Comparison: CT head without contrast 1/17/2020 Technique: Sequential imaging was performed from the vertex through the base of the skull without the use of IV contrast. Sagittal and coronal reformations were made from the original source data and reviewed. Automated exposure control was utilized for radiation dose reduction. Radiation dose:  mGy-cm Findings: There is mild diffuse cerebral atrophy. There is no evidence of acute intracranial hemorrhage or midline shift. There are periventricular and subcortical white matter changes, a nonspecific finding most often seen as sequela of chronic microvascular ischemia. There is no evidence of acute territorial infarct. The ventricles appear normal in configuration. The basilar cisterns are patent. There is some streak artifact through the posterior fossa. Allowing for this limitation, posterior fossa appears grossly normal. The calvarium is intact. There is ethmoid and maxillary sinus mucosal thickening. There is frothy material in the left maxillary sinus, suggesting sinusitis. The mastoid air cells appear clear. Calcifications are seen in the cavernous carotid arteries. Impression: 1. No acute intracranial findings. Sequela of chronic microvascular ischemia. 2. Sinus disease. Signed by Dr Neelam Pearson on 6/29/2020 5:40 PM    Xr Chest Portable    Result Date: 6/30/2020  XR CHEST PORTABLE 6/30/2020 7:33 AM History: Respiratory failure. Ventilator. Portable chest x-ray compared with yesterday.  Endotracheal tube tip is 4 cm grossly normal in appearance. There appears to be a small hiatal hernia. No pathologically enlarged axillary lymph nodes are identified. A mediastinal lymph node measures 7 mm in short axis. Calcified mediastinal lymph nodes indicate prior granulomatous exposure. A right paratracheal lymph node measures 9 mm in short axis. The heart is mildly enlarged. There is no pericardial effusion. There is trace left pleural fluid. Atherosclerotic calcifications are seen within the aorta and its branch vessels. The ascending thoracic aorta appears normal in caliber. The main pulmonary artery appears normal in caliber. The pulmonary arteries appear well opacified, and there are no filling defects to suggest PE. Extensive emphysematous changes are present. There is some associated interlobular septal thickening. A nodule in the posterior right upper lobe is similar to the prior exam and measures approximately 10 mm in size. A left upper lobe nodule anteriorly measures approximately 5 mm in size (axial image 20), new from the prior exam. A second anterior nodule in the left upper lobe measures approximately 5 mm in size (axial image 25). Scarring in the medial left upper lobe is stable from the prior exam. There is linear scarring versus atelectasis in the right lung base. There is new consolidation in the right lower lobe adjacent to the major fissure. A right middle lobe nodule measures approximately 4 mm in size with an adjacent smaller nodule (axial image 31). A pleural-based nodule in the posterior left lower lobe measures 10 mm in size, new from the prior exam (axial image 22). There are some pleural calcifications in the left lung apex. Review of the visualized portion of the upper abdomen demonstrates no acute abnormalities. Review of the visualized osseous structures demonstrates an age-indeterminate but likely chronic compression deformity of the T8 vertebral body.  Degenerative changes and scoliotic changes are seen in !Location ! !Pressure! Ratio! PPG Wave Form  ! !Pressure! Ratio! PPG Wave Form   ! +---------++--------+-----+---------------++--------+-----+----------------+ ! Great Toe!!72      !0.45 !               !!80      !0.52 !                ! +---------++--------+-----+---------------++--------+-----+----------------+       Assessment     Acute hypoxemic respiratory failure. Resolved. Successfully liberated from the ventilator.     COPD. Continue medical management. Transfer out of ICU.       Adan Soriano, DO

## 2020-07-02 NOTE — PROGRESS NOTES
Nutrition Assessment    Type and Reason for Visit: Reassess    Nutrition Assessment: Pt is improving from a nutritional standpoint AEB extubation and progression to an oral diet. Will monitor intake/tolerance and implement nutrition intervention as needed. Malnutrition Assessment:  · Malnutrition Status: At risk for malnutrition  · Context: Acute illness or injury  · Findings of the 6 clinical characteristics of malnutrition (Minimum of 2 out of 6 clinical characteristics is required to make the diagnosis of moderate or severe Protein Calorie Malnutrition based on AND/ASPEN Guidelines):  1. Energy Intake-Less than or equal to 50% of estimated energy requirement, Unable to assess    2. Weight Loss-No significant weight loss,    3. Fat Loss-No significant subcutaneous fat loss,    4. Muscle Loss-No significant muscle mass loss,    5. Fluid Accumulation-No significant fluid accumulation,    6.  Strength-Not measured    Nutrition Risk Level:  Moderate    Nutrient Needs:  · Estimated Daily Total Kcal: 3433-9004 kcals/day  · Estimated Daily Protein (g): 54-90 g/PRO/day  · Estimated Daily Total Fluid (ml/day): 0954-9047 mL/day    Nutrition Diagnosis:   · Problem: Swallowing difficulty  · Etiology: related to Acute injury/trauma     Signs and symptoms:  as evidenced by Swallow study results    Objective Information:  · Wound Type: Venous Stasis  · Current Nutrition Therapies:  · Oral Diet Orders: Dysphagia  Soft and Bite-Sized (Dysphagia 3), Mildly Thick   · Anthropometric Measures:  · Ht: 5' (152.4 cm)   · Current Body Wt: 150 lb (68 kg)  · Ideal Body Wt: 100 lb (45.4 kg)   · BMI Classification: BMI 25.0 - 29.9 Overweight    Nutrition Interventions:   Continue current diet  Continued Inpatient Monitoring    Nutrition Evaluation:   · Evaluation: Goal achieved   · Goals: New Goal: Pt will consume 50% or more of meals with no s/s intolerance    · Monitoring: Nutrition Progression, Meal Intake, Diet Tolerance, Skin Integrity, Wound Healing, Weight, Pertinent Labs, Chewing/Swallowing      Electronically signed by Rossana Mayers MS, RD, LD on 7/2/20 at 10:23 AM CDT    Contact Number: [phone]

## 2020-07-02 NOTE — PROGRESS NOTES
Aaron Anderson received from 147 to room # 408 . Mental Status: Patient is oriented, alert, coherent, logical, thought processes intact and able to concentrate and follow conversation. Vitals:    07/02/20 1639   BP: 137/77   Pulse: 77   Resp: 24   Temp: 97.3 °F (36.3 °C)   SpO2: 95%     Placed on cardiac monitor: No.  Belongings: None location is not applicable . Family at bedside No.  Oriented Patient to room. Call light within reach. Yes. Transfer was: Well tolerated by patient. .    Electronically signed by Alannah Suh RN on 7/2/2020 at 4:46 PM

## 2020-07-02 NOTE — PROGRESS NOTES
Follow up visit:  Pt sitting on the bedside, having breakfast.  She states, \"I have been working for my nurse to get my sat up and it's 98%, will just let her know. \"  Report from [place], and pt continues to have some anxiety. Continue current treatment plan and monitoring. Will continue to follow POC. Pt goals are to return home, her son Renato Moore lives with her and she has all needed DME.     Electronically signed by ePte Blank RN on 7/2/2020 at 9:49 AM

## 2020-07-03 ENCOUNTER — OUTSIDE FACILITY SERVICE (OUTPATIENT)
Dept: PULMONOLOGY | Facility: CLINIC | Age: 80
End: 2020-07-03

## 2020-07-03 PROBLEM — R91.1 PULMONARY NODULE SEEN ON IMAGING STUDY: Status: ACTIVE | Noted: 2020-01-01

## 2020-07-03 PROCEDURE — 99232 SBSQ HOSP IP/OBS MODERATE 35: CPT | Performed by: INTERNAL MEDICINE

## 2020-07-03 NOTE — PROGRESS NOTES
Pulmonary and Critical Care Progress Note [facility]    Patient: Theodora Gosselin    1940    MR# [id number]    Acct# [de-identified]   07/03/20   7:22 AM  Referring Provider: Gwen Norman MD    Chief Complaint: S/p extubation, respiratory failure/COPD    Interval history: She was transferred out of ICU yesterday. She remains on 4L NC O2. Her home baseline is 2 L. She is resting comfortably. No overnight events reported. Medications:    predniSONE, 20 mg, Oral, Daily    methylPREDNISolone, 40 mg, Intravenous, Daily    budesonide, 0.5 mg, Nebulization, BID    PARoxetine, 10 mg, Oral, Daily    propranolol, 40 mg, Oral, TID    levoFLOXacin, 250 mg, Oral, Daily    fluticasone, 1 spray, Each Nostril, BID    losartan, 50 mg, Oral, BID    sodium chloride flush, 10 mL, Intravenous, 2 times per day    enoxaparin, 40 mg, Subcutaneous, Q24H    ipratropium-albuterol, 1 ampule, Inhalation, Q4H WA     Review of Systems:   Review of Systems   Constitutional: Negative for chills and fever. Eyes: Negative. Respiratory: Positive for cough (\"some\"), shortness of breath and wheezing. Cardiovascular: Negative for chest pain and palpitations. Gastrointestinal: Negative. Genitourinary: Negative. Musculoskeletal: Negative. Skin: Negative for rash. Neurological: Negative. Negative for headaches. Psychiatric/Behavioral: Negative. Physical Exam:  Blood pressure (!) 154/74, pulse 68, temperature 98.5 °F (36.9 °C), temperature source Temporal, resp. rate 20, height 5' (1.524 m), weight 150 lb (68 kg), SpO2 (!) 88 %, not currently breastfeeding. Intake/Output Summary (Last 24 hours) at 7/3/2020 0722  Last data filed at 7/3/2020 0356  Gross per 24 hour   Intake 340 ml   Output 890 ml   Net -550 ml      Physical Exam  Vitals signs and nursing note reviewed. Constitutional:       Appearance: Normal appearance. She is well-developed. Interventions: Nasal cannula in place.    HENT: Head: Normocephalic. Eyes:      Pupils: Pupils are equal, round, and reactive to light. Neck:      Musculoskeletal: Neck supple. Cardiovascular:      Rate and Rhythm: Normal rate and regular rhythm. Heart sounds: S1 normal and S2 normal.   Pulmonary:      Effort: No tachypnea. Breath sounds: No stridor. Decreased breath sounds present. No wheezing. Abdominal:      General: Bowel sounds are normal.      Palpations: Abdomen is soft. Musculoskeletal:      Right lower leg: No edema. Left lower leg: No edema. Skin:     General: Skin is warm and dry. Neurological:      Mental Status: She is alert and oriented to person, place, and time. Psychiatric:         Attention and Perception: Attention normal.         Mood and Affect: Mood normal. Mood is not anxious. Behavior: Behavior normal.       Recent Labs     07/03/20 0427   WBC 13.3*   RBC 3.52*   HGB 8.3*   HCT 29.8*      MCV 84.7   MCH 23.6*   MCHC 27.9*   RDW 17.7*      Recent Labs     07/01/20 0358 07/01/20  0838 07/03/20 0427   NA  --   --  138   K 3.9 4.0 4.4   CL  --   --  105   CO2  --   --  18*   BUN  --   --  33*   CREATININE  --   --  0.9   CALCIUM  --   --  8.8   GLUCOSE  --   --  71*      Recent Labs     07/01/20 0358 07/01/20 0838   PHART 7.330* 7.320*   [id number] 34.0* 35.0   PO2ART 81.0 117.0*   [id number] 17.9* 18.0*   [id number] 94.5 96.6   BEART -7.3* -7.4*     Recent Labs     07/03/20 0427   CALCIUM 8.8     Radiograph:     My radiograph interpretation: No new today    Pulmonary Assessment:  1. Acute on chronic respiratory failure  2. Right lower lobe infiltrate  3. Emphysema  4. Reported history of COPD  5. Long smoking history, recently quit  6. History of lower extremity claudication  7. Anxiety    Recommend:   · Supplemental O2 to keep sats around 92-94%. Her home baseline is 2 L. · Bipap for sleep.    · Continue on bronchodilator treatments  · Wheezing is improved today and she appears to have been helped by an IV dose of Solu-Medrol yesterday. Continue oral prednisone. · oral Levaquin day #3  · Up to chair and ambulate with assist    Electronically signed by Tressa Hardy on 7/3/2020 at 7:22 AM     Physician Substantive portion:  Patient is without new complaints. Exam shows no distress, awake and alert. Diminished breath sounds with no accessory muscle use. No wheezing. Plan to continue oral steroids for now. Out of bed as much as possible. Continue antibiotics one more day. I have seen and examined patient personally, performing a face-to-face diagnostic evaluation with plan of care reviewed and developed with APRN and nursing staff. I have addended and/or modified the above history of present illness, physical examination, and assessment and plan to reflect my findings and impressions. Essential elements of the care plan were discussed with APRN above. Agree with findings and assessment/plan as documented above.     Electronically signed by Padmini Govea, on 7/3/2020, 1:27 PM

## 2020-07-03 NOTE — CONSULTS
Nutrition Assessment    Type and Reason for Visit: Reassess    Nutrition Recommendations: supplement desired but pt lactose intolerant and on thickened liquids. Will send 1 proteinex mixed in appleSauce with L & D  Nutrition Assessment: Pt continues to improve from a nutritional standpoint as po intake is now ranging 25-50%. applesauce with 1 proteinex added at L & D    Malnutrition Assessment:  · Malnutrition Status: No malnutrition  · Context: Acute illness or injury  · Findings of the 6 clinical characteristics of malnutrition (Minimum of 2 out of 6 clinical characteristics is required to make the diagnosis of moderate or severe Protein Calorie Malnutrition based on AND/ASPEN Guidelines):  1. Energy Intake-Less than or equal to 50% of estimated energy requirement, (2 days)    2. Weight Loss-No significant weight loss,    3. Fat Loss-No significant subcutaneous fat loss,    4. Muscle Loss-No significant muscle mass loss,    5. Fluid Accumulation-No significant fluid accumulation, Extremities  6.   Strength-Not measured    Nutrition Risk Level: Low    Nutrient Needs:  · Estimated Daily Total Kcal: 3653-4893 kcals/day  · Estimated Daily Protein (g): 54-90 g/PRO/day  · Estimated Daily Total Fluid (ml/day): 9149-2248 mL/day    Nutrition Diagnosis:   · Problem: Swallowing difficulty  · Etiology: related to Acute injury/trauma     Signs and symptoms:  as evidenced by Swallow study results    Objective Information:  · Nutrition-Focused Physical Findings:    · Wound Type: Venous Stasis  · Current Nutrition Therapies:  · Oral Diet Orders: Dysphagia  Soft and Bite-Sized (Dysphagia 3), Mildly Thick   · Oral Diet intake: 26-50%  · Oral Nutrition Supplement (ONS) Orders: Protein Modular  · ONS intake: Unable to assess     · Anthropometric Measures:  · Ht: 5' (152.4 cm)   · Current Body Wt: 150 lb (68 kg)  · Admission Body Wt:    · Ideal Body Wt: 100 lb (45.4 kg),   · BMI Classification: BMI 25.0 - 29.9 Overweight    Nutrition Interventions:   Continue current diet, Start ONS  Continued Inpatient Monitoring    Nutrition Evaluation:   · Evaluation: Progressing toward goals   · Goals: New Goal: Pt will consume 50% or more of meals with no s/s intolerance    · Monitoring: Meal Intake, Supplement Intake, Diet Tolerance, Skin Integrity, Wound Healing, Weight, Pertinent Labs, Chewing/Swallowing      Electronically signed by Lanice Gowers, MS, RD, LD on 7/3/20 at 11:25 AM CDT    Contact Number: *[phone]

## 2020-07-03 NOTE — PLAN OF CARE
Problem: Skin Integrity:  Goal: Will show no infection signs and symptoms  Description: Will show no infection signs and symptoms  Outcome: Ongoing     Problem: Falls - Risk of:  Goal: Will remain free from falls  Description: Will remain free from falls  Outcome: Ongoing     Problem: Gas Exchange - Impaired:  Goal: Levels of oxygenation will improve  Description: Levels of oxygenation will improve  Outcome: Ongoing     Problem: Nutrition  Goal: Optimal nutrition therapy  Description: Nutrition Problem: Swallowing difficulty  Intervention: Food and/or Nutrient Delivery: Continue current diet  Nutritional Goals: New Goal: Pt will consume 50% or more of meals with no s/s intolerance        7/3/2020 0000 by Luciana Guzman RN  Outcome: Ongoing  7/2/2020 1024 by Domenic Powell, MS, RD, LD  Outcome: Ongoing

## 2020-07-03 NOTE — PROGRESS NOTES
Speech Language Pathology  Facility/Department: [facility] 4 ONCOLOGY UNIT  SWALLOW THERAPY     NAME: Cecilia Tian  : 1940  MRN: [id number]    ADMISSION DATE: 2020  ADMITTING DIAGNOSIS: has Basal ganglia infarction ([address] Mild single current episode of major depressive disorder ([address] COPD (chronic obstructive pulmonary disease) ([address] Inflammation of left sacroiliac joint ([address] Inflammation of right sacroiliac joint ([address] Cigarette smoker; Irritable bowel syndrome with diarrhea; Acute respiratory failure with hypoxia and hypercapnia ([address] Pulmonary hypertension due to COPD ([address] Panic disorder; Spider veins; Atherosclerosis of native arteries of extremities with intermittent claudication, bilateral legs ([address] Atherosclerosis of native artery of both lower extremities with intermittent claudication ([address] Bilateral carotid artery stenosis; Acute hypoxemic respiratory failure ([address] Palliative care patient; and Pulmonary nodule seen on imaging study on their problem list.    Date of Treat: 7/3/2020  Evaluating Therapist: Dilcia Johnson    Reason for Referral  Cecilia Tian was referred for a bedside swallow evaluation to assess the efficiency of her swallow function, identify signs and symptoms of aspiration and make recommendations regarding safe dietary consistencies, effective compensatory strategies, and safe eating environment. Impression  Re-assessed patient's swallowing function. Patient exhibits slow, decreased oral prep of more solid consistencies as well as sluggish, mild-moderately decreased laryngeal elevation for swallow airway protection. Even so, no outward S/S penetration/aspiration was noted with any bite sized consistency presentation or nectar thick liquid presentation administered during treatment session this date. Just mild delayed coughs were observed with thin H2O trials. At this time, would recommend continuation bite sized consistency and nectar thick liquids.  ASSIST IN MEAL SET UP. Recommend meds whole in pudding/applesauce. If patient receives mouth care prior to intake, okay for ice chips and small sips thin H2O IN BETWEEN MEALS for comfort. Will continue to follow.     Treatment Plan  Requires SLP Intervention: Yes     Recommended Diet and Intervention  Solid Consistency Recommendation: Bite sized   Liquid Consistency Recommendation: Nectar thick  Recommended Form of Meds: Meds whole in puree as able  Therapeutic Interventions: Patient/Family education;Diet tolerance monitoring; Therapeutic PO trials with SLP     Compensatory Swallowing Strategies  Compensatory Swallowing Strategies: Upright as possible for all oral intake;Assist during meals;Small bites/sips;Eat/Feed slowly; Alternate solids and liquids; Remain upright for 30-45 minutes after meals     Treatment/Goals  Timeframe for Short-term Goals: 1x/day for 3 days   Goal 1: Patient will tolerate bite sized consistency with nectar thick liquids with min S/S penetration/aspiration during PO intake. Goal 2: Patient staff will follow swallow safety recommendations to decrease risk of penetration/aspiration during PO intake. Goal 3: Re-assessment of swallow function for potential diet upgrade. Goal 4: Monitor speech production.      General  Chart Reviewed: Yes  Behavior/Cognition: Alert; Cooperative  O2 Device: Room Air   Communication Observation: (Patient continues to exhibit slow, decreased lingual movements during verbalizations. SLP still ranked functional intelligibility of speech for unfamiliar listeners at 100% in utterances without background noise present.)  Follows Directions: Simple   Dentition : Some missing teeth   Patient Positioning: Upright in bed  Consistencies Administered: Bite sized; Nectar - cup; Thin - cup      Re-assessed patient's swallowing function with the following observations noted:     Oral Phase  Mastication: Bite sized (Patient exhibited slow oral prep with primarily vertical jaw movement noted at the front of the mouth during bite sized consistency presentations administered independently.)  Oral Phase - Comment: Oral transit of bite sized consistency primarily measured 1-2 seconds in length and min oral cavity residue was noted post swallows; residue cleared from the mouth with additional dry swallows. Oral transit of nectar thick liquid presentations and thin H2O trials, all presented independently via cup, primarily measured 1-2 seconds in length.       Pharyngeal Phase  Decreased Laryngeal Elevation: (Patient exhibited sluggish, mild-moderately decreased laryngeal elevation for swallow airway protection.)  Delayed Cough: Thin - cup   Pharyngeal: No outward S/S penetration/aspiration was noted with any bite sized consistency presentation or nectar thick liquid presentation administered during treatment session this date. Just mild delayed coughs were observed with thin H2O trials. At this time, would recommend continuation bite sized consistency and nectar thick liquids. ASSIST IN MEAL SET UP. Recommend meds whole in pudding/applesauce. If patient receives mouth care prior to intake, okay for ice chips and small sips thin H2O IN BETWEEN MEALS for comfort. Will continue to follow.     Electronically signed by YANETH Burrows on 7/3/2020 at 2:03 PM

## 2020-07-03 NOTE — PROGRESS NOTES
[facility]        Hospitalist Progress Note  7/3/2020 12:11 PM  Subjective:   Admit Date: 6/29/2020  PCP: Bill Hahn MD    Chief Complaint: Respiratory failure    Subjective: Patient seen and examined at bedside. No acute distress. Denies chest pain. Shortness of breath improving. Feeling better from admission. [facility] History: 61-year-old female in the outpatient vascular lab for testing with acute respiratory failure requiring intubation and MICU monitoring. Pulmonology consulted on admission. Patient has since been liberated from the ventilator and has been transferred to routine medical floors. Patient is being treated for COPD exacerbation as well as right lower lobe pneumonia. ROS: 14 point review of systems is negative except as specifically addressed above.     DIET DYSPHAGIA SOFT AND BITE-SIZED; Mildly Thick (Nectar)    Intake/Output Summary (Last 24 hours) at 7/3/2020 1211  Last data filed at 7/3/2020 0356  Gross per 24 hour   Intake 240 ml   Output 800 ml   Net -560 ml     Medications:  Current Facility-Administered Medications   Medication Dose Route Frequency Provider Last Rate Last Dose    predniSONE (DELTASONE) tablet 20 mg  20 mg Oral Daily Vincent Abed, DO   20 mg at 07/03/20 4592    budesonide (PULMICORT) nebulizer suspension 500 mcg  0.5 mg Nebulization BID Vincent Abed, DO   500 mcg at 07/03/20 0701    LORazepam (ATIVAN) injection 0.5 mg  0.5 mg Intravenous Q6H PRN Vincent Abed, DO   0.5 mg at 07/02/20 0047    morphine injection 1 mg  1 mg Intravenous Q4H PRN Vincent Abed, DO   1 mg at 07/02/20 5103    PARoxetine (PAXIL) tablet 10 mg  10 mg Oral Daily Vincent Abed, DO   10 mg at 07/03/20 3180    propranolol (INDERAL) tablet 40 mg  40 mg Oral TID Vincent Abed, DO   40 mg at 07/03/20 0836    levoFLOXacin (LEVAQUIN) tablet 250 mg  250 mg Oral Daily Vincent Abed, DO   250 mg at 07/03/20 0250    fluticasone 07/01/20  0358 07/01/20  0838 07/03/20  0427   NA  --   --  138   K 3.9 4.0 4.4   ANIONGAP  --   --  15   CL  --   --  105   CO2  --   --  18*   BUN  --   --  33*   CREATININE  --   --  0.9   GLUCOSE  --   --  71*   CALCIUM  --   --  8.8     No results for input(s): MG, PHOS in the last 72 hours. No results for input(s): AST, ALT, ALB, BILITOT, ALKPHOS, ALB in the last 72 hours. ABGs:No results for input(s): PH, PO2, PCO2, HCO3, BE, O2SAT in the last 72 hours. Troponin T: No results for input(s): TROPONINI in the last 72 hours. INR: No results for input(s): INR in the last 72 hours. Lactic Acid: No results for input(s): LACTA in the last 72 hours. Objective:   Vitals: BP (!) 140/72   Pulse 67   Temp 98.2 °F (36.8 °C) (Temporal)   Resp 16   Ht 5' (1.524 m)   Wt 150 lb (68 kg)   SpO2 95%   BMI 29.29 kg/m²   24HR INTAKE/OUTPUT:      Intake/Output Summary (Last 24 hours) at 7/3/2020 1211  Last data filed at 7/3/2020 0356  Gross per 24 hour   Intake 240 ml   Output 800 ml   Net -560 ml     General appearance: alert and cooperative with exam  HEENT: AT/NC  Lungs: BLAE, +wheezing  Heart: RRR  Abdomen: Soft  Extremities: no edema  Neurologic: Alert, gross motor and sensory function intact  Skin: warm    Assessment and Plan: Active Problems:    Acute hypoxemic respiratory failure [facility])    Palliative care patient    Pulmonary nodule seen on imaging study  Resolved Problems:    * No resolved hospital problems. *    COPD Exacerbation/PNA: Bronchodilators. Steroids. O2.  Pulmonology on board. Monitor. Pulmonary nodules on CT chest: Will require outpatient repeat imaging follow-up. Supportive management. PT/OT.     Advance Directive: Full Code    DVT prophylaxis: Lovenox    Discharge planning: TBD      Signed:  Berta Pichardo MD 7/3/2020 12:11 PM  Rounding Hospitalist

## 2020-07-03 NOTE — PROGRESS NOTES
Osteoporosis, Other emphysema ([address] Palliative care patient, Panic disorder, Stage 3 severe COPD by GOLD classification ([address] Tobacco abuse, and Urinary incontinence. has a past surgical history that includes Hemorrhoid surgery; Hysterectomy; Tonsillectomy; Appendectomy; Lung cancer surgery; Cardiac catheterization (5/3/2013   [facility]); Hysterectomy, total abdominal; and vascular surgery (10/11/2019). Restrictions     Vision/Hearing        Subjective  General  Diagnosis: resp failure  Follows Commands: Within Functional Limits  General Comment  Comments: on 02. wears at home  Subjective  Subjective: Pt states she feels weak but wants to amb to the BR for toileting and grooming. reports she is not incontinent and would be able to amb to the BR if has extension tubing for 02. Orientation     Social/Functional History  Social/Functional History  Lives With: Son  Home Equipment: 4 wheeled walker, Oxygen  ADL Assistance: Independent  Ambulation Assistance: Independent  Transfer Assistance: Independent  Cognition        Objective          AROM RLE (degrees)  RLE AROM: WFL  AROM LLE (degrees)  LLE AROM : WFL  Strength Other  Other: antigravity bilat le's        Bed mobility  Rolling to Right: Stand by assistance  Transfers  Sit to Stand: Contact guard assistance  Stand to sit: Contact guard assistance  Ambulation  Ambulation?: Yes  Ambulation 1  Surface: level tile  Device: Rolling Walker  Other Apparatus: O2  Assistance: Contact guard assistance  Quality of Gait: flexed posture, slow. noted Pt to become SOA with amb but did not appear to be in distress  Gait Deviations: Decreased step length;Decreased step height  Distance: 10 ft, 15 ft  Comments: Pt stood at sink for brushing teeth and for persona hygiene.      Balance  Comments: noted mild unsteadiness when standing at sink for grooming but Pt was able to self correct        Plan   Plan  Times per week: 5-7  Plan weeks: 1  Current Treatment Recommendations: Strengthening, Gait Training, Patient/Caregiver Education & Training, Functional Mobility Training, Transfer Training, Safety Education & Training  Plan Comment: 02, progress as tolerated by SOA.   Safety Devices  Type of devices: Call light within reach, Gait belt, Left in chair    G-Code       OutComes Score                                                  AM-PAC Score             Goals  Short term goals  Time Frame for Short term goals: 2 wks  Short term goal 1: amb 150 ft with rw and sba       Therapy Time   Individual Concurrent Group Co-treatment   Time In           Time Out           Minutes                   Nicolle Wakefield PT    Electronically signed by Nicolle Wakefield PT on 7/3/2020 at 10:17 AM\

## 2020-07-04 ENCOUNTER — OUTSIDE FACILITY SERVICE (OUTPATIENT)
Dept: PULMONOLOGY | Facility: CLINIC | Age: 80
End: 2020-07-04

## 2020-07-04 PROCEDURE — 99231 SBSQ HOSP IP/OBS SF/LOW 25: CPT | Performed by: INTERNAL MEDICINE

## 2020-07-04 NOTE — DISCHARGE SUMMARY
Discharge Summary      Blair Smith  :  1940  MRN:  [id number]    Admit date:  2020  Discharge date:      Admitting Physician:  Mally Lane MD    Advance Directive: Full Code    Consults: pulmonary/intensive care    Primary Care Physician:  Sabrina Duvall MD    Discharge Diagnoses:  Principal Problem:    Acute hypoxemic respiratory failure [facility])  Active Problems:    Palliative care patient    Pulmonary nodule seen on imaging study  Resolved Problems:    * No resolved hospital problems. *      Significant Diagnostic Studies:   Ct Head Wo Contrast    Result Date: 2020  EXAMINATION: CT HEAD WO CONTRAST 2020 5:38 PM HISTORY: Altered mental status Comparison: CT head without contrast 2020 Technique: Sequential imaging was performed from the vertex through the base of the skull without the use of IV contrast. Sagittal and coronal reformations were made from the original source data and reviewed. Automated exposure control was utilized for radiation dose reduction. Radiation dose:  mGy-cm Findings: There is mild diffuse cerebral atrophy. There is no evidence of acute intracranial hemorrhage or midline shift. There are periventricular and subcortical white matter changes, a nonspecific finding most often seen as sequela of chronic microvascular ischemia. There is no evidence of acute territorial infarct. The ventricles appear normal in configuration. The basilar cisterns are patent. There is some streak artifact through the posterior fossa. Allowing for this limitation, posterior fossa appears grossly normal. The calvarium is intact. There is ethmoid and maxillary sinus mucosal thickening. There is frothy material in the left maxillary sinus, suggesting sinusitis. The mastoid air cells appear clear. Calcifications are seen in the cavernous carotid arteries. Impression: 1. No acute intracranial findings. Sequela of chronic microvascular ischemia. 2. Sinus disease.  Signed by Dr Neelam Pearson on 6/29/2020 5:40 PM    Xr Chest Portable    Result Date: 6/30/2020  XR CHEST PORTABLE 6/30/2020 7:33 AM History: Respiratory failure. Ventilator. Portable chest x-ray compared with yesterday. Endotracheal tube tip is 4 cm above the alis. Chronic interstitial lung disease with mild right basilar infiltrate again seen. No pneumothorax. Heart size is normal and unchanged. 1. Stable chronic change. Signed by Dr Tawny Reeder on 6/30/2020 7:33 AM    Xr Chest Portable    Result Date: 6/29/2020  EXAMINATION: XR CHEST PORTABLE 6/29/2020 6:36 PM HISTORY: Shortness of breath, intubation COMPARISON: 1/17/2020 FINDINGS: Endotracheal tube is in place with tip above the alis. The heart appears normal in size. Aorta is tortuous. Diffuse emphysematous changes are present. There is consolidation in the right lower lobe. Trace right pleural fluid not excluded. There is no appreciable pneumothorax. Pulmonary vasculature appears grossly normal. There is scoliosis of the thoracic spine. 1. Endotracheal tube appears appropriately positioned. 2. Consolidation in the right lung base concerning for infection. Follow-up PA and lateral chest radiographs are recommended in 4-6 weeks to document resolution and exclude an underlying abnormality. 3. Emphysema. Signed by Dr Jose Pringle on 6/29/2020 6:38 PM    Cta Pulmonary W Contrast    Result Date: 6/29/2020  EXAMINATION: CTA PULMONARY W CONTRAST 6/29/2020 5:41 PM HISTORY: Shortness of breath Comparison: CT chest with contrast 4/9/2013 Technique: Sequential imaging was performed from the thoracic inlet through the upper abdomen after the administration of IV contrast. Sagittal and coronal reformations were made from the original source data and reviewed. Additionally, 3-D volume rendered images of the vessels were made per CTA protocol. Automated exposure control was utilized for radiation dose reduction. Radiation dose:  mGy-cm Findings:  An endotracheal tube is in place with the tip approximately 2 cm above the alis. Thyroid gland is heterogeneous in appearance but not well evaluated. There is some inspissated material in the proximal trachea, presumably mucous. The esophagus is grossly normal in appearance. There appears to be a small hiatal hernia. No pathologically enlarged axillary lymph nodes are identified. A mediastinal lymph node measures 7 mm in short axis. Calcified mediastinal lymph nodes indicate prior granulomatous exposure. A right paratracheal lymph node measures 9 mm in short axis. The heart is mildly enlarged. There is no pericardial effusion. There is trace left pleural fluid. Atherosclerotic calcifications are seen within the aorta and its branch vessels. The ascending thoracic aorta appears normal in caliber. The main pulmonary artery appears normal in caliber. The pulmonary arteries appear well opacified, and there are no filling defects to suggest PE. Extensive emphysematous changes are present. There is some associated interlobular septal thickening. A nodule in the posterior right upper lobe is similar to the prior exam and measures approximately 10 mm in size. A left upper lobe nodule anteriorly measures approximately 5 mm in size (axial image 20), new from the prior exam. A second anterior nodule in the left upper lobe measures approximately 5 mm in size (axial image 25). Scarring in the medial left upper lobe is stable from the prior exam. There is linear scarring versus atelectasis in the right lung base. There is new consolidation in the right lower lobe adjacent to the major fissure. A right middle lobe nodule measures approximately 4 mm in size with an adjacent smaller nodule (axial image 31). A pleural-based nodule in the posterior left lower lobe measures 10 mm in size, new from the prior exam (axial image 22). There are some pleural calcifications in the left lung apex.  Review of the visualized portion of the upper abdomen demonstrates no acute abnormalities. Review of the visualized osseous structures demonstrates an age-indeterminate but likely chronic compression deformity of the T8 vertebral body. Degenerative changes and scoliotic changes are seen in the spine. An old sternal fracture is also evident. An old left rib fracture is seen. Impression: 1. No evidence of PE or acute aortic pathology. 2. Right lower lobe airspace disease may reflect an area of scarring or infection. Findings are new compared to the previous exam from 2013. 3. Severe emphysema. 4. Bilateral noncalcified pulmonary nodules as above. Follow-up CT recommended in 3 months. 5. Atherosclerosis of the aorta and coronary arteries. 6. Cardiomegaly. 7. Trace left pleural fluid. Interlobular septal thickening is also evident. Signed by Dr Tyler Shepard on 6/29/2020 5:52 PM    Vl Lower Extremity Arterial Segmental Pressures W Ppg Bilateral    Result Date: 6/29/2020  Vascular Lower Arterial Plethysmography Procedure  Demographics   Patient Name      Kylah Michaels  Age                     68   Patient Number    [id number]       Gender                  Female   Visit Number      [id number]    Interpreting Physician  Toni Perry MD   Date of Birth     1940   Referring Physician     Jordin Guadarrama   Accession Number  [id number]    [address] [place] [address]  Procedure Type of Study:   Extremities Arteries:Lower Arterial Plethysmography, VASC LOWER EXTREMITY  ART SEGMENTAL PRESSURES W PPG. Indications for Study:Claudication. Risk Factors   - The patient's risk factor(s) include: dyslipidemia and arterial     hypertension.   - The patient has a former tobacco history.    - The patient's risk factor(s) include: Prior CVA, ,  Impression   Based on ankle brachial indices and doppler waveforms, the patient has  mildly diminished flow to the right lower extremity arterial system at  rest.  The patient has moderately diminished ankle-brachial indices left lower  extremity which would be consistent with claudication level symptoms. Based on segmental pressures and doppler waveforms, the patient likely has  disease in the bilateral superficial femoral arterial segments. Signature   ----------------------------------------------------------------  Electronically signed by Rafael MALDONADOInterpreting  physician) on 06/29/2020 05:44 PM  ----------------------------------------------------------------  Velocities are measured in cm/s ; Diameters are measured in mm Pressures +--------------------------------------++--------+-----+----+--------+-----+ ! ! !Right   ! ! Left!        !     ! +--------------------------------------++--------+-----+----+--------+-----+ ! Location                              ! !Pressure! Ratio! !Pressure! Ratio! +--------------------------------------++--------+-----+----+--------+-----+ ! Upper Thigh                           !!171     !1.07 !    !179     !1.12 ! +--------------------------------------++--------+-----+----+--------+-----+ ! Lower Thigh                           !!138     !0.86 !    !177     !1.11 ! +--------------------------------------++--------+-----+----+--------+-----+ ! Calf                                  !!128     !0.8  ! !124     ! 0.78 ! +--------------------------------------++--------+-----+----+--------+-----+ ! Ankle PT                              !!138     !0.86 !    !109     ! 0.68 ! +--------------------------------------++--------+-----+----+--------+-----+ ! DP                                    !!124     !0.78 !    !121     !0.76 ! +--------------------------------------++--------+-----+----+--------+-----+ ! Great Toe                             !!72      !0.45 !    !83      !0.52 ! +--------------------------------------++--------+-----+----+--------+-----+   - Brachial Pressure:Right: 160. Left:151.   - BERT:Right: 0.86. Left: 0.76.  Plethysmographic Digit Evaluation +---------++--------+-----+---------------++--------+-----+----------------+ ! ! !Right   ! ! Left           !!        !     !                ! +---------++--------+-----+---------------++--------+-----+----------------+ ! Location ! !Pressure! Ratio! PPG Wave Form  ! !Pressure! Ratio! PPG Wave Form   ! +---------++--------+-----+---------------++--------+-----+----------------+ ! Great Toe!!72      !0.45 !               !!80      !0.52 !                ! +---------++--------+-----+---------------++--------+-----+----------------+      Pertinent Labs:   CBC:   Recent Labs     07/03/20 0427 07/04/20 0214   WBC 13.3* 13.1*   HGB 8.3* 8.7*    302     BMP:    Recent Labs     07/03/20 0427 07/04/20 0214    138   K 4.4 4.5    101   CO2 18* 24   BUN 33* 35*   CREATININE 0.9 0.9   GLUCOSE 71* 105     INR: No results for input(s): INR in the last 72 hours. ABGs:No results for input(s): PH, PO2, PCO2, HCO3, BE, O2SAT in the last 72 hours. Lactic Acid:  Recent Labs     07/04/20 0214   LACTA 1.5       Procedures: Intubation  Hospital Course: 44-year-old female in the outpatient vascular lab for testing with acute respiratory failure requiring intubation and MICU monitoring. Pulmonology consulted on admission. Patient has since been liberated from the ventilator and has been transferred to routine medical floors. Patient is being treated for COPD exacerbation as well as right lower lobe pneumonia. Patient is greatly improved from admission and is currently saturating well on her home oxygen dose of 2 L. Pulmonology has signed off and is okay with discharge from their standpoint. Patient is currently hemodynamically stable for discharge home today to follow-up with PCP and pulmonology as an outpatient.     Physical Exam:  Vitals: BP (!) 162/88   Pulse 67   Temp 97.8 °F (36.6 °C) (Temporal)   Resp 14   Ht 5' (1.524 m)   Wt 150 lb (68 kg)   SpO2 100%   BMI 29.29 kg/m²   24HR INTAKE/OUTPUT: Intake/Output Summary (Last 24 hours) at 7/4/2020 0919  Last data filed at 7/4/2020 0316  Gross per 24 hour   Intake 490 ml   Output 3750 ml   Net -3260 ml     General appearance: alert and cooperative with exam  HEENT: AT/NC  Lungs: BLAE, minimal wheezing  Heart: RRR  Abdomen: Soft  Extremities: no edema  Neurologic: Alert, gross motor and sensory function intact  Skin: warm    Discharge Medications:       Cande Brown   Home Medication Instructions RUY:[id number]    Printed on:07/04/20 0919   Medication Information                      albuterol sulfate HFA (PROAIR HFA) 108 (90 Base) MCG/ACT inhaler  Inhale 2 puffs into the lungs every 6 hours as needed for Wheezing             aspirin EC 81 MG EC tablet  Take 1 tablet by mouth daily             atorvastatin (LIPITOR) 40 MG tablet  Take 1 tablet by mouth daily             Black Cohosh 20 MG TABS  Take by mouth every evening             CALCIUM PO  Take 500 mg by mouth daily             cetirizine (ZYRTEC) 10 MG tablet  Take 1 tablet by mouth daily             fluticasone (FLONASE) 50 MCG/ACT nasal spray  2 sprays by Nasal route daily             furosemide (LASIX) 20 MG tablet  Take 20 mg by mouth 2 times daily             ipratropium-albuterol (DUONEB) 0.5-2.5 (3) MG/3ML SOLN nebulizer solution  USE 1 VIAL IN NEBULIZER EVERY 4 HOURS AS NEEDED             levoFLOXacin (LEVAQUIN) 250 MG tablet  Take 1 tablet by mouth daily for 5 days             losartan (COZAAR) 100 MG tablet  Take 1 tablet by mouth daily             melatonin 5 MG TABS tablet  Take 5 mg by mouth nightly as needed              methylPREDNISolone (MEDROL DOSEPACK) 4 MG tablet  Take by mouth.             mirabegron (MYRBETRIQ) 25 MG TB24  Take 1 tablet by mouth daily             montelukast (SINGULAIR) 10 MG tablet  Take 1 tablet by mouth nightly             omeprazole (PRILOSEC) 20 MG delayed release capsule  TAKE 1 CAPSULE BY MOUTH DAILY             OXYGEN  Inhale 2 L/min into the lungs

## 2020-07-04 NOTE — PROGRESS NOTES
Pulmonary and Critical Care Progress Note [address] [facility]    Patient: Eric Millard    1940    MR# [id number]    Acct# [de-identified]   07/04/20   7:26 AM  Referring Provider: Windy Caba MD    Chief Complaint: S/p extubation, respiratory failure/COPD    Interval history: She is back to her home baseline of 2 L oxygen. She walked in the halls yesterday and is asking when she can go home. No new pulmonary complaints. No overnight events reported. Medications:    pantoprazole, 40 mg, Oral, QAM AC    predniSONE, 20 mg, Oral, Daily    budesonide, 0.5 mg, Nebulization, BID    PARoxetine, 10 mg, Oral, Daily    propranolol, 40 mg, Oral, TID    levoFLOXacin, 250 mg, Oral, Daily    fluticasone, 1 spray, Each Nostril, BID    losartan, 50 mg, Oral, BID    sodium chloride flush, 10 mL, Intravenous, 2 times per day    enoxaparin, 40 mg, Subcutaneous, Q24H    ipratropium-albuterol, 1 ampule, Inhalation, Q4H WA     Review of Systems:   Review of Systems   Constitutional: Negative for chills and fever. Eyes: Negative. Respiratory: Positive for shortness of breath (Baseline). Negative for cough and wheezing. Cardiovascular: Negative for chest pain and palpitations. Gastrointestinal: Negative. Genitourinary: Negative. Musculoskeletal: Negative. Skin: Negative for rash. Neurological: Negative. Negative for headaches. Psychiatric/Behavioral: Negative. Physical Exam:  Blood pressure (!) 162/88, pulse 67, temperature 97.8 °F (36.6 °C), temperature source Temporal, resp. rate 14, height 5' (1.524 m), weight 150 lb (68 kg), SpO2 100 %, not currently breastfeeding. Intake/Output Summary (Last 24 hours) at 7/4/2020 0726  Last data filed at 7/4/2020 0316  Gross per 24 hour   Intake 490 ml   Output 3750 ml   Net -3260 ml      Physical Exam  Vitals signs and nursing note reviewed. Constitutional:       Appearance: Normal appearance. She is well-developed.       Interventions: Nasal cannula in place. HENT:      Head: Normocephalic. Eyes:      Pupils: Pupils are equal, round, and reactive to light. Neck:      Musculoskeletal: Neck supple. Cardiovascular:      Rate and Rhythm: Normal rate and regular rhythm. Heart sounds: S1 normal and S2 normal.   Pulmonary:      Effort: No tachypnea. Breath sounds: No stridor. Decreased breath sounds present. No wheezing. Abdominal:      General: Bowel sounds are normal.      Palpations: Abdomen is soft. Musculoskeletal:      Right lower leg: No edema. Left lower leg: No edema. Skin:     General: Skin is warm and dry. Neurological:      Mental Status: She is alert and oriented to person, place, and time. Psychiatric:         Attention and Perception: Attention normal.         Mood and Affect: Mood normal. Mood is not anxious. Behavior: Behavior normal.       Recent Labs     07/03/20 0427 07/04/20 0214   WBC 13.3* 13.1*   RBC 3.52* 3.69*   HGB 8.3* 8.7*   HCT 29.8* 30.0*    302   MCV 84.7 81.3   MCH 23.6* 23.6*   MCHC 27.9* 29.0*   RDW 17.7* 17.5*      Recent Labs     07/01/20  0838 07/03/20 0427 07/04/20 0214   NA  --  138 138   K 4.0 4.4 4.5   CL  --  105 101   CO2  --  18* 24   BUN  --  33* 35*   CREATININE  --  0.9 0.9   CALCIUM  --  8.8 8.8   GLUCOSE  --  71* 105      Recent Labs     07/01/20  0838   PHART 7.320*   YEW3ZQE 35.0   PO2ART 117.0*   KXP1NJV 18.0*   Y5UBQNRQ 96.6   BEART -7.4*     Recent Labs     07/04/20 0214   AST 26   ALT 31   ALKPHOS 75   BILITOT 0.4   MG 2.0   CALCIUM 8.8   LACTA 1.5     Radiograph:     My radiograph interpretation: No new today    Pulmonary Assessment:  1. Acute on chronic respiratory failure  2. Right lower lobe infiltrate  3. Emphysema  4. Reported history of COPD  5. Long smoking history, recently quit  6. History of lower extremity claudication  7. Anxiety    Recommend:   · She is now back to her baseline supplemental oxygen of 2 L.   From our standpoint, she is okay for home when okay with others. · Continue Bipap for sleep. · Continue on bronchodilator treatments. She will resume her COPD regimen at home. · Recommend prednisone taper at discharge. · oral Levaquin day #4  · [facility] · Pulmonary is signing off. Electronically signed by Kennedy Maldonado on 7/4/2020 at 7:26 AM     Physician Substantive portion:  She feels much better and wants to go home. Exam shows no distress. Chest has a few crackles. No wheezes. Abd soft   Plan ok to home today. We can follow up if needed in the office. I have seen and examined patient personally, performing a face-to-face diagnostic evaluation with plan of care reviewed and developed with APRN and nursing staff. I have addended and/or modified the above history of present illness, physical examination, and assessment and plan to reflect my findings and impressions. Essential elements of the care plan were discussed with APRN above. Agree with findings and assessment/plan as documented above.     Electronically signed by Desiree Willingham on 7/4/2020, 12:24 PM

## 2020-07-04 NOTE — PLAN OF CARE
Problem: Skin Integrity:  Goal: Will show no infection signs and symptoms  Description: Will show no infection signs and symptoms  Outcome: Ongoing  Goal: Absence of new skin breakdown  Description: Absence of new skin breakdown  Outcome: Ongoing     Problem: Falls - Risk of:  Goal: Will remain free from falls  Description: Will remain free from falls  Outcome: Ongoing  Goal: Absence of physical injury  Description: Absence of physical injury  Outcome: Ongoing     Problem: Gas Exchange - Impaired:  Goal: Levels of oxygenation will improve  Description: Levels of oxygenation will improve  Outcome: Ongoing     Problem: Nutrition  Goal: Optimal nutrition therapy  Description: Nutrition Problem: Swallowing difficulty  Intervention: Food and/or Nutrient Delivery: Continue current diet  Nutritional Goals: New Goal: Pt will consume 50% or more of meals with no s/s intolerance        Outcome: Ongoing     Problem: Infection - Ventilator-Associated Pneumonia:  Goal: Prevent a ventilator associated event (VAE)  Description: Prevent a ventilator associated event (VAE)  Outcome: Ongoing  Goal: Absence of pulmonary infection  Description: Absence of pulmonary infection  Outcome: Ongoing

## 2020-07-06 NOTE — CARE COORDINATION
Aletha 45 Transitions Initial Follow Up Call    Call within 2 business days of discharge: Yes    Patient: Shivam Reid Patient : 1940   MRN: [id number]  Reason for Admission: Acute Hypoxic Respiratory Failure, COPD,  RML Pneumonia  Discharge Date: 20 RARS: Readmission Risk Score: 20      Last Discharge [address]       Complaint Diagnosis Description Type Department Provider    20 COPD Acute respiratory failure with hypercapnia ([facility] [address] . .. ED to Hosp-Admission (Discharged) (ADMITTED) [facility] ONC Marcelle Ledezma MD; Loc [facility]. .. Spoke with: N/A    Facility:[facility]     Non-face-to-face services provided:  Reviewed encounter information for continuity of care prior to follow up phone call - chart notes, consults, progress notes, test results, med list, appointments, AVS, other information. Care Transitions 24 Hour Call    Do you have all of your prescriptions and are they filled?:  Yes  Post Acute Services:  Home Health  Patient DME:  Straight cane, Walker  Do you have support at home?:  Child  Are you an active caregiver in your home?:  No  Care Transitions Interventions         Follow Up: Attempted to make contact with patient for an initial follow up call post discharge without success. Left a message regarding intent of call and call back information. Will try again at a later time.         Future Appointments   Date Time Provider Jaylon Joseph   2020 12:45 PM Manuel Tucker MD [facility]   2020 12:30 PM Fernando Dixon MD [facility] NEURO [facility]   2020  1:15 PM Judy Jones MD [facility]       Nancy Puga RN

## 2020-07-06 NOTE — TELEPHONE ENCOUNTER
Racquel Valenzuela called and left a VM stating that she was supposed to talk with [facility] on Tuesday but she had to be put in the hospital. She asked that someone return her call to discuss this.  She would like a call back at [phone]

## 2020-07-07 NOTE — CARE COORDINATION
Aletha 45 Transitions Initial Follow Up Call    Call within 2 business days of discharge: Yes    Patient: Elena Winchester Patient : 1940   MRN: [id number]  Reason for Admission:   Discharge Date: 20 RARS: Readmission Risk Score: 20      Last Discharge [facility]       Complaint Diagnosis Description Type Department Provider    20 COPD Acute respiratory failure with hypercapnia ([facility] [address] . .. ED to Hosp-Admission (Discharged) (ADMITTED) [facility] ONC Samanta Suggs MD; Esau Mccarthy. .. Spoke with: [address]: [place] [address]    Non-face-to-face services provided:  Reviewed encounter information for continuity of care prior to follow up phone call - chart notes, consults, progress notes, test results, med list, appointments, AVS, other information. Care Transitions 24 Hour Call    Do you have any ongoing symptoms?:  No  Do you have a copy of your discharge instructions?:  Yes  Do you have all of your prescriptions and are they filled?:  Yes  Have you been contacted by a [facility]?:  No  Have you scheduled your follow up appointment?:  Yes  How are you going to get to your appointment?:  Car - family or friend to transport  Were you discharged with any Home Care or Post Acute Services:  No  Post Acute Services:  Home Health  Patient DME:  Josie cane, Walker  Do you have support at home?:  Child  Do you feel like you have everything you need to keep you well at home?:  Yes  Are you an active caregiver in your home?:  No  Care Transitions Interventions         Follow Up ; Spoke with patient for initial CTC call after hospital discharge. She says she is doing better. Says she was having a procedure and had a panic attack afterwards and had to be intubated. She says she has her discharge medications and reconciled them today, 1111F order added. She says appetite is good. She wears 2 LPM home oxygen.  She does not have home care ordered says she does not need it. Her neighbor does help her with exercises. She has her follow up appointment with Dr. Patel Woo this week. She is not having any SOA beyond usual. She does not usually do daily weights, but encouraged to do so. No current problems or complaints. She was very appreciative of the care she received as inpatient. Advised to call with any needs prn. Will follow up with her at a later time.   Future Appointments   Date Time Provider Jaylon Joseph   7/9/2020 10:30 AM Janice Bolaños MD [facility]   8/11/2020 12:45 PM Janice Bolaños MD [facility]   8/17/2020 12:30 PM Trang Rg MD [facility] NEURO [facility]   9/22/2020  1:15 PM Etienne Reilly MD [facility] Cardio [facility]       Richmond Lovelace RN

## 2020-07-09 PROBLEM — Z99.81 OXYGEN DEPENDENT: Status: ACTIVE | Noted: 2020-01-01

## 2020-07-09 PROBLEM — J96.11 CHRONIC RESPIRATORY FAILURE WITH HYPOXIA (HCC): Status: ACTIVE | Noted: 2020-01-01

## 2020-07-09 PROBLEM — F33.42 RECURRENT MAJOR DEPRESSIVE DISORDER, IN FULL REMISSION (HCC): Status: ACTIVE | Noted: 2020-01-01

## 2020-07-09 NOTE — PROGRESS NOTES
2020    TELEHEALTH EVALUATION -- Audio/Visual (During GKKRE-20 public health emergency)    HPI:    Kathy Spencer (:  1940) has requested an audio/video evaluation for the following concern(s):    Patient presents today via video visit for f/u hospitalization. She has questions about the meds. She was diagnosed w/ recent bacterial pneumonia. This occurred after she was going through vascular study and she has not heard the results of this as of yet. She notes that whenever she did have the study she went to the bathroom and had a panic attack and she was taken to the emergency department and found to have pneumonia and she was hospitalized for 5 days. She is recovering slowly. She notes that she does feel better. She notes that she is concerned she may lose her oxygen due to Medicare. Patient has been deemed high risk and therefore avoiding getting out of the home and less medically necessary. Patient does note that since she has had the vascular study of her lower extremities that she has had more restless leg issues and is requesting something to help this. She notes that her medication is unaffordable for her overactive bladder and she would like to be on something else as it is over thousand dollars per month. Patient also notes that she was prescribed Levaquin and a Medrol Dosepak at the time of discharge. Review of Systems   Constitutional: Negative for chills and fever. Respiratory: Positive for cough and shortness of breath. Negative for chest tightness and wheezing. Cardiovascular: Negative for chest pain, palpitations and leg swelling. Gastrointestinal: Negative for abdominal pain, constipation, diarrhea, nausea and vomiting. Genitourinary: Positive for urgency. Negative for decreased urine volume, difficulty urinating, dysuria, frequency, pelvic pain and vaginal bleeding. Neurological: Positive for weakness. Prior to Visit Medications    Medication Sig Taking? Authorizing Provider   predniSONE 10 MG (21) TBPK Take 4 tabs daily for 3 days, then 3 tabs daily for 3 days, then 2 tabs for 3 days, then 1 tab for 3 days.  Yes Mindy Kenyon MD   oxybutynin (DITROPAN) 5 MG tablet Take 1 tablet by mouth 3 times daily Yes Mindy Kenyon MD   rOPINIRole (REQUIP) 0.25 MG tablet Take 1-2 tablets by mouth 3 times daily Yes Mindy Kenyon MD   levoFLOXacin (LEVAQUIN) 250 MG tablet Take 1 tablet by mouth daily for 5 days  Justin Arroyo MD   ipratropium-albuterol (DUONEB) 0.5-2.5 (3) MG/3ML SOLN nebulizer solution USE 1 VIAL IN NEBULIZER EVERY 4 HOURS AS NEEDED  Mindy Kenyon MD   PARoxetine (PAXIL) 10 MG tablet Starting in 14 days start 1 tab by mouth for 14 days, then increase 2 tabs a day  Mindy Kenyon MD   mirabegron (MYRBETRIQ) 25 MG TB24 Take 1 tablet by mouth daily  Mindy Kenyon MD   furosemide (LASIX) 20 MG tablet Take 20 mg by mouth 2 times daily  Historical Provider, MD   fluticasone (FLONASE) 50 MCG/ACT nasal spray 2 sprays by Nasal route daily  DIANA Florez NP   OXYGEN Inhale 2 L/min into the lungs continuous  DIANA Florez NP   propranolol (INDERAL) 40 MG tablet Take 1 tablet by mouth 3 times a day  Mindy Kenyon MD   CALCIUM PO Take 500 mg by mouth daily  Historical Provider, MD   Black Cohosh 20 MG TABS Take by mouth every evening  Historical Provider, MD   cetirizine (ZYRTEC) 10 MG tablet Take 1 tablet by mouth daily  Patient taking differently: Take 10 mg by mouth every evening   Mindy Kenyon MD   atorvastatin (LIPITOR) 40 MG tablet Take 1 tablet by mouth daily  Patient taking differently: Take 40 mg by mouth every evening   Mindy Kenyon MD   losartan (COZAAR) 100 MG tablet Take 1 tablet by mouth daily  Patient taking differently: Take 100 mg by mouth every evening   Mindy Kenyon MD   montelukast (SINGULAIR) 10 MG tablet Take 1 tablet by mouth nightly  Patient taking differently: Take 10 mg by mouth daily   Mindy Kenyon MD omeprazole (PRILOSEC) 20 MG delayed release capsule TAKE 1 CAPSULE BY MOUTH DAILY  Kofi Perera MD   potassium chloride (KLOR-CON M) 10 MEQ extended release tablet TAKE 2 TABLETS BY MOUTH EVERY DAY  Patient taking differently: Take 10 mEq by mouth 2 times daily TAKE 2 TABLETS BY MOUTH EVERY DAY  Kofi Perera MD   umeclidinium-vilanterol (ANORO ELLIPTA) 62.5-25 MCG/INH AEPB inhaler INHALE 1 PUFF INTO LUNGS DAILY. Kofi Perera MD   aspirin EC 81 MG EC tablet Take 1 tablet by mouth daily  Patient taking differently: Take 81 mg by mouth every evening   Kofi Perera MD   albuterol sulfate HFA (PROAIR HFA) 108 (90 Base) MCG/ACT inhaler Inhale 2 puffs into the lungs every 6 hours as needed for Wheezing  Kofi Perera MD   melatonin 5 MG TABS tablet Take 5 mg by mouth nightly as needed   Historical Provider, MD       Social History     Tobacco Use    Smoking status: Former Smoker     Packs/day: 0.50     Years: 30.00     Pack years: 15.00     Types: Cigarettes     Start date: 1970     Last attempt to quit: 2019     Years since quittin.9    Smokeless tobacco: Never Used   Substance Use Topics    Alcohol use: Yes     Comment: occcasionally    Drug use: No        Allergies   Allergen Reactions    Doxycycline      Patient states it caused chills and hot flashes severe.     Abilify [Aripiprazole]     Celebrex [Celecoxib]      swelling    Naproxen Hives    Lactose Intolerance (Gi) Nausea And Vomiting   ,   Past Medical History:   Diagnosis Date    Acid reflux     Allergic rhinitis     Bilateral carotid artery stenosis 2020    Hyperlipidemia     Hypertension     Irritable bowel syndrome with diarrhea 2019    Lung cancer ([facility] [address]     Lung with chemo    Mild single current episode of major depressive disorder ([facility] [address] 2018    Osteoarthritis     Osteoporosis     Other emphysema ([facility] [address] 2017    Palliative care patient 2019    Panic disorder 2019    Stage 3 severe COPD by GOLD classification ([facility] [address] 2018    FEV1 42%    Tobacco abuse 2018    Urinary incontinence     stress incontinence   ,   Past Surgical History:   Procedure Laterality Date    APPENDECTOMY      CARDIAC CATHETERIZATION  5/3/2013   MDL    EF 60%    HEMORRHOID SURGERY      HYSTERECTOMY      HYSTERECTOMY, TOTAL ABDOMINAL      LUNG CANCER SURGERY      TONSILLECTOMY      VASCULAR SURGERY  10/11/2019    TJR. Aortogram and runoff. PTA and stenting of the left external iliac artery with a 7x 80 and innova stent dilated to 7.2mm. PTA of the left popliteal artery with 5mm x 2cm cutting balloon. attempted recalization of the peroneal artery aborted.   ,   Social History     Tobacco Use    Smoking status: Former Smoker     Packs/day: 0.50     Years: 30.00     Pack years: 15.00     Types: Cigarettes     Start date: 1970     Last attempt to quit: 2019     Years since quittin.9    Smokeless tobacco: Never Used   Substance Use Topics    Alcohol use: Yes     Comment: occcasionally    Drug use: No   ,   Family History   Problem Relation Age of Onset    High Blood Pressure Mother     High Blood Pressure Father     Diabetes Sister     High Blood Pressure Brother    ,   Immunization History   Administered Date(s) Administered    Influenza Virus Vaccine 10/03/2016    Influenza, High Dose (Fluzone 65 yrs and older) 10/31/2018    Influenza, Tanesha Stuart, 6-35 Months, IM (Fluzone,Afluria) 2019    Influenza, Tanehsa Stuart, IM, (6 mo and older Fluzone, Flulaval, Fluarix and 3 yrs and older Afluria) 10/10/2017    Pneumococcal Conjugate 13-valent ([id number]) 10/10/2015    Pneumococcal Polysaccharide ([id number]) 2018   ,   Health Maintenance   Topic Date Due    DTaP/Tdap/Td vaccine (1 - Tdap) 03/10/1961    Shingles Vaccine (1 of 2) 03/10/1992    Flu vaccine (1) 2020    Annual Wellness Visit (AWV)  2020    Potassium monitoring  2021    Creatinine monitoring  2021    Breast cancer screen  02/03/2022    Colon cancer screen colonoscopy  06/04/2023    Pneumococcal 65+ years Vaccine  Completed    Hepatitis A vaccine  Aged Out    Hepatitis B vaccine  Aged Out    Hib vaccine  Aged Out    Meningococcal (ACWY) vaccine  Aged Out       PHYSICAL EXAMINATION:  [ INSTRUCTIONS:  \"[x]\" Indicates a positive item  \"[]\" Indicates a negative item  -- DELETE ALL ITEMS NOT EXAMINED]  Vital Signs: (As obtained by patient/caregiver or practitioner observation)    Blood pressure-  Heart rate-    Respiratory rate-    Temperature-  Pulse oximetry-     Constitutional: [x] Appears well-developed and well-nourished [] No apparent distress      [] Abnormal-   Mental status  [x] Alert and awake  [x] Oriented to person/place/time [x]Able to follow commands      Eyes:  EOM    [x]  Normal  [] Abnormal-  Sclera  [x]  Normal  [] Abnormal -         Discharge []  None visible  [] Abnormal -    HENT:   [x] Normocephalic, atraumatic. [] Abnormal   [x] Mouth/Throat: Mucous membranes are moist.     External Ears [x] Normal  [] Abnormal-     Neck: [x] No visualized mass     Pulmonary/Chest: [x] Respiratory effort normal.  [x] No visualized signs of difficulty breathing or respiratory distress        [] Abnormal-      Musculoskeletal:   [x] Normal gait with no signs of ataxia         [x] Normal range of motion of neck        [] Abnormal-       Neurological:        [x] No Facial Asymmetry (Cranial nerve 7 motor function) (limited exam to video visit)          [x] No gaze palsy        [] Abnormal-         Skin:        [x] No significant exanthematous lesions or discoloration noted on facial skin         [] Abnormal-            Psychiatric:       [x] Normal Affect [x] No Hallucinations        [] Abnormal-     Other pertinent observable physical exam findings-     ASSESSMENT/PLAN:    ICD-10-CM    1. Chronic obstructive pulmonary disease, unspecified COPD type (HCC) J44.9 predniSONE 10 MG (21) TBPK   2.  Recurrent major depressive disorder, in full remission ([email or web address] F33.42    3. Pneumonia due to infectious organism, unspecified laterality, unspecified part of lung J18.9 predniSONE 10 MG (21) TBPK     DC DISCHARGE MEDS RECONCILED W/ CURRENT OUTPATIENT MED LIST   4. RLS (restless legs syndrome) G25.81 rOPINIRole (REQUIP) 0.25 MG tablet   5. Stage 4 very severe COPD by GOLD classification ([facility] [address] J44.9    6. Oxygen dependent Z99.81    7. Chronic respiratory failure with hypoxia (HCC) J96.11    8. OAB (overactive bladder) N32.81 oxybutynin (DITROPAN) 5 MG tablet       Needs a longer steroid taper based on hx. High risk relapse and thus follow up as needed as well as in 4 weeks. Needs to continue O2 due to recent acute on chronic resp failure with hypoxia. Will continue O2 by NC 24/7 for pt as she is also stage 4 copd    Change oxybutynin and start ropinirole for uncontrolled restless leg syndrome and overactive bladder      Orders Placed This Encounter   Medications    predniSONE 10 MG (21) TBPK     Sig: Take 4 tabs daily for 3 days, then 3 tabs daily for 3 days, then 2 tabs for 3 days, then 1 tab for 3 days. Dispense:  30 each     Refill:  0    oxybutynin (DITROPAN) 5 MG tablet     Sig: Take 1 tablet by mouth 3 times daily     Dispense:  90 tablet     Refill:  3    rOPINIRole (REQUIP) 0.25 MG tablet     Sig: Take 1-2 tablets by mouth 3 times daily     Dispense:  180 tablet     Refill:  3       Orders Placed This Encounter   Procedures    DC DISCHARGE MEDS RECONCILED W/ CURRENT OUTPATIENT MED LIST       Return if symptoms worsen or fail to improve, for f/u COPD & recent PNA (VV). Shivam Reid is a 68 y.o. female being evaluated by a Virtual Visit (video visit) encounter to address concerns as mentioned above. A caregiver was present when appropriate.  Due to this being a TeleHealth encounter ([facility]), evaluation of the following organ systems was limited: Vitals/Constitutional/EENT/Resp/CV/GI//MS/Neuro/Skin/Heme-Lymph-Imm. Pursuant to the emergency declaration under the [facility], [address] [place] [address] and the [employer] Act, this Virtual Visit was conducted with patient's (and/or legal guardian's) consent, to reduce the patient's risk of exposure to COVID-19 and provide necessary medical care. The patient (and/or legal guardian) has also been advised to contact this office for worsening conditions or problems, and seek emergency medical treatment and/or call 911 if deemed necessary. Services were provided through a video synchronous discussion virtually to substitute for in-person clinic visit. Patient and provider were located at their individual homes or provider at secure site for business. --Judie Pollard MD on 7/9/2020 at 10:49 AM    An electronic signature was used to authenticate this note. Post-Discharge Transitional Care Management Services or Hospital Follow Up      [address]   YOB: 1940    Date of Office Visit:  7/9/2020  Date of Hospital Admission: 6/29/20  Date of Hospital Discharge: 7/4/20  Risk of hospital readmission (high >=14%.  Medium >=10%) :Readmission Risk Score: 20      Care management risk score Rising risk (score 2-5) and Complex Care (Scores >=6): 9     Non face to face  following discharge, date last encounter closed (first attempt may have been earlier): 7/7/2020 12:53 PM    Call initiated 2 business days of discharge: Yes    Patient Active Problem List   Diagnosis    Basal ganglia infarction ([address]    Mild single current episode of major depressive disorder ([address]    Stage 4 very severe COPD by GOLD classification ([address]    Inflammation of left sacroiliac joint ([address]    Inflammation of right sacroiliac joint ([address]    Cigarette smoker    Irritable bowel syndrome with diarrhea    Acute respiratory failure with hypoxia and hypercapnia (HCC)    Pulmonary hypertension due to COPD ([facility] [address]    Panic disorder    Spider veins    Atherosclerosis of native arteries of extremities with intermittent claudication, bilateral legs (HCC)    Atherosclerosis of native artery of both lower extremities with intermittent claudication (HCC)    Bilateral carotid artery stenosis    Acute hypoxemic respiratory failure (HCC)    Palliative care patient    Pulmonary nodule seen on imaging study    Recurrent major depressive disorder, in full remission ([facility] [address]    Chronic respiratory failure with hypoxia (HCC)    Oxygen dependent       Allergies   Allergen Reactions    Doxycycline      Patient states it caused chills and hot flashes severe.     Abilify [Aripiprazole]     Celebrex [Celecoxib]      swelling    Naproxen Hives    Lactose Intolerance (Gi) Nausea And Vomiting       Medications listed as ordered at the time of discharge from [facility], [address] Medication Instructions RUY:    Printed on:07/09/20 6495   Medication Information                      albuterol sulfate HFA (PROAIR HFA) 108 (90 Base) MCG/ACT inhaler  Inhale 2 puffs into the lungs every 6 hours as needed for Wheezing             aspirin EC 81 MG EC tablet  Take 1 tablet by mouth daily             atorvastatin (LIPITOR) 40 MG tablet  Take 1 tablet by mouth daily             Black Cohosh 20 MG TABS  Take by mouth every evening             CALCIUM PO  Take 500 mg by mouth daily             cetirizine (ZYRTEC) 10 MG tablet  Take 1 tablet by mouth daily             fluticasone (FLONASE) 50 MCG/ACT nasal spray  2 sprays by Nasal route daily             furosemide (LASIX) 20 MG tablet  Take 20 mg by mouth 2 times daily             ipratropium-albuterol (DUONEB) 0.5-2.5 (3) MG/3ML SOLN nebulizer solution  USE 1 VIAL IN NEBULIZER EVERY 4 HOURS AS NEEDED             levoFLOXacin (LEVAQUIN) 250 MG tablet  Take 1 tablet by mouth daily for 5 days             losartan (COZAAR) 100 MG tablet  Take 1 tablet by mouth daily             melatonin 5 MG TABS tablet  Take 5 mg by mouth nightly as needed              mirabegron (MYRBETRIQ) 25 MG TB24  Take 1 tablet by mouth daily             montelukast (SINGULAIR) 10 MG tablet  Take 1 tablet by mouth nightly             omeprazole (PRILOSEC) 20 MG delayed release capsule  TAKE 1 CAPSULE BY MOUTH DAILY             oxybutynin (DITROPAN) 5 MG tablet  Take 1 tablet by mouth 3 times daily             OXYGEN  Inhale 2 L/min into the lungs continuous             PARoxetine (PAXIL) 10 MG tablet  Starting in 14 days start 1 tab by mouth for 14 days, then increase 2 tabs a day             potassium chloride (KLOR-CON M) 10 MEQ extended release tablet  TAKE 2 TABLETS BY MOUTH EVERY DAY             predniSONE 10 MG (21) TBPK  Take 4 tabs daily for 3 days, then 3 tabs daily for 3 days, then 2 tabs for 3 days, then 1 tab for 3 days. propranolol (INDERAL) 40 MG tablet  Take 1 tablet by mouth 3 times a day             rOPINIRole (REQUIP) 0.25 MG tablet  Take 1-2 tablets by mouth 3 times daily             umeclidinium-vilanterol (ANORO ELLIPTA) 62.5-25 MCG/INH AEPB inhaler  INHALE 1 PUFF INTO LUNGS DAILY. Medications marked \"taking\" at this time  Outpatient Medications Marked as Taking for the 7/9/20 encounter (Virtual Visit) with Sophia Swanson MD   Medication Sig Dispense Refill    predniSONE 10 MG (21) TBPK Take 4 tabs daily for 3 days, then 3 tabs daily for 3 days, then 2 tabs for 3 days, then 1 tab for 3 days.  30 each 0    oxybutynin (DITROPAN) 5 MG tablet Take 1 tablet by mouth 3 times daily 90 tablet 3    rOPINIRole (REQUIP) 0.25 MG tablet Take 1-2 tablets by mouth 3 times daily 180 tablet 3        Medications patient taking as of now reconciled against medications ordered at time of hospital discharge: Yes    Chief Complaint   Patient presents with    Follow-Up from Hospital       History of Present illness - Follow up of Hospital diagnosis(es): see above    Inpatient course: Discharge summary reviewed- see chart. Interval history/Current status: guarded        There were no vitals filed for this visit. There is no height or weight on file to calculate BMI.    Wt Readings from Last 3 Encounters:   06/29/20 150 lb (68 kg)   04/06/20 150 lb (68 kg)   01/27/20 145 lb (65.8 kg)     BP Readings from Last 3 Encounters:   07/04/20 (!) 162/88   01/27/20 120/69   01/17/20 129/85          Medical Decision Making: high complexity

## 2020-07-10 NOTE — CARE COORDINATION
[facility] Transitions Follow Up Call    7/10/2020    Patient: Ulysses Wilkes  Patient : 1940   MRN: [id number]   Discharge Date: 20 RARS: Readmission Risk Score: 21         Spoke with: Fallon Mckeon Transitions Subsequent and Final Call    Schedule Follow Up Appointment with PCP:  Completed  Subsequent and Final Calls  Do you have any ongoing symptoms?:  No  Have your medications changed?:  No  Do you have any questions related to your medications?:  No  Do you currently have any active services?:  No  Are you currently active with any services?:  Home Health  Do you have any needs or concerns that I can assist you with?:  No  Identified Barriers:  None  Care Transitions Interventions  Other Interventions: Follow Up: Placed a call to the home and spoke with patient for a follow up call. She reported that she is doing very well. She said that her breathing is good today. She is not having any issues at this time. She said she has oxygen to use prn. Said she usually uses it mostly at night. She does neb treatments 3-4 times a day. Said she has a productive cough, but it is clear. She denied any medications issues. She is eating very well, drinking well. She is up and about in her home, does not venture out very often due to the humidity and heat. She is not aware of any issues. Did discuss COVID 19 information a bit, infection control, hand washing, exposure precautions, cleaning, etc.  She is aware and reported that she is not getting out and there are only a couple of people coming in. To call prn issues. CTN to follow up as indicated.       Plan for next call - assess overall status, CP status, pain, appetite, sleep patterns, abnormal signs and symptoms, effectiveness of medications, activity level and tolerance, falls/other unexpected events, findings from follow up appointments, medication/treatment changes, any additional teaching needs, etc.        Future Appointments   Date Time Provider Jaylon Joseph   8/11/2020 12:45 PM [facility]   8/17/2020 12:30 PM Emmie Downs MD [facility]   9/22/2020  1:15 PM Claudette Clifford MD [facility] Cardio [facility]       Doyle Del Valle RN

## 2020-07-14 NOTE — PROGRESS NOTES
2020    TELEHEALTH EVALUATION -- Audio/Visual (During IXFZM-29 public health emergency)    HPI:    Patient is located at home  Provider is located at [facility]   Also present during call is neighbor    Rodri Rider (:  1940) has requested an audio/video evaluation for the following concern(s):    Ramiro Cary has a history of peripheral vascular disease of the lower extremities. She has had this for 1 - 5 years. Current treatment includes ASA EC daily. Ramiro Cary has not had new wounds. Recently, she reports no claudication. She has mostly been limited by SOB due to a copd exacerbation. Prior to Visit Medications    Medication Sig Taking? Authorizing Provider   predniSONE 10 MG (21) TBPK Take 4 tabs daily for 3 days, then 3 tabs daily for 3 days, then 2 tabs for 3 days, then 1 tab for 3 days.  Yes Enrike Feliz MD   oxybutynin (DITROPAN) 5 MG tablet Take 1 tablet by mouth 3 times daily Yes Enrike Feliz MD   rOPINIRole (REQUIP) 0.25 MG tablet Take 1-2 tablets by mouth 3 times daily Yes Enrike Feliz MD   ipratropium-albuterol (DUONEB) 0.5-2.5 (3) MG/3ML SOLN nebulizer solution USE 1 VIAL IN NEBULIZER EVERY 4 HOURS AS NEEDED Yes Enrike Feliz MD   PARoxetine (PAXIL) 10 MG tablet Starting in 14 days start 1 tab by mouth for 14 days, then increase 2 tabs a day Yes Enrike Feliz MD   mirabegron (MYRBETRIQ) 25 MG TB24 Take 1 tablet by mouth daily Yes Enrike Feliz MD   furosemide (LASIX) 20 MG tablet Take 20 mg by mouth 2 times daily Yes Historical Provider, MD   fluticasone (FLONASE) 50 MCG/ACT nasal spray 2 sprays by Nasal route daily Yes DIANA Dixon - NP   OXYGEN Inhale 2 L/min into the lungs continuous Yes Emili Cheng APRN - NP   propranolol (INDERAL) 40 MG tablet Take 1 tablet by mouth 3 times a day Yes Enrike Feliz MD   CALCIUM PO Take 500 mg by mouth daily Yes Historical Provider, MD   Black Cohosh 20 MG TABS Take by mouth every evening Yes Historical Provider, MD cetirizine (ZYRTEC) 10 MG tablet Take 1 tablet by mouth daily  Patient taking differently: Take 10 mg by mouth every evening  Yes Jannie Cobian MD   atorvastatin (LIPITOR) 40 MG tablet Take 1 tablet by mouth daily  Patient taking differently: Take 40 mg by mouth every evening  Yes Jannie Cobian MD   losartan (COZAAR) 100 MG tablet Take 1 tablet by mouth daily  Patient taking differently: Take 100 mg by mouth every evening  Yes Jannie Cobian MD   montelukast (SINGULAIR) 10 MG tablet Take 1 tablet by mouth nightly  Patient taking differently: Take 10 mg by mouth daily  Yes Jannie Cobian MD   omeprazole (PRILOSEC) 20 MG delayed release capsule TAKE 1 CAPSULE BY MOUTH DAILY Yes Jannie Cobian MD   potassium chloride (KLOR-CON M) 10 MEQ extended release tablet TAKE 2 TABLETS BY MOUTH EVERY DAY  Patient taking differently: Take 10 mEq by mouth 2 times daily TAKE 2 TABLETS BY MOUTH EVERY DAY Yes Jannie Cobian MD   umeclidinium-vilanterol (ANORO ELLIPTA) 62.5-25 MCG/INH AEPB inhaler INHALE 1 PUFF INTO LUNGS DAILY. Yes Jannie Cobian MD   aspirin EC 81 MG EC tablet Take 1 tablet by mouth daily  Patient taking differently: Take 81 mg by mouth every evening  Yes Jannie Cobian MD   albuterol sulfate HFA (PROAIR HFA) 108 (90 Base) MCG/ACT inhaler Inhale 2 puffs into the lungs every 6 hours as needed for Wheezing Yes Jannie Cobian MD   melatonin 5 MG TABS tablet Take 5 mg by mouth nightly as needed  Yes Historical Provider, MD       Social History     Tobacco Use    Smoking status: Former Smoker     Packs/day: 0.50     Years: 30.00     Pack years: 15.00     Types: Cigarettes     Start date: 1970     Last attempt to quit: 2019     Years since quittin.9    Smokeless tobacco: Never Used   Substance Use Topics    Alcohol use: Yes     Comment: occcasionally    Drug use: No        Allergies   Allergen Reactions    Doxycycline      Patient states it caused chills and hot flashes severe.     Abilify [Aripiprazole]     Celebrex [Celecoxib]      swelling    Naproxen Hives    Lactose Intolerance (Gi) Nausea And Vomiting   ,   Past Medical History:   Diagnosis Date    Acid reflux     Allergic rhinitis     Bilateral carotid artery stenosis 2020    Hyperlipidemia     Hypertension     Irritable bowel syndrome with diarrhea 2019    [facility]     Lung with chemo    Mild single current episode of major depressive disorder ([facility] 2018    Osteoarthritis     Osteoporosis     Other emphysema ([facility] 2017    Palliative care patient 2019    Panic disorder 2019    Stage 3 severe COPD by GOLD classification ([facility] 2018    FEV1 42%    Tobacco abuse 2018    Urinary incontinence     stress incontinence   ,   Past Surgical History:   Procedure Laterality Date    APPENDECTOMY      CARDIAC CATHETERIZATION  5/3/2013   MDL    EF 60%    HEMORRHOID SURGERY      HYSTERECTOMY      HYSTERECTOMY, TOTAL ABDOMINAL      LUNG CANCER SURGERY      TONSILLECTOMY      VASCULAR SURGERY  10/11/2019    TJR. Aortogram and runoff. PTA and stenting of the left external iliac artery with a 7x 80 and innova stent dilated to 7.2mm. PTA of the left popliteal artery with 5mm x 2cm cutting balloon. attempted recalization of the peroneal artery aborted.   ,   Social History     Tobacco Use    Smoking status: Former Smoker     Packs/day: 0.50     Years: 30.00     Pack years: 15.00     Types: Cigarettes     Start date: 1970     Last attempt to quit: 2019     Years since quittin.9    Smokeless tobacco: Never Used   Substance Use Topics    Alcohol use: Yes     Comment: occcasionally    Drug use: No   ,   Family History   Problem Relation Age of Onset    High Blood Pressure Mother     High Blood Pressure Father     Diabetes Sister     High Blood Pressure Brother    ,   Health Maintenance   Topic Date Due    DTaP/Tdap/Td vaccine (1 - Tdap) 03/10/1961    Shingles Vaccine (1 of 2) 03/10/1992    Flu vaccine (1) 09/01/2020    Annual Wellness Visit (AWV)  12/05/2020    Potassium monitoring  07/04/2021    Creatinine monitoring  07/04/2021    Breast cancer screen  02/03/2022    Colon cancer screen colonoscopy  06/04/2023    Pneumococcal 65+ years Vaccine  Completed    Hepatitis A vaccine  Aged Out    Hepatitis B vaccine  Aged Out    Hib vaccine  Aged Out    Meningococcal (ACWY) vaccine  Aged Out       Review of Systems    Constitutional - no significant activity change, appetite change, or unexpected weight change. No fever or chills. No diaphoresis or significant fatigue. HENT - no significant rhinorrhea or epistaxis. No tinnitus or significant hearing loss. Eyes - no sudden vision change or amaurosis. Respiratory - has significant shortness of breath and is on oxygen, no wheezing, or stridor. No apnea, cough, or chest tightness associated with shortness of breath. Cardiovascular - no chest pain, syncope, or significant dizziness. No palpitations or significant leg swelling.  has not had claudication. Gastrointestinal -  has not had abdominal swelling or pain. No blood in stool. No severe constipation, diarrhea, nausea, or vomiting. Genitourinary - No difficulty urinating, dysuria, frequency, or urgency. No flank pain or hematuria. Musculoskeletal - has not had back pain, gait disturbance, or myalgia. Skin - no color change, rash, pallor, has new wound. Neurologic - no dizziness, facial asymmetry, or light headedness. No seizures. No speech difficulty or lateralizing weakness. Hematologic - no easy bruising or excessive bleeding. Psychiatric - no severe anxiety or nervousness. No confusion. All other review of systems are negative. PHYSICAL EXAMINATION:    Due to this being a TeleHealth encounter, evaluation of the following organ systems is limited: Vitals/Constitutional/EENT/Resp/CV/GI//MS/Neuro/Skin/Heme-Lymph-Imm.   Constitutional - well developed, well nourished. No diaphoresis or acute distress. HENT - head normocephalic. Right external ear canal appears normal.  Left external ear canal appears normal.  Septum appears midline. Eyes - conjunctiva normal.   No exudate. No icterus. Neck- ROM appears normal, no tracheal deviation. Extremities -No cyanosis, clubbing, mild edema. No signs atheroembolic event. Pulmonary - effort appears normal.  No respiratory distress. No accessory muscle use  Musculoskeletal -   Motor grossly intact in visible lower extremities and upper extremities  Neurologic - alert and oriented X 3. Cranial Nerves II-XII grossly intact  Skin - intact. No rash, erythema, or pallor. Right second toe with dry 3 mm wound from \"stumping it\"  Psychiatric - mood, affect, and behavior appear normal.  Judgment and thought processes appear normal.    Lower extremity arterial study: . Based on ankle brachial indices and doppler waveforms, the patient has     mildly diminished flow to the right lower extremity arterial system at    [facility]     The patient has moderately diminished ankle-brachial indices left lower     extremity which would be consistent with claudication level symptoms.     Based on segmental pressures and doppler waveforms, the patient likely has     disease in the bilateral superficial femoral arterial segments. Individual films reviewed: Yes. These results were reviewed with the patient. Disease process is unstable    Options were discussed with the patient including continued medical management versus proceeding with angiography and potential intervention for wound and pvd. Patient has opted to proceed with cmm. Risks have been discussed with the patient including but not limited to MI, death, CVA, bleed, nerve injury, infection, contrast nephropathy, possible need for dialysis, and need for further surgery. ASSESSMENT/PLAN:  1.  Atherosclerosis of native artery of both lower extremities with intermittent claudication ([facility]        No follow-ups on file. Polysporin once a day and dry gauze  Severe panic attack, had pneumonia, and was hospitalized for a week  Patient instructed to call with any progressive redness, drainage, onset of foul odor, or progressive deterioration of wound. Dr. Zaida Esteban has recommended a 2 week follow up of wound to try and give these a chance to heal and let her get over her recent intubation. She will need video visit. If these fail to heal we will proceed          Strongly encouraged start/continue statin therapy  Recommended no smoking  An  electronic signature was used to authenticate this note. --Lanning Osgood, APRN on 7/14/2020 at 11:05 AM        Pursuant to the emergency declaration under the [facility] waiver authority and the [employer] Act, this Virtual  Visit was conducted, with patient's consent, to reduce the patient's risk of exposure to COVID-19 and provide continuity of care for an established patient. Services were provided through a video synchronous discussion virtually to substitute for in-person clinic visit.

## 2020-07-15 NOTE — CARE COORDINATION
[facility] Transitions Follow Up Call    7/15/2020    Patient: Rolanda Johnson  Patient : 1940   MRN: [id number]    Discharge Date: 20 RARS: Readmission Risk Score: 21         Spoke with: Fallon Christianson Andre Mike Transitions Subsequent and Final Call    Schedule Follow Up Appointment with PCP:  Completed  Subsequent and Final Calls  Do you have any ongoing symptoms?:  No  Have your medications changed?:  No  Do you have any questions related to your medications?:  No  Do you currently have any active services?:  No  Are you currently active with any services?:  Home Health  Do you have any needs or concerns that I can assist you with?:  No  Identified Barriers:  None  Care Transitions Interventions  Other Interventions: Follow Up: Placed a call to the home and spoke with patient. She reported that she is doing very well today. She denied any respiratory issues or other problems. Reported that appetite is good, she is drinking well. Denied any bowel or bladder issues. Reported that she is sleeping well. Did say she has finished her antibiotics but her PCP did want her to do some more steroids so she is taking them. No other issues or needs noted. CTN to follow up again as indicated.       Plan for next call - assess overall status, CP status, pain, appetite, sleep patterns, abnormal signs and symptoms, effectiveness of medications, activity level and tolerance, falls/other unexpected events, findings from follow up appointments, medication/treatment changes, any additional teaching needs, etc.      Future Appointments   Date Time Provider Jaylon Joseph   2020 12:45 Xavier Cueto MD [address]   2020 12:30 PM Arnold Gonzalez MD [facility] NEURO [facility]   2020  1:15 PM Shirlene Blanton MD [facility] Cardio [facility]       Robel Schwab, RN

## 2020-07-16 NOTE — TELEPHONE ENCOUNTER
----- Message from Kim Client, DIANA sent at 7/16/2020  3:05 PM CDT -----  Please let patient know Dr. Romy Ospina has recommended a 2 week follow up of wound to try and give these a chance to heal and let her get over her recent intubation. She will need video visit.   If these fail to heal we will proceed

## 2020-07-28 NOTE — TELEPHONE ENCOUNTER
Called to reschedule 09/22 JDT appointment, left message to return call & reschedule with ANOTHER CARDIOLOGIST in office.

## 2020-07-30 PROBLEM — I70.299 ATHEROSCLEROSIS OF ARTERY OF EXTREMITY WITH ULCERATION (HCC): Status: ACTIVE | Noted: 2020-01-01

## 2020-07-30 PROBLEM — L97.909 ATHEROSCLEROSIS OF ARTERY OF EXTREMITY WITH ULCERATION (HCC): Status: ACTIVE | Noted: 2020-01-01

## 2020-07-30 NOTE — PROGRESS NOTES
mouth daily Yes Historical Provider, MD   Black Cohosh 20 MG TABS Take by mouth every evening Yes Historical Provider, MD   cetirizine (ZYRTEC) 10 MG tablet Take 1 tablet by mouth daily  Patient taking differently: Take 10 mg by mouth every evening  Yes Mauricio Hutchinson MD   atorvastatin (LIPITOR) 40 MG tablet Take 1 tablet by mouth daily  Patient taking differently: Take 40 mg by mouth every evening  Yes Mauricio Hutchinson MD   losartan (COZAAR) 100 MG tablet Take 1 tablet by mouth daily  Patient taking differently: Take 100 mg by mouth every evening  Yes Mauricio Hutchinson MD   montelukast (SINGULAIR) 10 MG tablet Take 1 tablet by mouth nightly  Patient taking differently: Take 10 mg by mouth daily  Yes Mauricio Hutchinson MD   omeprazole (PRILOSEC) 20 MG delayed release capsule TAKE 1 CAPSULE BY MOUTH DAILY Yes Mauricio Hutchinson MD   potassium chloride (KLOR-CON M) 10 MEQ extended release tablet TAKE 2 TABLETS BY MOUTH EVERY DAY  Patient taking differently: Take 10 mEq by mouth 2 times daily TAKE 2 TABLETS BY MOUTH EVERY DAY Yes Mauricio Hutchinson MD   umeclidinium-vilanterol (ANORO ELLIPTA) 62.5-25 MCG/INH AEPB inhaler INHALE 1 PUFF INTO LUNGS DAILY.  Yes Mauricio Hutchinson MD   aspirin EC 81 MG EC tablet Take 1 tablet by mouth daily  Patient taking differently: Take 81 mg by mouth every evening  Yes Mauricio Hutchinson MD   albuterol sulfate HFA (PROAIR HFA) 108 (90 Base) MCG/ACT inhaler Inhale 2 puffs into the lungs every 6 hours as needed for Wheezing Yes Mauricio Hutchinson MD   melatonin 5 MG TABS tablet Take 5 mg by mouth nightly as needed  Yes Historical Provider, MD       Social History     Tobacco Use    Smoking status: Former Smoker     Packs/day: 0.50     Years: 30.00     Pack years: 15.00     Types: Cigarettes     Start date: 1970     Last attempt to quit: 2019     Years since quittin.9    Smokeless tobacco: Never Used   Substance Use Topics    Alcohol use: Yes     Comment: occcasionally    Drug use: No        Allergies Allergen Reactions    Doxycycline      Patient states it caused chills and hot flashes severe.  Abilify [Aripiprazole]     Celebrex [Celecoxib]      swelling    Naproxen Hives    Lactose Intolerance (Gi) Nausea And Vomiting   ,   Past Medical History:   Diagnosis Date    Acid reflux     Allergic rhinitis     Bilateral carotid artery stenosis 2020    Hyperlipidemia     Hypertension     Irritable bowel syndrome with diarrhea 2019    [facility] ([facility]     Lung with chemo    Mild single current episode of major depressive disorder ([facility] 2018    Osteoarthritis     Osteoporosis     Other emphysema ([facility] 2017    Palliative care patient 2019    Panic disorder 2019    Stage 3 severe COPD by GOLD classification ([facility] 2018    FEV1 42%    Tobacco abuse 2018    Urinary incontinence     stress incontinence   ,   Past Surgical History:   Procedure Laterality Date    APPENDECTOMY      CARDIAC CATHETERIZATION  5/3/2013   MDL    EF 60%    HEMORRHOID SURGERY      HYSTERECTOMY      HYSTERECTOMY, TOTAL ABDOMINAL      LUNG CANCER SURGERY      TONSILLECTOMY      VASCULAR SURGERY  10/11/2019    TJR. Aortogram and runoff. PTA and stenting of the left external iliac artery with a 7x 80 and innova stent dilated to 7.2mm. PTA of the left popliteal artery with 5mm x 2cm cutting balloon. attempted recalization of the peroneal artery aborted.   ,   Social History     Tobacco Use    Smoking status: Former Smoker     Packs/day: 0.50     Years: 30.00     Pack years: 15.00     Types: Cigarettes     Start date: 1970     Last attempt to quit: 2019     Years since quittin.9    Smokeless tobacco: Never Used   Substance Use Topics    Alcohol use: Yes     Comment: occcasionally    Drug use: No   ,   Family History   Problem Relation Age of Onset    High Blood Pressure Mother     High Blood Pressure Father     Diabetes Sister     High Blood Pressure Brother    ,   Health Maintenance   Topic Date Due    DTaP/Tdap/Td vaccine (1 - Tdap) 03/10/1961    Shingles Vaccine (1 of 2) 03/10/1992    Flu vaccine (1) 09/01/2020    Annual Wellness Visit (AWV)  12/05/2020    Potassium monitoring  07/04/2021    Creatinine monitoring  07/04/2021    Breast cancer screen  02/03/2022    Colon cancer screen colonoscopy  06/04/2023    Pneumococcal 65+ years Vaccine  Completed    Hepatitis A vaccine  Aged Out    Hepatitis B vaccine  Aged Out    Hib vaccine  Aged Out    Meningococcal (ACWY) vaccine  Aged Out       Review of Systems    Constitutional - no significant activity change, appetite change, or unexpected weight change. No fever or chills. No diaphoresis or significant fatigue. HENT - no significant rhinorrhea or epistaxis. No tinnitus or significant hearing loss. Eyes - no sudden vision change or amaurosis. Respiratory - has significant shortness of breath and is on oxygen, no wheezing, or stridor. No apnea, cough, or chest tightness associated with shortness of breath. Cardiovascular - no chest pain, syncope, or significant dizziness. No palpitations or significant leg swelling.  has not had claudication. Gastrointestinal -  has not had abdominal swelling or pain. No blood in stool. No severe constipation, diarrhea, nausea, or vomiting. Genitourinary - No difficulty urinating, dysuria, frequency, or urgency. No flank pain or hematuria. Musculoskeletal - has not had back pain, gait disturbance, or myalgia. Skin - no color change, rash, pallor, has new wound. Neurologic - no dizziness, facial asymmetry, or light headedness. No seizures. No speech difficulty or lateralizing weakness. Hematologic - no easy bruising or excessive bleeding. Psychiatric - no severe anxiety or nervousness. No confusion. All other review of systems are negative.     PHYSICAL EXAMINATION:    Due to this being a TeleHealth encounter, evaluation of the following organ systems is limited: Vitals/Constitutional/EENT/Resp/CV/GI//MS/Neuro/Skin/Heme-Lymph-Imm. Constitutional - well developed, well nourished. No diaphoresis or acute distress. HENT - head normocephalic. Right external ear canal appears normal.  Left external ear canal appears normal.  Septum appears midline. Eyes - conjunctiva normal.   No exudate. No icterus. Neck- ROM appears normal, no tracheal deviation. Extremities -No cyanosis, clubbing, mild edema. No signs atheroembolic event. Pulmonary - effort appears normal.  No respiratory distress. No accessory muscle use  Musculoskeletal -   Motor grossly intact in visible lower extremities and upper extremities  Neurologic - alert and oriented X 3. Cranial Nerves II-XII grossly intact  Skin - intact. No rash, erythema, or pallor. Right second toe basically healed with dry area of superficial skin; 2 cm 1 cm right shin; no signs of infection. Areas are dry with dark eschar  Psychiatric - mood, affect, and behavior appear normal.  Judgment and thought processes appear normal.    Lower extremity arterial study: . Based on ankle brachial indices and doppler waveforms, the patient has     mildly diminished flow to the right lower extremity arterial system at    [facility]     The patient has moderately diminished ankle-brachial indices left lower     extremity which would be consistent with claudication level symptoms.     Based on segmental pressures and doppler waveforms, the patient likely has     disease in the bilateral superficial femoral arterial segments. Individual films reviewed: Yes. These results were reviewed with the patient. Disease process is unstable          ASSESSMENT/PLAN:  1. Atherosclerosis of artery of extremity with ulceration (HCC)        No follow-ups on file.     Polysporin once a day and dry gauze to new wounds  Follow up in 3 weeks to see if they area healing  Patient instructed to call with any progressive redness, drainage, onset of foul odor,

## 2020-08-05 NOTE — TELEPHONE ENCOUNTER
Pili Montes De Oca called to request a refill on her medication.       Last office visit : 7/9/2020   Next office visit : 8/11/2020     Requested Prescriptions     Signed Prescriptions Disp Refills    montelukast (SINGULAIR) 10 MG tablet 90 tablet 1     Sig: Take 1 tablet by mouth daily     Authorizing Provider: Isidor Nyhan     Ordering User: Chau Sotomayor    potassium chloride (KLOR-CON M) 10 MEQ extended release tablet 180 tablet 2     Sig: TAKE 2 TABLETS BY MOUTH EVERY DAY     Authorizing Provider: Isidor Nyhan     Ordering User: Temo Higgins aspirin EC 81 MG EC tablet 90 tablet 2     Sig: Take 1 tablet by mouth daily     Authorizing Provider: Isidor Nyhan Ordering User: Temo Handcornelius propranolol (INDERAL) 40 MG tablet 270 tablet 2     Sig: Take 1 tablet by mouth 3 times a day     Authorizing Provider: Isidor Nyhan Ordering User: Roney Donis MA

## 2020-08-11 NOTE — PROGRESS NOTES
2020    TELEHEALTH EVALUATION -- Audio/Visual (During NSKRL-15 public health emergency)    HPI:    Quang Garcia (:  1940) has requested an audio/video evaluation for the following concern(s):    Patient presents today via video visit for breathing issues and pain. She was told by pulmonolgy that she would benefit from steroids daily and she has ran out. She is noting that she is using her oxygen and notes she is having increased pain. She notes she is wanting her flu shot. Review of Systems   Constitutional: Negative for chills and fever. Respiratory: Positive for cough and shortness of breath. Negative for chest tightness and wheezing. Cardiovascular: Negative for chest pain, palpitations and leg swelling. Gastrointestinal: Negative for abdominal pain, constipation, diarrhea, nausea and vomiting. Genitourinary: Negative for difficulty urinating, dysuria and frequency. Prior to Visit Medications    Medication Sig Taking?  Authorizing Provider   predniSONE (DELTASONE) 5 MG tablet Take 1 tablet by mouth daily Yes Raymundo Marte MD   montelukast (SINGULAIR) 10 MG tablet Take 1 tablet by mouth daily  Raymundo Marte MD   potassium chloride (KLOR-CON M) 10 MEQ extended release tablet TAKE 2 TABLETS BY MOUTH EVERY DAY  Raymundo Marte MD   aspirin EC 81 MG EC tablet Take 1 tablet by mouth daily  Raymundo Marte MD   propranolol (INDERAL) 40 MG tablet Take 1 tablet by mouth 3 times a day  Raymundo Marte MD   oxybutynin (DITROPAN) 5 MG tablet Take 1 tablet by mouth 3 times daily  Raymundo Marte MD   rOPINIRole (REQUIP) 0.25 MG tablet Take 1-2 tablets by mouth 3 times daily  Raymundo Marte MD   ipratropium-albuterol (DUONEB) 0.5-2.5 (3) MG/3ML SOLN nebulizer solution USE 1 VIAL IN NEBULIZER EVERY 4 HOURS AS NEEDED  Raymundo Marte MD   PARoxetine (PAXIL) 10 MG tablet Starting in 14 days start 1 tab by mouth for 14 days, then increase 2 tabs a day  Raymundo Marte MD   mirabegron (MYRBETRIQ) 25 MG TB24 Take 1 tablet by mouth daily  Jes Zelaya MD   furosemide (LASIX) 20 MG tablet Take 20 mg by mouth 2 times daily  Historical Provider, MD   fluticasone (FLONASE) 50 MCG/ACT nasal spray 2 sprays by Nasal route daily  DIANA Child NP   OXYGEN Inhale 2 L/min into the lungs continuous  DIANA Child NP   CALCIUM PO Take 500 mg by mouth daily  Historical Provider, MD   Black Cohosh 20 MG TABS Take by mouth every evening  Historical Provider, MD   cetirizine (ZYRTEC) 10 MG tablet Take 1 tablet by mouth daily  Patient taking differently: Take 10 mg by mouth every evening   Jes Zelaya MD   atorvastatin (LIPITOR) 40 MG tablet Take 1 tablet by mouth daily  Patient taking differently: Take 40 mg by mouth every evening   Jes Zelaya MD   losartan (COZAAR) 100 MG tablet Take 1 tablet by mouth daily  Patient taking differently: Take 100 mg by mouth every evening   Jes Zelaya MD   omeprazole (PRILOSEC) 20 MG delayed release capsule TAKE 1 CAPSULE BY MOUTH DAILY  Jes Zelaya MD   umeclidinium-vilanterol (ANORO ELLIPTA) 62.5-25 MCG/INH AEPB inhaler INHALE 1 PUFF INTO LUNGS DAILY. Jes Zelaya MD   albuterol sulfate HFA (PROAIR HFA) 108 (90 Base) MCG/ACT inhaler Inhale 2 puffs into the lungs every 6 hours as needed for Wheezing  Jes Zelyaa MD   melatonin 5 MG TABS tablet Take 5 mg by mouth nightly as needed   Historical Provider, MD       Social History     Tobacco Use    Smoking status: Former Smoker     Packs/day: 0.50     Years: 30.00     Pack years: 15.00     Types: Cigarettes     Start date: 1970     Last attempt to quit: 2019     Years since quittin.0    Smokeless tobacco: Never Used   Substance Use Topics    Alcohol use: Yes     Comment: occcasionally    Drug use: No        Allergies   Allergen Reactions    Doxycycline      Patient states it caused chills and hot flashes severe.     Abilify [Aripiprazole]     Celebrex [Celecoxib]      swelling    Tommie Ritter, 6-35 Months, IM (Fluzone,Afluria) 11/05/2019    Influenza, Tommie Ritter, IM, (6 mo and older Fluzone, Flulaval, Fluarix and 3 yrs and older Afluria) 10/10/2017    Pneumococcal Conjugate 13-valent ([id number]) 10/10/2015    Pneumococcal Polysaccharide ([id number]) 01/01/2018   ,   Health Maintenance   Topic Date Due    DTaP/Tdap/Td vaccine (1 - Tdap) 03/10/1961    Shingles Vaccine (1 of 2) 03/10/1992    Flu vaccine (1) 09/01/2020    Annual Wellness Visit (AWV)  12/05/2020    Potassium monitoring  07/04/2021    Creatinine monitoring  07/04/2021    Breast cancer screen  02/03/2022    Colon cancer screen colonoscopy  06/04/2023    Pneumococcal 65+ years Vaccine  Completed    Hepatitis A vaccine  Aged Out    Hepatitis B vaccine  Aged Out    Hib vaccine  Aged Out    Meningococcal (ACWY) vaccine  Aged Out       PHYSICAL EXAMINATION:  [ INSTRUCTIONS:  \"[x]\" Indicates a positive item  \"[]\" Indicates a negative item  -- DELETE ALL ITEMS NOT EXAMINED]  Vital Signs: (As obtained by patient/caregiver or practitioner observation)    Blood pressure-  Heart rate-    Respiratory rate-    Temperature-  Pulse oximetry-     Constitutional: [x] Appears well-developed and well-nourished [] No apparent distress      [] Abnormal-   Mental status  [x] Alert and awake  [x] Oriented to person/place/time [x]Able to follow commands      Eyes:  EOM    [x]  Normal  [] Abnormal-  Sclera  [x]  Normal  [] Abnormal -         Discharge []  None visible  [] Abnormal -    HENT:   [x] Normocephalic, atraumatic.   [] Abnormal   [x] Mouth/Throat: Mucous membranes are moist.     External Ears [x] Normal  [] Abnormal-     Neck: [x] No visualized mass     Pulmonary/Chest: [x] Respiratory effort normal.  [x] No visualized signs of difficulty breathing or respiratory distress        [] Abnormal-      Musculoskeletal:   [x] Normal gait with no signs of ataxia         [x] Normal range of motion of neck        [] Abnormal-       Neurological: [x] No Facial Asymmetry (Cranial nerve 7 motor function) (limited exam to video visit)          [x] No gaze palsy        [] Abnormal-         Skin:        [x] No significant exanthematous lesions or discoloration noted on facial skin         [] Abnormal-            Psychiatric:       [x] Normal Affect [x] No Hallucinations        [] Abnormal-     Other pertinent observable physical exam findings-     ASSESSMENT/PLAN:    ICD-10-CM    1. Pulmonary hypertension due to COPD ([facility]  I27.23     J44.9    2. Inflammation of right sacroiliac joint (HCC)  M46.1    3. Chronic respiratory failure with hypoxia (HCC)  J96.11        Pain and copd uncontrolled, start prednisone 5 mg daily due to it being end staged. And increase to 10 mg prednisone. Orders Placed This Encounter   Medications    predniSONE (DELTASONE) 5 MG tablet     Sig: Take 1 tablet by mouth daily     Dispense:  90 tablet     Refill:  0       No orders of the defined types were placed in this encounter. Return in about 3 months (around 11/11/2020) for , medicare AWV. Jacob Childress is a 68 y.o. female being evaluated by a Virtual Visit (video visit) encounter to address concerns as mentioned above. A caregiver was present when appropriate. Due to this being a TeleHealth encounter (During [facility] public health emergency), evaluation of the following organ systems was limited: Vitals/Constitutional/EENT/Resp/CV/GI//MS/Neuro/Skin/Heme-Lymph-Imm. Pursuant to the emergency declaration under the [address] [place] [address] authority and the [employer] and [employer] Act, this Virtual Visit was conducted with patient's (and/or legal guardian's) consent, to reduce the patient's risk of exposure to COVID-19 and provide necessary medical care.   The patient (and/or legal guardian) has also been advised to contact this office for worsening conditions or problems, and seek emergency medical treatment and/or call 911 if deemed necessary. Services were provided through a video synchronous discussion virtually to substitute for in-person clinic visit. Patient and provider were located at their individual homes or provider at secure site for business. --Alma Bear MD on 8/11/2020 at 1:41 PM    An electronic signature was used to authenticate this note.

## 2020-08-20 NOTE — PROGRESS NOTES
2020    TELEHEALTH EVALUATION -- Audio/Visual (During RJOCB-21 public health emergency)    HPI:    Patient is located at home  Provider is located at [facility]   Also present during call is no one. Olya Anderson (:  1940) has requested an audio/video evaluation for the following concern(s):    She reports she developed a wound on right toe. This started several week(s) ago. She believes this is  healing. She has been applying nothing. She has not had  fever or chills. She has a history of peripheral vascular disease. Prior to Visit Medications    Medication Sig Taking?  Authorizing Provider   predniSONE (DELTASONE) 5 MG tablet Take 1 tablet by mouth daily Yes Ulisses Langley MD   montelukast (SINGULAIR) 10 MG tablet Take 1 tablet by mouth daily Yes Ulisses Langley MD   potassium chloride (KLOR-CON M) 10 MEQ extended release tablet TAKE 2 TABLETS BY MOUTH EVERY DAY Yes Ulisses Langley MD   aspirin EC 81 MG EC tablet Take 1 tablet by mouth daily Yes Ulisses Langley MD   propranolol (INDERAL) 40 MG tablet Take 1 tablet by mouth 3 times a day Yes Ulisses Langley MD   oxybutynin (DITROPAN) 5 MG tablet Take 1 tablet by mouth 3 times daily Yes Ulisses Langley MD   rOPINIRole (REQUIP) 0.25 MG tablet Take 1-2 tablets by mouth 3 times daily Yes Ulisses Langley MD   ipratropium-albuterol (DUONEB) 0.5-2.5 (3) MG/3ML SOLN nebulizer solution USE 1 VIAL IN NEBULIZER EVERY 4 HOURS AS NEEDED Yes Ulisses Langley MD   PARoxetine (PAXIL) 10 MG tablet Starting in 14 days start 1 tab by mouth for 14 days, then increase 2 tabs a day Yes Ulisses Langley MD   mirabegron (MYRBETRIQ) 25 MG TB24 Take 1 tablet by mouth daily Yes Ulisses Langley MD   furosemide (LASIX) 20 MG tablet Take 20 mg by mouth 2 times daily Yes Historical Mildred, MD   fluticasone (FLONASE) 50 MCG/ACT nasal spray 2 sprays by Nasal route daily Yes DIANA Godinez NP   OXYGEN Inhale 2 L/min into the lungs continuous Yes DIANA Godinez - NP   CALCIUM PO Take 500 mg by mouth daily Yes Historical Provider, MD   Black Cohosh 20 MG TABS Take by mouth every evening Yes Historical Provider, MD   cetirizine (ZYRTEC) 10 MG tablet Take 1 tablet by mouth daily  Patient taking differently: Take 10 mg by mouth every evening  Yes Chiquita Manley MD   atorvastatin (LIPITOR) 40 MG tablet Take 1 tablet by mouth daily  Patient taking differently: Take 40 mg by mouth every evening  Yes Chiquita Manley MD   losartan (COZAAR) 100 MG tablet Take 1 tablet by mouth daily  Patient taking differently: Take 100 mg by mouth every evening  Yes Chiquita Malney MD   omeprazole (PRILOSEC) 20 MG delayed release capsule TAKE 1 CAPSULE BY MOUTH DAILY Yes Chiquita Manley MD   umeclidinium-vilanterol (ANORO ELLIPTA) 62.5-25 MCG/INH AEPB inhaler INHALE 1 PUFF INTO LUNGS DAILY. Yes Chiquita Manley MD   albuterol sulfate HFA (PROAIR HFA) 108 (90 Base) MCG/ACT inhaler Inhale 2 puffs into the lungs every 6 hours as needed for Wheezing Yes Chiquita Manley MD   melatonin 5 MG TABS tablet Take 5 mg by mouth nightly as needed  Yes Historical Provider, MD       Social History     Tobacco Use    Smoking status: Former Smoker     Packs/day: 0.50     Years: 30.00     Pack years: 15.00     Types: Cigarettes     Start date: 1970     Last attempt to quit: 2019     Years since quittin.0    Smokeless tobacco: Never Used   Substance Use Topics    Alcohol use: Yes     Comment: occcasionally    Drug use: No        Allergies   Allergen Reactions    Doxycycline      Patient states it caused chills and hot flashes severe.     Abilify [Aripiprazole]     Celebrex [Celecoxib]      swelling    Naproxen Hives    Lactose Intolerance (Gi) Nausea And Vomiting   ,   Past Medical History:   Diagnosis Date    Acid reflux     Allergic rhinitis     Bilateral carotid artery stenosis 2020    Hyperlipidemia     Hypertension     Irritable bowel syndrome with diarrhea 2019    [facility] with [facility]    Mild single current episode of major depressive disorder ([facility] 2018    Osteoarthritis     Osteoporosis     Other emphysema ([facility] 2017    Palliative care patient 2019    Panic disorder 2019    Stage 3 severe COPD by GOLD classification ([facility] 2018    FEV1 42%    Tobacco abuse 2018    Urinary incontinence     stress incontinence   ,   Past Surgical History:   Procedure Laterality Date    APPENDECTOMY      CARDIAC CATHETERIZATION  5/3/2013   MDL    EF 60%    HEMORRHOID SURGERY      HYSTERECTOMY      HYSTERECTOMY, TOTAL ABDOMINAL      LUNG CANCER SURGERY      TONSILLECTOMY      VASCULAR SURGERY  10/11/2019    TJR. Aortogram and runoff. PTA and stenting of the left external iliac artery with a 7x 80 and innova stent dilated to 7.2mm. PTA of the left popliteal artery with 5mm x 2cm cutting balloon. attempted recalization of the peroneal artery aborted.   ,   Social History     Tobacco Use    Smoking status: Former Smoker     Packs/day: 0.50     Years: 30.00     Pack years: 15.00     Types: Cigarettes     Start date: 1970     Last attempt to quit: 2019     Years since quittin.0    Smokeless tobacco: Never Used   Substance Use Topics    Alcohol use: Yes     Comment: occcasionally    Drug use: No   ,   Family History   Problem Relation Age of Onset    High Blood Pressure Mother     High Blood Pressure Father     Diabetes Sister     High Blood Pressure Brother    ,   Health Maintenance   Topic Date Due    DTaP/Tdap/Td vaccine (1 - Tdap) 03/10/1961    Shingles Vaccine (1 of 2) 03/10/1992    Flu vaccine (1) 2020    Annual Wellness Visit (AWV)  2020    Potassium monitoring  2021    Creatinine monitoring  2021    Breast cancer screen  2022    Colon cancer screen colonoscopy  2023    Pneumococcal 65+ years Vaccine  Completed    Hepatitis A vaccine  Aged Out    Hepatitis B vaccine  Aged Out    Hib vaccine Aged Out    Meningococcal (ACWY) vaccine  Aged Out       Review of Systems    Constitutional - no significant activity change, appetite change, or unexpected weight change. No fever or chills. No diaphoresis or significant fatigue. HENT - no significant rhinorrhea or epistaxis. No tinnitus or significant hearing loss. Eyes - no sudden vision change or amaurosis. Respiratory - has significant shortness of breath,is on oxygen, no wheezing, or stridor. No apnea, cough, or chest tightness associated with shortness of breath. Cardiovascular - no chest pain, syncope, or significant dizziness. No palpitations or significant leg swelling. has had claudication. Gastrointestinal -  has not had abdominal swelling or pain. No blood in stool. No severe constipation, diarrhea, nausea, or vomiting. Genitourinary - No difficulty urinating, dysuria, frequency, or urgency. No flank pain or hematuria. Musculoskeletal - has not had back pain, gait disturbance, or myalgia. Skin - no color change, rash, pallor, or new wound. Neurologic - no dizziness, facial asymmetry, or light headedness. No seizures. No speech difficulty or lateralizing weakness. Hematologic - no easy bruising or excessive bleeding. Psychiatric - no severe anxiety or nervousness. No confusion. All other review of systems are negative. PHYSICAL EXAMINATION:    Due to this being a TeleHealth encounter, evaluation of the following organ systems is limited: Vitals/Constitutional/EENT/Resp/CV/GI//MS/Neuro/Skin/Heme-Lymph-Imm. Constitutional - well developed, well nourished. No diaphoresis or acute distress. HENT - head normocephalic. Right external ear canal appears normal.  Left external ear canal appears normal.  Septum appears midline. Eyes - conjunctiva normal.   No exudate. No icterus. Neck- ROM appears normal, no tracheal deviation. Extremities -No cyanosis, clubbing, no edema. No signs atheroembolic event.     Pulmonary - effort appears normal.  No respiratory distress. No accessory muscle use  Musculoskeletal -   Motor grossly intact in visible lower extremities and upper extremities  Neurologic - alert and oriented X 3. Cranial Nerves II-XII grossly intact  Skin - intact. No rash, erythema, or pallor. Wound healed  Psychiatric - mood, affect, and behavior appear normal.  Judgment and thought processes appear normal.        ASSESSMENT/PLAN:  1. Atherosclerosis of native arteries of extremities with intermittent claudication, bilateral legs (HCC)        No follow-ups on file. Patient instructed to walk as much as possible. Call our office with any progressive pain in leg(s) or hip(s) with walking. Take good care of your feet. Let us know right away if you develop a wound on your foot. 6 months with juan manuel  Strongly encouraged start/continue statin therapy  Recommended no smoking  An  electronic signature was used to authenticate this note. --DIANA Medina on 8/20/2020 at 9:13 AM        Pursuant to the emergency declaration under the [facility], [facility] waiver authority and the [employer] and [employer] Act, this Virtual  Visit was conducted, with patient's consent, to reduce the patient's risk of exposure to COVID-19 and provide continuity of care for an established patient. Services were provided through a video synchronous discussion virtually to substitute for in-person clinic visit.

## 2020-08-24 NOTE — TELEPHONE ENCOUNTER
John Vasquez called requesting a refill of the below medication which has been pended for you:     Requested Prescriptions     Pending Prescriptions Disp Refills    ipratropium-albuterol (DUONEB) 0.5-2.5 (3) MG/3ML SOLN nebulizer solution 180 mL 1     Sig: Take 3 mLs by nebulization every 4 hours    diphenoxylate-atropine (LOMOTIL) 2.5-0.025 MG per tablet 120 tablet 0     Sig: Take 1 tablet by mouth 4 times daily as needed for Diarrhea for up to 30 days. Last Appointment Date: 8/11/2020  Next Appointment Date: 11/12/2020    Allergies   Allergen Reactions    Doxycycline      Patient states it caused chills and hot flashes severe.     Abilify [Aripiprazole]     Celebrex [Celecoxib]      swelling    Naproxen Hives    Lactose Intolerance (Gi) Nausea And Vomiting

## 2020-08-24 NOTE — PROGRESS NOTES
Pilisherry Ybarra called to request a refill on her medication.       Last office visit : 8/11/2020   Next office visit : 11/12/2020     Requested Prescriptions     Pending Prescriptions Disp Refills    omeprazole (PRILOSEC) 20 MG delayed release capsule 90 capsule 3     Sig: TAKE 1 CAPSULE BY MOUTH DAILY            Maureen Bazan MA

## 2020-08-26 NOTE — TELEPHONE ENCOUNTER
Hope from [facility] called to clarify script for patients paxil.  I gave verbal for the correct script and sig        Requested Prescriptions     Signed Prescriptions Disp Refills    PARoxetine (PAXIL) 10 MG tablet 180 tablet 1     Sig: Take 2 tablets a day     Authorizing Provider: Makenzie Sue     Ordering User: Madiha Cho MA

## 2020-09-03 NOTE — TELEPHONE ENCOUNTER
Pili Kathleen Hidalgo called to request a refill on her medication. Last office visit : 8/11/2020   Next office visit : 11/12/2020     Requested Prescriptions     Signed Prescriptions Disp Refills    umeclidinium-vilanterol (ANORO ELLIPTA) 62.5-25 MCG/INH AEPB inhaler 3 each 3     Sig: INHALE 1 PUFF INTO LUNGS DAILY.      Authorizing Provider: Walt Mccray     Ordering User: Morgan Claude, MA

## 2020-09-14 NOTE — PROGRESS NOTES
[address] [address]             Phone:  [phone]  Fax:  [phone]       2020    TELEHEALTH EVALUATION -- Audio/Visual (During [id number] public health emergency)    HPI:  Chief Complaint   Patient presents with    Cas 24 (:  1940) has requested an audio/video evaluation for the following concern(s):    Patient presents today for evaluation of multiple concerns. Her neighbor is present with her today and is active in discussing concerns as well. Patient has severe end stage COPD; she is use O2 NC 2L prn (most of the time); she states her anxiety and breathing are getting more severe. She often gets worse when she has to use the restroom; she complains of always feeling like she is going to have diarrhea. She does take lomotil once daily, but if she takes twice she has constipation. She states she is having 2-3 panic attacks daily and can last several hours. She has been taking ativan as needed but has run out of this prescription. She also recently was switched from effexor to paxil. She is using inhalers and takes 5 mg prednisone daily; she states she has SOB and wheezing. She denies fever, mucous production, cough or sore throat. She does continue to smoke but states she is trying to quit. She  reports that she quit smoking about 13 months ago. Her smoking use included cigarettes. She started smoking about 50 years ago. She has a 15.00 pack-year smoking history. She has never used smokeless tobacco.      Review of Systems   Constitutional: Negative for chills and fever. HENT: Negative for ear pain, hearing loss, rhinorrhea and voice change. Eyes: Negative for photophobia and visual disturbance. Respiratory: Positive for cough, shortness of breath and wheezing. Cardiovascular: Negative for chest pain and palpitations. Gastrointestinal: Negative for nausea and vomiting. Endocrine: Negative.   Negative for cold intolerance Diagnosis Date    Acid reflux     Allergic rhinitis     Bilateral carotid artery stenosis 2020    Hyperlipidemia     Hypertension     Irritable bowel syndrome with diarrhea 2019    Lung cancer ([facility] [address]     Lung with chemo    Mild single current episode of major depressive disorder ([facility] [address] 2018    Osteoarthritis     Osteoporosis     Other emphysema ([facility] [address] 2017    Palliative care patient 2019    Panic disorder 2019    Stage 3 severe COPD by GOLD classification ([facility] 2018    FEV1 42%    Tobacco abuse 2018    Urinary incontinence     stress incontinence   ,   Past Surgical History:   Procedure Laterality Date    APPENDECTOMY      CARDIAC CATHETERIZATION  5/3/2013   MDL    EF 60%    HEMORRHOID SURGERY      HYSTERECTOMY      HYSTERECTOMY, TOTAL ABDOMINAL      LUNG CANCER SURGERY      TONSILLECTOMY      VASCULAR SURGERY  10/11/2019    TJR. Aortogram and runoff. PTA and stenting of the left external iliac artery with a 7x 80 and innova stent dilated to 7.2mm. PTA of the left popliteal artery with 5mm x 2cm cutting balloon. attempted recalization of the peroneal artery aborted.   ,   Social History     Tobacco Use    Smoking status: Former Smoker     Packs/day: 0.50     Years: 30.00     Pack years: 15.00     Types: Cigarettes     Start date: 1970     Last attempt to quit: 2019     Years since quittin.1    Smokeless tobacco: Never Used   Substance Use Topics    Alcohol use: Yes     Comment: occcasionally    Drug use: No   ,   Family History   Problem Relation Age of Onset    High Blood Pressure Mother     High Blood Pressure Father     Diabetes Sister     High Blood Pressure Brother    ,   Immunization History   Administered Date(s) Administered    Influenza Virus Vaccine 10/03/2016    Influenza, High Dose (Fluzone 65 yrs and older) 10/31/2018    Influenza, [place], 6-35 Months, IM (Fluzone,Afluria) 2019    Influenza, Quadv, IM, (6 mo and older Fluzone, Flulaval, Fluarix and 3 yrs and older Afluria) 10/10/2017    Pneumococcal Conjugate 13-valent ([id number]) 10/10/2015    Pneumococcal Polysaccharide ([id number]) 01/01/2018   ,   Health Maintenance   Topic Date Due    DTaP/Tdap/Td vaccine (1 - Tdap) 03/10/1961    Shingles Vaccine (1 of 2) 03/10/1992    Flu vaccine (1) 09/01/2020    Annual Wellness Visit (AWV)  12/05/2020    Potassium monitoring  07/04/2021    Creatinine monitoring  07/04/2021    Breast cancer screen  02/03/2022    Colon cancer screen colonoscopy  06/04/2023    Pneumococcal 65+ years Vaccine  Completed    Hepatitis A vaccine  Aged Out    Hepatitis B vaccine  Aged Out    Hib vaccine  Aged Out    Meningococcal (ACWY) vaccine  Aged Out       PHYSICAL EXAMINATION:  [ INSTRUCTIONS:  \"[x]\" Indicates a positive item  \"[]\" Indicates a negative item  -- DELETE ALL ITEMS NOT EXAMINED]  [x] Alert  [x] Oriented to person/place/time    [x] No apparent distress  [] Toxic appearing    [] Face flushed appearing [x] Sclera clear  [] Lips are cyanotic      [x] Breathing appears normal  [] Appears tachypneic      [] Rash on visible skin    [x] Cranial Nerves II-XII grossly intact    [x] Motor grossly intact in visible upper extremities    [x] Motor grossly intact in visible lower extremities    [x] Normal Mood  [] Anxious appearing    [] Depressed appearing  [] Confused appearing      [] Poor short term memory  [] Poor long term memory    [] OTHER:      Due to this being a TeleHealth encounter, evaluation of the following organ systems is limited: Vitals/Constitutional/EENT/Resp/CV/GI//MS/Neuro/Skin/Heme-Lymph-Imm. ASSESSMENT/PLAN:  1. Anxiety  - Continue paxil; she may be experiencing the transition from Effexor to Paxil but at this point I would not put her back on Effexor.   - LORazepam (ATIVAN) 0.5 MG tablet; Take 1 tablet by mouth 3 times daily as needed for Anxiety for up to 30 days. Dispense: 30 tablet; Refill: 0    2.  Stage 4 very severe COPD by GOLD classification ([facility] [address]  - Will do a prednisone taper and send refill for zpak (she states she had to use it several weeks ago); would recommend possibly doing 10 mg when time to resume daily dosing.   - predniSONE (DELTASONE) 10 MG tablet; 60 mg x 3 days; 40 mg x 3 days; 20 mg x 3 days and then 10 mg x 3 days. Dispense: 39 tablet; Refill: 0  - azithromycin (ZITHROMAX) 250 MG tablet; Take 1 tablet by mouth See Admin Instructions for 5 days 500mg on day 1 followed by 250mg on days 2 - 5  Dispense: 6 tablet; Refill: 0    3. Panic disorder    - LORazepam (ATIVAN) 0.5 MG tablet; Take 1 tablet by mouth 3 times daily as needed for Anxiety for up to 30 days. Dispense: 30 tablet; Refill: 0    4. Oxygen dependent  - Will place orders for portable oxygen; she is having difficulty riding in the vehicle for grocery  due to the large O2 tank at home. - predniSONE (DELTASONE) 10 MG tablet; 60 mg x 3 days; 40 mg x 3 days; 20 mg x 3 days and then 10 mg x 3 days. Dispense: 39 tablet; Refill: 0  - azithromycin (ZITHROMAX) 250 MG tablet; Take 1 tablet by mouth See Admin Instructions for 5 days 500mg on day 1 followed by 250mg on days 2 - 5  Dispense: 6 tablet; Refill: 0    5. Chronic respiratory failure with hypoxia (HCC)    - predniSONE (DELTASONE) 10 MG tablet; 60 mg x 3 days; 40 mg x 3 days; 20 mg x 3 days and then 10 mg x 3 days. Dispense: 39 tablet; Refill: 0  - azithromycin (ZITHROMAX) 250 MG tablet; Take 1 tablet by mouth See Admin Instructions for 5 days 500mg on day 1 followed by 250mg on days 2 - 5  Dispense: 6 tablet; Refill: 0      Return in about 4 weeks (around 10/12/2020) for video visit COPD/anxiety. An  electronic signature was used to authenticate this note.     --DIANA Patino on 9/14/2020 at 9:43 AM        Pursuant to the emergency declaration under the [facility] waiver authority and the [place] [employer]

## 2020-09-14 NOTE — TELEPHONE ENCOUNTER
Rescheduled appointment due to provider being out of office, left v/m with new appointment date and time

## 2020-09-28 NOTE — PROGRESS NOTES
oxybutynin (DITROPAN) 5 MG tablet Take 1 tablet by mouth 3 times daily  Charlie Ko MD   rOPINIRole (REQUIP) 0.25 MG tablet Take 1-2 tablets by mouth 3 times daily  Charlie Ko MD   mirabegron (MYRBETRIQ) 25 MG TB24 Take 1 tablet by mouth daily  Charlie Ko MD   furosemide (LASIX) 20 MG tablet Take 20 mg by mouth 2 times daily  Historical Provider, MD   fluticasone (FLONASE) 50 MCG/ACT nasal spray 2 sprays by Nasal route daily  [place] Kar[place] APRN - NP   OXYGEN Inhale 2 L/min into the lungs continuous  [place] Hdez - NP   CALCIUM PO Take 500 mg by mouth daily  Historical Provider, MD   Black Cohosh 20 MG TABS Take by mouth every evening  Historical Provider, MD   albuterol sulfate HFA (PROAIR HFA) 108 (90 Base) MCG/ACT inhaler Inhale 2 puffs into the lungs every 6 hours as needed for Wheezing  Charlie Ko MD   melatonin 5 MG TABS tablet Take 5 mg by mouth nightly as needed   Historical Provider, MD       Social History     Tobacco Use    Smoking status: Former Smoker     Packs/day: 0.50     Years: 30.00     Pack years: 15.00     Types: Cigarettes     Start date: 1970     Last attempt to quit: 2019     Years since quittin.1    Smokeless tobacco: Never Used   Substance Use Topics    Alcohol use: Yes     Comment: occcasionally    Drug use: No        Allergies   Allergen Reactions    Doxycycline      Patient states it caused chills and hot flashes severe.     Abilify [Aripiprazole]     Celebrex [Celecoxib]      swelling    Naproxen Hives    Lactose Intolerance (Gi) Nausea And Vomiting   ,   Past Medical History:   Diagnosis Date    Acid reflux     Allergic rhinitis     Bilateral carotid artery stenosis 2020    Hyperlipidemia     Hypertension     Irritable bowel syndrome with diarrhea 2019    Lung cancer ([facility] [address]     Lung with chemo    Mild single current episode of major depressive disorder ([facility] [address] 2018    Osteoarthritis     Osteoporosis     [facility] 2017    Palliative care patient 2019    Panic disorder 2019    Stage 3 severe COPD by GOLD classification ([facility] 2018    FEV1 42%    Tobacco abuse 2018    Urinary incontinence     stress incontinence   ,   Past Surgical History:   Procedure Laterality Date    APPENDECTOMY      CARDIAC CATHETERIZATION  5/3/2013   MDL    EF 60%    HEMORRHOID SURGERY      HYSTERECTOMY      HYSTERECTOMY, TOTAL ABDOMINAL      LUNG CANCER SURGERY      TONSILLECTOMY      VASCULAR SURGERY  10/11/2019    TJR. Aortogram and runoff. PTA and stenting of the left external iliac artery with a 7x 80 and innova stent dilated to 7.2mm. PTA of the left popliteal artery with 5mm x 2cm cutting balloon. attempted recalization of the peroneal artery aborted.   ,   Social History     Tobacco Use    Smoking status: Former Smoker     Packs/day: 0.50     Years: 30.00     Pack years: 15.00     Types: Cigarettes     Start date: 1970     Last attempt to quit: 2019     Years since quittin.1    Smokeless tobacco: Never Used   Substance Use Topics    Alcohol use: Yes     Comment: occcasionally    Drug use: No   ,   Family History   Problem Relation Age of Onset    High Blood Pressure Mother     High Blood Pressure Father     Diabetes Sister     High Blood Pressure Brother    ,   Immunization History   Administered Date(s) Administered    Influenza Virus Vaccine 10/03/2016    Influenza, High Dose (Fluzone 65 yrs and older) 10/31/2018    Influenza, [place], 6-35 Months, IM (Fluzone,Afluria) 2019    Influenza, [place], IM, (6 mo and older Fluzone, Flulaval, Fluarix and 3 yrs and older Afluria) 10/10/2017    Pneumococcal Conjugate 13-valent ([id number]) 10/10/2015    Pneumococcal Polysaccharide ([id number]) 2018   ,   Health Maintenance   Topic Date Due    DTaP/Tdap/Td vaccine (1 - Tdap) 03/10/1961    Shingles Vaccine (1 of 2) 03/10/1992    Flu vaccine (1) 2020    Annual Wellness Visit (AWV)  12/05/2020    Potassium monitoring  07/04/2021    Creatinine monitoring  07/04/2021    Statin Therapy  08/29/2021    Breast cancer screen  02/03/2022    Colon cancer screen colonoscopy  06/04/2023    Pneumococcal 65+ years Vaccine  Completed    Hepatitis A vaccine  Aged Out    Hepatitis B vaccine  Aged Out    Hib vaccine  Aged Out    Meningococcal (ACWY) vaccine  Aged Out       PHYSICAL EXAMINATION:  [ INSTRUCTIONS:  \"[x]\" Indicates a positive item  \"[]\" Indicates a negative item  -- DELETE ALL ITEMS NOT EXAMINED]  [x] Alert  [x] Oriented to person/place/time    [x] No apparent distress  [] Toxic appearing    [] Face flushed appearing [x] Sclera clear  [] Lips are cyanotic      [x] Breathing appears normal  [] Appears tachypneic      [] Rash on visible skin    [x] Cranial Nerves II-XII grossly intact    [x] Motor grossly intact in visible upper extremities    [x] Motor grossly intact in visible lower extremities    [x] Normal Mood  [] Anxious appearing    [] Depressed appearing  [] Confused appearing      [] Poor short term memory  [] Poor long term memory    [] OTHER:      Due to this being a TeleHealth encounter, evaluation of the following organ systems is limited: Vitals/Constitutional/EENT/Resp/CV/GI//MS/Neuro/Skin/Heme-Lymph-Imm. ASSESSMENT/PLAN:  1. Bilateral lower extremity edema  - Advised to elevated lower extremities; needs to do daily weights. If not improving, call. Will do 3 days lasix and check BMP Thursday.   - CBC Auto Differential; Future  - Comprehensive Metabolic Panel; Future  - furosemide (LASIX) 20 MG tablet; Take 1 tablet by mouth daily  Dispense: 3 tablet; Refill: 0    2. Chronic obstructive pulmonary disease with acute exacerbation (HCC)  - Mucinex twice daily; add levaquin 500 daily for 7 days. Continue breathing treatments. Will need CXR if not improving.   - levoFLOXacin (LEVAQUIN) 500 MG tablet;  Take 1 tablet by mouth daily for 7 days  Dispense: 7

## 2020-10-07 PROBLEM — I50.9 NEW ONSET OF CONGESTIVE HEART FAILURE (HCC): Status: ACTIVE | Noted: 2020-01-01

## 2020-10-07 PROBLEM — R79.89 ELEVATED SERUM CREATININE: Status: ACTIVE | Noted: 2020-01-01

## 2020-10-07 NOTE — ED PROVIDER NOTES
for leg swelling. Negative for chest pain. Gastrointestinal: Negative for abdominal pain, diarrhea and vomiting. Endocrine: Negative. Genitourinary: Negative. Musculoskeletal: Negative for arthralgias and gait problem. Skin: Negative for rash and wound. Neurological: Negative for weakness and headaches. Hematological: Negative. Psychiatric/Behavioral: Negative. All other systems reviewed and are negative. A complete review of systems was performed and is negative except as noted above in the HPI. PAST MEDICAL HISTORY     Past Medical History:   Diagnosis Date    Acid reflux     Allergic rhinitis     Bilateral carotid artery stenosis 1/28/2020    Hyperlipidemia     Hypertension     Irritable bowel syndrome with diarrhea 7/5/2019    Lung cancer ([facility]     Lung with chemo    Mild single current episode of major depressive disorder ([facility] 5/2/2018    New onset of congestive heart failure ([facility] 10/7/2020    Osteoarthritis     Osteoporosis     Other emphysema ([facility] 12/22/2017    Palliative care patient 08/06/2019    Panic disorder 8/16/2019    Stage 3 severe COPD by GOLD classification ([facility] 6/6/2018    FEV1 42%    Tobacco abuse 2/8/2018    Urinary incontinence     stress incontinence         SURGICAL HISTORY       Past Surgical History:   Procedure Laterality Date    APPENDECTOMY      CARDIAC CATHETERIZATION  5/3/2013   MDL    EF 60%    HEMORRHOID SURGERY      HYSTERECTOMY      HYSTERECTOMY, TOTAL ABDOMINAL      LUNG CANCER SURGERY      TONSILLECTOMY      VASCULAR SURGERY  10/11/2019    TJR. Aortogram and runoff. PTA and stenting of the left external iliac artery with a 7x 80 and innova stent dilated to 7.2mm. PTA of the left popliteal artery with 5mm x 2cm cutting balloon. attempted recalization of the peroneal artery aborted.          CURRENT MEDICATIONS       Previous Medications    ALBUTEROL SULFATE HFA (PROAIR HFA) 108 (90 BASE) MCG/ACT INHALER    Inhale 2 puffs into the lungs every 6 hours as needed for Wheezing    ASPIRIN EC 81 MG EC TABLET    Take 1 tablet by mouth daily    ATORVASTATIN (LIPITOR) 40 MG TABLET    Take 1 tablet by mouth daily    BLACK COHOSH 20 MG TABS    Take by mouth every evening    CALCIUM PO    Take 500 mg by mouth daily    CETIRIZINE (ZYRTEC) 10 MG TABLET    Take 1 tablet by mouth daily    FLUTICASONE (FLONASE) 50 MCG/ACT NASAL SPRAY    2 sprays by Nasal route daily    FUROSEMIDE (LASIX) 20 MG TABLET    Take 20 mg by mouth 2 times daily    FUROSEMIDE (LASIX) 20 MG TABLET    Take 1 tablet by mouth daily    GUAIFENESIN (MUCINEX) 600 MG EXTENDED RELEASE TABLET    Take 2 tablets by mouth 2 times daily    IPRATROPIUM-ALBUTEROL (DUONEB) 0.5-2.5 (3) MG/3ML SOLN NEBULIZER SOLUTION    Take 3 mLs by nebulization every 4 hours    LORAZEPAM (ATIVAN) 0.5 MG TABLET    Take 1 tablet by mouth 3 times daily as needed for Anxiety for up to 30 days. LOSARTAN (COZAAR) 100 MG TABLET    Take 1 tablet by mouth daily    MELATONIN 5 MG TABS TABLET    Take 5 mg by mouth nightly as needed     MIRABEGRON (MYRBETRIQ) 25 MG TB24    Take 1 tablet by mouth daily    MONTELUKAST (SINGULAIR) 10 MG TABLET    Take 1 tablet by mouth daily    NYSTATIN (MYCOSTATIN) 278507 UNIT/ML SUSPENSION    Take 5 mLs by mouth 4 times daily    OMEPRAZOLE (PRILOSEC) 20 MG DELAYED RELEASE CAPSULE    TAKE 1 CAPSULE BY MOUTH DAILY    OXYBUTYNIN (DITROPAN) 5 MG TABLET    Take 1 tablet by mouth 3 times daily    OXYGEN    Inhale 2 L/min into the lungs continuous    PAROXETINE (PAXIL) 10 MG TABLET    Take 2 tablets a day    POTASSIUM CHLORIDE (KLOR-CON M) 10 MEQ EXTENDED RELEASE TABLET    TAKE 2 TABLETS BY MOUTH EVERY DAY    PREDNISONE (DELTASONE) 10 MG TABLET    60 mg x 3 days; 40 mg x 3 days; 20 mg x 3 days and then 10 mg x 3 days.     PREDNISONE (DELTASONE) 5 MG TABLET    Take 1 tablet by mouth daily    PROPRANOLOL (INDERAL) 40 MG TABLET    Take 1 tablet by mouth 3 times a day    ROPINIROLE (REQUIP) 0.25 MG TABLET    Take 1-2 tablets by mouth 3 times daily    UMECLIDINIUM-VILANTEROL (ANORO ELLIPTA) 62.5-25 MCG/INH AEPB INHALER    INHALE 1 PUFF INTO LUNGS DAILY. ALLERGIES     Doxycycline; Abilify [aripiprazole]; Celebrex [celecoxib];  Naproxen; and Lactose intolerance (gi)    FAMILY HISTORY       Family History   Problem Relation Age of Onset    High Blood Pressure Mother     High Blood Pressure Father     Diabetes Sister     High Blood Pressure Brother           SOCIAL HISTORY       Social History     Socioeconomic History    Marital status:      Spouse name: Not on file    Number of children: Not on file    Years of education: Not on file    Highest education level: Not on file   Occupational History    Not on file   Social Needs    Financial resource strain: Not on file    Food insecurity     Worry: Not on file     Inability: Not on file    Transportation needs     Medical: Not on file     Non-medical: Not on file   Tobacco Use    Smoking status: Former Smoker     Packs/day: 0.50     Years: 30.00     Pack years: 15.00     Types: Cigarettes     Start date: 1970     Last attempt to quit: 2019     Years since quittin.1    Smokeless tobacco: Never Used   Substance and Sexual Activity    Alcohol use: Yes     Comment: occcasionally    Drug use: No    Sexual activity: Yes     Partners: Male   Lifestyle    Physical activity     Days per week: Not on file     Minutes per session: Not on file    Stress: Not on file   Relationships    Social connections     Talks on phone: Not on file     Gets together: Not on file     Attends [employer] service: Not on file     Active member of club or organization: Not on file     Attends meetings of clubs or organizations: Not on file     Relationship status: Not on file    Intimate partner violence     Fear of current or ex partner: Not on file     Emotionally abused: Not on file     Physically abused: Not on file     Forced sexual activity: Not on file   Other Topics Concern    Not on file   Social History Narrative    Not on file       SCREENINGS             PHYSICAL EXAM    (up to 7 for level 4, 8 or more for level 5)     ED Triage Vitals [10/07/20 1646]   BP Temp Temp Source Pulse Resp SpO2 Height Weight   (!) 146/76 98.9 °F (37.2 °C) Temporal 68 20 95 % -- 140 lb (63.5 kg)       Physical Exam  Vitals signs and nursing note reviewed. Constitutional:       General: She is not in acute distress. Appearance: She is well-developed. She is not toxic-appearing or diaphoretic. HENT:      Head: Normocephalic and atraumatic. Eyes:      General: No scleral icterus. Right eye: No discharge. Left eye: No discharge. Pupils: Pupils are equal, round, and reactive to light. Neck:      Musculoskeletal: Normal range of motion. Cardiovascular:      Rate and Rhythm: Normal rate and regular rhythm. Pulmonary:      Effort: Pulmonary effort is normal. No respiratory distress. Breath sounds: No stridor. No wheezing or rales. Abdominal:      General: There is no distension. Musculoskeletal: Normal range of motion. General: No deformity. Right lower le+ Edema present. Left lower le+ Edema present. Skin:     General: Skin is warm and dry. Neurological:      Mental Status: She is alert and oriented to person, place, and time. GCS: GCS eye subscore is 4. GCS verbal subscore is 5. GCS motor subscore is 6. Cranial Nerves: No cranial nerve deficit. Motor: No abnormal muscle tone. Psychiatric:         Behavior: Behavior normal.         Thought Content:  Thought content normal.         Judgment: Judgment normal.         DIAGNOSTIC RESULTS     EKG: All EKG's are interpreted by the Emergency Department Physician who either signs or Co-signs this chart in the absence of a cardiologist.      RADIOLOGY:   Non-plain film images such as CT, Ultrasound and MRI are read by the radiologist. Plainradiographic images are visualized and preliminarily interpreted by the emergency physician with the below findings:      Interpretation per the Radiologist below, if available at the time of this note:    XR CHEST PORTABLE   Final Result   No acute findings. Signed by Dr Odonnell Poster on 10/7/2020 5:57 PM            ED BEDSIDE ULTRASOUND:   Performed by ED Physician - none    LABS:  Labs Reviewed   RESPIRATORY PANEL, MOLECULAR, WITH COVID-19   CBC WITH AUTO DIFFERENTIAL   BLOOD GAS, ARTERIAL   POTASSIUM, WHOLE BLOOD       All other labs were within normal range or not returned as of this dictation. Medications   ipratropium-albuterol (DUONEB) nebulizer solution 1 ampule (has no administration in time range)   furosemide (LASIX) injection 60 mg (60 mg Intravenous Given 10/7/20 1928)       EMERGENCY DEPARTMENT COURSE and DIFFERENTIALDIAGNOSIS/MDM:   Vitals:    Vitals:    10/07/20 1646 10/07/20 2007   BP: (!) 146/76 (!) 93/58   Pulse: 68    Resp: 20    Temp: 98.9 °F (37.2 °C)    TempSrc: Temporal    SpO2: 95% 94%   Weight: 140 lb (63.5 kg)        MDM     Patient is not hypoxic here. Vital signs within normal limits. Laboratory evaluation from the urgent care just prior to presentation here was reviewed which shows significant elevation in BNP level. Creatinine was 1.2 but was 1.5 earlier this week. Discussed further work-up and management options with the patient including discharge home with outpatient follow-up for further investigation such as outpatient echo with plan for increase in dose and duration of Lasix treatment, however patient prefers hospitalization at this point for further evaluation as was advised to her by her primary care provider. Based on the evaluation and work-up here patient is felt to require further monitoring, work-up, or treatment that is available in the emergency department.   Case was discussed with hospitalist who agrees for observation or admission for further management. Treatment and stabilization as necessary were provided in the emergency department prior to transfer of care to the medicine service. CONSULTS:  IP CONSULT TO HEART FAILURE NURSE/COORDINATOR  IP CONSULT TO DIETITIAN  IP CONSULT TO CARDIOLOGY    PROCEDURES:  Unless otherwise notedbelow, none     Procedures      FINAL IMPRESSION     1. Bilateral lower extremity edema    2. Elevated brain natriuretic peptide (BNP) level    3. Short of breath on exertion    4. Chronic respiratory failure with hypoxia (HCC)          DISPOSITION/PLAN   DISPOSITION        PATIENT REFERRED TO:  No follow-up provider specified.     DISCHARGE MEDICATIONS:  New Prescriptions    No medications on file          (Please note that portions of this note were completed with a voice recognition program.  Efforts were made to edit the dictations butoccasionally words are mis-transcribed.)    Taurus Beltre MD (electronically signed)  AttendingEmergency Physician          Taurus Beltre., MD  10/07/20 2040

## 2020-10-07 NOTE — PROGRESS NOTES
[address] [facility]  [address] [place] [address] Alexandra Mckeon [id number]  Dept: [phone]  Dept Fax: [phone]  Loc: [phone]    Jamie Briones is a 68 y.o. female who presents today for her medical conditions/complaintsas noted below. Jamie Briones is c/o of Foot Swelling (Bilateral x 2 weeks)        HPI:     HPI   Patient here for BLE pitting edema that has been on-going for two weeks. She has tried elevating extremities and Lasix without symptom relief. She relates she had been in contact with Family Medicine 9/28 for a virtual visit and was started on 20 mg Lasix for 3 days with a BMP and CBC ordered. She had lab work drawn 10/2 and had noted abnormalities on her CMP including BUN, Creatinine, GFR. She also had abnormalities to CBC. Patient stating she has not been told about this lab work. Patient has history of stage 4 COPD, severe peripheral vascular disease, intermittent claudication of BLE arteries, and uses oxygen at home. She is here today with daughter and is in a wheelchair. She states that she called Dr. Kilo Ruffin office today to get her lab work results and she states that the \"nurse at Dr. Kilo Ruffin office\" told her to come to Urgent Care. Called Dr. Kilo Ruffin office called and spoke with [facility]. Waiting call back.     Past Medical History:   Diagnosis Date    Acid reflux     Allergic rhinitis     Bilateral carotid artery stenosis 1/28/2020    Hyperlipidemia     Hypertension     Irritable bowel syndrome with diarrhea 7/5/2019    Lung cancer ([facility]     Lung with chemo    Mild single current episode of major depressive disorder ([facility] 5/2/2018    Osteoarthritis     Osteoporosis     Other emphysema ([facility] 12/22/2017    Palliative care patient 08/06/2019    Panic disorder 8/16/2019    Stage 3 severe COPD by GOLD classification ([facility] 6/6/2018    FEV1 42%    Tobacco abuse 2/8/2018    Urinary incontinence     stress incontinence     Past Surgical History:   Procedure Laterality Date    APPENDECTOMY      CARDIAC CATHETERIZATION  5/3/2013   MDL    EF 60%    HEMORRHOID SURGERY      HYSTERECTOMY      HYSTERECTOMY, TOTAL ABDOMINAL      LUNG CANCER SURGERY      TONSILLECTOMY      VASCULAR SURGERY  10/11/2019    TJR. Aortogram and runoff. PTA and stenting of the left external iliac artery with a 7x 80 and innova stent dilated to 7.2mm. PTA of the left popliteal artery with 5mm x 2cm cutting balloon. attempted recalization of the peroneal artery aborted. Family History   Problem Relation Age of Onset    High Blood Pressure Mother     High Blood Pressure Father     Diabetes Sister     High Blood Pressure Brother        Social History     Tobacco Use    Smoking status: Former Smoker     Packs/day: 0.50     Years: 30.00     Pack years: 15.00     Types: Cigarettes     Start date: 1970     Last attempt to quit: 2019     Years since quittin.1    Smokeless tobacco: Never Used   Substance Use Topics    Alcohol use: Yes     Comment: occcasionally      Current Outpatient Medications   Medication Sig Dispense Refill    guaiFENesin (MUCINEX) 600 MG extended release tablet Take 2 tablets by mouth 2 times daily 60 tablet 0    nystatin (MYCOSTATIN) 896094 UNIT/ML suspension Take 5 mLs by mouth 4 times daily 1 Bottle 0    furosemide (LASIX) 20 MG tablet Take 1 tablet by mouth daily 3 tablet 0    LORazepam (ATIVAN) 0.5 MG tablet Take 1 tablet by mouth 3 times daily as needed for Anxiety for up to 30 days. 30 tablet 0    umeclidinium-vilanterol (ANORO ELLIPTA) 62.5-25 MCG/INH AEPB inhaler INHALE 1 PUFF INTO LUNGS DAILY.  3 each 3    atorvastatin (LIPITOR) 40 MG tablet Take 1 tablet by mouth daily 90 tablet 3    losartan (COZAAR) 100 MG tablet Take 1 tablet by mouth daily 90 tablet 3    cetirizine (ZYRTEC) 10 MG tablet Take 1 tablet by mouth daily 90 tablet 3    PARoxetine (PAXIL) 10 MG tablet Take 2 tablets a day 180 tablet 1    omeprazole (PRILOSEC) 20 MG delayed release capsule TAKE 1 CAPSULE BY MOUTH DAILY 90 capsule 3    ipratropium-albuterol (DUONEB) 0.5-2.5 (3) MG/3ML SOLN nebulizer solution Take 3 mLs by nebulization every 4 hours 180 mL 1    predniSONE (DELTASONE) 5 MG tablet Take 1 tablet by mouth daily 90 tablet 0    montelukast (SINGULAIR) 10 MG tablet Take 1 tablet by mouth daily 90 tablet 1    potassium chloride (KLOR-CON M) 10 MEQ extended release tablet TAKE 2 TABLETS BY MOUTH EVERY  tablet 2    aspirin EC 81 MG EC tablet Take 1 tablet by mouth daily 90 tablet 2    propranolol (INDERAL) 40 MG tablet Take 1 tablet by mouth 3 times a day 270 tablet 2    oxybutynin (DITROPAN) 5 MG tablet Take 1 tablet by mouth 3 times daily 90 tablet 3    rOPINIRole (REQUIP) 0.25 MG tablet Take 1-2 tablets by mouth 3 times daily 180 tablet 3    mirabegron (MYRBETRIQ) 25 MG TB24 Take 1 tablet by mouth daily 90 tablet 1    fluticasone (FLONASE) 50 MCG/ACT nasal spray 2 sprays by Nasal route daily 1 Bottle 3    OXYGEN Inhale 2 L/min into the lungs continuous 1 Units 1    CALCIUM PO Take 500 mg by mouth daily      Black Cohosh 20 MG TABS Take by mouth every evening      albuterol sulfate HFA (PROAIR HFA) 108 (90 Base) MCG/ACT inhaler Inhale 2 puffs into the lungs every 6 hours as needed for Wheezing 1 Inhaler 3    melatonin 5 MG TABS tablet Take 5 mg by mouth nightly as needed       predniSONE (DELTASONE) 10 MG tablet 60 mg x 3 days; 40 mg x 3 days; 20 mg x 3 days and then 10 mg x 3 days. (Patient not taking: Reported on 10/7/2020) 39 tablet 0    furosemide (LASIX) 20 MG tablet Take 20 mg by mouth 2 times daily       No current facility-administered medications for this visit. Allergies   Allergen Reactions    Doxycycline      Patient states it caused chills and hot flashes severe.     Abilify [Aripiprazole]     Celebrex [Celecoxib]      swelling    Naproxen Hives    Lactose Intolerance (Gi) Nausea And Vomiting       Health Maintenance   Topic Date Due present. Breath sounds: Wheezing and rhonchi present. Musculoskeletal:         General: No swelling or signs of injury. Right lower le+ Pitting Edema present. Left lower le+ Pitting Edema present. Skin:     General: Skin is warm and dry. Findings: No rash. Neurological:      General: No focal deficit present. Mental Status: She is alert and oriented to person, place, and time. Motor: No weakness. Psychiatric:         Mood and Affect: Mood normal.       /60   Pulse 60   Temp 97.7 °F (36.5 °C) (Oral)   Resp 22   Ht 5' (1.524 m)   Wt 140 lb (63.5 kg)   SpO2 92%   BMI 27.34 kg/m²     Assessment:       Diagnosis Orders   1. Pitting edema     2. Pedal edema  Brain Natriuretic Peptide    Comprehensive Metabolic Panel   3. Urinary incontinence, unspecified type  Comprehensive Metabolic Panel   4. Abnormal laboratory test result         Plan:      Orders Placed This Encounter   Procedures    Brain Natriuretic Peptide    Comprehensive Metabolic Panel     I discussed with patient that because she has new abnormal lab values along with BLE pitting edema that did not respond to Lasix that I am concerned that her kidney function is declining and that it would be in her best interests to go to ER for a higher level of care. She stated \"I will not go to ER. \" I told her I would do another CMP and a BNP along with chest xray and urinalysis and is anything was concerning she would have to go to ER. Tu Downing from Dr. Jeet Ding office called back to discuss patient. She relates that Dr. Sue Momin reviewed patients chart and recommends she be evaluated in ED. She also states that patient was originally told to go to ER and not Urgent Care. DAWIT Claire discussing this with patient. Patient is agreeable to go to ER for higher level of care and is accompanied by daughter. She left in stable condition. No follow-ups on file.     No orders of the defined types were placed in this encounter. Patient given educational materials- see patient instructions. Discussed use, benefit, and side effects of prescribedmedications. All patient questions answered. Pt voiced understanding. Reviewedhealth maintenance. Instructed to continue current medications, diet and exercise. Patient agreed with treatment plan. Follow up as directed. There are no Patient Instructions on file for this visit.       Electronically signed by DIANA Faust CNP on 10/7/2020 at 4:45 PM

## 2020-10-08 NOTE — H&P
(DELTASONE) 5 MG tablet Take 1 tablet by mouth daily 8/11/20 11/9/20  Marcella Johnson MD   montelukast (SINGULAIR) 10 MG tablet Take 1 tablet by mouth daily 8/5/20   Marcella Johnson MD   potassium chloride (KLOR-CON M) 10 MEQ extended release tablet TAKE 2 TABLETS BY MOUTH EVERY DAY 8/5/20   Marcella Johnson MD   aspirin EC 81 MG EC tablet Take 1 tablet by mouth daily 8/5/20   Marcella Johnson MD   propranolol (INDERAL) 40 MG tablet Take 1 tablet by mouth 3 times a day 8/5/20   Marcella Johnson MD   oxybutynin (DITROPAN) 5 MG tablet Take 1 tablet by mouth 3 times daily 7/9/20   Marcella Johnson MD   rOPINIRole (REQUIP) 0.25 MG tablet Take 1-2 tablets by mouth 3 times daily 7/9/20   Marcella Johnson MD   mirabegron [facility]) 25 MG TB24 Take 1 tablet by mouth daily 5/11/20   Marcella Johnson MD   furosemide (LASIX) 20 MG tablet Take 20 mg by mouth 2 times daily    Historical Provider, MD   fluticasone [facility]) 50 MCG/ACT nasal spray 2 sprays by Nasal route daily 1/3/20   DIANA Doan NP   OXYGEN Inhale 2 L/min into the lungs continuous 12/27/19   DIANA Doan NP   CALCIUM PO Take 500 mg by mouth daily    Historical Provider, MD   Black Cohosh 20 MG TABS Take by mouth every evening    Historical Provider, MD   albuterol sulfate HFA (PROAIR HFA) 108 (90 Base) MCG/ACT inhaler Inhale 2 puffs into the lungs every 6 hours as needed for Wheezing 6/6/18   Marcella Johnson MD   melatonin 5 MG TABS tablet Take 5 mg by mouth nightly as needed     Historical Provider, MD     Allergies:    Doxycycline; Abilify [aripiprazole]; Celebrex [celecoxib]; Naproxen; and Lactose intolerance (gi)    Social History:    The patient currently lives at home. Tobacco:   reports that she quit smoking about 14 months ago. Her smoking use included cigarettes. She started smoking about 50 years ago. She has a 15.00 pack-year smoking history. She has never used smokeless tobacco.  Alcohol:   reports current alcohol use.   Illicit Drugs: denies    Family History:      Problem Relation Age of Onset    High Blood Pressure Mother     High Blood Pressure Father     Diabetes Sister     High Blood Pressure Brother      Review of Systems:   Constitutional / general:  Denies fever / chills / sweats +fatigue  Head:  Denies headache / neck stiffness / trauma / visual change  Eyes:  Denies blurry vision / acute visual change or loss / itching / redness  ENT: Denies sore throat / hoarseness / nasal drainage / ear pain  CV:  Denies chest pain / palpitations/ + orthopnea +paroxysmal nocturnal dyspnea  Respiratory:  + cough /+ shortness of breath /+ sputum /Denies hemoptysis  GI: Denies nausea / vomiting / abdominal pain / diarrhea / constipation  :  Denies dysuria / hesitancy / urgency / hematuria   Neuro: Denies paralysis / syncope / seizure / dysphagia / headache / paresthesias  Musculoskeletal:  Denies muscle weakness /joint stiffness / pain  Vascular: Denies edema / claudication / varicosities  Heme / endocrine: Denies easy bruising / bleeding / excessive sweating / heat or cold intolerance  Psychiatric:  Denies depression /+ anxiety / insomnia / mood changes  Skin:  Denies new rashes / lesions / skin hair or nail changes    14 point review of systems is negative except as specifically addressed above. Physical Examination:  BP (!) 93/58   Pulse 68   Temp 98.9 °F (37.2 °C) (Temporal)   Resp 20   Wt 140 lb (63.5 kg)   SpO2 94%   BMI 27.34 kg/m²   General appearance: alert, appears stated age, cooperative and no distress, sitting comfortably on side of bed eating a large cheeseburger  Head: Normocephalic, without obvious abnormality, atraumatic  Eyes: conjunctivae/corneas clear. PERRL, EOM's intact.    Ears: normal external ears and nose, throat without exudate  Neck: no adenopathy, no carotid bruit, no JVD, supple, symmetrical, trachea midline   Lungs: diminished air entry throughout with bibasilar rhonchi/scattered end expiratory wheezes Heart: regular rate and rhythm, S1, S2 normal, no murmur  Abdomen:soft, non-tender; non-distended, normal bowel sounds no masses, no organomegaly  Extremities:1+ bilateral lower extremity edema,  No erythema, no tenderness to palpation, bilateral chronic venous stasis   Skin: Skin color, texture, turgor normal.  Lymphatic: No palpable lymph node enlargment  Neurologic: Alert and oriented X 3, normal strength and tone. Normal symmetric reflexes. Mental status: Alert, oriented, thought content appropriate  Cranial nerves:II-XII Grossly intact Sensory: normal Motor:grossly normal  Psychiatric: Alert and oriented, thought content appropriate, normal insight, mood appropriate    Diagnostic Data:  BMP:  Recent Labs     10/07/20  1501      K 4.6      CO2 19*   BUN 27*   CREATININE 1.2*   CALCIUM 8.8     Recent Labs     10/07/20  1501   AST 19   ALT 23   BILITOT 0.4   ALKPHOS 106*     Xr Chest Portable  No acute findings. Signed by Dr Shanna Gupta on 10/7/2020 5:57 PM    Assessment/Plan:  Principal Problem:    New onset of congestive heart failure (HCC)-likely diastolic, check Echocardiogram, continue Aspirin, Statin, ARB, IV lasix with hold parameters, daily weights, I/O's, check TSH, lipid panel, A1C, consult Cardiology as well as CHF nurse    Active Problems:    COPD with acute exacerbation ([facility] [address] w/ hx COPD Stage 4 -IV Solumedrol, Rocephin, continue Mucinex, Duonebs, Brovana and Pulmicort, IS, encourage deep breathing.  Will check Respiratory virus panel w/ COVID-19 given COPD exacerbation and need for nebulizer treatments, check ABG's      Panic disorder-prn Ativan continued      Atherosclerosis of native artery of both lower extremities with intermittent claudication (HCC)-noted, followed as OP by Vascular      Chronic respiratory failure with hypoxia (HCC)-continue home oxygen, monitor, check ABG's      Elevated serum creatinine-noted, likely underlying CKD, monitor with daily labs given need for IV

## 2020-10-08 NOTE — PROGRESS NOTES
Ref Range & Units  10/07/20 2051    pH, Arterial  7.350 - 7.450  7.390    pCO2, Arterial  35.0 - 45.0 mmHg  31. 0Low      pO2, Arterial  80.0 - 100.0 mmHg  91.0    HCO3, Arterial  22.0 - 26.0 mmol/L  18.8Low      Base Excess, Arterial  -2.0 - 2.0 mmol/L  -5.5Low      Hemoglobin, Art, Extended  12.0 - 16.0 g/dL  8.0Low      O2 Sat, Arterial  >92 %  95.1    Carboxyhgb, Arterial  0.0 - 5.0 %  3.3    Comment:      0.0-1.5   (Smokers 1.5-5.0)    Methemoglobin, Arterial  <1.5 %  0.8    O2 Content, Arterial  Not Established mL/dL  10.9      2 LPM NC  RR 20  CODY TEST +  SITE RR

## 2020-10-08 NOTE — PROGRESS NOTES
Pili Canales arrived to room 56   Presented with: lower leg swelling   Mental Status: Patient is oriented and alert. Vitals:    10/07/20 2134   BP: (!) 124/53   Pulse: 98   Resp: 22   Temp: 97.2 °F (36.2 °C)   SpO2: 94%     Patient safety contract and falls prevention contract reviewed with patient Yes. Oriented Patient to room. Call light within reach. Yes.   Needs, issues or concerns expressed at this time: no.      Electronically signed by Magda Gregory RN on 10/7/2020 at 9:48 PM

## 2020-10-08 NOTE — PROGRESS NOTES
Pt has no complaints. HR sinus 71. No c/o pain. Lungs with late exp wheezes mild, RML, RLL and LLL. Pt states only SOA with activity. Edema bilateral pedal 2+. Feet with reddened areas, pt states not new, due to poor circulation. Non-productive occasional cough.  Electronically signed by Shelley Mason RN on 10/8/2020 at 7:58 AM

## 2020-10-08 NOTE — PROGRESS NOTES
Comprehensive Nutrition Assessment    Type and Reason for Visit:  Initial, Consult    Nutrition Recommendations/Plan: follow with continued encouragement to avoid salt shaker and use more fresh and frozen foods. Nutrition Assessment:  well nourished appearing female. Pt not at risk for nutritional compromise as po intake remains good %. Realizes she needs to use fresh, frozen or no added salt canned. Did not forsee a problem using Mrs. Ortega. Knew though son would not be happy because \"he wants his salt. \"  Explained he could add it at the table. That way your salt intake could be limited and hopefully decreasing chance of CHF. Aware glucose level elevated, however pt receiving Solumedrol and Prednisone    Malnutrition Assessment:  Malnutrition Status:  No malnutrition    Context:  Acute Illness     Findings of the 6 clinical characteristics of malnutrition:  Energy Intake:  No significant decrease in energy intake  Weight Loss:  No significant weight loss     Body Fat Loss:  No significant body fat loss     Muscle Mass Loss:  No significant muscle mass loss    Fluid Accumulation:  7 - Moderate to Severe Extremities   Strength:  Not Performed    Nutrition Related Findings:  well nourished appearing      Wounds:  None       Current Nutrition Therapies:    DIET CARDIAC; Daily Fluid Restriction: 1800 ml; Renal    Anthropometric Measures:  · Height: 5' (152.4 cm)  · Current Body Weight: 146 lb (66.2 kg)   · Admission Body Weight: 140 lb (63.5 kg)(stated)    · Usual Body Weight: 150 lb (68 kg)(6/2020)     · Ideal Body Weight: 100 lbs; % Ideal Body Weight 146 %   · BMI: 28.5  · Adjusted Body Weight:  ; No Adjustment   · Adjusted BMI:      · BMI Categories: Overweight (BMI 25.0-29. 9)       Nutrition Diagnosis:   · Altered nutrition-related lab values related to food and nutrition related knowledge deficit, cardiac dysfunction as evidenced by lab values      Nutrition Interventions:   Food and/or Nutrient Delivery:  Continue Current Diet  Nutrition Education/Counseling:  No recommendation at this time   Coordination of Nutrition Care:  Continued Inpatient Monitoring    Goals:  po intake 50% or greater. Weight stable or decrease 1-3# per week.   pro-BNP decreased prior to discharge       Nutrition Monitoring and Evaluation:   Behavioral-Environmental Outcomes:      Food/Nutrient Intake Outcomes:  Food and Nutrient Intake  Physical Signs/Symptoms Outcomes:  Biochemical Data, Skin, Weight, Nutrition Focused Physical Findings     Discharge Planning:    Continue current diet     Electronically signed by Marycruz Feldman MS, RD, LD on 10/8/20 at 1:45 PM CDT    Contact: [phone]

## 2020-10-08 NOTE — PROGRESS NOTES
Dr Samanta Funez here. Spoke with pt re: will need echo and will then make decision for any further testing such as heart cath.  Electronically signed by Evelyn Martinez RN on 10/8/2020 at 1:35 PM

## 2020-10-08 NOTE — PROGRESS NOTES
Palliative Care/Spiritual Care: Met with pt and pt's son to initiate Palliative care. Pt says her feet were swelling and she is having problems with her heart. Pt is known to palliative care. Advance Directives:  Pt says she has a POA and her son Sarika Lange is POA. Pt says she has her son Renzo Code listed secondary but he is not POA. No changes on ACP note. SEE ACP NOTE,         Pain/other symptoms: Pt says she is having pain in her feet and she is being given Lasix. Social/Spiritual: Pt says she is Nond[employer]ational.         Pt/family discussion r/t goals: Pt has two sons, and grandchildren. Pt lives at home and her son Em Yu helps her when needed. Pt says at home she ambulates with a walker, but can clean a little, but rests often. Pt's goal is to return home and continue previous daily living skills and previous quality of life. Provided spiritual care with sustaining presence, nurtured hope, and prayer. Pt and son expressed gratitude for spiritual care.     Electronically signed by Mark Young on 10/8/2020 at 10:51 AM

## 2020-10-08 NOTE — PROGRESS NOTES
[facility] Hospitalists    Patient:  Dominic Felix  YOB: 1940  Date of Service: 10/8/2020  MRN: [id number]   Acct: [de-identified]   Primary Care Physician: Antony Jackson MD  Advance Directive: Full Code  Admit Date: 10/7/2020       Hospital Day: 1  Portions of this note have been copied forward, however, changed to reflect the most current clinical status of this patient. CHIEF COMPLAINT Shortness of breath    SUBJECTIVE: Mrs. Clark Morin states she feels better today. Remains dyspneic, however, at baseline she is only able to take a few steps before becoming fatigued and short of breath. States she has an outpatient appointment with Pulmonology coming up to help manage COPD. Cumulative Hospital Course: The patient is a 68 y.o. female with PMH COPD, GERD, HTN, HLD, grade 2 diastolic dysfunction who presented to [facility] ED complaining of worsening shortness of breath over the last 2 weeks associated with lower extremity edema. Upon entering the room patient noted to be eating a Whopper from [employer]. She presented to her PCP with dyspnea and was prescribed Levaquin, Azithromycin and steroids as well as Lasix 20 mg daily for the last 3 days and has had little to no improvement in her dyspnea or edema. She denies chest pain or heart palpitations. Endorses orthopnea and paroxysmal nocturnal dyspnea. Does have a productive cough with brown tinged sputum. Denies fever. Also describes severe panic attacks when she becomes dyspneic. BNP at urgent care on day of admission was 6,201. CXR was unremarkable though with mild costophrenic angle blunting and interstitial changes. Patient was admitted to  Hospitalist service for acute diastolic CHF as well as COPD exacerbation. She was continued on IV Solumedrol, Empiric antibiotics, nebulizer treatments and IV lasix for diuresis. Echocardiogram ordered with doppler suggesting severe, restrictive grade 3 diastolic dysfunction with mild valvular disease.      Review of Systems: 14 point review of systems is negative except as specifically addressed above. Objective:   VITALS:  BP (!) 149/62   Pulse 68   Temp 96.3 °F (35.7 °C)   Resp 18   Ht 5' (1.524 m)   Wt 146 lb (66.2 kg)   SpO2 92%   BMI 28.51 kg/m²   24HR INTAKE/OUTPUT:      Intake/Output Summary (Last 24 hours) at 10/8/2020 1511  Last data filed at 10/8/2020 1322  Gross per 24 hour   Intake 750 ml   Output 2700 ml   Net -1950 ml     General appearance: alert, appears stated age, cooperative and no distress, sitting comfortably on side of bed   Head: Normocephalic, without obvious abnormality, atraumatic  Eyes: conjunctivae/corneas clear. PERRL, EOM's intact. Ears: normal external ears and nose, throat without exudate  Neck: no adenopathy, no carotid bruit, no JVD, supple, symmetrical, trachea midline   Lungs: diminished air exchange throughout with diffuse end expiratory wheezing  Heart: regular rate and rhythm, S1, S2 normal, no murmur  Abdomen:soft, non-tender; non-distended, normal bowel sounds no masses, no organomegaly  Extremities:trace bilateral lower extremity edema with spider veins, chronic venous stasis  Skin: Warm, dry  Lymphatic: No palpable lymph node enlargment  Neurologic: Alert and oriented X 3, generalized weakness and normal tone.  No focal deficits  Psychiatric: Alert and oriented, thought content appropriate, normal insight, mood appropriate    Medications:      predniSONE  40 mg Oral Daily    rOPINIRole  0.25 mg Oral TID    ipratropium-albuterol  1 ampule Inhalation Q4H WA    aspirin EC  81 mg Oral Daily    atorvastatin  40 mg Oral Daily    cetirizine  10 mg Oral Daily    fluticasone  2 spray Nasal Daily    guaiFENesin  1,200 mg Oral BID    losartan  100 mg Oral Daily    mirabegron  25 mg Oral Daily    montelukast  10 mg Oral Nightly    nystatin  500,000 Units Oral 4x Daily    pantoprazole  40 mg Oral QAM AC    oxybutynin  5 mg Oral TID    PARoxetine  20 mg Oral Daily    propranolol 40 mg Oral TID    cefTRIAXone (ROCEPHIN) IV  1 g Intravenous Q24H    Arformoterol Tartrate  15 mcg Nebulization BID    budesonide  0.5 mg Nebulization BID    sodium chloride flush  10 mL Intravenous 2 times per day    enoxaparin  40 mg Subcutaneous Daily    furosemide  40 mg Intravenous Daily     LORazepam, melatonin, sodium chloride flush, acetaminophen **OR** acetaminophen, polyethylene glycol, promethazine **OR** ondansetron, perflutren lipid microspheres, potassium chloride **OR** potassium alternative oral replacement **OR** potassium chloride, magnesium sulfate, nitroGLYCERIN  DIET CARDIAC; Daily Fluid Restriction: 1800 ml; Renal     Lab and other Data:     Recent Labs     10/07/20  1939 10/08/20  0217   WBC 12.7* 12.0*   HGB 7.4* 7.2*    328     Recent Labs     10/07/20  1501 10/07/20  2051 10/08/20  0217     --  137   K 4.6 4.0 4.4     --  103   CO2 19*  --  22   BUN 27*  --  32*   CREATININE 1.2*  --  1.5*   GLUCOSE 93  --  211*     Recent Labs     10/07/20  1501   AST 19   ALT 23   BILITOT 0.4   ALKPHOS 106*     Troponin T:   Recent Labs     10/07/20  2221 10/08/20  0217   TROPONINI 0.01 0.01     ABG:  Recent Labs     10/07/20  2051   PHART 7.390   [id number] 31.0*   PO2ART 91.0   [id number] 18.8*   BEART -5.5*   HGBAE 8.0*   [id number] 95.1   CARBOXHGBART 3.3       RAD:   Xr Chest Portable  No acute findings.  Signed by Dr aJymie Leslie on 10/7/2020 5:57 PM    Echo:   Summary    Normal left ventricular size with preserved systolic function EF 75-33%    Normal left ventricular wall thickness with mitral valve disease    precluding standard assessment of diastolic function but tissue Doppler    suggests severe/restrictive [grade 3] diastolic dysfunction    Mild left atrial enlargement    Moderate thickening of a tricuspid aortic valve with mildly reduced cusp    separation and a peak gradient of 10 mmHg consistent with insignificant    stenosis    No aortic insufficiency seen    Mitral and aortic calcification with moderate thickening of the mitral    leaflets which demonstrated decreased mobility with a peak gradient of 13    mmHg and an average calculated area of 1.3 cm third consistent with    moderate stenosis    Mild mitral regurgitation    Nonvisualization pulmonic valve    Right-sided chambers appear to be within normal size limits with preserved    RV systolic function    Mild tricuspid regurgitation with RVSP estimate of 38 mmHg    Normal IVC consistent with normal right atrial filling pressures of 5-10    mmHg    No significant pericardial effusion and aortic dimensions are within    normal limits     Micro: Respiratory virus panel unremarkable    Assessment/Plan   Principal Problem:    New onset acute diastolic CHF-continue Aspirin, Statin, ARB, judicious use IV diuretics given likely underlying CKD, I/O's, daily weights, TSH unremarkable     Active Problems:    COPD with acute exacerbation (HCC) w/ hx COPD Stage 4 -IV Solumedrol, Rocephin, continue Mucinex, Duonebs, Brovana and Pulmicort, IS, encourage deep breathing. Mild improvement today.  Per patient at baseline she can only take a few steps before becoming severely dyspneic and has an appointment with OP Pulmonology        Panic disorder-prn Ativan continued       Atherosclerosis of native artery of both lower extremities with intermittent claudication (HCC)-noted, followed as OP by Vascular       Chronic respiratory failure with hypoxia (HCC)-continue home oxygen, monitor, stable       Elevated serum creatinine-noted, likely underlying CKD, creatinine elevated today, hold Lasix, re-check in AM    Antibiotic: Rocephin     DVT Prophylaxis: Heparin    GI prophylaxis: Protonix    Rosalina Gamboa PA-C

## 2020-10-09 NOTE — PROGRESS NOTES
Pt ate some crackers and vanilla ice cream at 0430. Reminded to stay NPO for luis antonio today.  Electronically signed by Mahalia Mcburney, RN on 10/9/2020 at 5:25 AM

## 2020-10-09 NOTE — PROGRESS NOTES
14 point review of systems is negative except as specifically addressed above. Objective:   VITALS:  BP (!) 170/75   Pulse 64   Temp 96.5 °F (35.8 °C) (Temporal)   Resp 18   Ht 5' (1.524 m)   Wt 144 lb 11.2 oz (65.6 kg)   SpO2 93%   BMI 28.26 kg/m²   24HR INTAKE/OUTPUT:      Intake/Output Summary (Last 24 hours) at 10/9/2020 1431  Last data filed at 10/9/2020 1002  Gross per 24 hour   Intake 100 ml   Output 1450 ml   Net -1350 ml     General appearance: alert, appears stated age, cooperative and no distress, sitting comfortably on side of bed eating a sandwich   Head: Normocephalic, without obvious abnormality, atraumatic  Eyes: conjunctivae/corneas clear. PERRL, EOM's intact. Ears: normal external ears and nose, throat without exudate  Neck: no adenopathy, no carotid bruit, no JVD, supple, symmetrical, trachea midline   Lungs: decreased air entry throughout, soft expiratory wheezing overall improved   Heart: RRR, S1, S2 normal, no murmur  Abdomen:soft, non-tender; non-distended, normal bowel sounds no masses, no organomegaly  Extremities:trace bilateral lower extremity edema with spider veins, chronic venous stasis  Skin: Warm, dry  Lymphatic: No palpable lymph node enlargment  Neurologic: Alert and oriented X 3, generalized weakness and normal tone.  No focal deficits  Psychiatric: Alert and oriented, thought content appropriate, normal insight, mood appropriate    Medications:      furosemide  40 mg Oral Daily    predniSONE  40 mg Oral Daily    rOPINIRole  0.25 mg Oral TID    heparin (porcine)  5,000 Units Subcutaneous 3 times per day    ipratropium-albuterol  1 ampule Inhalation Q4H WA    aspirin EC  81 mg Oral Daily    atorvastatin  40 mg Oral Daily    cetirizine  10 mg Oral Daily    fluticasone  2 spray Nasal Daily    guaiFENesin  1,200 mg Oral BID    losartan  100 mg Oral Daily    mirabegron  25 mg Oral Daily    montelukast  10 mg Oral Nightly    nystatin  500,000 Units Oral 4x Daily    pantoprazole  40 mg Oral QAM AC    oxybutynin  5 mg Oral TID    PARoxetine  20 mg Oral Daily    propranolol  40 mg Oral TID    Arformoterol Tartrate  15 mcg Nebulization BID    budesonide  0.5 mg Nebulization BID    sodium chloride flush  10 mL Intravenous 2 times per day     LORazepam, melatonin, sodium chloride flush, acetaminophen **OR** acetaminophen, polyethylene glycol, promethazine **OR** ondansetron, perflutren lipid microspheres, potassium chloride **OR** potassium alternative oral replacement **OR** potassium chloride, magnesium sulfate, nitroGLYCERIN  DIET CARDIAC; Carb Control: 4 carbs/meal (approximate 1800 kcals/day); Daily Fluid Restriction: 1800 ml; Renal     Lab and other Data:     Recent Labs     10/07/20  1939 10/08/20  0217 10/09/20  0234   WBC 12.7* 12.0* 16.9*   HGB 7.4* 7.2* 7.0*    328 380     Recent Labs     10/07/20  1501 10/07/20  2051 10/08/20  0217 10/09/20  0234     --  137 135*   K 4.6 4.0 4.4 4.2     --  103 100   CO2 19*  --  22 22   BUN 27*  --  32* 41*   CREATININE 1.2*  --  1.5* 1.5*   GLUCOSE 93  --  211* 193*     Recent Labs     10/07/20  1501   AST 19   ALT 23   BILITOT 0.4   ALKPHOS 106*     Troponin T:   Recent Labs     10/07/20  2221 10/08/20  0217   TROPONINI 0.01 0.01     ABG:  Recent Labs     10/07/20  2051   PHART 7.390   [id number] 31.0*   PO2ART 91.0   [id number] 18.8*   BEART -5.5*   HGBAE 8.0*   [id number] 95.1   CARBOXHGBART 3.3       RAD:   Xr Chest Portable  No acute findings.  Signed by Dr Aramis Louise on 10/7/2020 5:57 PM    Echo:   Summary    Normal left ventricular size with preserved systolic function EF 68-75%    Normal left ventricular wall thickness with mitral valve disease    precluding standard assessment of diastolic function but tissue Doppler    suggests severe/restrictive [grade 3] diastolic dysfunction    Mild left atrial enlargement    Moderate thickening of a tricuspid aortic valve with mildly reduced cusp    separation

## 2020-10-09 NOTE — CONSULTS
[id number] [facility]  Cardiology Consult      Requesting MD:  Yudy Higuera MD   Admit Status:  Inpatient [101]       History obtained from:   ? Patient  ?  Other (specify):     PROBLEM LIST:    Patient Active Problem List    Diagnosis Date Noted    New onset of congestive heart failure ([address] 10/07/2020     Priority: Low    Elevated serum creatinine 10/07/2020     Priority: Low    Atherosclerosis of artery of extremity with ulceration ([address] 07/30/2020     Priority: Low    Recurrent major depressive disorder, in full remission ([address] 07/09/2020     Priority: Low    Chronic respiratory failure with hypoxia ([address] 07/09/2020     Priority: Low    Oxygen dependent 07/09/2020     Priority: Low    Pulmonary nodule seen on imaging study 07/03/2020     Priority: Low    Acute hypoxemic respiratory failure ([address] 06/29/2020     Priority: Low    Bilateral carotid artery stenosis 01/28/2020     Priority: Low    Atherosclerosis of native artery of both lower extremities with intermittent claudication (HCC)      Priority: Low    Atherosclerosis of native arteries of extremities with intermittent claudication, bilateral legs (HCC) 09/18/2019     Priority: Low    Spider veins 09/17/2019     Priority: Low    Panic disorder 08/16/2019     Priority: Low    Pulmonary hypertension due to COPD ([address] 08/15/2019     Priority: Low    Acute respiratory failure with hypoxia and hypercapnia (HCC)      Priority: Low    Palliative care patient 08/06/2019     Priority: Low    Inflammation of right sacroiliac joint ([address] 07/05/2019     Priority: Low    Cigarette smoker 07/05/2019     Priority: Low    Irritable bowel syndrome with diarrhea 07/05/2019     Priority: Low    Inflammation of left sacroiliac joint ([address] 12/18/2018     Priority: Low    COPD with acute exacerbation ([address] 09/18/2018     Priority: Low    Stage 4 very severe COPD by GOLD classification ([address] 06/06/2018     Priority: Low     Overview Note: for dysuria and hematuria. Musculoskeletal: Negative for arthralgias, back pain and myalgias. Skin: Negative for pallor and rash. Neurological: Negative for seizures, syncope, weakness and headaches. Psychiatric/Behavioral: Negative for behavioral problems and dysphoric mood. Past Medical History:      Diagnosis Date    Acid reflux     Allergic rhinitis     Bilateral carotid artery stenosis 1/28/2020    Hyperlipidemia     Hypertension     Irritable bowel syndrome with diarrhea 7/5/2019    Lung cancer ([facility]     Lung with chemo    Mild single current episode of major depressive disorder ([facility] 5/2/2018    New onset of congestive heart failure ([facility] 10/7/2020    Osteoarthritis     Osteoporosis     Other emphysema ([facility] 12/22/2017    Palliative care patient 08/06/2019    Panic disorder 8/16/2019    Stage 3 severe COPD by GOLD classification ([facility] 6/6/2018    FEV1 42%    Tobacco abuse 2/8/2018    Urinary incontinence     stress incontinence       Past Surgical History:      Procedure Laterality Date    APPENDECTOMY      CARDIAC CATHETERIZATION  5/3/2013   MDL    EF 60%    HEMORRHOID SURGERY      HYSTERECTOMY      HYSTERECTOMY, TOTAL ABDOMINAL      LUNG CANCER SURGERY      TONSILLECTOMY      VASCULAR SURGERY  10/11/2019    TJR. Aortogram and runoff. PTA and stenting of the left external iliac artery with a 7x 80 and innova stent dilated to 7.2mm. PTA of the left popliteal artery with 5mm x 2cm cutting balloon. attempted recalization of the peroneal artery aborted. Allergies:  Doxycycline; Abilify [aripiprazole]; Celebrex [celecoxib];  Naproxen; and Lactose intolerance (gi)    Past Social History:  Social History     Socioeconomic History    Marital status:      Spouse name: Not on file    Number of children: Not on file    Years of education: Not on file    Highest education level: Not on file   Occupational History    Not on file   Social Needs    Financial resource encounter: 146 lb (66.2 kg). Physical Exam  Constitutional:       General: She is not in acute distress. Appearance: She is not diaphoretic. HENT:      Mouth/Throat:      Pharynx: No oropharyngeal exudate. Eyes:      General: No scleral icterus. Right eye: No discharge. Left eye: No discharge. Neck:      Thyroid: No thyromegaly. Vascular: No JVD. Cardiovascular:      Rate and Rhythm: Normal rate and regular rhythm. No extrasystoles are present. Heart sounds: Normal heart sounds, S1 normal and S2 normal. No murmur. No systolic murmur. No diastolic murmur. No friction rub. No gallop. No S3 or S4 sounds. Comments: 1+ pitting edema in the lower extremities  Increased redness noted in the feet  Pedal pulses are diminished bilaterally  No significant jugular venous distention noted  Short systolic murmur appreciated  No gallop  Faint carotid bruits    Pulmonary:      Effort: Pulmonary effort is normal. No respiratory distress. Breath sounds: Normal breath sounds. No wheezing or rales. Comments: Diminished air entry with prolonged expiratory phase. No obvious crepitations noted  Chest:      Chest wall: No tenderness. Abdominal:      General: Bowel sounds are normal. There is no distension. Palpations: Abdomen is soft. There is no mass. Tenderness: There is no abdominal tenderness. There is no guarding or rebound. Hernia: No hernia is present. Comments: Abdomen is soft and nontender  No palpable organomegaly   Musculoskeletal: Normal range of motion. Skin:     General: Skin is warm. Coloration: Skin is not pale. Findings: No rash. Neurological:      Mental Status: She is alert and oriented to person, place, and time. Cranial Nerves: No cranial nerve deficit.       Deep Tendon Reflexes: Reflexes normal.           Labs:  Recent Labs     10/07/20  1939 10/08/20  0217   WBC 12.7* 12.0*   HGB 7.4* 7.2*    328       Recent Labs     10/07/20  1501 10/07/20  2051 10/08/20  0217     --  137   K 4.6 4.0 4.4     --  103   CO2 19*  --  22   BUN 27*  --  32*   CREATININE 1.2*  --  1.5*   LABGLOM 43*  --  34*   MG  --   --  1.4*   CALCIUM 8.8  --  8.7*       CK, CKMB, Troponin: @LABRCNT (CKTOTAL:3, CKMB:3, TROPONINI:3)@    Last 3 BNP:          IMAGING:  Xr Chest Portable    Result Date: 10/7/2020  Exam: XR CHEST PORTABLE - 10/7/2020 4:44 PM Indication: Shortness of breath, elevated BNP Comparison: 7/1/2020 Findings: Normal size cardiac silhouette. Chronic RIGHT hemidiaphragm elevation and RIGHT lateral costophrenic angle blunting. No pleural effusion or visible pneumothorax. No new airspace opacity. Chronic interstitial coarsening is noted. No significant central vascular congestion. No acute osseous finding. No acute findings. Signed by Dr Shanna Gupta on 10/7/2020 5:57 PM          Assessment and Plan: This is a 68y.o. year old female with past medical history of severe COPD with long-standing prior tobacco use parents is quit 2019), peripheral arterial disease with prior intervention, patent coronary arteries by catheterization 5/2013 with normal LV ejection fraction, grade 3 diastolic dysfunction, mild to moderate mitral stenosis and regurgitation presenting with probable diastolic heart failure with volume retention. 1. No clear signs to suggest ischemia. Can do a Lexiscan study tomorrow. No significant valvular pathology that would explain her presentation. She does have grade 3 diastolic dysfunction. Attempts at diuresis with mild increase in creatinine noted. She was on Lasix 20 mg by mouth twice daily. She likely will need a higher dose going forward. His creatinine is stable tomorrow would restart Lasix at 40 mg twice daily. Alternatively addition of Zaroxolyn can be done as well. Continue hypertensive management. 2. Patient noted to be significantly anemic with hemoglobin is 7.0.  Merits further evaluation and management.       Electronically signed by Sabiha Navarro MD on 10/8/2020 at 8:31 PM

## 2020-10-09 NOTE — PROGRESS NOTES
Cardiology Progress Note Cecil Ndiaye MD      Patient:  Alin Patel  [id number]    Patient Active Problem List    Diagnosis Date Noted    New onset of congestive heart failure ([address] 10/07/2020     Priority: Low    Elevated serum creatinine 10/07/2020     Priority: Low    Atherosclerosis of artery of extremity with ulceration ([address] 07/30/2020     Priority: Low    Recurrent major depressive disorder, in full remission ([address] 07/09/2020     Priority: Low    Chronic respiratory failure with hypoxia ([address] 07/09/2020     Priority: Low    Oxygen dependent 07/09/2020     Priority: Low    Pulmonary nodule seen on imaging study 07/03/2020     Priority: Low    Acute hypoxemic respiratory failure ([address] 06/29/2020     Priority: Low    Bilateral carotid artery stenosis 01/28/2020     Priority: Low    Atherosclerosis of native artery of both lower extremities with intermittent claudication (HCC)      Priority: Low    Atherosclerosis of native arteries of extremities with intermittent claudication, bilateral legs (HCC) 09/18/2019     Priority: Low    Spider veins 09/17/2019     Priority: Low    Panic disorder 08/16/2019     Priority: Low    Pulmonary hypertension due to COPD ([address] 08/15/2019     Priority: Low    Acute respiratory failure with hypoxia and hypercapnia (HCC)      Priority: Low    Palliative care patient 08/06/2019     Priority: Low    Inflammation of right sacroiliac joint ([address] 07/05/2019     Priority: Low    Cigarette smoker 07/05/2019     Priority: Low    Irritable bowel syndrome with diarrhea 07/05/2019     Priority: Low    Inflammation of left sacroiliac joint ([address] 12/18/2018     Priority: Low    COPD with acute exacerbation ([address] 09/18/2018     Priority: Low    Stage 4 very severe COPD by GOLD classification ([address] 06/06/2018     Priority: Low     Overview Note:     FEV1 42%      Mild single current episode of major depressive disorder ([address] 05/02/2018     Priority: Low    Basal ganglia Singh Gallagher MD   propranolol (INDERAL) 40 MG tablet Take 1 tablet by mouth 3 times a day 8/5/20   Yasir Falcon MD   oxybutynin [facility]) 5 MG tablet Take 1 tablet by mouth 3 times daily 7/9/20   Yasir Falcon MD   rOPINIRole (REQUIP) 0.25 MG tablet Take 1-2 tablets by mouth 3 times daily 7/9/20   Yasir Falcon MD   mirabegron [facility]) 25 MG TB24 Take 1 tablet by mouth daily 5/11/20   Yasir Falcon MD   furosemide (LASIX) 20 MG tablet Take 20 mg by mouth 2 times daily    Historical Provider, MD   fluticasone Jessica Newby) 50 MCG/ACT nasal spray 2 sprays by Nasal route daily 1/3/20   DIANA Dietz NP   OXYGEN Inhale 2 L/min into the lungs continuous 12/27/19   DIANA Dietz NP   CALCIUM PO Take 500 mg by mouth daily    Historical Provider, MD   Black Cohosh 20 MG TABS Take by mouth every evening    Historical Provider, MD   albuterol sulfate HFA (PROAIR HFA) 108 (90 Base) MCG/ACT inhaler Inhale 2 puffs into the lungs every 6 hours as needed for Wheezing 6/6/18   Yasir Falcon MD   melatonin 5 MG TABS tablet Take 5 mg by mouth nightly as needed     Historical Provider, MD        furosemide  40 mg Oral Daily    iron sucrose  200 mg Intravenous Daily    predniSONE  40 mg Oral Daily    rOPINIRole  0.25 mg Oral TID    heparin (porcine)  5,000 Units Subcutaneous 3 times per day    ipratropium-albuterol  1 ampule Inhalation Q4H WA    aspirin EC  81 mg Oral Daily    atorvastatin  40 mg Oral Daily    cetirizine  10 mg Oral Daily    fluticasone  2 spray Nasal Daily    guaiFENesin  1,200 mg Oral BID    losartan  100 mg Oral Daily    mirabegron  25 mg Oral Daily    montelukast  10 mg Oral Nightly    nystatin  500,000 Units Oral 4x Daily    pantoprazole  40 mg Oral QAM AC    oxybutynin  5 mg Oral TID    PARoxetine  20 mg Oral Daily    propranolol  40 mg Oral TID    Arformoterol Tartrate  15 mcg Nebulization BID    budesonide  0.5 mg Nebulization BID    sodium chloride flush  10 mL Intravenous 2 times per day       TELEMETRY: Sinus     Physical Exam:      Physical Exam  Constitutional:       General: She is not in acute distress. Appearance: She is not diaphoretic. HENT:      Mouth/Throat:      Pharynx: No oropharyngeal exudate. Eyes:      General: No scleral icterus. Right eye: No discharge. Left eye: No discharge. Neck:      Thyroid: No thyromegaly. Vascular: No JVD. Cardiovascular:      Rate and Rhythm: Normal rate and regular rhythm. No extrasystoles are present. Heart sounds: Normal heart sounds, S1 normal and S2 normal. No murmur. No systolic murmur. No diastolic murmur. No friction rub. No gallop. No S3 or S4 sounds. Comments: Redness of the legs with trace edema  No significant jugular venous distention  Short ESM  Pulmonary:      Effort: Pulmonary effort is normal. No respiratory distress. Breath sounds: Normal breath sounds. No wheezing or rales. Chest:      Chest wall: No tenderness. Abdominal:      General: Bowel sounds are normal. There is no distension. Palpations: Abdomen is soft. There is no mass. Tenderness: There is no abdominal tenderness. There is no guarding or rebound. Hernia: No hernia is present. Comments: No palpable organomegaly   Musculoskeletal: Normal range of motion. Skin:     General: Skin is warm. Coloration: Skin is pale. Findings: No rash. Neurological:      Mental Status: She is alert and oriented to person, place, and time. Cranial Nerves: No cranial nerve deficit.       Deep Tendon Reflexes: Reflexes normal.                 Lab Data:  CBC:   Recent Labs     10/07/20  1939 10/08/20  0217 10/09/20  0234   WBC 12.7* 12.0* 16.9*   HGB 7.4* 7.2* 7.0*   HCT 26.4* 25.4* 24.5*   MCV 77.2* 75.4* 76.1*    328 380     BMP:   Recent Labs     10/07/20  1501 10/07/20  2051 10/08/20  0217 10/09/20  0234     --  137 135*   K 4.6 4.0 4.4 4.2     --  103 100   CO2 19*  --  22 22   BUN 27*  --  32* 41*   CREATININE 1.2*  --  1.5* 1.5*     LIVER PROFILE:   Recent Labs     10/07/20  1501   AST 19   ALT 23   BILITOT 0.4   ALKPHOS 106*     PT/INR: No results for input(s): PROTIME, INR in the last 72 hours. APTT: No results for input(s): APTT in the last 72 hours. BNP:  No results for input(s): BNP in the last 72 hours. CK, CKMB, Troponin: @LABRCNT (CKTOTAL:3, CKMB:3, TROPONINI:3)@    IMAGING:  Xr Chest Portable    Result Date: 10/7/2020  Exam: XR CHEST PORTABLE - 10/7/2020 4:44 PM Indication: Shortness of breath, elevated BNP Comparison: 7/1/2020 Findings: Normal size cardiac silhouette. Chronic RIGHT hemidiaphragm elevation and RIGHT lateral costophrenic angle blunting. No pleural effusion or visible pneumothorax. No new airspace opacity. Chronic interstitial coarsening is noted. No significant central vascular congestion. No acute osseous finding. No acute findings. Signed by Dr Pepe Murphy on 10/7/2020 5:57 PM    Nm Myocardial Spect Rest Exercise Or Rx    Result Date: 10/9/2020  Lexiscan Nuclear Stress Test Report Procedure date: 10-09-20 Indications: shortness of breath Procedure: Stress was performed with injection of 0.4 mg Lexiscan. Vital signs and EKG were monitored. Technetium-99 sestamibi was injected in divided doses, approximately 10 mCi and 30 mCi respectively for rest and stress imaging. The patient was discharged in stable condition. Results: Patient had symptoms of dyspnea during infusion that resolved in recovery. Baseline EKG showed normal sinus rhythm without ST/T changes. During stress there were no significant EKG changes or rhythm changes. Baseline and peak blood pressures were 155/74, and 169/76 respectively. Baseline and peak heart rates were 64 and  78 respectively. Lexiscan/Cardiolyte Nuclear Medicine Report Date of Procedure: 10/9/2020 The patient was injected with 48.28 millicuries (mCi) of Technetium (Tc99m).   After an appropriate level of stress the patient was re-injected with 62.61 millicuries (mCi) of Technetium (Tc99m). Repeat gated images were then performed per standard protocol. Findings: 1. Analysis of the the stress and rest images reveals no obvious defects on stress and rest images. 2.  Analysis of the gated images reveals grossly normal left ventricular function with a calculated ejection fraction of 74 %. Impression: There is no obvious infarct or ischemia, with a calculated ejection fraction of 74 %. Suggest: Clinical correlation with consideration for medical management. Signed by Dr Yousif Levi on 10/9/2020 1:36 PM        Assessment and Plan: This is a 68y.o. year old female with past medical history of severe COPD with long-standing prior tobacco use parents is quit 2019), peripheral arterial disease with prior intervention, patent coronary arteries by catheterization 5/2013 with normal LV ejection fraction, grade 3 diastolic dysfunction, mild to moderate mitral stenosis and regurgitation presenting with probable diastolic heart failure with volume retention with significant anemia. 1. Lexiscan study with no obvious evidence of infarct or ischemia. Preserved EF. Continue medical management. Started on Lasix 40 mg daily which can be increased if needed. No further intervention from a cardiac viewpoint. Can follow-up as an outpatient.     Yousif Levi MD 10/9/2020 2:40 PM

## 2020-10-09 NOTE — CONSULTS
Nurse met with patient re: referral to CHF clinic. Discussed with patient diet, activity, medications, definition of heart failure and s/s, daily wt monitoring with reporting of wt gain of >2-3 lbs in 24 hours or > 5 lbs in one week, BP/HR and activity monitoring with use of daily log, f/u appointments with PCP, CHF clinic and cardiologist. Discussed vaccine use, testing. HF packet given. Patient stated she does have a wt scale and BP monitor at home. Pt is agreeable to participating in the HF program and will need an apt. With cardiology CHF clinic within 5-7 days of dc. Due to issues with HGB and possible bleeding I am not sure when pt will be discharging. Time spent with patient 30 minutes.

## 2020-10-09 NOTE — PROGRESS NOTES
Visited with pt to provide spiritual care. Pt says she wanted to go home but one doctor said she could go home but one doctor did not. Provided spiritual care with sustaining presence, nurtured hope, and prayer. Pt expressed gratitude for spiritual care.     Electronically signed by Miguel Carrillo on 10/9/2020 at 3:12 PM

## 2020-10-10 NOTE — DISCHARGE SUMMARY
a day             rOPINIRole (REQUIP) 0.25 MG tablet  Take 1-2 tablets by mouth 3 times daily             umeclidinium-vilanterol (ANORO ELLIPTA) 62.5-25 MCG/INH AEPB inhaler  INHALE 1 PUFF INTO LUNGS DAILY. Discharge Instructions: Follow up with Sarah Tucker MD in 3-5 days. Take medications as directed. Resume activity as tolerated. Diet: DIET CARDIAC; Carb Control: 4 carbs/meal (approximate 1800 kcals/day); Daily Fluid Restriction: 1800 ml; Renal     Disposition: Patient is medically stable and will be discharged home. Time spent on discharge 35 minutes spent in assessing patient, reviewing medications, discussion with nursing, confirming safe discharge plan and preparation of discharge summary.     Signed:  Georgie Boas, PA-C

## 2020-10-10 NOTE — PROGRESS NOTES
Discharge instructions reviewed with patient, she verbalized understanding. IV and telemetry box removed.

## 2020-10-12 NOTE — CARE COORDINATION
[facility] Transitions Initial Follow Up Call    Call within 2 business days of discharge: Yes    Patient: Elaine Maier Patient : 1940   MRN: [id number]  Reason for Admission:   Discharge Date: 10/10/20 RARS: Readmission Risk Score: 25      Last Discharge [facility]       Complaint Diagnosis Description Type Department Provider    10/7/20 Shortness of Breath; Leg Swelling Bilateral lower extremity edema . .. ED to Hosp-Admission (Discharged) (ADMITTED) AYLIN Cash MD; Zhou Ortega. .. Spoke with: N/A    Facility: [place] [id number]    Non-face-to-face services provided:  Reviewed encounter information for continuity of care prior to follow up phone call - chart notes, consults, progress notes, test results, med list, appointments, AVS, other information. Care Transitions 24 Hour Call    Do you have all of your prescriptions and are they filled?:  Yes  Post Acute Services:  Home Health  Patient DME:  Straight cane, Walker  Do you have support at home?:  Child  Are you an active caregiver in your home?:  No  Care Transitions Interventions         Follow Up : Attempted to make contact with [facility] for CTC/COVID initial follow up call post discharge from the hospital without success. Left a message regarding intent of call and call back information. Will try again my next business day.      Future Appointments   Date Time Provider Jaylon Joseph   10/12/2020  1:15 PM Gissell Gaspar MD [facility]   10/19/2020 11:00 AM Jose Patel DO [facility] Cardio [facility]   10/22/2020 12:30 PM Marcin Coe MD [facility] NEURO [facility]       Gurdeep Brown RN

## 2020-10-12 NOTE — PROGRESS NOTES
mouth 3 times daily as needed for Anxiety for up to 30 days. Mary [employer], APRN   umeclidinium-vilanterol (ANORO ELLIPTA) 62.5-25 MCG/INH AEPB inhaler INHALE 1 PUFF INTO LUNGS DAILY.   Ana Shafer MD   atorvastatin (LIPITOR) 40 MG tablet Take 1 tablet by mouth daily  Ana Shafer MD   losartan (COZAAR) 100 MG tablet Take 1 tablet by mouth daily  Ana Shafer MD   cetirizine (ZYRTEC) 10 MG tablet Take 1 tablet by mouth daily  Ana Shafer MD   omeprazole (PRILOSEC) 20 MG delayed release capsule TAKE 1 CAPSULE BY MOUTH DAILY  Ana Shafer MD   ipratropium-albuterol (DUONEB) 0.5-2.5 (3) MG/3ML SOLN nebulizer solution Take 3 mLs by nebulization every 4 hours  Ana Shafer MD   montelukast (SINGULAIR) 10 MG tablet Take 1 tablet by mouth daily  Ana Shafer MD   potassium chloride (KLOR-CON M) 10 MEQ extended release tablet TAKE 2 TABLETS BY MOUTH EVERY DAY  Ana Shafer MD   aspirin EC 81 MG EC tablet Take 1 tablet by mouth daily  Ana Shafer MD   propranolol (INDERAL) 40 MG tablet Take 1 tablet by mouth 3 times a day  Ana Shafer MD   oxybutynin (DITROPAN) 5 MG tablet Take 1 tablet by mouth 3 times daily  Ana Shafer MD   rOPINIRole (REQUIP) 0.25 MG tablet Take 1-2 tablets by mouth 3 times daily  Ana Shafer MD   mirabegron (MYRBETRIQ) 25 MG TB24 Take 1 tablet by mouth daily  Ana Shafer MD   fluticasone [facility]) 50 MCG/ACT nasal spray 2 sprays by Nasal route daily  DIANA Cabrera NP   OXYGEN Inhale 2 L/min into the lungs continuous  DIANA Cabrera NP   CALCIUM PO Take 500 mg by mouth daily  Historical Provider, MD   Black Cohosh 20 MG TABS Take by mouth every evening  Historical Provider, MD   albuterol sulfate HFA (PROAIR HFA) 108 (90 Base) MCG/ACT inhaler Inhale 2 puffs into the lungs every 6 hours as needed for Wheezing  Ana Shafer MD   melatonin 5 MG TABS tablet Take 5 mg by mouth nightly as needed   Historical Provider, MD       Social History Tobacco Use    Smoking status: Former Smoker     Packs/day: 0.50     Years: 30.00     Pack years: 15.00     Types: Cigarettes     Start date: 1970     Last attempt to quit: 2019     Years since quittin.2    Smokeless tobacco: Never Used   Substance Use Topics    Alcohol use: Yes     Comment: occcasionally    Drug use: No        Allergies   Allergen Reactions    Doxycycline      Patient states it caused chills and hot flashes severe.  Abilify [Aripiprazole] Hives    Celebrex [Celecoxib]      swelling    Naproxen Hives    Lactose Intolerance (Gi) Nausea And Vomiting   ,   Past Medical History:   Diagnosis Date    Acid reflux     Allergic rhinitis     Bilateral carotid artery stenosis 2020    Hyperlipidemia     Hypertension     Irritable bowel syndrome with diarrhea 2019    Lung cancer ([facility]     Lung with chemo    Mild single current episode of major depressive disorder ([facility] 2018    New onset of congestive heart failure ([facility] 10/7/2020    Osteoarthritis     Osteoporosis     Other emphysema ([facility] 2017    Palliative care patient 2019    Panic disorder 2019    Stage 3 severe COPD by GOLD classification ([facility] 2018    FEV1 42%    Tobacco abuse 2018    Urinary incontinence     stress incontinence   ,   Past Surgical History:   Procedure Laterality Date    APPENDECTOMY      CARDIAC CATHETERIZATION  5/3/2013   MDL    EF 60%    HEMORRHOID SURGERY      HYSTERECTOMY      HYSTERECTOMY, TOTAL ABDOMINAL      LUNG CANCER SURGERY      TONSILLECTOMY      VASCULAR SURGERY  10/11/2019    TJR. Aortogram and runoff. PTA and stenting of the left external iliac artery with a 7x 80 and innova stent dilated to 7.2mm. PTA of the left popliteal artery with 5mm x 2cm cutting balloon. attempted recalization of the peroneal artery aborted.   ,   Social History     Tobacco Use    Smoking status: Former Smoker     Packs/day: 0.50     Years: 30.00     Pack years: 15.00     Types: Cigarettes     Start date: 1970     Last attempt to quit: 2019     Years since quittin.2    Smokeless tobacco: Never Used   Substance Use Topics    Alcohol use: Yes     Comment: occcasionally    Drug use: No   ,   Family History   Problem Relation Age of Onset    High Blood Pressure Mother     High Blood Pressure Father     Diabetes Sister     High Blood Pressure Brother    ,   Immunization History   Administered Date(s) Administered    Influenza Virus Vaccine 10/03/2016    Influenza, High Dose (Fluzone 65 yrs and older) 10/31/2018    Influenza, [employer], 6-35 Months, IM (Fluzone,Afluria) 2019    Influenza, [employer], IM, (6 mo and older Fluzone, Flulaval, Fluarix and 3 yrs and older Afluria) 10/10/2017    Pneumococcal Conjugate 13-valent ([id number]) 10/10/2015    Pneumococcal Polysaccharide ([id number]) 2018   ,   Health Maintenance   Topic Date Due    DTaP/Tdap/Td vaccine (1 - Tdap) 03/10/1961    Shingles Vaccine (1 of 2) 03/10/1992    Flu vaccine (1) 2020    Annual Wellness Visit (AWV)  2020    Potassium monitoring  10/10/2021    Creatinine monitoring  10/10/2021    Breast cancer screen  2022    Colon cancer screen colonoscopy  2023    Pneumococcal 65+ years Vaccine  Completed    Hepatitis A vaccine  Aged Out    Hepatitis B vaccine  Aged Out    Hib vaccine  Aged Out    Meningococcal (ACWY) vaccine  Aged Out       PHYSICAL EXAMINATION:  [ INSTRUCTIONS:  \"[x]\" Indicates a positive item  \"[]\" Indicates a negative item  -- DELETE ALL ITEMS NOT EXAMINED]  Vital Signs: (As obtained by patient/caregiver or practitioner observation)    Blood pressure-  Heart rate-    Respiratory rate-    Temperature-  Pulse oximetry-     Constitutional: [x] Appears well-developed and well-nourished [] No apparent distress      [] Abnormal-   Mental status  [x] Alert and awake  [x] Oriented to person/place/time [x]Able to follow commands      Eyes:  EOM resistance via inc pulm vasc Resistance or fluid retention respectively. Orders Placed This Encounter   Medications    furosemide (LASIX) 20 MG tablet     Sig: Take 1 tablet by mouth daily     Dispense:  30 tablet     Refill:  2    PARoxetine (PAXIL) 40 MG tablet     Sig: Take 1 tablet by mouth every morning     Dispense:  90 tablet     Refill:  3       Orders Placed This Encounter   Procedures    TSH without Reflex     Standing Status:   Standing     Number of Occurrences:   10     Standing Expiration Date:   10/10/2030    [place] DISCHARGE MEDS RECONCILED W/ CURRENT OUTPATIENT MED LIST       Return in about 2 months (around 12/12/2020) for OV (M-Wam), medicare AWV 2 time slots + CHF lasix f/u. Conrado Brito is a 68 y.o. female being evaluated by a Virtual Visit (video visit) encounter to address concerns as mentioned above. A caregiver was present when appropriate. Due to this being a TeleHealth encounter (During [facility] public health emergency), evaluation of the following organ systems was limited: Vitals/Constitutional/EENT/Resp/CV/GI//MS/Neuro/Skin/Heme-Lymph-Imm. Pursuant to the emergency declaration under the [address] [place] [address], [address] [place] [address] waiver authority and the [employer] Act, this Virtual Visit was conducted with patient's (and/or legal guardian's) consent, to reduce the patient's risk of exposure to COVID-19 and provide necessary medical care. The patient (and/or legal guardian) has also been advised to contact this office for worsening conditions or problems, and seek emergency medical treatment and/or call 911 if deemed necessary. Services were provided through a video synchronous discussion virtually to substitute for in-person clinic visit. Patient and provider were located at their individual homes or provider at secure site for business.     It is possible that material may be posted from a notepad that is used for a Dragon dictation device for dictating notes outside the EMR and I am the original author of all of this content. --Yahir Martinez MD on 10/12/2020 at 1:52 PM    An electronic signature was used to authenticate this note. Post-Discharge Transitional Care Management Services or Hospital Follow Up      [address]   YOB: 1940    Date of Office Visit:  10/12/2020  Date of Hospital Admission: 10/7/20  Date of Hospital Discharge: 10/10/20  Risk of hospital readmission (high >=14%.  Medium >=10%) :Readmission Risk Score: 25      Care management risk score Rising risk (score 2-5) and Complex Care (Scores >=6): 6     Non face to face  following discharge, date last encounter closed (first attempt may have been earlier): 10/12/2020  9:24 AM    Call initiated 2 business days of discharge: Yes    Patient Active Problem List   Diagnosis    Basal ganglia infarction ([address]    Mild single current episode of major depressive disorder ([address]    Stage 4 very severe COPD by GOLD classification ([address]    COPD with acute exacerbation ([address]    Inflammation of left sacroiliac joint ([address]    Inflammation of right sacroiliac joint ([address]    Cigarette smoker    Irritable bowel syndrome with diarrhea    Acute respiratory failure with hypoxia and hypercapnia ([address]    Pulmonary hypertension due to COPD ([address]    Panic disorder    Spider veins    Atherosclerosis of native arteries of extremities with intermittent claudication, bilateral legs ([address]    Atherosclerosis of native artery of both lower extremities with intermittent claudication ([address]    Bilateral carotid artery stenosis    Acute hypoxemic respiratory failure ([address]    Palliative care patient    Pulmonary nodule seen on imaging study    Recurrent major depressive disorder, in full remission ([address]    Chronic respiratory failure with hypoxia ([address]    Oxygen dependent    Atherosclerosis of artery of extremity with ulceration ([address]    New onset of congestive heart failure (HCC)    Elevated serum creatinine       Allergies   Allergen Reactions    Doxycycline      Patient states it caused chills and hot flashes severe.  Abilify [Aripiprazole] Hives    Celebrex [Celecoxib]      swelling    Naproxen Hives    Lactose Intolerance (Gi) Nausea And Vomiting       Medications listed as ordered at the time of discharge from [facility], [address] Medication Instructions RUY:    Printed on:10/12/20 8116   Medication Information                      albuterol sulfate HFA (PROAIR HFA) 108 (90 Base) MCG/ACT inhaler  Inhale 2 puffs into the lungs every 6 hours as needed for Wheezing             aspirin EC 81 MG EC tablet  Take 1 tablet by mouth daily             atorvastatin (LIPITOR) 40 MG tablet  Take 1 tablet by mouth daily             Black Cohosh 20 MG TABS  Take by mouth every evening             CALCIUM PO  Take 500 mg by mouth daily             cetirizine (ZYRTEC) 10 MG tablet  Take 1 tablet by mouth daily             ferrous sulfate (FE TABS) 325 (65 Fe) MG EC tablet  Take 1 tablet by mouth 2 times daily             fluticasone (FLONASE) 50 MCG/ACT nasal spray  2 sprays by Nasal route daily             furosemide (LASIX) 20 MG tablet  Take 1 tablet by mouth daily             guaiFENesin (MUCINEX) 600 MG extended release tablet  Take 2 tablets by mouth 2 times daily             ipratropium-albuterol (DUONEB) 0.5-2.5 (3) MG/3ML SOLN nebulizer solution  Take 3 mLs by nebulization every 4 hours             LORazepam (ATIVAN) 0.5 MG tablet  Take 1 tablet by mouth 3 times daily as needed for Anxiety for up to 30 days.              losartan (COZAAR) 100 MG tablet  Take 1 tablet by mouth daily             melatonin 5 MG TABS tablet  Take 5 mg by mouth nightly as needed              mirabegron (MYRBETRIQ) 25 MG TB24  Take 1 tablet by mouth daily             montelukast (SINGULAIR) 10 MG tablet  Take 1 tablet by mouth daily             nystatin (MYCOSTATIN) 926400 UNIT/ML suspension  Take 5 mLs by mouth 4 times daily             omeprazole (PRILOSEC) 20 MG delayed release capsule  TAKE 1 CAPSULE BY MOUTH DAILY             oxybutynin (DITROPAN) 5 MG tablet  Take 1 tablet by mouth 3 times daily             OXYGEN  Inhale 2 L/min into the lungs continuous             PARoxetine (PAXIL) 40 MG tablet  Take 1 tablet by mouth every morning             potassium chloride (KLOR-CON M) 10 MEQ extended release tablet  TAKE 2 TABLETS BY MOUTH EVERY DAY             predniSONE (DELTASONE) 10 MG tablet  4 tab daily x 4 days,then 3 tab daily x 4 days,then 2 tab daily x 4 days,then 1 tab daily x 4 days. propranolol (INDERAL) 40 MG tablet  Take 1 tablet by mouth 3 times a day             rOPINIRole (REQUIP) 0.25 MG tablet  Take 1-2 tablets by mouth 3 times daily             umeclidinium-vilanterol (ANORO ELLIPTA) 62.5-25 MCG/INH AEPB inhaler  INHALE 1 PUFF INTO LUNGS DAILY. Medications marked \"taking\" at this time  Outpatient Medications Marked as Taking for the 10/12/20 encounter (Virtual Visit) with Yasir Falcon MD   Medication Sig Dispense Refill    furosemide (LASIX) 20 MG tablet Take 1 tablet by mouth daily 30 tablet 2    PARoxetine (PAXIL) 40 MG tablet Take 1 tablet by mouth every morning 90 tablet 3        Medications patient taking as of now reconciled against medications ordered at time of hospital discharge: Yes    No chief complaint on file. History of Present illness - Follow up of Hospital diagnosis(es): new onset heart failure    Inpatient course: Discharge summary reviewed- see chart. Interval history/Current status: fair, stable        There were no vitals filed for this visit. There is no height or weight on file to calculate BMI.    Wt Readings from Last 3 Encounters:   10/09/20 144 lb 11.2 oz (65.6 kg)   10/07/20 140 lb (63.5 kg)   06/29/20 150 lb (68 kg)     BP Readings from Last 3 Encounters:   10/10/20 (!) 157/82   10/07/20 132/60   07/04/20 (!) 162/88      Medical Decision Making: high complexity

## 2020-10-12 NOTE — TELEPHONE ENCOUNTER
Patient just had vv and failed to mention she has had thrush for weeks now and it is not clearing up. She said she was on antibiotics and those aren't working.

## 2020-10-12 NOTE — TELEPHONE ENCOUNTER
Lets add nystatin swish and swallow 5 mL's 4 times daily x7 days and Diflucan 100 mg daily for 3 days please

## 2020-10-14 NOTE — CARE COORDINATION
[facility] Transitions Initial Follow Up Call    Call within 2 business days of discharge: Yes    Patient: Denita Javier Patient : 1940   MRN: [id number]  Reason for Admission:   Discharge Date: 10/10/20 RARS: Readmission Risk Score: 25      Last Discharge [facility]       Complaint Diagnosis Description Type Department Provider    10/7/20 Shortness of Breath; Leg Swelling Bilateral lower extremity edema . .. ED to Hosp-Admission (Discharged) (ADMITTED) L LUIS Noe MD; Jessica Ramirez. .. Spoke with: N/A    Facility: [place] [id number]    Non-face-to-face services provided:  Reviewed encounter information for continuity of care prior to follow up phone call - chart notes, consults, progress notes, test results, med list, appointments, AVS, other information. Care Transitions 24 Hour Call    Do you have all of your prescriptions and are they filled?:  Yes  Post Acute Services:  Home Health  Patient DME:  Straight cane, Walker  Do you have support at home?:  Child  Are you an active caregiver in your home?:  No  Care Transitions Interventions         Follow Up : Attempt #3 to make contact with Sheila Lorenzo for CTC initial follow up call post discharge from the hospital without success. Unable to leave a message regarding intent of call and call back information. Will discharge from CTN services at this time due to inability to make contact with patient.     Future Appointments   Date Time Provider Jaylon Joseph   10/19/2020 11:00 AM Teresita Barger DO [facility] [place]   10/22/2020 12:30 PM Toña Adams MD [facility] [place]   12/15/2020 10:00 AM Chris Escamilla MD [facility] Yaron Mae RN

## 2020-10-14 NOTE — TELEPHONE ENCOUNTER
Patient states she has tried the nystatin and it does not work. She would like something different. Oren Montesinos

## 2020-10-15 NOTE — TELEPHONE ENCOUNTER
Only option is diflucan oral in combo nystatin or just diflucan.   Let's bumpu up diflucan to 7 days please and i'd recommend the two together

## 2020-10-19 PROBLEM — E78.5 HYPERLIPIDEMIA: Status: ACTIVE | Noted: 2020-01-01

## 2020-10-19 PROBLEM — I34.2 NONRHEUMATIC MITRAL VALVE STENOSIS: Status: ACTIVE | Noted: 2020-01-01

## 2020-10-19 PROBLEM — I50.32 CHRONIC DIASTOLIC (CONGESTIVE) HEART FAILURE (HCC): Status: ACTIVE | Noted: 2020-01-01

## 2020-10-19 PROBLEM — I10 ESSENTIAL HYPERTENSION: Status: ACTIVE | Noted: 2020-01-01

## 2020-10-19 PROBLEM — I35.0 NONRHEUMATIC AORTIC VALVE STENOSIS: Status: ACTIVE | Noted: 2020-01-01

## 2020-10-19 NOTE — PROGRESS NOTES
Office Visit  Khushbu Pride is a 68 y.o. female; who present today for 1 Year Follow Up (NTP; No Cardiac Symptoms )      HPI  I am seeing this 68-year-old white female in follow-up after recent hospitalization but also has been a regular patient of this practice but it is the first time I am seeing her personally. She is accompanied by a female  who knows her history well also. I reviewed the hospital records including lab tests and looked at the echocardiogram myself. She was hospitalized because she developed increasing leg edema, no chest pain and it is questionable whether her breathing was any worse than baseline. She has known COPD and uses oxygen 24 hours/day and states that her breathing may not have been any worse than previous to the hospitalization. She is doing well since discharge. She had a negative dobutamine echo study in 2013 with a subsequent heart catheterization showing only mild CAD and a normal pharmacologic nuclear study in August 2019. Another nuclear stress study was performed about a week ago with the recent hospitalization and it was negative for scar or ischemia with normal LV wall motion and systolic function. The echocardiogram indicated that there was moderate mitral stenosis and mild aortic stenosis and it indicated that there may have been grade 3 diastolic dysfunction but on my review the diastolic dysfunction is indeterminate because of mitral stenosis but it would suggest that it is not grade 3 but perhaps grade 2. I believe the mitral stenosis is mild secondary primarily to severe mitral annular calcification. The aortic valve is thickened and calcified and appears to be mildly restricted in motion but the peak gradient is only 10. She does have normal LV systolic function. Since discharge from the hospital she is doing well and her breathing is baseline, no chest pain and she denies palpitations, denies presyncope or syncope.   Her leg edema completely resolved. She did point out that she ate quite a bit of salty pickles for 3 days prior to this episode occurring and it seems clear that the hospitalization with an elevated proBNP at 6000 and chest x-ray with small right pleural effusion represents an episode of acute right-sided diastolic congestive heart failure that is now compensated. Current Outpatient Medications   Medication Sig Dispense Refill    fluconazole (DIFLUCAN) 100 MG tablet Take 1 tablet by mouth daily for 7 days 7 tablet 0    nystatin (MYCOSTATIN) 894713 UNIT/ML suspension Take 5 mLs by mouth 4 times daily For 7 days 1 Bottle 0    furosemide (LASIX) 20 MG tablet Take 1 tablet by mouth daily 30 tablet 2    PARoxetine (PAXIL) 40 MG tablet Take 1 tablet by mouth every morning 90 tablet 3    ferrous sulfate (FE TABS) 325 (65 Fe) MG EC tablet Take 1 tablet by mouth 2 times daily 60 tablet 0    predniSONE (DELTASONE) 10 MG tablet 4 tab daily x 4 days,then 3 tab daily x 4 days,then 2 tab daily x 4 days,then 1 tab daily x 4 days. (Patient taking differently: Take 10 mg by mouth daily Then 1 tab daily x 4 days. ) 40 tablet 0    guaiFENesin (MUCINEX) 600 MG extended release tablet Take 2 tablets by mouth 2 times daily 60 tablet 0    nystatin (MYCOSTATIN) 849574 UNIT/ML suspension Take 5 mLs by mouth 4 times daily 1 Bottle 0    umeclidinium-vilanterol (ANORO ELLIPTA) 62.5-25 MCG/INH AEPB inhaler INHALE 1 PUFF INTO LUNGS DAILY.  (Patient taking differently: Inhale 1 puff into the lungs daily INHALE 1 PUFF INTO LUNGS Daily.) 3 each 3    atorvastatin (LIPITOR) 40 MG tablet Take 1 tablet by mouth daily 90 tablet 3    losartan (COZAAR) 100 MG tablet Take 1 tablet by mouth daily 90 tablet 3    cetirizine (ZYRTEC) 10 MG tablet Take 1 tablet by mouth daily 90 tablet 3    omeprazole (PRILOSEC) 20 MG delayed release capsule TAKE 1 CAPSULE BY MOUTH DAILY 90 capsule 3    ipratropium-albuterol (DUONEB) 0.5-2.5 (3) MG/3ML SOLN nebulizer solution Take 3 mLs by nebulization every 4 hours 180 mL 1    montelukast (SINGULAIR) 10 MG tablet Take 1 tablet by mouth daily 90 tablet 1    potassium chloride (KLOR-CON M) 10 MEQ extended release tablet TAKE 2 TABLETS BY MOUTH EVERY  tablet 2    aspirin EC 81 MG EC tablet Take 1 tablet by mouth daily 90 tablet 2    propranolol (INDERAL) 40 MG tablet Take 1 tablet by mouth 3 times a day 270 tablet 2    oxybutynin (DITROPAN) 5 MG tablet Take 1 tablet by mouth 3 times daily 90 tablet 3    rOPINIRole (REQUIP) 0.25 MG tablet Take 1-2 tablets by mouth 3 times daily 180 tablet 3    fluticasone (FLONASE) 50 MCG/ACT nasal spray 2 sprays by Nasal route daily 1 Bottle 3    OXYGEN Inhale 2 L/min into the lungs continuous (Patient taking differently: Inhale 2 L/min into the lungs continuous PRN, Pt uses most of the day and at night.) 1 Units 1    CALCIUM PO Take 500 mg by mouth daily      Black Cohosh 20 MG TABS Take by mouth nightly       albuterol sulfate HFA (PROAIR HFA) 108 (90 Base) MCG/ACT inhaler Inhale 2 puffs into the lungs every 6 hours as needed for Wheezing 1 Inhaler 3    melatonin 5 MG TABS tablet Take 5 mg by mouth nightly as needed       mirabegron (MYRBETRIQ) 25 MG TB24 Take 1 tablet by mouth daily (Patient not taking: Reported on 10/19/2020) 90 tablet 1     No current facility-administered medications for this visit. There are no discontinued medications. Allergies   Allergen Reactions    Doxycycline      Patient states it caused chills and hot flashes severe.     Abilify [Aripiprazole] Hives    Celebrex [Celecoxib]      swelling    Naproxen Hives    Lactose Intolerance (Gi) Nausea And Vomiting       Past Medical History:   Diagnosis Date    Acid reflux     Allergic rhinitis     Bilateral carotid artery stenosis 1/28/2020    Hyperlipidemia     Hypertension     Irritable bowel syndrome with diarrhea 7/5/2019    Lung cancer ([facility] [address]     Lung with chemo    Mild single current episode of major  Not on file   Tobacco Use    Smoking status: Former Smoker     Packs/day: 0.50     Years: 30.00     Pack years: 15.00     Types: Cigarettes     Start date: 1970     Last attempt to quit: 2019     Years since quittin.2    Smokeless tobacco: Never Used   Substance and Sexual Activity    Alcohol use: Yes     Comment: occcasionally    Drug use: No    Sexual activity: Yes     Partners: Male        Family History   Problem Relation Age of Onset    High Blood Pressure Mother     High Blood Pressure Father     Diabetes Sister     High Blood Pressure Brother        Review of Systems  Review of Systems   Constitutional: Negative for fatigue and fever. Respiratory: Positive for shortness of breath. Negative for cough. Cardiovascular: Positive for leg swelling. Negative for chest pain and palpitations. Gastrointestinal: Negative for anal bleeding and blood in stool. Neurological: Negative. Physical Exam  /80   Pulse 78   Ht 5' (1.524 m)   Wt 136 lb (61.7 kg)   SpO2 95%   BMI 26.56 kg/m²    Physical Exam  Constitutional:       Appearance: Normal appearance. She is normal weight. Cardiovascular:      Rate and Rhythm: Normal rate and regular rhythm. Heart sounds: Murmur present. Systolic murmur present with a grade of 1/6. No gallop. Comments: Early systolic murmur at the second right intercostal space, no murmur heard at the apical area with patient sitting up. Pulmonary:      Effort: Pulmonary effort is normal.      Comments: Lung aeration is fair but clear  Abdominal:      General: Abdomen is flat. Bowel sounds are normal.      Palpations: Abdomen is soft. Musculoskeletal:         General: No swelling. Skin:     General: Skin is warm and dry. Neurological:      Mental Status: She is alert.          Assessment/Plan    EKG Findings:  Not performed today    Problem List Items Addressed This Visit        Cardiology Problems    Chronic diastolic (congestive) heart failure ([facility] [address] - Primary    Nonrheumatic mitral valve stenosis    Nonrheumatic aortic valve stenosis    Essential hypertension    Hyperlipidemia       Other    Stage 4 very severe COPD by GOLD classification ([facility]) (Chronic)           Diagnosis Orders   1. Chronic diastolic (congestive) heart failure (HCC)      Presently compensated, decompensated right-sided diastolic CHF, October 5376   2. Nonrheumatic mitral valve stenosis      Mild by echocardiography, October 2020 secondary to severe mitral annular calcification   3. Nonrheumatic aortic valve stenosis      Very mild by echocardiography, October 2020   4. Stage 4 very severe COPD by GOLD classification ([facility] [address]      Chronic exertional dyspnea, on oxygen 24 hours   5. Essential hypertension     6. Hyperlipidemia, unspecified hyperlipidemia type         Recommendations:   Diet: Low-sodium, low-fat diet  Activity: Symptom limited activities  Medication Changes: Continue same medical regimen    I described to the patient and her  the issue regarding diastolic heart failure and the importance of maintaining good blood pressure and low-sodium diet and they acknowledge understanding this and she states that she will comply. She is to notify me if her breathing gets worse or she develops increasing lower extremity edema. I will repeat her echocardiogram in 1 year but sooner if there is a significant clinical change and she is to report any change and come see me sooner if needed. No orders of the defined types were placed in this encounter. No orders of the defined types were placed in this encounter. Return in about 6 months (around 4/19/2021) for me, routine.

## 2020-10-22 NOTE — PROGRESS NOTES
Chief Complaint   Patient presents with    Follow-Up from Hospital     Patient is here as a hfu which stems from an ED visit 1/17/2020, you were consulted and patient was to f/u w/our office. Due to Covid this has been canceled and rescheduled several times. Patient has also been re-admitted to the hospital on a coule of occasions resulting in her appt being canceled then as well. Dacia Lynn is a 68y.o. year old female who is seen for evaluation of her eye difficulties. She was seen in the ED in January of this year for what at what appeared to be a branch retinal artery occlusion. Those records are reviewed. She was sent there by her eye doctor. In the ED, she had a normal sed rate and a CTA of the head and neck. Because of the COVID-19 pandemic she is just now getting here. Work-up revealed an old basal ganglia infarct. She does have a history of optical migraines. She saw Dr. Savannah Kelly about 3 years ago. She otherwise denies any focal signs or symptoms. She is a smoker. Has COPD.     Active Ambulatory Problems     Diagnosis Date Noted    Basal ganglia infarction ([address] 10/14/2017    Mild single current episode of major depressive disorder ([address] 05/02/2018    Stage 4 very severe COPD by GOLD classification ([address] 06/06/2018    COPD with acute exacerbation ([address] 09/18/2018    Inflammation of left sacroiliac joint ([address] 12/18/2018    Inflammation of right sacroiliac joint ([address] 07/05/2019    Cigarette smoker 07/05/2019    Irritable bowel syndrome with diarrhea 07/05/2019    Acute respiratory failure with hypoxia and hypercapnia (HCC)     Pulmonary hypertension due to COPD ([address] 08/15/2019    Panic disorder 08/16/2019    Spider veins 09/17/2019    Atherosclerosis of native arteries of extremities with intermittent claudication, bilateral legs ([address] 09/18/2019    Atherosclerosis of native artery of both lower extremities with intermittent claudication (HCC)     Bilateral carotid artery stenosis 01/28/2020    Acute hypoxemic respiratory failure ([address] 06/29/2020    Palliative care patient 08/06/2019    Pulmonary nodule seen on imaging study 07/03/2020    Recurrent major depressive disorder, in full remission ([address] 07/09/2020    Chronic respiratory failure with hypoxia ([address] 07/09/2020    Oxygen dependent 07/09/2020    Atherosclerosis of artery of extremity with ulceration ([address] 07/30/2020    Elevated serum creatinine 10/07/2020    Chronic diastolic (congestive) heart failure ([address] 10/19/2020    Nonrheumatic mitral valve stenosis 10/19/2020    Nonrheumatic aortic valve stenosis 10/19/2020    Essential hypertension 10/19/2020    Hyperlipidemia 10/19/2020     Resolved Ambulatory Problems     Diagnosis Date Noted    Stress incontinence, female 05/09/2012    Nevus 05/09/2012    Cerebrovascular accident (CVA) due to occlusion of cerebral artery ([address] 10/10/2017    Hyponatremia 10/11/2017    Panlobular emphysema ([address] 03/09/2018    Tobacco abuse 09/18/2018    Hypoxia 09/18/2018    Troponin level elevated     Acute respiratory failure with hypoxia and hypercapnia (HCC) 10/04/2018    Acute respiratory failure (HCC) 08/03/2019    Lactic acidosis 08/03/2019    Leukocytosis 08/03/2019    Chest pain 08/03/2019    Chest discomfort 08/03/2019    Palliative care patient 08/06/2019   [facility] discharge follow-up 08/16/2019     Past Medical History:   Diagnosis Date    Acid reflux     Allergic rhinitis     Hypertension     Lung cancer ([address]     New onset of congestive heart failure ([address] 10/7/2020    Osteoarthritis     Osteoporosis     Other emphysema ([address] 12/22/2017    Stage 3 severe COPD by GOLD classification ([address] 6/6/2018    Urinary incontinence        Past Surgical History:   Procedure Laterality Date    APPENDECTOMY      CARDIAC CATHETERIZATION  5/3/2013   MDL    EF 60%    HEMORRHOID SURGERY      HYSTERECTOMY      HYSTERECTOMY, TOTAL ABDOMINAL      LUNG CANCER SURGERY      TONSILLECTOMY      VASCULAR SURGERY  10/11/2019    [facility]. Aortogram and runoff. PTA and stenting of the left external iliac artery with a 7x 80 and innova stent dilated to 7.2mm. PTA of the left popliteal artery with 5mm x 2cm cutting balloon. attempted recalization of the peroneal artery aborted. Family History   Problem Relation Age of Onset    High Blood Pressure Mother     High Blood Pressure Father     Diabetes Sister     High Blood Pressure Brother        Allergies   Allergen Reactions    Doxycycline      Patient states it caused chills and hot flashes severe.     Abilify [Aripiprazole] Hives    Celebrex [Celecoxib]      swelling    Naproxen Hives    Lactose Intolerance (Gi) Nausea And Vomiting       Social History     Socioeconomic History    Marital status:      Spouse name: Not on file    Number of children: Not on file    Years of education: Not on file    Highest education level: Not on file   Occupational History    Not on file   Social Needs    Financial resource strain: Not on file    Food insecurity     Worry: Not on file     Inability: Not on file    Transportation needs     Medical: Not on file     Non-medical: Not on file   Tobacco Use    Smoking status: Former Smoker     Packs/day: 0.50     Years: 30.00     Pack years: 15.00     Types: Cigarettes     Start date: 1970     Last attempt to quit: 2019     Years since quittin.2    Smokeless tobacco: Never Used   Substance and Sexual Activity    Alcohol use: Yes     Comment: occcasionally    Drug use: No    Sexual activity: Yes     Partners: Male   Lifestyle    Physical activity     Days per week: Not on file     Minutes per session: Not on file    Stress: Not on file   Relationships    Social connections     Talks on phone: Not on file     Gets together: Not on file     Attends Hinduism service: Not on file     Active member of club or organization: Not on file     Attends meetings of clubs or organizations: Not on file     Relationship status: Not on file    Intimate partner violence     Fear of current or ex partner: Not on file     Emotionally abused: Not on file     Physically abused: Not on file     Forced sexual activity: Not on file   Other Topics Concern    Not on file   Social History Narrative    Not on file       Review of Systems      Constitutional: ?Fever ? Sweats ? Chills ? Recent Injury ? Denies all unless marked   HENT:?Headache ? Head Injury ? Sore Throat ? Ear Pain ? Dizziness ? Hearing Loss ? Denies all unless marked   Spine: ? Neck pain ? Back pain ? Sciaticia ? Denies all unless marked   Cardiovascular:?Chest Pain ? Palpitations ? Heart Disease ? Denies all unless marked   Pulmonary: ? Shortness of Breath ? Cough ? Denies all unless marked   Gastrointestinal: ?Abdominal Pain ? Blood in Stool ? Diarrhea ? Constipation ? Nausea ? Vomiting ? Denies all unless marked   Genitourinary: ? Dysuria ? Frequency ? Incontinence ? Urgency ? Denies all unless marked   Musculoskeletal: ? Arthralgia ? Myalgias ? Muscle cramps ? Muscle twitches   ? Denies all unless marked   Extremities: ? Pain ? Swelling ? Denies all unless marked   Skin:? Rash ? Color Change ? Denies all unless marked   Neurological:? Visual Disturbance ? Double Vision ? Slurred Speech ? Trouble swallowing ? Vertigo ? Tingling ? Numbness ? Weakness ? Loss of Balance   ? Loss of Consciousness ? Memory Loss ? Seizures ? Denies all unless marked   Psychiatric/Behavioral:? Depression ? Anxiety ? Denies all unless marked   Sleep: ? Insomnia ? Sleep Disturbance ? Snoring ? Restless Legs ? Daytime Sleepiness ? Sleep Apnea ?  Denies all unless marked       Current Outpatient Medications   Medication Sig Dispense Refill    nystatin (MYCOSTATIN) 643331 UNIT/ML suspension Take 5 mLs by mouth 4 times daily For 7 days 1 Bottle 0    furosemide (LASIX) 20 MG tablet Take 1 tablet by mouth daily 30 tablet 2    PARoxetine (PAXIL) 40 MG tablet Take 1 tablet by mouth every morning 90 tablet 3    ferrous sulfate (FE TABS) 325 (65 Fe) MG EC tablet Take 1 tablet by mouth 2 times daily 60 tablet 0    predniSONE (DELTASONE) 10 MG tablet 4 tab daily x 4 days,then 3 tab daily x 4 days,then 2 tab daily x 4 days,then 1 tab daily x 4 days. (Patient taking differently: Take 10 mg by mouth daily Then 1 tab daily x 4 days. ) 40 tablet 0    guaiFENesin (MUCINEX) 600 MG extended release tablet Take 2 tablets by mouth 2 times daily 60 tablet 0    nystatin (MYCOSTATIN) 120748 UNIT/ML suspension Take 5 mLs by mouth 4 times daily 1 Bottle 0    umeclidinium-vilanterol (ANORO ELLIPTA) 62.5-25 MCG/INH AEPB inhaler INHALE 1 PUFF INTO LUNGS DAILY.  (Patient taking differently: Inhale 1 puff into the lungs daily INHALE 1 PUFF INTO LUNGS Daily.) 3 each 3    atorvastatin (LIPITOR) 40 MG tablet Take 1 tablet by mouth daily 90 tablet 3    losartan (COZAAR) 100 MG tablet Take 1 tablet by mouth daily 90 tablet 3    cetirizine (ZYRTEC) 10 MG tablet Take 1 tablet by mouth daily 90 tablet 3    omeprazole (PRILOSEC) 20 MG delayed release capsule TAKE 1 CAPSULE BY MOUTH DAILY 90 capsule 3    ipratropium-albuterol (DUONEB) 0.5-2.5 (3) MG/3ML SOLN nebulizer solution Take 3 mLs by nebulization every 4 hours 180 mL 1    montelukast (SINGULAIR) 10 MG tablet Take 1 tablet by mouth daily 90 tablet 1    potassium chloride (KLOR-CON M) 10 MEQ extended release tablet TAKE 2 TABLETS BY MOUTH EVERY  tablet 2    aspirin EC 81 MG EC tablet Take 1 tablet by mouth daily 90 tablet 2    propranolol (INDERAL) 40 MG tablet Take 1 tablet by mouth 3 times a day 270 tablet 2    oxybutynin (DITROPAN) 5 MG tablet Take 1 tablet by mouth 3 times daily 90 tablet 3    rOPINIRole (REQUIP) 0.25 MG tablet Take 1-2 tablets by mouth 3 times daily 180 tablet 3    mirabegron (MYRBETRIQ) 25 MG TB24 Take 1 tablet by mouth daily 90 tablet 1    fluticasone (FLONASE) 50 MCG/ACT nasal spray 2 sprays by Nasal route daily 1 Bottle 3    OXYGEN Inhale 2 L/min into the lungs continuous (Patient taking differently: Inhale 2 L/min into the lungs continuous PRN, Pt uses most of the day and at night.) 1 Units 1    CALCIUM PO Take 500 mg by mouth daily      Black Cohosh 20 MG TABS Take by mouth nightly       albuterol sulfate HFA (PROAIR HFA) 108 (90 Base) MCG/ACT inhaler Inhale 2 puffs into the lungs every 6 hours as needed for Wheezing 1 Inhaler 3    melatonin 5 MG TABS tablet Take 5 mg by mouth nightly as needed        No current facility-administered medications for this visit. Outpatient Medications Marked as Taking for the 10/22/20 encounter (Office Visit) with Ann Marie De Jesus MD   Medication Sig Dispense Refill    [] fluconazole (DIFLUCAN) 100 MG tablet Take 1 tablet by mouth daily for 7 days 7 tablet 0    nystatin (MYCOSTATIN) 379861 UNIT/ML suspension Take 5 mLs by mouth 4 times daily For 7 days 1 Bottle 0    furosemide (LASIX) 20 MG tablet Take 1 tablet by mouth daily 30 tablet 2    PARoxetine (PAXIL) 40 MG tablet Take 1 tablet by mouth every morning 90 tablet 3    ferrous sulfate (FE TABS) 325 (65 Fe) MG EC tablet Take 1 tablet by mouth 2 times daily 60 tablet 0    predniSONE (DELTASONE) 10 MG tablet 4 tab daily x 4 days,then 3 tab daily x 4 days,then 2 tab daily x 4 days,then 1 tab daily x 4 days. (Patient taking differently: Take 10 mg by mouth daily Then 1 tab daily x 4 days. ) 40 tablet 0    guaiFENesin (MUCINEX) 600 MG extended release tablet Take 2 tablets by mouth 2 times daily 60 tablet 0    nystatin (MYCOSTATIN) 178074 UNIT/ML suspension Take 5 mLs by mouth 4 times daily 1 Bottle 0    umeclidinium-vilanterol (ANORO ELLIPTA) 62.5-25 MCG/INH AEPB inhaler INHALE 1 PUFF INTO LUNGS DAILY.  (Patient taking differently: Inhale 1 puff into the lungs daily INHALE 1 PUFF INTO LUNGS Daily.) 3 each 3    atorvastatin (LIPITOR) 40 MG tablet Take 1 tablet by mouth daily 90 tablet 3    losartan (COZAAR) 100 MG tablet Take 1 tablet by mouth daily 90 tablet 3    cetirizine (ZYRTEC) 10 MG tablet Take 1 tablet by mouth daily 90 tablet 3    omeprazole (PRILOSEC) 20 MG delayed release capsule TAKE 1 CAPSULE BY MOUTH DAILY 90 capsule 3    ipratropium-albuterol (DUONEB) 0.5-2.5 (3) MG/3ML SOLN nebulizer solution Take 3 mLs by nebulization every 4 hours 180 mL 1    montelukast (SINGULAIR) 10 MG tablet Take 1 tablet by mouth daily 90 tablet 1    potassium chloride (KLOR-CON M) 10 MEQ extended release tablet TAKE 2 TABLETS BY MOUTH EVERY  tablet 2    aspirin EC 81 MG EC tablet Take 1 tablet by mouth daily 90 tablet 2    propranolol (INDERAL) 40 MG tablet Take 1 tablet by mouth 3 times a day 270 tablet 2    oxybutynin (DITROPAN) 5 MG tablet Take 1 tablet by mouth 3 times daily 90 tablet 3    rOPINIRole (REQUIP) 0.25 MG tablet Take 1-2 tablets by mouth 3 times daily 180 tablet 3    mirabegron (MYRBETRIQ) 25 MG TB24 Take 1 tablet by mouth daily 90 tablet 1    fluticasone (FLONASE) 50 MCG/ACT nasal spray 2 sprays by Nasal route daily 1 Bottle 3    OXYGEN Inhale 2 L/min into the lungs continuous (Patient taking differently: Inhale 2 L/min into the lungs continuous PRN, Pt uses most of the day and at night.) 1 Units 1    CALCIUM PO Take 500 mg by mouth daily      Black Cohosh 20 MG TABS Take by mouth nightly       albuterol sulfate HFA (PROAIR HFA) 108 (90 Base) MCG/ACT inhaler Inhale 2 puffs into the lungs every 6 hours as needed for Wheezing 1 Inhaler 3    melatonin 5 MG TABS tablet Take 5 mg by mouth nightly as needed          /72   Pulse 76   Ht 5' (1.524 m)   Wt 136 lb (61.7 kg)   BMI 26.56 kg/m²       Constitutional - well developed, well nourished. Eyes - conjunctiva normal.  Pupils react to light  Ear, nose, throat -hearing intact to finger rub No scars, masses, or lesions over external nose or ears, no atrophy of tongue  Neck-symmetric, no masses noted, no jugular vein distension.   No 10/10/2020    GLUCOSE 124 (H) 10/10/2020    CALCIUM 9.0 10/10/2020    PROT 5.9 (L) 10/07/2020    LABALBU 3.1 (L) 10/07/2020    BILITOT 0.4 10/07/2020    ALKPHOS 106 (H) 10/07/2020    AST 19 10/07/2020    ALT 23 10/07/2020    LABGLOM 40 (A) 10/10/2020    GFRAA 48 (L) 10/10/2020    GLOB 3.3 01/10/2017           Assessment    ICD-10-CM    1. History of recent stroke  Z86.73    2. History of hypertension  Z86.79    3. Restless leg syndrome  G25.81      She has had no further optical abnormalities. No further strokelike symptoms. She will continue on an aspirin a day for stroke prophylaxis. I remain available as needed. We had a long talk regarding all of the above. Plan    Return if symptoms worsen or fail to improve.     (Please note that portions of this note were completed with a voice recognition program. Efforts were made to edit the dictations but occasionally words are mis-transcribed.)

## 2020-12-15 NOTE — PROGRESS NOTES
Khushbu Pride is a 68 y.o. female who presents today for   Chief Complaint   Patient presents with    Medicare AWV    Panic Attack       HPI  Patient is here for awv and f/u for oxygen usage. Patient has been more short of breath lately. She uses oxygen at night with her CPAP machine every single night. She states that she is due for recertification for this. She uses it as directed and notes that she has not used that for various reasons that she feels worse. She feels a benefit with it along with the oxygen at night and as needed. She is short of breath today in office. She is using Mucinex as needed. She notes that she continues to have occasional panic attacks and Ativan helped substantially. No change in PMH, family, social, or surgical history unless mentioned above. Review of Systems   Constitutional: Positive for fatigue. Negative for chills and fever. Respiratory: Positive for shortness of breath. Negative for cough, chest tightness and wheezing. Cardiovascular: Negative for chest pain, palpitations and leg swelling. Gastrointestinal: Negative for abdominal pain, constipation, diarrhea, nausea and vomiting. Genitourinary: Negative for difficulty urinating, dysuria and frequency. Musculoskeletal: Positive for arthralgias and gait problem. Neurological: Positive for weakness. Negative for speech difficulty and light-headedness. Psychiatric/Behavioral: Negative for behavioral problems and decreased concentration. The patient is nervous/anxious.         Past Medical History:   Diagnosis Date    Acid reflux     Allergic rhinitis     Bilateral carotid artery stenosis 1/28/2020    Hyperlipidemia     Hypertension     Irritable bowel syndrome with diarrhea 7/5/2019    [facility] [address] [address]     Lung with chemo    Mild single current episode of major depressive disorder ([address] 5/2/2018    New onset of congestive heart failure ([address] 10/7/2020    Osteoarthritis     Osteoporosis Judgment: Judgment normal.         Assessment:    ICD-10-CM    1. Stage 4 very severe COPD by GOLD classification ([facility])  J44.9 6 Minute Walk Test   2. Anxiety  F41.9 LORazepam (ATIVAN) 0.5 MG tablet   3. Panic disorder  F41.0 LORazepam (ATIVAN) 0.5 MG tablet   4. COPD with acute exacerbation ([facility])  J44.1 6 Minute Walk Test   5. Chronic respiratory failure with hypoxia ([facility])  J96.11    6. Wheelchair dependent  Z99.3    7. Impaired mobility and ADLs  Z74.09     Z78.9    8. Routine general medical examination at a health care facility  Z00.00        Plan:   Patient absolutely needs oxygen based on 6-minute walk test for which she desaturated down to 82% within 10 feet of walking. She is 83% at baseline as well. She is in chronic respiratory failure and therefore has stage IV COPD and unfortunately other known sequelae that may occur in the future. Patient is to be changed from Anoro to Trelegy based on her respiratory failure and need for combined therapy. Continue prednisone as tolerated. Patient intolerant of Daliresp due to anxiety. Patient also has impaired mobility based on the level of respiratory failure and would benefit from a power scooter. Orders Placed This Encounter   Procedures    6 Minute Walk Test     Standing Status:   Future     Standing Expiration Date:   12/15/2021     Orders Placed This Encounter   Medications    LORazepam (ATIVAN) 0.5 MG tablet     Sig: Take 1 tablet by mouth 3 times daily as needed for Anxiety for up to 30 days. Dispense:  30 tablet     Refill:  0    fluticasone-umeclidin-vilant (TRELEGY ELLIPTA) 100-62.5-25 MCG/INH AEPB     Sig: Inhale 1 puff into the lungs daily     Dispense:  1 each     Refill:  5    Misc.  [facility]) MISC     Sig: Power scooter Dx: respiratory failure, impaired mobility and gait     Dispense:  1 each     Refill:  0     Medications Discontinued During This Encounter   Medication Reason  nystatin (MYCOSTATIN) 034448 UNIT/ML suspension     predniSONE (DELTASONE) 10 MG tablet Therapy completed    LORazepam (ATIVAN) 0.5 MG tablet REORDER    umeclidinium-vilanterol (ANORO ELLIPTA) 62.5-25 MCG/INH AEPB inhaler Therapy completed     Patient Instructions   We are committed to providing you with the best care possible. In order to help us achieve these goals please remember to bring all medications, herbal products, and over the counter supplements with you to each visit. If your provider has ordered testing for you, please be sure to follow up with our office if you have not received results within 7 days after the testing took place. *If you receive a survey after visiting one of our offices, please take time to share your experience concerning your physician office visit. These surveys are confidential and no health information about you is shared. We are eager to improve for you and we are counting on your feedback to help make that happen. Personalized Preventive Plan for Alondra Barry - 12/15/2020  Medicare offers a range of preventive health benefits. Some of the tests and screenings are paid in full while other may be subject to a deductible, co-insurance, and/or copay. Some of these benefits include a comprehensive review of your medical history including lifestyle, illnesses that may run in your family, and various assessments and screenings as appropriate. After reviewing your medical record and screening and assessments performed today your provider may have ordered immunizations, labs, imaging, and/or referrals for you. A list of these orders (if applicable) as well as your Preventive Care list are included within your After Visit Summary for your review. Other Preventive Recommendations:    · A preventive eye exam performed by an eye specialist is recommended every 1-2 years to screen for glaucoma; cataracts, macular degeneration, and other eye disorders. · A preventive dental visit is recommended every 6 months. · Try to get at least 150 minutes of exercise per week or 10,000 steps per day on a pedometer . · Order or download the FREE \"Exercise & Physical Activity: Your Everyday Guide\" from [employer]. Call 9-128.428.2080 or search [employer] online. · You need 2236-6106 mg of calcium and 5608-1467 IU of vitamin D per day. It is possible to meet your calcium requirement with diet alone, but a vitamin D supplement is usually necessary to meet this goal.  · When exposed to the sun, use a sunscreen that protects against both UVA and UVB radiation with an SPF of 30 or greater. Reapply every 2 to 3 hours or after sweating, drying off with a towel, or swimming. · Always wear a seat belt when traveling in a car. Always wear a helmet when riding a bicycle or motorcycle. Patient given educational handouts and has had all questions answered. Patient voices understanding and agrees to plans along with risks and benefits of plan. Patient isinstructed to continue prior meds, diet, and exercise plans unless instructed otherwise. Patient agrees to follow up as instructed and sooner if needed. Patient agrees to go to ER if condition becomes emergent. Notesmay be completed with dictation device and spelling errors may occur. Materials may be copied and pasted from a notepad outside of EMR, all of which, I, Dr. Ezio Hoyos MD, take sole intellectual ownership of and have approved adding to my note. Return for OV (Juliane), COPD.

## 2020-12-15 NOTE — TELEPHONE ENCOUNTER
Called patient and discussed power scooter order and what [employer] company she would like me to send orders to.  Discussed and will fax orders to [facility]

## 2020-12-15 NOTE — PATIENT INSTRUCTIONS
We are committed to providing you with the best care possible. In order to help us achieve these goals please remember to bring all medications, herbal products, and over the counter supplements with you to each visit. If your provider has ordered testing for you, please be sure to follow up with our office if you have not received results within 7 days after the testing took place. *If you receive a survey after visiting one of our offices, please take time to share your experience concerning your physician office visit. These surveys are confidential and no health information about you is shared. We are eager to improve for you and we are counting on your feedback to help make that happen. Personalized Preventive Plan for Arianna Gu - 12/15/2020  Medicare offers a range of preventive health benefits. Some of the tests and screenings are paid in full while other may be subject to a deductible, co-insurance, and/or copay. Some of these benefits include a comprehensive review of your medical history including lifestyle, illnesses that may run in your family, and various assessments and screenings as appropriate. After reviewing your medical record and screening and assessments performed today your provider may have ordered immunizations, labs, imaging, and/or referrals for you. A list of these orders (if applicable) as well as your Preventive Care list are included within your After Visit Summary for your review. Other Preventive Recommendations:    · A preventive eye exam performed by an eye specialist is recommended every 1-2 years to screen for glaucoma; cataracts, macular degeneration, and other eye disorders. · A preventive dental visit is recommended every 6 months. · Try to get at least 150 minutes of exercise per week or 10,000 steps per day on a pedometer . · Order or download the FREE \"Exercise & Physical Activity: Your Everyday Guide\" from [employer]. Call 3-253.947.3938 or search [employer] online. · You need 8155-8613 mg of calcium and 3653-2872 IU of vitamin D per day. It is possible to meet your calcium requirement with diet alone, but a vitamin D supplement is usually necessary to meet this goal.  · When exposed to the sun, use a sunscreen that protects against both UVA and UVB radiation with an SPF of 30 or greater. Reapply every 2 to 3 hours or after sweating, drying off with a towel, or swimming. · Always wear a seat belt when traveling in a car. Always wear a helmet when riding a bicycle or motorcycle.

## 2020-12-15 NOTE — PROGRESS NOTES
losartan (COZAAR) 100 MG tablet Take 1 tablet by mouth daily Yes Azael Gooden MD   cetirizine (ZYRTEC) 10 MG tablet Take 1 tablet by mouth daily Yes Azael Gooden MD   omeprazole (PRILOSEC) 20 MG delayed release capsule TAKE 1 CAPSULE BY MOUTH DAILY Yes Azael Gooden MD   montelukast (SINGULAIR) 10 MG tablet Take 1 tablet by mouth daily Yes Azael Gooden MD   potassium chloride (KLOR-CON M) 10 MEQ extended release tablet TAKE 2 TABLETS BY MOUTH EVERY DAY Yes Azael Gooden MD   aspirin EC 81 MG EC tablet Take 1 tablet by mouth daily Yes Azael Gooden MD   propranolol (INDERAL) 40 MG tablet Take 1 tablet by mouth 3 times a day Yes Azael Gooden MD   oxybutynin (DITROPAN) 5 MG tablet Take 1 tablet by mouth 3 times daily Yes Azael Gooden MD   rOPINIRole (REQUIP) 0.25 MG tablet Take 1-2 tablets by mouth 3 times daily Yes Azael Gooden MD   mirabegron (MYRBETRIQ) 25 MG TB24 Take 1 tablet by mouth daily Yes Azael Gooden MD   fluticasone (FLONASE) 50 MCG/ACT nasal spray 2 sprays by Nasal route daily Yes DIANA Knight NP   OXYGEN Inhale 2 L/min into the lungs continuous  Patient taking differently: Inhale 2 L/min into the lungs continuous PRN, Pt uses most of the day and at night.  Yes DIANA Knight NP   CALCIUM PO Take 500 mg by mouth daily Yes Historical Provider, MD Bobby Cohosh 20 MG TABS Take by mouth nightly  Yes Historical Provider, MD   albuterol sulfate HFA (PROAIR HFA) 108 (90 Base) MCG/ACT inhaler Inhale 2 puffs into the lungs every 6 hours as needed for Wheezing Yes Azael Gooden MD   melatonin 5 MG TABS tablet Take 5 mg by mouth nightly as needed  Yes Historical Provider, MD   ipratropium-albuterol (DUONEB) 0.5-2.5 (3) MG/3ML SOLN nebulizer solution Take 3 mLs by nebulization every 4 hours  Azael Gooden MD       Past Medical History:   Diagnosis Date    Acid reflux     Allergic rhinitis     Bilateral carotid artery stenosis 1/28/2020    Hyperlipidemia  Hypertension     Irritable bowel syndrome with diarrhea 7/5/2019    Lung cancer ([facility]     Lung with chemo    Mild single current episode of major depressive disorder ([facility] [address] 5/2/2018    New onset of congestive heart failure ([address] 10/7/2020    Osteoarthritis     Osteoporosis     Other emphysema ([facility] [facility] 12/22/2017    Palliative care patient 08/06/2019    Panic disorder 8/16/2019    Stage 3 severe COPD by GOLD classification ([facility] [address] 6/6/2018    FEV1 42%    Tobacco abuse 2/8/2018    Urinary incontinence     stress incontinence       Past Surgical History:   Procedure Laterality Date    APPENDECTOMY      CARDIAC CATHETERIZATION  5/3/2013   MDL    EF 60%    HEMORRHOID SURGERY      HYSTERECTOMY      HYSTERECTOMY, TOTAL ABDOMINAL      LUNG CANCER SURGERY      TONSILLECTOMY      VASCULAR SURGERY  10/11/2019    TJR. Aortogram and runoff. PTA and stenting of the left external iliac artery with a 7x 80 and innova stent dilated to 7.2mm. PTA of the left popliteal artery with 5mm x 2cm cutting balloon. attempted recalization of the peroneal artery aborted.        Family History   Problem Relation Age of Onset    High Blood Pressure Mother     High Blood Pressure Father     Diabetes Sister     High Blood Pressure Brother        CareTeam (Including outside providers/suppliers regularly involved in providing care):   Patient Care Team:  Maikol Swartz MD as PCP - General (Family Medicine)  Maikol Swartz MD as PCP - [facility] Provider  Royer De La O MD (Gastroenterology)  DIANA Wylie - NP as Nurse Practitioner (Nurse Practitioner Adult Health)  Jesusita Ji MD as Consulting Physician (Vascular Surgery)  Marla Leonard MD as Neurologist (Neurology)    Wt Readings from Last 3 Encounters:   12/15/20 141 lb (64 kg)   10/22/20 136 lb (61.7 kg)   10/19/20 136 lb (61.7 kg)     Vitals:    12/15/20 1008   BP: 132/74   Temp: 97.9 °F (36.6 °C)   Weight: 141 lb (64 kg)   Height: 5' (1.524 m) Do you have difficulty driving, watching TV, or doing any of your daily activities because of your eyesight?: No  Have you had an eye exam within the past year?: Yes  Hearing/Vision Interventions:  · Hearing concerns:  patient declines any further evaluation/treatment for hearing issues     ADL:  ADLs  In the past 7 days, did you need help from others to perform any of the following everyday activities? Eating, dressing, grooming, bathing, toileting, or walking/balance?: (!) Walking/Balance  In the past 7 days, did you need help from others to take care of any of the following?  Laundry, housekeeping, banking/finances, shopping, telephone use, food preparation, transportation, or taking medications?: [employer], Laundry, Housekeeping, Shopping, Food Preparation  ADL Interventions:  · Patient declines any further evaluation/treatment for this issue    Personalized Preventive Plan   Current Health Maintenance Status  Immunization History   Administered Date(s) Administered    Influenza Virus Vaccine 10/03/2016    Influenza, High Dose (Fluzone 65 yrs and older) 10/31/2018, 11/01/2020    Influenza, Kasienelly Feldman, 6-35 Months, IM (Fluzone,Afluria) 11/05/2019    Influenza, Kasienelly Feldman, IM, (6 mo and older Fluzone, Flulaval, Fluarix and 3 yrs and older Afluria) 10/10/2017    Pneumococcal Conjugate 13-valent ([address]) 10/10/2015    Pneumococcal Polysaccharide ([id number]) 01/01/2018    Zoster Recombinant (Shingrix) 12/05/2019, 05/30/2020        Health Maintenance   Topic Date Due    DTaP/Tdap/Td vaccine (1 - Tdap) 03/10/1959    Annual Wellness Visit (AWV)  05/29/2019    Potassium monitoring  12/08/2021    Creatinine monitoring  12/08/2021    Breast cancer screen  02/03/2022    Colon cancer screen colonoscopy  06/04/2023    Flu vaccine  Completed    Shingles Vaccine  Completed    Pneumococcal 65+ years Vaccine  Completed    Hepatitis A vaccine  Aged Out    Hepatitis B vaccine  Aged Out  Hib vaccine  Aged Out    Meningococcal (ACWY) vaccine  Aged Out     Recommendations for [employer] Due: see orders and patient instructions/AVS.  . Recommended screening schedule for the next 5-10 years is provided to the patient in written form: see Patient Francisco Washington was seen today for medicare awv and panic attack. Diagnoses and all orders for this visit:    Stage 4 very severe COPD by GOLD classification ([address]  -     6 Minute Walk Test; Future    Anxiety  -     LORazepam (ATIVAN) 0.5 MG tablet; Take 1 tablet by mouth 3 times daily as needed for Anxiety for up to 30 days. Panic disorder  -     LORazepam (ATIVAN) 0.5 MG tablet; Take 1 tablet by mouth 3 times daily as needed for Anxiety for up to 30 days. COPD with acute exacerbation ([address]  -     6 Minute Walk Test; Future    Chronic respiratory failure with hypoxia (HCC)    Wheelchair dependent    Impaired mobility and ADLs    Routine general medical examination at a health care facility    Other orders  -     fluticasone-umeclidin-vilant (Tasha Bunde) 100-62.5-25 MCG/INH AEPB; Inhale 1 puff into the lungs daily  -     Misc.  Devices [facility] MISC; Power scooter Dx: respiratory failure, impaired mobility and gait

## 2020-12-22 NOTE — TELEPHONE ENCOUNTER
Twyla Ying is requesting new order for O2 so patient can continue to supply oxygen has been ordered and faxed to [facility] fax #[phone] see media

## 2021-01-01 ENCOUNTER — APPOINTMENT (OUTPATIENT)
Dept: GENERAL RADIOLOGY | Age: 81
DRG: 870 | End: 2021-01-01
Payer: MEDICARE

## 2021-01-01 ENCOUNTER — APPOINTMENT (OUTPATIENT)
Dept: ULTRASOUND IMAGING | Age: 81
DRG: 870 | End: 2021-01-01
Payer: MEDICARE

## 2021-01-01 ENCOUNTER — HOSPITAL ENCOUNTER (INPATIENT)
Age: 81
LOS: 4 days | Discharge: HOSPICE | DRG: 870 | End: 2021-02-22
Attending: PEDIATRICS
Payer: MEDICARE

## 2021-01-01 ENCOUNTER — APPOINTMENT (OUTPATIENT)
Dept: CT IMAGING | Age: 81
DRG: 870 | End: 2021-01-01
Payer: MEDICARE

## 2021-01-01 VITALS
SYSTOLIC BLOOD PRESSURE: 90 MMHG | OXYGEN SATURATION: 90 % | WEIGHT: 147.2 LBS | HEIGHT: 60 IN | BODY MASS INDEX: 28.9 KG/M2 | HEART RATE: 84 BPM | RESPIRATION RATE: 17 BRPM | TEMPERATURE: 98.9 F | DIASTOLIC BLOOD PRESSURE: 41 MMHG

## 2021-01-01 DIAGNOSIS — K59.1 FUNCTIONAL DIARRHEA: Primary | ICD-10-CM

## 2021-01-01 DIAGNOSIS — N17.9 ACUTE RENAL FAILURE SUPERIMPOSED ON CHRONIC KIDNEY DISEASE, UNSPECIFIED CKD STAGE, UNSPECIFIED ACUTE RENAL FAILURE TYPE (HCC): ICD-10-CM

## 2021-01-01 DIAGNOSIS — R07.9 CHEST PAIN, UNSPECIFIED TYPE: ICD-10-CM

## 2021-01-01 DIAGNOSIS — N18.9 ACUTE RENAL FAILURE SUPERIMPOSED ON CHRONIC KIDNEY DISEASE, UNSPECIFIED CKD STAGE, UNSPECIFIED ACUTE RENAL FAILURE TYPE (HCC): ICD-10-CM

## 2021-01-01 DIAGNOSIS — J44.1 COPD WITH ACUTE EXACERBATION (HCC): ICD-10-CM

## 2021-01-01 DIAGNOSIS — R41.82 ALTERED MENTAL STATUS, UNSPECIFIED ALTERED MENTAL STATUS TYPE: Primary | ICD-10-CM

## 2021-01-01 DIAGNOSIS — J44.9 CHRONIC OBSTRUCTIVE PULMONARY DISEASE, UNSPECIFIED COPD TYPE (HCC): ICD-10-CM

## 2021-01-01 DIAGNOSIS — G25.81 RLS (RESTLESS LEGS SYNDROME): ICD-10-CM

## 2021-01-01 DIAGNOSIS — E87.20 METABOLIC ACIDOSIS: ICD-10-CM

## 2021-01-01 DIAGNOSIS — R09.89 DIMINISHED PULSES IN LOWER EXTREMITY: ICD-10-CM

## 2021-01-01 DIAGNOSIS — J44.1 COPD EXACERBATION (HCC): ICD-10-CM

## 2021-01-01 DIAGNOSIS — Z74.09 IMPAIRED MOBILITY AND ADLS: ICD-10-CM

## 2021-01-01 DIAGNOSIS — Z78.9 IMPAIRED MOBILITY AND ADLS: ICD-10-CM

## 2021-01-01 DIAGNOSIS — D72.829 LEUKOCYTOSIS, UNSPECIFIED TYPE: ICD-10-CM

## 2021-01-01 DIAGNOSIS — J96.11 CHRONIC RESPIRATORY FAILURE WITH HYPOXIA (HCC): Primary | ICD-10-CM

## 2021-01-01 DIAGNOSIS — Z99.3 WHEELCHAIR DEPENDENT: Primary | ICD-10-CM

## 2021-01-01 LAB
ACANTHOCYTES: ABNORMAL
ALBUMIN SERPL-MCNC: 1.7 G/DL (ref 3.5–5.2)
ALBUMIN SERPL-MCNC: 1.9 G/DL (ref 3.5–5.2)
ALBUMIN SERPL-MCNC: 2 G/DL (ref 3.5–5.2)
ALBUMIN SERPL-MCNC: 2.4 G/DL (ref 3.5–5.2)
ALBUMIN SERPL-MCNC: 2.7 G/DL (ref 3.5–5.2)
ALBUMIN SERPL-MCNC: 3.4 G/DL (ref 3.5–5.2)
ALP BLD-CCNC: 222 U/L (ref 35–104)
ALP BLD-CCNC: 245 U/L (ref 35–104)
ALP BLD-CCNC: 365 U/L (ref 35–104)
ALP BLD-CCNC: 373 U/L (ref 35–104)
ALP BLD-CCNC: 398 U/L (ref 35–104)
ALP BLD-CCNC: 421 U/L (ref 35–104)
ALT SERPL-CCNC: 1242 U/L (ref 5–33)
ALT SERPL-CCNC: 1889 U/L (ref 5–33)
ALT SERPL-CCNC: 2565 U/L (ref 5–33)
ALT SERPL-CCNC: 274 U/L (ref 5–33)
ALT SERPL-CCNC: 275 U/L (ref 5–33)
ALT SERPL-CCNC: 832 U/L (ref 5–33)
AMMONIA: 55 UMOL/L (ref 11–51)
AMMONIA: 62 UMOL/L (ref 11–51)
ANION GAP SERPL CALCULATED.3IONS-SCNC: 15 MMOL/L (ref 7–19)
ANION GAP SERPL CALCULATED.3IONS-SCNC: 16 MMOL/L (ref 7–19)
ANION GAP SERPL CALCULATED.3IONS-SCNC: 17 MMOL/L (ref 7–19)
ANION GAP SERPL CALCULATED.3IONS-SCNC: 19 MMOL/L (ref 7–19)
ANION GAP SERPL CALCULATED.3IONS-SCNC: 20 MMOL/L (ref 7–19)
ANION GAP SERPL CALCULATED.3IONS-SCNC: 21 MMOL/L (ref 7–19)
ANION GAP SERPL CALCULATED.3IONS-SCNC: 22 MMOL/L (ref 7–19)
ANISOCYTOSIS: ABNORMAL
AST SERPL-CCNC: 1529 U/L (ref 5–32)
AST SERPL-CCNC: 369 U/L (ref 5–32)
AST SERPL-CCNC: 411 U/L (ref 5–32)
AST SERPL-CCNC: 4309 U/L (ref 5–32)
AST SERPL-CCNC: 675 U/L (ref 5–32)
AST SERPL-CCNC: >7000 U/L (ref 5–32)
ATYPICAL LYMPHOCYTE RELATIVE PERCENT: 1 % (ref 0–8)
BACTERIA: NEGATIVE /HPF
BACTERIA: NEGATIVE /HPF
BANDED NEUTROPHILS RELATIVE PERCENT: 16 % (ref 0–5)
BANDED NEUTROPHILS RELATIVE PERCENT: 21 % (ref 0–5)
BANDED NEUTROPHILS RELATIVE PERCENT: 3 % (ref 0–5)
BANDED NEUTROPHILS RELATIVE PERCENT: 8 % (ref 0–5)
BASE EXCESS ARTERIAL: -12.9 MMOL/L (ref -2–2)
BASE EXCESS ARTERIAL: -16.8 MMOL/L (ref -2–2)
BASE EXCESS ARTERIAL: -5.5 MMOL/L (ref -2–2)
BASE EXCESS ARTERIAL: -6.2 MMOL/L (ref -2–2)
BASE EXCESS ARTERIAL: -9.2 MMOL/L (ref -2–2)
BASOPHILS ABSOLUTE: 0 K/UL (ref 0–0.2)
BASOPHILS RELATIVE PERCENT: 0 % (ref 0–1)
BASOPHILS RELATIVE PERCENT: 0.1 % (ref 0–1)
BASOPHILS RELATIVE PERCENT: 0.1 % (ref 0–1)
BILIRUB SERPL-MCNC: 0.7 MG/DL (ref 0.2–1.2)
BILIRUB SERPL-MCNC: 0.9 MG/DL (ref 0.2–1.2)
BILIRUB SERPL-MCNC: 1.2 MG/DL (ref 0.2–1.2)
BILIRUB SERPL-MCNC: 1.4 MG/DL (ref 0.2–1.2)
BILIRUB SERPL-MCNC: 1.7 MG/DL (ref 0.2–1.2)
BILIRUB SERPL-MCNC: 1.9 MG/DL (ref 0.2–1.2)
BILIRUBIN URINE: NEGATIVE
BILIRUBIN URINE: NEGATIVE
BLOOD, URINE: ABNORMAL
BLOOD, URINE: ABNORMAL
BUN BLDV-MCNC: 106 MG/DL (ref 8–23)
BUN BLDV-MCNC: 116 MG/DL (ref 8–23)
BUN BLDV-MCNC: 120 MG/DL (ref 8–23)
BUN BLDV-MCNC: 137 MG/DL (ref 8–23)
BUN BLDV-MCNC: 88 MG/DL (ref 8–23)
BUN BLDV-MCNC: 90 MG/DL (ref 8–23)
BUN BLDV-MCNC: 95 MG/DL (ref 8–23)
BURR CELLS: ABNORMAL
CALCIUM SERPL-MCNC: 6.8 MG/DL (ref 8.8–10.2)
CALCIUM SERPL-MCNC: 7.1 MG/DL (ref 8.8–10.2)
CALCIUM SERPL-MCNC: 7.2 MG/DL (ref 8.8–10.2)
CALCIUM SERPL-MCNC: 7.4 MG/DL (ref 8.8–10.2)
CALCIUM SERPL-MCNC: 8.7 MG/DL (ref 8.8–10.2)
CALCIUM SERPL-MCNC: 9 MG/DL (ref 8.8–10.2)
CALCIUM SERPL-MCNC: 9.4 MG/DL (ref 8.8–10.2)
CARBOXYHEMOGLOBIN ARTERIAL: 2.1 % (ref 0–5)
CARBOXYHEMOGLOBIN ARTERIAL: 2.1 % (ref 0–5)
CARBOXYHEMOGLOBIN ARTERIAL: 2.3 % (ref 0–5)
CARBOXYHEMOGLOBIN ARTERIAL: 2.6 % (ref 0–5)
CARBOXYHEMOGLOBIN ARTERIAL: 3.3 % (ref 0–5)
CHLORIDE BLD-SCNC: 100 MMOL/L (ref 98–111)
CHLORIDE BLD-SCNC: 104 MMOL/L (ref 98–111)
CHLORIDE BLD-SCNC: 104 MMOL/L (ref 98–111)
CHLORIDE BLD-SCNC: 107 MMOL/L (ref 98–111)
CHLORIDE BLD-SCNC: 108 MMOL/L (ref 98–111)
CHLORIDE BLD-SCNC: 109 MMOL/L (ref 98–111)
CHLORIDE BLD-SCNC: 110 MMOL/L (ref 98–111)
CHP ED QC CHECK: NORMAL
CLARITY: ABNORMAL
CLARITY: CLEAR
CO2: 13 MMOL/L (ref 22–29)
CO2: 14 MMOL/L (ref 22–29)
CO2: 17 MMOL/L (ref 22–29)
CO2: 18 MMOL/L (ref 22–29)
CO2: 19 MMOL/L (ref 22–29)
CO2: 23 MMOL/L (ref 22–29)
CO2: 23 MMOL/L (ref 22–29)
COLOR: YELLOW
COLOR: YELLOW
CORRECTED WBC: 34.9 K/UL (ref 4.8–10.8)
CREAT SERPL-MCNC: 2.8 MG/DL (ref 0.5–0.9)
CREAT SERPL-MCNC: 2.9 MG/DL (ref 0.5–0.9)
CREAT SERPL-MCNC: 3 MG/DL (ref 0.5–0.9)
CREAT SERPL-MCNC: 3.6 MG/DL (ref 0.5–0.9)
CREAT SERPL-MCNC: 3.7 MG/DL (ref 0.5–0.9)
CREAT SERPL-MCNC: 3.8 MG/DL (ref 0.5–0.9)
CREAT SERPL-MCNC: 4.5 MG/DL (ref 0.5–0.9)
CREATININE URINE: 18.6 MG/DL (ref 4.2–622)
CRYSTALS, UA: ABNORMAL /HPF
CRYSTALS, UA: ABNORMAL /HPF
CULTURE, RESPIRATORY: ABNORMAL
CULTURE, RESPIRATORY: ABNORMAL
D DIMER: 2.74 UG/ML FEU (ref 0–0.48)
EOSINOPHILS ABSOLUTE: 0 K/UL (ref 0–0.6)
EOSINOPHILS RELATIVE PERCENT: 0 % (ref 0–5)
EPITHELIAL CELLS, UA: 1 /HPF (ref 0–5)
EPITHELIAL CELLS, UA: 1 /HPF (ref 0–5)
GFR AFRICAN AMERICAN: 11
GFR AFRICAN AMERICAN: 14
GFR AFRICAN AMERICAN: 14
GFR AFRICAN AMERICAN: 15
GFR AFRICAN AMERICAN: 18
GFR AFRICAN AMERICAN: 19
GFR AFRICAN AMERICAN: 20
GFR NON-AFRICAN AMERICAN: 11
GFR NON-AFRICAN AMERICAN: 12
GFR NON-AFRICAN AMERICAN: 12
GFR NON-AFRICAN AMERICAN: 15
GFR NON-AFRICAN AMERICAN: 16
GFR NON-AFRICAN AMERICAN: 16
GFR NON-AFRICAN AMERICAN: 9
GLUCOSE BLD-MCNC: 115 MG/DL (ref 70–99)
GLUCOSE BLD-MCNC: 115 MG/DL (ref 74–109)
GLUCOSE BLD-MCNC: 127 MG/DL (ref 74–109)
GLUCOSE BLD-MCNC: 131 MG/DL (ref 74–109)
GLUCOSE BLD-MCNC: 132 MG/DL (ref 70–99)
GLUCOSE BLD-MCNC: 133 MG/DL (ref 70–99)
GLUCOSE BLD-MCNC: 137 MG/DL (ref 74–109)
GLUCOSE BLD-MCNC: 145 MG/DL (ref 70–99)
GLUCOSE BLD-MCNC: 155 MG/DL (ref 70–99)
GLUCOSE BLD-MCNC: 161 MG/DL (ref 70–99)
GLUCOSE BLD-MCNC: 165 MG/DL (ref 70–99)
GLUCOSE BLD-MCNC: 188 MG/DL (ref 70–99)
GLUCOSE BLD-MCNC: 206 MG/DL (ref 70–99)
GLUCOSE BLD-MCNC: 223 MG/DL (ref 74–109)
GLUCOSE BLD-MCNC: 228 MG/DL (ref 70–99)
GLUCOSE BLD-MCNC: 242 MG/DL (ref 70–99)
GLUCOSE BLD-MCNC: 269 MG/DL (ref 70–99)
GLUCOSE BLD-MCNC: 301 MG/DL (ref 70–99)
GLUCOSE BLD-MCNC: 62 MG/DL
GLUCOSE BLD-MCNC: 62 MG/DL (ref 70–99)
GLUCOSE BLD-MCNC: 67 MG/DL (ref 70–99)
GLUCOSE BLD-MCNC: 82 MG/DL (ref 74–109)
GLUCOSE BLD-MCNC: 83 MG/DL (ref 70–99)
GLUCOSE BLD-MCNC: 86 MG/DL (ref 70–99)
GLUCOSE BLD-MCNC: 90 MG/DL (ref 70–99)
GLUCOSE BLD-MCNC: 93 MG/DL (ref 70–99)
GLUCOSE BLD-MCNC: 93 MG/DL (ref 74–109)
GLUCOSE BLD-MCNC: 94 MG/DL (ref 70–99)
GLUCOSE BLD-MCNC: 99 MG/DL (ref 70–99)
GLUCOSE URINE: NEGATIVE MG/DL
GLUCOSE URINE: NEGATIVE MG/DL
GRAM STAIN RESULT: ABNORMAL
HCO3 ARTERIAL: 14.2 MMOL/L (ref 22–26)
HCO3 ARTERIAL: 15.3 MMOL/L (ref 22–26)
HCO3 ARTERIAL: 17.1 MMOL/L (ref 22–26)
HCO3 ARTERIAL: 19.1 MMOL/L (ref 22–26)
HCO3 ARTERIAL: 20.6 MMOL/L (ref 22–26)
HCT VFR BLD CALC: 31.6 % (ref 37–47)
HCT VFR BLD CALC: 32.8 % (ref 37–47)
HCT VFR BLD CALC: 33 % (ref 37–47)
HCT VFR BLD CALC: 33.3 % (ref 37–47)
HCT VFR BLD CALC: 33.9 % (ref 37–47)
HCT VFR BLD CALC: 34.4 % (ref 37–47)
HEMOGLOBIN, ART, EXTENDED: 7.1 G/DL (ref 12–16)
HEMOGLOBIN, ART, EXTENDED: 8.9 G/DL (ref 12–16)
HEMOGLOBIN, ART, EXTENDED: 9.5 G/DL (ref 12–16)
HEMOGLOBIN, ART, EXTENDED: 9.7 G/DL (ref 12–16)
HEMOGLOBIN, ART, EXTENDED: 9.8 G/DL (ref 12–16)
HEMOGLOBIN: 8.6 G/DL (ref 12–16)
HEMOGLOBIN: 8.8 G/DL (ref 12–16)
HEMOGLOBIN: 9 G/DL (ref 12–16)
HEMOGLOBIN: 9.2 G/DL (ref 12–16)
HEMOGLOBIN: 9.5 G/DL (ref 12–16)
HEMOGLOBIN: 9.7 G/DL (ref 12–16)
HYALINE CASTS: 2 /HPF (ref 0–8)
HYALINE CASTS: 3 /HPF (ref 0–8)
HYPOCHROMIA: ABNORMAL
IMMATURE GRANULOCYTES #: 0.1 K/UL
IMMATURE GRANULOCYTES #: 0.2 K/UL
IMMATURE GRANULOCYTES #: 0.3 K/UL
IMMATURE GRANULOCYTES #: 0.5 K/UL
KETONES, URINE: NEGATIVE MG/DL
KETONES, URINE: NEGATIVE MG/DL
LACTIC ACID: 1.7 MMOL/L (ref 0.5–1.9)
LACTIC ACID: 3.5 MMOL/L (ref 0.5–1.9)
LACTIC ACID: 3.8 MMOL/L (ref 0.5–1.9)
LACTIC ACID: 3.9 MMOL/L (ref 0.5–1.9)
LACTIC ACID: 4.7 MMOL/L (ref 0.5–1.9)
LACTIC ACID: 5 MMOL/L (ref 0.5–1.9)
LACTIC ACID: 5.3 MMOL/L (ref 0.5–1.9)
LACTIC ACID: 5.8 MMOL/L (ref 0.5–1.9)
LACTIC ACID: 6.7 MMOL/L (ref 0.5–1.9)
LEUKOCYTE ESTERASE, URINE: NEGATIVE
LEUKOCYTE ESTERASE, URINE: NEGATIVE
LYMPHOCYTES ABSOLUTE: 0.5 K/UL (ref 1.1–4.5)
LYMPHOCYTES ABSOLUTE: 0.7 K/UL (ref 1.1–4.5)
LYMPHOCYTES ABSOLUTE: 0.7 K/UL (ref 1.1–4.5)
LYMPHOCYTES ABSOLUTE: 0.8 K/UL (ref 1.1–4.5)
LYMPHOCYTES ABSOLUTE: 1 K/UL (ref 1.1–4.5)
LYMPHOCYTES ABSOLUTE: 1.6 K/UL (ref 1.1–4.5)
LYMPHOCYTES RELATIVE PERCENT: 1 % (ref 20–40)
LYMPHOCYTES RELATIVE PERCENT: 2 % (ref 20–40)
LYMPHOCYTES RELATIVE PERCENT: 7 % (ref 20–40)
LYMPHOCYTES RELATIVE PERCENT: 8.4 % (ref 20–40)
MAGNESIUM: 1.8 MG/DL (ref 1.6–2.4)
MAGNESIUM: 1.8 MG/DL (ref 1.6–2.4)
MAGNESIUM: 1.9 MG/DL (ref 1.6–2.4)
MAGNESIUM: 2.1 MG/DL (ref 1.6–2.4)
MAGNESIUM: 2.4 MG/DL (ref 1.6–2.4)
MCH RBC QN AUTO: 20.8 PG (ref 27–31)
MCH RBC QN AUTO: 20.9 PG (ref 27–31)
MCH RBC QN AUTO: 21 PG (ref 27–31)
MCH RBC QN AUTO: 21.2 PG (ref 27–31)
MCHC RBC AUTO-ENTMCNC: 25 G/DL (ref 33–37)
MCHC RBC AUTO-ENTMCNC: 26.8 G/DL (ref 33–37)
MCHC RBC AUTO-ENTMCNC: 27.9 G/DL (ref 33–37)
MCHC RBC AUTO-ENTMCNC: 28.5 G/DL (ref 33–37)
MCHC RBC AUTO-ENTMCNC: 28.5 G/DL (ref 33–37)
MCHC RBC AUTO-ENTMCNC: 28.6 G/DL (ref 33–37)
MCV RBC AUTO: 73.5 FL (ref 81–99)
MCV RBC AUTO: 74.2 FL (ref 81–99)
MCV RBC AUTO: 74.5 FL (ref 81–99)
MCV RBC AUTO: 76 FL (ref 81–99)
MCV RBC AUTO: 77.9 FL (ref 81–99)
MCV RBC AUTO: 83.3 FL (ref 81–99)
METAMYELOCYTES RELATIVE PERCENT: 4 %
METHEMOGLOBIN ARTERIAL: 0.2 %
METHEMOGLOBIN ARTERIAL: 0.5 %
METHEMOGLOBIN ARTERIAL: 0.7 %
MICROALBUMIN UR-MCNC: 3.8 MG/DL (ref 0–19)
MICROALBUMIN/CREAT UR-RTO: 204.3 MG/G
MICROCYTES: ABNORMAL
MONOCYTES ABSOLUTE: 0.3 K/UL (ref 0–0.9)
MONOCYTES ABSOLUTE: 0.3 K/UL (ref 0–0.9)
MONOCYTES ABSOLUTE: 0.7 K/UL (ref 0–0.9)
MONOCYTES ABSOLUTE: 0.8 K/UL (ref 0–0.9)
MONOCYTES ABSOLUTE: 1.1 K/UL (ref 0–0.9)
MONOCYTES ABSOLUTE: 1.9 K/UL (ref 0–0.9)
MONOCYTES RELATIVE PERCENT: 1 % (ref 0–10)
MONOCYTES RELATIVE PERCENT: 1 % (ref 0–10)
MONOCYTES RELATIVE PERCENT: 2 % (ref 0–10)
MONOCYTES RELATIVE PERCENT: 3 % (ref 0–10)
MONOCYTES RELATIVE PERCENT: 4.9 % (ref 0–10)
MONOCYTES RELATIVE PERCENT: 9.8 % (ref 0–10)
MYELOCYTE PERCENT: 4 %
NEUTROPHILS ABSOLUTE: 11.7 K/UL (ref 1.5–7.5)
NEUTROPHILS ABSOLUTE: 15.8 K/UL (ref 1.5–7.5)
NEUTROPHILS ABSOLUTE: 25.4 K/UL (ref 1.5–7.5)
NEUTROPHILS ABSOLUTE: 33.9 K/UL (ref 1.5–7.5)
NEUTROPHILS ABSOLUTE: 35.2 K/UL (ref 1.5–7.5)
NEUTROPHILS ABSOLUTE: 37.7 K/UL (ref 1.5–7.5)
NEUTROPHILS RELATIVE PERCENT: 66 % (ref 50–65)
NEUTROPHILS RELATIVE PERCENT: 80 % (ref 50–65)
NEUTROPHILS RELATIVE PERCENT: 80.7 % (ref 50–65)
NEUTROPHILS RELATIVE PERCENT: 86.6 % (ref 50–65)
NEUTROPHILS RELATIVE PERCENT: 89 % (ref 50–65)
NEUTROPHILS RELATIVE PERCENT: 94 % (ref 50–65)
NITRITE, URINE: NEGATIVE
NITRITE, URINE: NEGATIVE
NUCLEATED RED BLOOD CELLS: 23 /100 WBC
NUCLEATED RED BLOOD CELLS: 8 /100 WBC
O2 CONTENT ARTERIAL: 10.1 ML/DL
O2 CONTENT ARTERIAL: 11.2 ML/DL
O2 CONTENT ARTERIAL: 12.9 ML/DL
O2 CONTENT ARTERIAL: 13.1 ML/DL
O2 CONTENT ARTERIAL: 13.2 ML/DL
O2 SAT, ARTERIAL: 88.7 %
O2 SAT, ARTERIAL: 93.1 %
O2 SAT, ARTERIAL: 95.3 %
O2 SAT, ARTERIAL: 96.7 %
O2 SAT, ARTERIAL: 96.9 %
O2 THERAPY: ABNORMAL
ORGANISM: ABNORMAL
OVALOCYTES: ABNORMAL
PARATHYROID HORMONE INTACT: 357.8 PG/ML (ref 15–65)
PCO2 ARTERIAL: 33 MMHG (ref 35–45)
PCO2 ARTERIAL: 38 MMHG (ref 35–45)
PCO2 ARTERIAL: 44 MMHG (ref 35–45)
PCO2 ARTERIAL: 46 MMHG (ref 35–45)
PCO2 ARTERIAL: 59 MMHG (ref 35–45)
PDW BLD-RTO: 23 % (ref 11.5–14.5)
PDW BLD-RTO: 23 % (ref 11.5–14.5)
PDW BLD-RTO: 23.7 % (ref 11.5–14.5)
PDW BLD-RTO: 24.3 % (ref 11.5–14.5)
PDW BLD-RTO: 24.4 % (ref 11.5–14.5)
PDW BLD-RTO: 25.2 % (ref 11.5–14.5)
PERFORMED ON: ABNORMAL
PERFORMED ON: NORMAL
PH ARTERIAL: 6.99 (ref 7.35–7.45)
PH ARTERIAL: 7.15 (ref 7.35–7.45)
PH ARTERIAL: 7.26 (ref 7.35–7.45)
PH ARTERIAL: 7.26 (ref 7.35–7.45)
PH ARTERIAL: 7.37 (ref 7.35–7.45)
PH UA: 5 (ref 5–8)
PH UA: 5 (ref 5–8)
PHOSPHORUS: 9.3 MG/DL (ref 2.5–4.5)
PLATELET # BLD: 162 K/UL (ref 130–400)
PLATELET # BLD: 189 K/UL (ref 130–400)
PLATELET # BLD: 239 K/UL (ref 130–400)
PLATELET # BLD: 280 K/UL (ref 130–400)
PLATELET # BLD: 418 K/UL (ref 130–400)
PLATELET # BLD: 509 K/UL (ref 130–400)
PLATELET SLIDE REVIEW: ABNORMAL
PLATELET SLIDE REVIEW: ABNORMAL
PLATELET SLIDE REVIEW: ADEQUATE
PMV BLD AUTO: 10 FL (ref 9.4–12.3)
PMV BLD AUTO: 10.2 FL (ref 9.4–12.3)
PMV BLD AUTO: 10.3 FL (ref 9.4–12.3)
PMV BLD AUTO: 9.5 FL (ref 9.4–12.3)
PO2 ARTERIAL: 136 MMHG (ref 80–100)
PO2 ARTERIAL: 157 MMHG (ref 80–100)
PO2 ARTERIAL: 59 MMHG (ref 80–100)
PO2 ARTERIAL: 85 MMHG (ref 80–100)
PO2 ARTERIAL: 89 MMHG (ref 80–100)
POIKILOCYTES: ABNORMAL
POLYCHROMASIA: ABNORMAL
POTASSIUM REFLEX MAGNESIUM: 5.6 MMOL/L (ref 3.5–5)
POTASSIUM REFLEX MAGNESIUM: 7.7 MMOL/L (ref 3.5–5)
POTASSIUM SERPL-SCNC: 4.3 MMOL/L (ref 3.5–5)
POTASSIUM SERPL-SCNC: 4.8 MMOL/L (ref 3.5–5)
POTASSIUM SERPL-SCNC: 5.3 MMOL/L (ref 3.5–5)
POTASSIUM SERPL-SCNC: 5.6 MMOL/L (ref 3.5–5)
POTASSIUM SERPL-SCNC: 7 MMOL/L (ref 3.5–5)
POTASSIUM SERPL-SCNC: 7.1 MMOL/L (ref 3.5–5)
POTASSIUM, WHOLE BLOOD: 4.9
POTASSIUM, WHOLE BLOOD: 5.3
POTASSIUM, WHOLE BLOOD: 5.7
POTASSIUM, WHOLE BLOOD: 6.6
POTASSIUM, WHOLE BLOOD: 8.8
PRO-BNP: ABNORMAL PG/ML (ref 0–1800)
PROTEIN UA: 30 MG/DL
PROTEIN UA: ABNORMAL MG/DL
RBC # BLD: 4.13 M/UL (ref 4.2–5.4)
RBC # BLD: 4.21 M/UL (ref 4.2–5.4)
RBC # BLD: 4.24 M/UL (ref 4.2–5.4)
RBC # BLD: 4.34 M/UL (ref 4.2–5.4)
RBC # BLD: 4.53 M/UL (ref 4.2–5.4)
RBC # BLD: 4.57 M/UL (ref 4.2–5.4)
RBC UA: 2 /HPF (ref 0–4)
RBC UA: 75 /HPF (ref 0–4)
REASON FOR REJECTION: NORMAL
REJECTED TEST: NORMAL
REJECTED TEST: NORMAL
RPR: NORMAL
RPR: NORMAL
SARS-COV-2, NAAT: NOT DETECTED
SCHISTOCYTES: ABNORMAL
SODIUM BLD-SCNC: 137 MMOL/L (ref 136–145)
SODIUM BLD-SCNC: 140 MMOL/L (ref 136–145)
SODIUM BLD-SCNC: 141 MMOL/L (ref 136–145)
SODIUM BLD-SCNC: 143 MMOL/L (ref 136–145)
SODIUM BLD-SCNC: 144 MMOL/L (ref 136–145)
SODIUM BLD-SCNC: 146 MMOL/L (ref 136–145)
SODIUM BLD-SCNC: 148 MMOL/L (ref 136–145)
SODIUM URINE: 107 MMOL/L
SPECIFIC GRAVITY UA: 1.01 (ref 1–1.03)
SPECIFIC GRAVITY UA: 1.01 (ref 1–1.03)
TARGET CELLS: ABNORMAL
TOTAL PROTEIN: 5.3 G/DL (ref 6.6–8.7)
TOTAL PROTEIN: 5.3 G/DL (ref 6.6–8.7)
TOTAL PROTEIN: 5.4 G/DL (ref 6.6–8.7)
TOTAL PROTEIN: 5.5 G/DL (ref 6.6–8.7)
TOTAL PROTEIN: 6.9 G/DL (ref 6.6–8.7)
TOTAL PROTEIN: 7.3 G/DL (ref 6.6–8.7)
TROPONIN: 0.03 NG/ML (ref 0–0.03)
TROPONIN: 0.05 NG/ML (ref 0–0.03)
UROBILINOGEN, URINE: 0.2 E.U./DL
UROBILINOGEN, URINE: 1 E.U./DL
WBC # BLD: 13.6 K/UL (ref 4.8–10.8)
WBC # BLD: 19.5 K/UL (ref 4.8–10.8)
WBC # BLD: 26.2 K/UL (ref 4.8–10.8)
WBC # BLD: 37.1 K/UL (ref 4.8–10.8)
WBC # BLD: 39.3 K/UL (ref 4.8–10.8)
WBC # BLD: 42.9 K/UL (ref 4.8–10.8)
WBC UA: 2 /HPF (ref 0–5)
WBC UA: 2 /HPF (ref 0–5)

## 2021-01-01 PROCEDURE — 85025 COMPLETE CBC W/AUTO DIFF WBC: CPT

## 2021-01-01 PROCEDURE — 82140 ASSAY OF AMMONIA: CPT

## 2021-01-01 PROCEDURE — 83970 ASSAY OF PARATHORMONE: CPT

## 2021-01-01 PROCEDURE — 36600 WITHDRAWAL OF ARTERIAL BLOOD: CPT

## 2021-01-01 PROCEDURE — 87070 CULTURE OTHR SPECIMN AEROBIC: CPT

## 2021-01-01 PROCEDURE — 71045 X-RAY EXAM CHEST 1 VIEW: CPT

## 2021-01-01 PROCEDURE — 83880 ASSAY OF NATRIURETIC PEPTIDE: CPT

## 2021-01-01 PROCEDURE — 2000000000 HC ICU R&B

## 2021-01-01 PROCEDURE — 2580000003 HC RX 258: Performed by: HOSPITALIST

## 2021-01-01 PROCEDURE — 87205 SMEAR GRAM STAIN: CPT

## 2021-01-01 PROCEDURE — 81001 URINALYSIS AUTO W/SCOPE: CPT

## 2021-01-01 PROCEDURE — 31500 INSERT EMERGENCY AIRWAY: CPT

## 2021-01-01 PROCEDURE — 2580000003 HC RX 258: Performed by: INTERNAL MEDICINE

## 2021-01-01 PROCEDURE — 82803 BLOOD GASES ANY COMBINATION: CPT

## 2021-01-01 PROCEDURE — 6360000002 HC RX W HCPCS: Performed by: HOSPITALIST

## 2021-01-01 PROCEDURE — 2500000003 HC RX 250 WO HCPCS: Performed by: HOSPITALIST

## 2021-01-01 PROCEDURE — 94002 VENT MGMT INPAT INIT DAY: CPT

## 2021-01-01 PROCEDURE — 2500000003 HC RX 250 WO HCPCS: Performed by: PSYCHIATRY & NEUROLOGY

## 2021-01-01 PROCEDURE — 6360000002 HC RX W HCPCS: Performed by: PSYCHIATRY & NEUROLOGY

## 2021-01-01 PROCEDURE — 6370000000 HC RX 637 (ALT 250 FOR IP): Performed by: INTERNAL MEDICINE

## 2021-01-01 PROCEDURE — C1751 CATH, INF, PER/CENT/MIDLINE: HCPCS

## 2021-01-01 PROCEDURE — 82947 ASSAY GLUCOSE BLOOD QUANT: CPT

## 2021-01-01 PROCEDURE — 2580000003 HC RX 258: Performed by: PEDIATRICS

## 2021-01-01 PROCEDURE — 99223 1ST HOSP IP/OBS HIGH 75: CPT | Performed by: NURSE PRACTITIONER

## 2021-01-01 PROCEDURE — 36592 COLLECT BLOOD FROM PICC: CPT

## 2021-01-01 PROCEDURE — 96374 THER/PROPH/DIAG INJ IV PUSH: CPT

## 2021-01-01 PROCEDURE — 87635 SARS-COV-2 COVID-19 AMP PRB: CPT

## 2021-01-01 PROCEDURE — 99233 SBSQ HOSP IP/OBS HIGH 50: CPT | Performed by: NURSE PRACTITIONER

## 2021-01-01 PROCEDURE — 85379 FIBRIN DEGRADATION QUANT: CPT

## 2021-01-01 PROCEDURE — 2500000003 HC RX 250 WO HCPCS: Performed by: INTERNAL MEDICINE

## 2021-01-01 PROCEDURE — 6360000002 HC RX W HCPCS: Performed by: INTERNAL MEDICINE

## 2021-01-01 PROCEDURE — 80053 COMPREHEN METABOLIC PANEL: CPT

## 2021-01-01 PROCEDURE — 94640 AIRWAY INHALATION TREATMENT: CPT

## 2021-01-01 PROCEDURE — 83605 ASSAY OF LACTIC ACID: CPT

## 2021-01-01 PROCEDURE — 2700000000 HC OXYGEN THERAPY PER DAY

## 2021-01-01 PROCEDURE — 84132 ASSAY OF SERUM POTASSIUM: CPT

## 2021-01-01 PROCEDURE — 2500000003 HC RX 250 WO HCPCS: Performed by: EMERGENCY MEDICINE

## 2021-01-01 PROCEDURE — 99233 SBSQ HOSP IP/OBS HIGH 50: CPT | Performed by: PSYCHIATRY & NEUROLOGY

## 2021-01-01 PROCEDURE — 83735 ASSAY OF MAGNESIUM: CPT

## 2021-01-01 PROCEDURE — 99223 1ST HOSP IP/OBS HIGH 75: CPT | Performed by: PSYCHIATRY & NEUROLOGY

## 2021-01-01 PROCEDURE — 6370000000 HC RX 637 (ALT 250 FOR IP): Performed by: HOSPITALIST

## 2021-01-01 PROCEDURE — 36569 INSJ PICC 5 YR+ W/O IMAGING: CPT

## 2021-01-01 PROCEDURE — 84484 ASSAY OF TROPONIN QUANT: CPT

## 2021-01-01 PROCEDURE — 87186 SC STD MICRODIL/AGAR DIL: CPT

## 2021-01-01 PROCEDURE — 6370000000 HC RX 637 (ALT 250 FOR IP): Performed by: PEDIATRICS

## 2021-01-01 PROCEDURE — 6360000002 HC RX W HCPCS

## 2021-01-01 PROCEDURE — 99233 SBSQ HOSP IP/OBS HIGH 50: CPT | Performed by: SURGERY

## 2021-01-01 PROCEDURE — 87077 CULTURE AEROBIC IDENTIFY: CPT

## 2021-01-01 PROCEDURE — 36415 COLL VENOUS BLD VENIPUNCTURE: CPT

## 2021-01-01 PROCEDURE — 96375 TX/PRO/DX INJ NEW DRUG ADDON: CPT

## 2021-01-01 PROCEDURE — 6360000002 HC RX W HCPCS: Performed by: NURSE PRACTITIONER

## 2021-01-01 PROCEDURE — 92950 HEART/LUNG RESUSCITATION CPR: CPT

## 2021-01-01 PROCEDURE — 84100 ASSAY OF PHOSPHORUS: CPT

## 2021-01-01 PROCEDURE — 84300 ASSAY OF URINE SODIUM: CPT

## 2021-01-01 PROCEDURE — 87040 BLOOD CULTURE FOR BACTERIA: CPT

## 2021-01-01 PROCEDURE — 02HV33Z INSERTION OF INFUSION DEVICE INTO SUPERIOR VENA CAVA, PERCUTANEOUS APPROACH: ICD-10-PCS | Performed by: INTERNAL MEDICINE

## 2021-01-01 PROCEDURE — 1210000000 HC MED SURG R&B

## 2021-01-01 PROCEDURE — 99232 SBSQ HOSP IP/OBS MODERATE 35: CPT | Performed by: SURGERY

## 2021-01-01 PROCEDURE — 2580000003 HC RX 258

## 2021-01-01 PROCEDURE — 96361 HYDRATE IV INFUSION ADD-ON: CPT

## 2021-01-01 PROCEDURE — 0BH17EZ INSERTION OF ENDOTRACHEAL AIRWAY INTO TRACHEA, VIA NATURAL OR ARTIFICIAL OPENING: ICD-10-PCS | Performed by: EMERGENCY MEDICINE

## 2021-01-01 PROCEDURE — 76770 US EXAM ABDO BACK WALL COMP: CPT

## 2021-01-01 PROCEDURE — 82570 ASSAY OF URINE CREATININE: CPT

## 2021-01-01 PROCEDURE — 5A1955Z RESPIRATORY VENTILATION, GREATER THAN 96 CONSECUTIVE HOURS: ICD-10-PCS | Performed by: HOSPITALIST

## 2021-01-01 PROCEDURE — 86592 SYPHILIS TEST NON-TREP QUAL: CPT

## 2021-01-01 PROCEDURE — 2500000003 HC RX 250 WO HCPCS

## 2021-01-01 PROCEDURE — 51702 INSERT TEMP BLADDER CATH: CPT

## 2021-01-01 PROCEDURE — 94003 VENT MGMT INPAT SUBQ DAY: CPT

## 2021-01-01 PROCEDURE — 82043 UR ALBUMIN QUANTITATIVE: CPT

## 2021-01-01 PROCEDURE — 6360000002 HC RX W HCPCS: Performed by: PEDIATRICS

## 2021-01-01 PROCEDURE — 99222 1ST HOSP IP/OBS MODERATE 55: CPT | Performed by: NURSE PRACTITIONER

## 2021-01-01 PROCEDURE — 70450 CT HEAD/BRAIN W/O DYE: CPT

## 2021-01-01 PROCEDURE — 99231 SBSQ HOSP IP/OBS SF/LOW 25: CPT | Performed by: NURSE PRACTITIONER

## 2021-01-01 PROCEDURE — 99285 EMERGENCY DEPT VISIT HI MDM: CPT

## 2021-01-01 PROCEDURE — 93005 ELECTROCARDIOGRAM TRACING: CPT

## 2021-01-01 PROCEDURE — 93923 UPR/LXTR ART STDY 3+ LVLS: CPT

## 2021-01-01 PROCEDURE — 76937 US GUIDE VASCULAR ACCESS: CPT

## 2021-01-01 RX ORDER — DEXMEDETOMIDINE HYDROCHLORIDE 4 UG/ML
.2-1.4 INJECTION, SOLUTION INTRAVENOUS CONTINUOUS
Status: DISCONTINUED | OUTPATIENT
Start: 2021-01-01 | End: 2021-01-01

## 2021-01-01 RX ORDER — PAROXETINE HYDROCHLORIDE 20 MG/1
40 TABLET, FILM COATED ORAL EVERY MORNING
Status: DISCONTINUED | OUTPATIENT
Start: 2021-01-01 | End: 2021-01-01 | Stop reason: HOSPADM

## 2021-01-01 RX ORDER — ETOMIDATE 2 MG/ML
20 INJECTION INTRAVENOUS ONCE
Status: COMPLETED | OUTPATIENT
Start: 2021-01-01 | End: 2021-01-01

## 2021-01-01 RX ORDER — POLYETHYLENE GLYCOL 3350 17 G/17G
17 POWDER, FOR SOLUTION ORAL DAILY PRN
Status: DISCONTINUED | OUTPATIENT
Start: 2021-01-01 | End: 2021-01-01 | Stop reason: HOSPADM

## 2021-01-01 RX ORDER — SODIUM POLYSTYRENE SULFONATE 15 G/60ML
30 SUSPENSION ORAL; RECTAL ONCE
Status: COMPLETED | OUTPATIENT
Start: 2021-01-01 | End: 2021-01-01

## 2021-01-01 RX ORDER — DEXTROSE MONOHYDRATE 50 MG/ML
100 INJECTION, SOLUTION INTRAVENOUS PRN
Status: DISCONTINUED | OUTPATIENT
Start: 2021-01-01 | End: 2021-01-01 | Stop reason: HOSPADM

## 2021-01-01 RX ORDER — FUROSEMIDE 10 MG/ML
10 INJECTION INTRAMUSCULAR; INTRAVENOUS ONCE
Status: COMPLETED | OUTPATIENT
Start: 2021-01-01 | End: 2021-01-01

## 2021-01-01 RX ORDER — CARBOXYMETHYLCELLULOSE SODIUM 5 MG/ML
1 SOLUTION/ DROPS OPHTHALMIC 3 TIMES DAILY
Status: DISCONTINUED | OUTPATIENT
Start: 2021-01-01 | End: 2021-01-01 | Stop reason: HOSPADM

## 2021-01-01 RX ORDER — 0.9 % SODIUM CHLORIDE 0.9 %
250 INTRAVENOUS SOLUTION INTRAVENOUS ONCE
Status: COMPLETED | OUTPATIENT
Start: 2021-01-01 | End: 2021-01-01

## 2021-01-01 RX ORDER — CALCIUM GLUCONATE 94 MG/ML
1000 INJECTION, SOLUTION INTRAVENOUS ONCE
Status: DISCONTINUED | OUTPATIENT
Start: 2021-01-01 | End: 2021-01-01 | Stop reason: SDUPTHER

## 2021-01-01 RX ORDER — PROMETHAZINE HYDROCHLORIDE 12.5 MG/1
12.5 TABLET ORAL EVERY 6 HOURS PRN
Status: DISCONTINUED | OUTPATIENT
Start: 2021-01-01 | End: 2021-01-01 | Stop reason: HOSPADM

## 2021-01-01 RX ORDER — FUROSEMIDE 10 MG/ML
20 INJECTION INTRAMUSCULAR; INTRAVENOUS ONCE
Status: COMPLETED | OUTPATIENT
Start: 2021-01-01 | End: 2021-01-01

## 2021-01-01 RX ORDER — DEXTROSE MONOHYDRATE 25 G/50ML
25 INJECTION, SOLUTION INTRAVENOUS ONCE
Status: COMPLETED | OUTPATIENT
Start: 2021-01-01 | End: 2021-01-01

## 2021-01-01 RX ORDER — METHYLPREDNISOLONE SODIUM SUCCINATE 40 MG/ML
40 INJECTION, POWDER, LYOPHILIZED, FOR SOLUTION INTRAMUSCULAR; INTRAVENOUS EVERY 8 HOURS
Status: DISCONTINUED | OUTPATIENT
Start: 2021-01-01 | End: 2021-01-01

## 2021-01-01 RX ORDER — VECURONIUM BROMIDE 1 MG/ML
10 INJECTION, POWDER, LYOPHILIZED, FOR SOLUTION INTRAVENOUS ONCE
Status: COMPLETED | OUTPATIENT
Start: 2021-01-01 | End: 2021-01-01

## 2021-01-01 RX ORDER — NICOTINE POLACRILEX 4 MG
15 LOZENGE BUCCAL PRN
Status: DISCONTINUED | OUTPATIENT
Start: 2021-01-01 | End: 2021-01-01 | Stop reason: HOSPADM

## 2021-01-01 RX ORDER — DEXTROSE MONOHYDRATE 25 G/50ML
12.5 INJECTION, SOLUTION INTRAVENOUS PRN
Status: DISCONTINUED | OUTPATIENT
Start: 2021-01-01 | End: 2021-01-01 | Stop reason: SDUPTHER

## 2021-01-01 RX ORDER — CALCIUM GLUCONATE 20 MG/ML
1000 INJECTION, SOLUTION INTRAVENOUS ONCE
Status: DISCONTINUED | OUTPATIENT
Start: 2021-01-01 | End: 2021-01-01 | Stop reason: SDUPTHER

## 2021-01-01 RX ORDER — ONDANSETRON 2 MG/ML
4 INJECTION INTRAMUSCULAR; INTRAVENOUS EVERY 6 HOURS PRN
Status: DISCONTINUED | OUTPATIENT
Start: 2021-01-01 | End: 2021-01-01 | Stop reason: HOSPADM

## 2021-01-01 RX ORDER — IPRATROPIUM BROMIDE AND ALBUTEROL SULFATE 2.5; .5 MG/3ML; MG/3ML
1 SOLUTION RESPIRATORY (INHALATION) EVERY 4 HOURS
Status: DISCONTINUED | OUTPATIENT
Start: 2021-01-01 | End: 2021-01-01 | Stop reason: HOSPADM

## 2021-01-01 RX ORDER — 0.9 % SODIUM CHLORIDE 0.9 %
1000 INTRAVENOUS SOLUTION INTRAVENOUS ONCE
Status: COMPLETED | OUTPATIENT
Start: 2021-01-01 | End: 2021-01-01

## 2021-01-01 RX ORDER — 0.9 % SODIUM CHLORIDE 0.9 %
500 INTRAVENOUS SOLUTION INTRAVENOUS ONCE
Status: COMPLETED | OUTPATIENT
Start: 2021-01-01 | End: 2021-01-01

## 2021-01-01 RX ORDER — CHLORHEXIDINE GLUCONATE 0.12 MG/ML
15 RINSE ORAL 2 TIMES DAILY
Status: DISCONTINUED | OUTPATIENT
Start: 2021-01-01 | End: 2021-01-01 | Stop reason: HOSPADM

## 2021-01-01 RX ORDER — CALCIUM GLUCONATE 94 MG/ML
INJECTION, SOLUTION INTRAVENOUS
Status: COMPLETED
Start: 2021-01-01 | End: 2021-01-01

## 2021-01-01 RX ORDER — HEPARIN SODIUM 5000 [USP'U]/ML
5000 INJECTION, SOLUTION INTRAVENOUS; SUBCUTANEOUS EVERY 8 HOURS SCHEDULED
Status: DISCONTINUED | OUTPATIENT
Start: 2021-01-01 | End: 2021-01-01 | Stop reason: HOSPADM

## 2021-01-01 RX ORDER — CALCIUM GLUCONATE 20 MG/ML
1000 INJECTION, SOLUTION INTRAVENOUS ONCE
Status: COMPLETED | OUTPATIENT
Start: 2021-01-01 | End: 2021-01-01

## 2021-01-01 RX ORDER — IPRATROPIUM BROMIDE AND ALBUTEROL SULFATE 2.5; .5 MG/3ML; MG/3ML
SOLUTION RESPIRATORY (INHALATION)
Qty: 180 ML | Refills: 0 | Status: SHIPPED | OUTPATIENT
Start: 2021-01-01

## 2021-01-01 RX ORDER — SODIUM CHLORIDE, SODIUM LACTATE, POTASSIUM CHLORIDE, AND CALCIUM CHLORIDE .6; .31; .03; .02 G/100ML; G/100ML; G/100ML; G/100ML
250 INJECTION, SOLUTION INTRAVENOUS ONCE
Status: COMPLETED | OUTPATIENT
Start: 2021-01-01 | End: 2021-01-01

## 2021-01-01 RX ORDER — CALCIUM GLUCONATE 94 MG/ML
INJECTION, SOLUTION INTRAVENOUS
Status: DISPENSED
Start: 2021-01-01 | End: 2021-01-01

## 2021-01-01 RX ORDER — CALCIUM GLUCONATE 94 MG/ML
2000 INJECTION, SOLUTION INTRAVENOUS ONCE
Status: COMPLETED | OUTPATIENT
Start: 2021-01-01 | End: 2021-01-01

## 2021-01-01 RX ORDER — ROPINIROLE 0.5 MG/1
0.5 TABLET, FILM COATED ORAL ONCE
Status: COMPLETED | OUTPATIENT
Start: 2021-01-01 | End: 2021-01-01

## 2021-01-01 RX ORDER — ASPIRIN 81 MG/1
81 TABLET ORAL DAILY
Status: DISCONTINUED | OUTPATIENT
Start: 2021-01-01 | End: 2021-01-01 | Stop reason: HOSPADM

## 2021-01-01 RX ORDER — SODIUM POLYSTYRENE SULFONATE 15 G/60ML
15 SUSPENSION ORAL; RECTAL ONCE
Status: COMPLETED | OUTPATIENT
Start: 2021-01-01 | End: 2021-01-01

## 2021-01-01 RX ORDER — METHYLPREDNISOLONE SODIUM SUCCINATE 125 MG/2ML
125 INJECTION, POWDER, LYOPHILIZED, FOR SOLUTION INTRAMUSCULAR; INTRAVENOUS ONCE
Status: COMPLETED | OUTPATIENT
Start: 2021-01-01 | End: 2021-01-01

## 2021-01-01 RX ORDER — ACETAMINOPHEN 325 MG/1
650 TABLET ORAL EVERY 6 HOURS PRN
Status: DISCONTINUED | OUTPATIENT
Start: 2021-01-01 | End: 2021-01-01 | Stop reason: HOSPADM

## 2021-01-01 RX ORDER — CALCIUM GLUCONATE 20 MG/ML
2000 INJECTION, SOLUTION INTRAVENOUS ONCE
Status: COMPLETED | OUTPATIENT
Start: 2021-01-01 | End: 2021-01-01

## 2021-01-01 RX ORDER — DEXTROSE MONOHYDRATE 50 MG/ML
100 INJECTION, SOLUTION INTRAVENOUS PRN
Status: DISCONTINUED | OUTPATIENT
Start: 2021-01-01 | End: 2021-01-01 | Stop reason: SDUPTHER

## 2021-01-01 RX ORDER — HYDRALAZINE HYDROCHLORIDE 20 MG/ML
10 INJECTION INTRAMUSCULAR; INTRAVENOUS EVERY 6 HOURS PRN
Status: DISCONTINUED | OUTPATIENT
Start: 2021-01-01 | End: 2021-01-01 | Stop reason: HOSPADM

## 2021-01-01 RX ORDER — METHYLPREDNISOLONE SODIUM SUCCINATE 40 MG/ML
40 INJECTION, POWDER, LYOPHILIZED, FOR SOLUTION INTRAMUSCULAR; INTRAVENOUS DAILY
Status: DISCONTINUED | OUTPATIENT
Start: 2021-01-01 | End: 2021-01-01 | Stop reason: HOSPADM

## 2021-01-01 RX ORDER — NOREPINEPHRINE BIT/0.9 % NACL 16MG/250ML
2-100 INFUSION BOTTLE (ML) INTRAVENOUS CONTINUOUS
Status: DISCONTINUED | OUTPATIENT
Start: 2021-01-01 | End: 2021-01-01

## 2021-01-01 RX ORDER — SODIUM CHLORIDE 450 MG/100ML
INJECTION, SOLUTION INTRAVENOUS CONTINUOUS
Status: DISCONTINUED | OUTPATIENT
Start: 2021-01-01 | End: 2021-01-01 | Stop reason: HOSPADM

## 2021-01-01 RX ORDER — SODIUM CHLORIDE 0.9 % (FLUSH) 0.9 %
10 SYRINGE (ML) INJECTION PRN
Status: DISCONTINUED | OUTPATIENT
Start: 2021-01-01 | End: 2021-01-01 | Stop reason: HOSPADM

## 2021-01-01 RX ORDER — DEXTROSE MONOHYDRATE 25 G/50ML
12.5 INJECTION, SOLUTION INTRAVENOUS PRN
Status: DISCONTINUED | OUTPATIENT
Start: 2021-01-01 | End: 2021-01-01 | Stop reason: HOSPADM

## 2021-01-01 RX ORDER — ACETAMINOPHEN 650 MG/1
650 SUPPOSITORY RECTAL EVERY 6 HOURS PRN
Status: DISCONTINUED | OUTPATIENT
Start: 2021-01-01 | End: 2021-01-01 | Stop reason: HOSPADM

## 2021-01-01 RX ORDER — PROPOFOL 10 MG/ML
5-50 INJECTION, EMULSION INTRAVENOUS
Status: DISCONTINUED | OUTPATIENT
Start: 2021-01-01 | End: 2021-01-01

## 2021-01-01 RX ORDER — LACTULOSE 10 G/15ML
20 SOLUTION ORAL 3 TIMES DAILY
Status: DISCONTINUED | OUTPATIENT
Start: 2021-01-01 | End: 2021-01-01 | Stop reason: HOSPADM

## 2021-01-01 RX ORDER — SODIUM CHLORIDE 0.9 % (FLUSH) 0.9 %
10 SYRINGE (ML) INJECTION PRN
Status: DISCONTINUED | OUTPATIENT
Start: 2021-01-01 | End: 2021-01-01 | Stop reason: SDUPTHER

## 2021-01-01 RX ORDER — SODIUM CHLORIDE, SODIUM LACTATE, POTASSIUM CHLORIDE, AND CALCIUM CHLORIDE .6; .31; .03; .02 G/100ML; G/100ML; G/100ML; G/100ML
500 INJECTION, SOLUTION INTRAVENOUS ONCE
Status: DISCONTINUED | OUTPATIENT
Start: 2021-01-01 | End: 2021-01-01 | Stop reason: HOSPADM

## 2021-01-01 RX ORDER — PROPOFOL 10 MG/ML
5-50 INJECTION, EMULSION INTRAVENOUS
Status: DISCONTINUED | OUTPATIENT
Start: 2021-01-01 | End: 2021-01-01 | Stop reason: HOSPADM

## 2021-01-01 RX ORDER — NICOTINE POLACRILEX 4 MG
15 LOZENGE BUCCAL PRN
Status: DISCONTINUED | OUTPATIENT
Start: 2021-01-01 | End: 2021-01-01 | Stop reason: SDUPTHER

## 2021-01-01 RX ORDER — FLUTICASONE FUROATE, UMECLIDINIUM BROMIDE AND VILANTEROL TRIFENATATE 100; 62.5; 25 UG/1; UG/1; UG/1
1 POWDER RESPIRATORY (INHALATION) DAILY
Qty: 23 EACH | Refills: 3 | Status: SHIPPED | OUTPATIENT
Start: 2021-01-01

## 2021-01-01 RX ORDER — SODIUM CHLORIDE 0.9 % (FLUSH) 0.9 %
10 SYRINGE (ML) INJECTION EVERY 12 HOURS SCHEDULED
Status: DISCONTINUED | OUTPATIENT
Start: 2021-01-01 | End: 2021-01-01

## 2021-01-01 RX ORDER — CALCIUM GLUCONATE 94 MG/ML
1000 INJECTION, SOLUTION INTRAVENOUS ONCE
Status: COMPLETED | OUTPATIENT
Start: 2021-01-01 | End: 2021-01-01

## 2021-01-01 RX ORDER — DIPHENOXYLATE HYDROCHLORIDE AND ATROPINE SULFATE 2.5; .025 MG/1; MG/1
TABLET ORAL
Qty: 120 TABLET | Refills: 0 | Status: SHIPPED | OUTPATIENT
Start: 2021-01-01 | End: 2021-01-01

## 2021-01-01 RX ORDER — POTASSIUM CHLORIDE 7.45 MG/ML
10 INJECTION INTRAVENOUS PRN
Status: DISCONTINUED | OUTPATIENT
Start: 2021-01-01 | End: 2021-01-01 | Stop reason: HOSPADM

## 2021-01-01 RX ORDER — LACTULOSE 10 G/15ML
20 SOLUTION ORAL ONCE
Status: COMPLETED | OUTPATIENT
Start: 2021-01-01 | End: 2021-01-01

## 2021-01-01 RX ORDER — MAGNESIUM SULFATE 1 G/100ML
1000 INJECTION INTRAVENOUS PRN
Status: DISCONTINUED | OUTPATIENT
Start: 2021-01-01 | End: 2021-01-01 | Stop reason: HOSPADM

## 2021-01-01 RX ORDER — IPRATROPIUM BROMIDE AND ALBUTEROL SULFATE 2.5; .5 MG/3ML; MG/3ML
1 SOLUTION RESPIRATORY (INHALATION) 4 TIMES DAILY
Status: DISCONTINUED | OUTPATIENT
Start: 2021-01-01 | End: 2021-01-01

## 2021-01-01 RX ORDER — SODIUM CHLORIDE 0.9 % (FLUSH) 0.9 %
10 SYRINGE (ML) INJECTION EVERY 12 HOURS SCHEDULED
Status: DISCONTINUED | OUTPATIENT
Start: 2021-01-01 | End: 2021-01-01 | Stop reason: HOSPADM

## 2021-01-01 RX ORDER — HYDROMORPHONE HCL IN WATER/PF 6 MG/30 ML
PATIENT CONTROLLED ANALGESIA SYRINGE INTRAVENOUS CONTINUOUS
Status: DISCONTINUED | OUTPATIENT
Start: 2021-01-01 | End: 2021-01-01 | Stop reason: HOSPADM

## 2021-01-01 RX ORDER — DEXTROSE MONOHYDRATE 25 G/50ML
INJECTION, SOLUTION INTRAVENOUS
Status: COMPLETED
Start: 2021-01-01 | End: 2021-01-01

## 2021-01-01 RX ORDER — NOREPINEPHRINE BIT/0.9 % NACL 16MG/250ML
INFUSION BOTTLE (ML) INTRAVENOUS
Status: DISPENSED
Start: 2021-01-01 | End: 2021-01-01

## 2021-01-01 RX ORDER — IPRATROPIUM BROMIDE AND ALBUTEROL SULFATE 2.5; .5 MG/3ML; MG/3ML
1 SOLUTION RESPIRATORY (INHALATION) ONCE
Status: COMPLETED | OUTPATIENT
Start: 2021-01-01 | End: 2021-01-01

## 2021-01-01 RX ORDER — SODIUM CHLORIDE 9 MG/ML
INJECTION, SOLUTION INTRAVENOUS CONTINUOUS
Status: DISCONTINUED | OUTPATIENT
Start: 2021-01-01 | End: 2021-01-01

## 2021-01-01 RX ORDER — MONTELUKAST SODIUM 10 MG/1
10 TABLET ORAL DAILY
Status: DISCONTINUED | OUTPATIENT
Start: 2021-01-01 | End: 2021-01-01 | Stop reason: HOSPADM

## 2021-01-01 RX ORDER — ROPINIROLE 0.25 MG/1
TABLET, FILM COATED ORAL
Qty: 180 TABLET | Refills: 0 | Status: SHIPPED | OUTPATIENT
Start: 2021-01-01

## 2021-01-01 RX ORDER — ATORVASTATIN CALCIUM 40 MG/1
40 TABLET, FILM COATED ORAL DAILY
Status: DISCONTINUED | OUTPATIENT
Start: 2021-01-01 | End: 2021-01-01 | Stop reason: HOSPADM

## 2021-01-01 RX ADMIN — IPRATROPIUM BROMIDE AND ALBUTEROL SULFATE 1 AMPULE: .5; 3 SOLUTION RESPIRATORY (INHALATION) at 02:21

## 2021-01-01 RX ADMIN — SODIUM CHLORIDE: 9 INJECTION, SOLUTION INTRAVENOUS at 03:40

## 2021-01-01 RX ADMIN — HEPARIN SODIUM 5000 UNITS: 5000 INJECTION INTRAVENOUS; SUBCUTANEOUS at 21:38

## 2021-01-01 RX ADMIN — LACTULOSE 20 G: 20 SOLUTION ORAL at 07:32

## 2021-01-01 RX ADMIN — MICONAZOLE NITRATE: 20 POWDER TOPICAL at 08:02

## 2021-01-01 RX ADMIN — LACTULOSE 20 G: 20 SOLUTION ORAL at 16:41

## 2021-01-01 RX ADMIN — IPRATROPIUM BROMIDE AND ALBUTEROL SULFATE 1 AMPULE: .5; 3 SOLUTION RESPIRATORY (INHALATION) at 10:36

## 2021-01-01 RX ADMIN — MICONAZOLE NITRATE: 20 POWDER TOPICAL at 21:40

## 2021-01-01 RX ADMIN — SODIUM POLYSTYRENE SULFONATE 30 G: 15 SUSPENSION ORAL; RECTAL at 06:11

## 2021-01-01 RX ADMIN — Medication 50 MEQ: at 03:26

## 2021-01-01 RX ADMIN — FAMOTIDINE 20 MG: 10 INJECTION, SOLUTION INTRAVENOUS at 09:59

## 2021-01-01 RX ADMIN — SODIUM CHLORIDE: 9 INJECTION, SOLUTION INTRAVENOUS at 09:43

## 2021-01-01 RX ADMIN — METHYLPREDNISOLONE SODIUM SUCCINATE 125 MG: 125 INJECTION, POWDER, FOR SOLUTION INTRAMUSCULAR; INTRAVENOUS at 00:22

## 2021-01-01 RX ADMIN — IPRATROPIUM BROMIDE AND ALBUTEROL SULFATE 1 AMPULE: .5; 3 SOLUTION RESPIRATORY (INHALATION) at 14:19

## 2021-01-01 RX ADMIN — IPRATROPIUM BROMIDE AND ALBUTEROL SULFATE 1 AMPULE: .5; 3 SOLUTION RESPIRATORY (INHALATION) at 02:12

## 2021-01-01 RX ADMIN — CHLORHEXIDINE GLUCONATE 15 ML: 1.2 RINSE ORAL at 21:26

## 2021-01-01 RX ADMIN — CALCIUM GLUCONATE 2000 MG: 98 INJECTION, SOLUTION INTRAVENOUS at 03:20

## 2021-01-01 RX ADMIN — SODIUM CHLORIDE: 4.5 INJECTION, SOLUTION INTRAVENOUS at 02:52

## 2021-01-01 RX ADMIN — CHLORHEXIDINE GLUCONATE 15 ML: 1.2 RINSE ORAL at 07:32

## 2021-01-01 RX ADMIN — SODIUM CHLORIDE, PRESERVATIVE FREE 10 ML: 5 INJECTION INTRAVENOUS at 07:32

## 2021-01-01 RX ADMIN — MONTELUKAST SODIUM 10 MG: 10 TABLET, FILM COATED ORAL at 08:01

## 2021-01-01 RX ADMIN — CALCIUM GLUCONATE 2000 MG: 94 INJECTION, SOLUTION INTRAVENOUS at 03:20

## 2021-01-01 RX ADMIN — IPRATROPIUM BROMIDE AND ALBUTEROL SULFATE 1 AMPULE: .5; 3 SOLUTION RESPIRATORY (INHALATION) at 10:22

## 2021-01-01 RX ADMIN — SODIUM CHLORIDE: 4.5 INJECTION, SOLUTION INTRAVENOUS at 15:30

## 2021-01-01 RX ADMIN — SODIUM CHLORIDE, PRESERVATIVE FREE 10 ML: 5 INJECTION INTRAVENOUS at 08:11

## 2021-01-01 RX ADMIN — HEPARIN SODIUM 5000 UNITS: 5000 INJECTION INTRAVENOUS; SUBCUTANEOUS at 21:30

## 2021-01-01 RX ADMIN — CHLORHEXIDINE GLUCONATE 15 ML: 1.2 RINSE ORAL at 08:58

## 2021-01-01 RX ADMIN — DEXTROSE MONOHYDRATE 25 G: 25 INJECTION, SOLUTION INTRAVENOUS at 03:25

## 2021-01-01 RX ADMIN — CHLORHEXIDINE GLUCONATE 15 ML: 1.2 RINSE ORAL at 20:58

## 2021-01-01 RX ADMIN — HEPARIN SODIUM 5000 UNITS: 5000 INJECTION INTRAVENOUS; SUBCUTANEOUS at 14:41

## 2021-01-01 RX ADMIN — IPRATROPIUM BROMIDE AND ALBUTEROL SULFATE 1 AMPULE: .5; 3 SOLUTION RESPIRATORY (INHALATION) at 21:43

## 2021-01-01 RX ADMIN — CARBOXYMETHYLCELLULOSE SODIUM 1 DROP: 5 SOLUTION/ DROPS OPHTHALMIC at 21:26

## 2021-01-01 RX ADMIN — DEXTROSE MONOHYDRATE 25 G: 500 INJECTION PARENTERAL at 03:25

## 2021-01-01 RX ADMIN — CARBOXYMETHYLCELLULOSE SODIUM 1 DROP: 5 SOLUTION/ DROPS OPHTHALMIC at 08:02

## 2021-01-01 RX ADMIN — MICONAZOLE NITRATE: 20 POWDER TOPICAL at 20:58

## 2021-01-01 RX ADMIN — IPRATROPIUM BROMIDE AND ALBUTEROL SULFATE 1 AMPULE: .5; 3 SOLUTION RESPIRATORY (INHALATION) at 14:17

## 2021-01-01 RX ADMIN — SODIUM CHLORIDE, PRESERVATIVE FREE 10 ML: 5 INJECTION INTRAVENOUS at 20:58

## 2021-01-01 RX ADMIN — FUROSEMIDE 20 MG: 10 INJECTION, SOLUTION INTRAMUSCULAR; INTRAVENOUS at 00:23

## 2021-01-01 RX ADMIN — HEPARIN SODIUM 5000 UNITS: 5000 INJECTION INTRAVENOUS; SUBCUTANEOUS at 20:33

## 2021-01-01 RX ADMIN — LACTULOSE 20 G: 20 SOLUTION ORAL at 21:38

## 2021-01-01 RX ADMIN — LACTULOSE 20 G: 20 SOLUTION ORAL at 21:26

## 2021-01-01 RX ADMIN — CEFEPIME HYDROCHLORIDE 1000 MG: 1 INJECTION, POWDER, FOR SOLUTION INTRAMUSCULAR; INTRAVENOUS at 05:26

## 2021-01-01 RX ADMIN — METHYLPREDNISOLONE SODIUM SUCCINATE 40 MG: 40 INJECTION, POWDER, FOR SOLUTION INTRAMUSCULAR; INTRAVENOUS at 16:27

## 2021-01-01 RX ADMIN — IPRATROPIUM BROMIDE AND ALBUTEROL SULFATE 1 AMPULE: .5; 3 SOLUTION RESPIRATORY (INHALATION) at 18:40

## 2021-01-01 RX ADMIN — SODIUM BICARBONATE: 84 INJECTION, SOLUTION INTRAVENOUS at 11:10

## 2021-01-01 RX ADMIN — ATORVASTATIN CALCIUM 40 MG: 40 TABLET, FILM COATED ORAL at 08:01

## 2021-01-01 RX ADMIN — INSULIN LISPRO 1 UNITS: 100 INJECTION, SOLUTION INTRAVENOUS; SUBCUTANEOUS at 17:04

## 2021-01-01 RX ADMIN — ASPIRIN 81 MG: 81 TABLET, COATED ORAL at 08:01

## 2021-01-01 RX ADMIN — IPRATROPIUM BROMIDE AND ALBUTEROL SULFATE 1 AMPULE: .5; 3 SOLUTION RESPIRATORY (INHALATION) at 02:17

## 2021-01-01 RX ADMIN — SODIUM CHLORIDE, PRESERVATIVE FREE 10 ML: 5 INJECTION INTRAVENOUS at 21:33

## 2021-01-01 RX ADMIN — IPRATROPIUM BROMIDE AND ALBUTEROL SULFATE 1 AMPULE: .5; 3 SOLUTION RESPIRATORY (INHALATION) at 06:20

## 2021-01-01 RX ADMIN — METHYLPREDNISOLONE SODIUM SUCCINATE 40 MG: 40 INJECTION, POWDER, FOR SOLUTION INTRAMUSCULAR; INTRAVENOUS at 08:12

## 2021-01-01 RX ADMIN — CARBOXYMETHYLCELLULOSE SODIUM 1 DROP: 5 SOLUTION/ DROPS OPHTHALMIC at 08:11

## 2021-01-01 RX ADMIN — METHYLPREDNISOLONE SODIUM SUCCINATE 40 MG: 40 INJECTION, POWDER, FOR SOLUTION INTRAMUSCULAR; INTRAVENOUS at 10:00

## 2021-01-01 RX ADMIN — INSULIN HUMAN 10 UNITS: 100 INJECTION, SOLUTION PARENTERAL at 16:15

## 2021-01-01 RX ADMIN — SODIUM BICARBONATE: 84 INJECTION, SOLUTION INTRAVENOUS at 09:50

## 2021-01-01 RX ADMIN — CARBOXYMETHYLCELLULOSE SODIUM 1 DROP: 5 SOLUTION/ DROPS OPHTHALMIC at 08:48

## 2021-01-01 RX ADMIN — SODIUM POLYSTYRENE SULFONATE 30 G: 15 SUSPENSION ORAL; RECTAL at 06:18

## 2021-01-01 RX ADMIN — METHYLPREDNISOLONE SODIUM SUCCINATE 40 MG: 40 INJECTION, POWDER, FOR SOLUTION INTRAMUSCULAR; INTRAVENOUS at 03:26

## 2021-01-01 RX ADMIN — SODIUM CHLORIDE, POTASSIUM CHLORIDE, SODIUM LACTATE AND CALCIUM CHLORIDE 250 ML: 600; 310; 30; 20 INJECTION, SOLUTION INTRAVENOUS at 15:47

## 2021-01-01 RX ADMIN — CEFEPIME HYDROCHLORIDE 1000 MG: 1 INJECTION, POWDER, FOR SOLUTION INTRAMUSCULAR; INTRAVENOUS at 05:58

## 2021-01-01 RX ADMIN — METHYLPREDNISOLONE SODIUM SUCCINATE 40 MG: 40 INJECTION, POWDER, FOR SOLUTION INTRAMUSCULAR; INTRAVENOUS at 09:59

## 2021-01-01 RX ADMIN — INSULIN LISPRO 1 UNITS: 100 INJECTION, SOLUTION INTRAVENOUS; SUBCUTANEOUS at 20:33

## 2021-01-01 RX ADMIN — PROPOFOL 15 MCG/KG/MIN: 10 INJECTION, EMULSION INTRAVENOUS at 03:35

## 2021-01-01 RX ADMIN — DEXTROSE MONOHYDRATE 25 G: 25 INJECTION, SOLUTION INTRAVENOUS at 15:00

## 2021-01-01 RX ADMIN — CARBOXYMETHYLCELLULOSE SODIUM 1 DROP: 5 SOLUTION/ DROPS OPHTHALMIC at 14:15

## 2021-01-01 RX ADMIN — INSULIN LISPRO 3 UNITS: 100 INJECTION, SOLUTION INTRAVENOUS; SUBCUTANEOUS at 07:20

## 2021-01-01 RX ADMIN — IPRATROPIUM BROMIDE AND ALBUTEROL SULFATE 1 AMPULE: .5; 3 SOLUTION RESPIRATORY (INHALATION) at 18:25

## 2021-01-01 RX ADMIN — DEXTROSE MONOHYDRATE 12.5 G: 25 INJECTION, SOLUTION INTRAVENOUS at 15:57

## 2021-01-01 RX ADMIN — SODIUM CHLORIDE: 9 INJECTION, SOLUTION INTRAVENOUS at 17:48

## 2021-01-01 RX ADMIN — IPRATROPIUM BROMIDE AND ALBUTEROL SULFATE 1 AMPULE: .5; 3 SOLUTION RESPIRATORY (INHALATION) at 06:30

## 2021-01-01 RX ADMIN — MICONAZOLE NITRATE: 20 POWDER TOPICAL at 20:33

## 2021-01-01 RX ADMIN — SODIUM CHLORIDE 1000 ML: 9 INJECTION, SOLUTION INTRAVENOUS at 22:00

## 2021-01-01 RX ADMIN — CARBOXYMETHYLCELLULOSE SODIUM 1 DROP: 5 SOLUTION/ DROPS OPHTHALMIC at 07:32

## 2021-01-01 RX ADMIN — HEPARIN SODIUM 5000 UNITS: 5000 INJECTION INTRAVENOUS; SUBCUTANEOUS at 07:13

## 2021-01-01 RX ADMIN — LACTULOSE 20 G: 20 SOLUTION ORAL at 14:42

## 2021-01-01 RX ADMIN — DEXMEDETOMIDINE HYDROCHLORIDE 0.2 MCG/KG/HR: 4 INJECTION, SOLUTION INTRAVENOUS at 07:32

## 2021-01-01 RX ADMIN — HEPARIN SODIUM 5000 UNITS: 5000 INJECTION INTRAVENOUS; SUBCUTANEOUS at 05:59

## 2021-01-01 RX ADMIN — PROPOFOL 15 MCG/KG/MIN: 10 INJECTION, EMULSION INTRAVENOUS at 16:27

## 2021-01-01 RX ADMIN — IPRATROPIUM BROMIDE AND ALBUTEROL SULFATE 1 AMPULE: .5; 3 SOLUTION RESPIRATORY (INHALATION) at 10:24

## 2021-01-01 RX ADMIN — FUROSEMIDE 10 MG: 10 INJECTION, SOLUTION INTRAMUSCULAR; INTRAVENOUS at 00:20

## 2021-01-01 RX ADMIN — INSULIN LISPRO 1 UNITS: 100 INJECTION, SOLUTION INTRAVENOUS; SUBCUTANEOUS at 14:42

## 2021-01-01 RX ADMIN — HEPARIN SODIUM 5000 UNITS: 5000 INJECTION INTRAVENOUS; SUBCUTANEOUS at 21:58

## 2021-01-01 RX ADMIN — LACTULOSE 20 G: 20 SOLUTION ORAL at 20:58

## 2021-01-01 RX ADMIN — CARBOXYMETHYLCELLULOSE SODIUM 1 DROP: 5 SOLUTION/ DROPS OPHTHALMIC at 14:42

## 2021-01-01 RX ADMIN — Medication 5 MCG/MIN: at 05:28

## 2021-01-01 RX ADMIN — FAMOTIDINE 20 MG: 10 INJECTION, SOLUTION INTRAVENOUS at 07:32

## 2021-01-01 RX ADMIN — CALCIUM GLUCONATE 1000 MG: 20 INJECTION, SOLUTION INTRAVENOUS at 15:30

## 2021-01-01 RX ADMIN — SODIUM BICARBONATE: 84 INJECTION, SOLUTION INTRAVENOUS at 00:56

## 2021-01-01 RX ADMIN — IPRATROPIUM BROMIDE AND ALBUTEROL SULFATE 1 AMPULE: .5; 3 SOLUTION RESPIRATORY (INHALATION) at 22:18

## 2021-01-01 RX ADMIN — SODIUM CHLORIDE: 4.5 INJECTION, SOLUTION INTRAVENOUS at 11:27

## 2021-01-01 RX ADMIN — HEPARIN SODIUM 5000 UNITS: 5000 INJECTION INTRAVENOUS; SUBCUTANEOUS at 05:48

## 2021-01-01 RX ADMIN — HEPARIN SODIUM 5000 UNITS: 5000 INJECTION INTRAVENOUS; SUBCUTANEOUS at 14:53

## 2021-01-01 RX ADMIN — SODIUM CHLORIDE, PRESERVATIVE FREE 10 ML: 5 INJECTION INTRAVENOUS at 08:48

## 2021-01-01 RX ADMIN — SODIUM CHLORIDE, PRESERVATIVE FREE 10 ML: 5 INJECTION INTRAVENOUS at 08:01

## 2021-01-01 RX ADMIN — CARBOXYMETHYLCELLULOSE SODIUM 1 DROP: 5 SOLUTION/ DROPS OPHTHALMIC at 20:33

## 2021-01-01 RX ADMIN — LACTULOSE 20 G: 20 SOLUTION ORAL at 08:01

## 2021-01-01 RX ADMIN — DEXMEDETOMIDINE HYDROCHLORIDE 0.2 MCG/KG/HR: 4 INJECTION, SOLUTION INTRAVENOUS at 20:33

## 2021-01-01 RX ADMIN — PROPOFOL 20 MCG/KG/MIN: 10 INJECTION, EMULSION INTRAVENOUS at 00:40

## 2021-01-01 RX ADMIN — HYDRALAZINE HYDROCHLORIDE 10 MG: 20 INJECTION INTRAMUSCULAR; INTRAVENOUS at 11:44

## 2021-01-01 RX ADMIN — FAMOTIDINE 20 MG: 10 INJECTION, SOLUTION INTRAVENOUS at 09:40

## 2021-01-01 RX ADMIN — HEPARIN SODIUM 5000 UNITS: 5000 INJECTION INTRAVENOUS; SUBCUTANEOUS at 14:15

## 2021-01-01 RX ADMIN — HEPARIN SODIUM 5000 UNITS: 5000 INJECTION INTRAVENOUS; SUBCUTANEOUS at 06:18

## 2021-01-01 RX ADMIN — CALCIUM GLUCONATE 1000 MG: 20 INJECTION, SOLUTION INTRAVENOUS at 05:47

## 2021-01-01 RX ADMIN — HEPARIN SODIUM 5000 UNITS: 5000 INJECTION INTRAVENOUS; SUBCUTANEOUS at 14:45

## 2021-01-01 RX ADMIN — LACTULOSE 20 G: 20 SOLUTION ORAL at 09:02

## 2021-01-01 RX ADMIN — IPRATROPIUM BROMIDE AND ALBUTEROL SULFATE 1 AMPULE: .5; 3 SOLUTION RESPIRATORY (INHALATION) at 18:37

## 2021-01-01 RX ADMIN — CARBOXYMETHYLCELLULOSE SODIUM 1 DROP: 5 SOLUTION/ DROPS OPHTHALMIC at 14:54

## 2021-01-01 RX ADMIN — IPRATROPIUM BROMIDE AND ALBUTEROL SULFATE 1 AMPULE: .5; 3 SOLUTION RESPIRATORY (INHALATION) at 01:45

## 2021-01-01 RX ADMIN — METHYLPREDNISOLONE SODIUM SUCCINATE 40 MG: 40 INJECTION, POWDER, FOR SOLUTION INTRAMUSCULAR; INTRAVENOUS at 02:08

## 2021-01-01 RX ADMIN — FAMOTIDINE 20 MG: 10 INJECTION, SOLUTION INTRAVENOUS at 08:47

## 2021-01-01 RX ADMIN — LACTULOSE 20 G: 20 SOLUTION ORAL at 08:11

## 2021-01-01 RX ADMIN — METHYLPREDNISOLONE SODIUM SUCCINATE 40 MG: 40 INJECTION, POWDER, FOR SOLUTION INTRAMUSCULAR; INTRAVENOUS at 02:22

## 2021-01-01 RX ADMIN — IPRATROPIUM BROMIDE AND ALBUTEROL SULFATE 1 AMPULE: .5; 3 SOLUTION RESPIRATORY (INHALATION) at 22:05

## 2021-01-01 RX ADMIN — PROPOFOL 10 MCG/KG/MIN: 10 INJECTION, EMULSION INTRAVENOUS at 07:59

## 2021-01-01 RX ADMIN — PROPOFOL 20 MCG/KG/MIN: 10 INJECTION, EMULSION INTRAVENOUS at 10:49

## 2021-01-01 RX ADMIN — ETOMIDATE 20 MG: 2 INJECTION, SOLUTION INTRAVENOUS at 03:38

## 2021-01-01 RX ADMIN — CALCIUM GLUCONATE 1000 MG: 98 INJECTION, SOLUTION INTRAVENOUS at 16:15

## 2021-01-01 RX ADMIN — FUROSEMIDE 20 MG: 10 INJECTION, SOLUTION INTRAMUSCULAR; INTRAVENOUS at 09:27

## 2021-01-01 RX ADMIN — VECURONIUM BROMIDE 10 MG: 1 INJECTION, POWDER, LYOPHILIZED, FOR SOLUTION INTRAVENOUS at 03:43

## 2021-01-01 RX ADMIN — CHLORHEXIDINE GLUCONATE 15 ML: 1.2 RINSE ORAL at 21:38

## 2021-01-01 RX ADMIN — LACTULOSE 20 G: 20 SOLUTION ORAL at 14:53

## 2021-01-01 RX ADMIN — MICONAZOLE NITRATE: 20 POWDER TOPICAL at 07:33

## 2021-01-01 RX ADMIN — CALCIUM GLUCONATE 2000 MG: 20 INJECTION, SOLUTION INTRAVENOUS at 10:13

## 2021-01-01 RX ADMIN — SODIUM CHLORIDE 1000 ML: 9 INJECTION, SOLUTION INTRAVENOUS at 10:24

## 2021-01-01 RX ADMIN — MICONAZOLE NITRATE: 20 POWDER TOPICAL at 09:03

## 2021-01-01 RX ADMIN — IPRATROPIUM BROMIDE AND ALBUTEROL SULFATE 1 AMPULE: .5; 3 SOLUTION RESPIRATORY (INHALATION) at 06:27

## 2021-01-01 RX ADMIN — LACTULOSE 20 G: 20 SOLUTION ORAL at 14:15

## 2021-01-01 RX ADMIN — CEFEPIME HYDROCHLORIDE 1000 MG: 1 INJECTION, POWDER, FOR SOLUTION INTRAMUSCULAR; INTRAVENOUS at 05:59

## 2021-01-01 RX ADMIN — SODIUM BICARBONATE: 84 INJECTION, SOLUTION INTRAVENOUS at 08:33

## 2021-01-01 RX ADMIN — METHYLPREDNISOLONE SODIUM SUCCINATE 40 MG: 40 INJECTION, POWDER, FOR SOLUTION INTRAMUSCULAR; INTRAVENOUS at 09:40

## 2021-01-01 RX ADMIN — CEFEPIME HYDROCHLORIDE 1000 MG: 1 INJECTION, POWDER, FOR SOLUTION INTRAMUSCULAR; INTRAVENOUS at 15:00

## 2021-01-01 RX ADMIN — IPRATROPIUM BROMIDE AND ALBUTEROL SULFATE 1 AMPULE: .5; 3 SOLUTION RESPIRATORY (INHALATION) at 22:12

## 2021-01-01 RX ADMIN — Medication 10 MG: at 13:24

## 2021-01-01 RX ADMIN — INSULIN LISPRO 1 UNITS: 100 INJECTION, SOLUTION INTRAVENOUS; SUBCUTANEOUS at 21:06

## 2021-01-01 RX ADMIN — METHYLPREDNISOLONE SODIUM SUCCINATE 40 MG: 40 INJECTION, POWDER, FOR SOLUTION INTRAMUSCULAR; INTRAVENOUS at 17:55

## 2021-01-01 RX ADMIN — IPRATROPIUM BROMIDE AND ALBUTEROL SULFATE 1 AMPULE: .5; 3 SOLUTION RESPIRATORY (INHALATION) at 06:28

## 2021-01-01 RX ADMIN — HEPARIN SODIUM 5000 UNITS: 5000 INJECTION INTRAVENOUS; SUBCUTANEOUS at 05:58

## 2021-01-01 RX ADMIN — CEFEPIME HYDROCHLORIDE 1000 MG: 1 INJECTION, POWDER, FOR SOLUTION INTRAMUSCULAR; INTRAVENOUS at 04:27

## 2021-01-01 RX ADMIN — IPRATROPIUM BROMIDE AND ALBUTEROL SULFATE 1 AMPULE: .5; 3 SOLUTION RESPIRATORY (INHALATION) at 14:22

## 2021-01-01 RX ADMIN — DEXMEDETOMIDINE HYDROCHLORIDE 1.2 MCG/KG/HR: 4 INJECTION, SOLUTION INTRAVENOUS at 01:23

## 2021-01-01 RX ADMIN — IPRATROPIUM BROMIDE AND ALBUTEROL SULFATE 1 AMPULE: .5; 3 SOLUTION RESPIRATORY (INHALATION) at 22:04

## 2021-01-01 RX ADMIN — FAMOTIDINE 20 MG: 10 INJECTION, SOLUTION INTRAVENOUS at 08:11

## 2021-01-01 RX ADMIN — SODIUM BICARBONATE 50 MEQ: 84 INJECTION, SOLUTION INTRAVENOUS at 15:00

## 2021-01-01 RX ADMIN — INSULIN LISPRO 1 UNITS: 100 INJECTION, SOLUTION INTRAVENOUS; SUBCUTANEOUS at 08:53

## 2021-01-01 RX ADMIN — CARBOXYMETHYLCELLULOSE SODIUM 1 DROP: 5 SOLUTION/ DROPS OPHTHALMIC at 21:38

## 2021-01-01 RX ADMIN — METHYLPREDNISOLONE SODIUM SUCCINATE 40 MG: 40 INJECTION, POWDER, FOR SOLUTION INTRAMUSCULAR; INTRAVENOUS at 18:08

## 2021-01-01 RX ADMIN — SODIUM BICARBONATE: 84 INJECTION, SOLUTION INTRAVENOUS at 00:01

## 2021-01-01 RX ADMIN — METHYLPREDNISOLONE SODIUM SUCCINATE 40 MG: 40 INJECTION, POWDER, FOR SOLUTION INTRAMUSCULAR; INTRAVENOUS at 08:28

## 2021-01-01 RX ADMIN — SODIUM POLYSTYRENE SULFONATE 15 G: 15 SUSPENSION ORAL; RECTAL at 14:59

## 2021-01-01 RX ADMIN — SODIUM CHLORIDE 500 ML: 9 INJECTION, SOLUTION INTRAVENOUS at 16:47

## 2021-01-01 RX ADMIN — PAROXETINE HYDROCHLORIDE 40 MG: 20 TABLET, FILM COATED ORAL at 08:01

## 2021-01-01 RX ADMIN — PROPOFOL 10 MCG/KG/MIN: 10 INJECTION, EMULSION INTRAVENOUS at 11:25

## 2021-01-01 RX ADMIN — CARBOXYMETHYLCELLULOSE SODIUM 1 DROP: 5 SOLUTION/ DROPS OPHTHALMIC at 20:58

## 2021-01-01 RX ADMIN — SODIUM CHLORIDE 500 ML: 9 INJECTION, SOLUTION INTRAVENOUS at 13:45

## 2021-01-01 RX ADMIN — MICONAZOLE NITRATE: 20 POWDER TOPICAL at 08:11

## 2021-01-01 RX ADMIN — IPRATROPIUM BROMIDE AND ALBUTEROL SULFATE 1 AMPULE: .5; 3 SOLUTION RESPIRATORY (INHALATION) at 18:31

## 2021-01-01 RX ADMIN — SODIUM CHLORIDE 250 ML: 9 INJECTION, SOLUTION INTRAVENOUS at 10:17

## 2021-01-01 RX ADMIN — CEFEPIME HYDROCHLORIDE 1000 MG: 1 INJECTION, POWDER, FOR SOLUTION INTRAMUSCULAR; INTRAVENOUS at 05:48

## 2021-01-01 RX ADMIN — SODIUM BICARBONATE: 84 INJECTION, SOLUTION INTRAVENOUS at 06:39

## 2021-01-01 RX ADMIN — CHLORHEXIDINE GLUCONATE 15 ML: 1.2 RINSE ORAL at 08:01

## 2021-01-01 RX ADMIN — MICONAZOLE NITRATE: 20 POWDER TOPICAL at 21:26

## 2021-01-01 RX ADMIN — CHLORHEXIDINE GLUCONATE 15 ML: 1.2 RINSE ORAL at 08:12

## 2021-01-01 RX ADMIN — IPRATROPIUM BROMIDE AND ALBUTEROL SULFATE 1 AMPULE: .5; 3 SOLUTION RESPIRATORY (INHALATION) at 06:19

## 2021-01-01 RX ADMIN — ROPINIROLE HYDROCHLORIDE 0.5 MG: 0.5 TABLET, FILM COATED ORAL at 01:54

## 2021-01-01 RX ADMIN — ALBUTEROL SULFATE 10 MG: 2.5 SOLUTION RESPIRATORY (INHALATION) at 15:40

## 2021-01-01 ASSESSMENT — PULMONARY FUNCTION TESTS
PIF_VALUE: 20
PIF_VALUE: 19
PIF_VALUE: 26
PIF_VALUE: 24
PIF_VALUE: 26
PIF_VALUE: 21
PIF_VALUE: 17
PIF_VALUE: 21
PIF_VALUE: 25
PIF_VALUE: 31
PIF_VALUE: 18
PIF_VALUE: 27
PIF_VALUE: 27
PIF_VALUE: 25
PIF_VALUE: 20
PIF_VALUE: 21
PIF_VALUE: 23
PIF_VALUE: 21
PIF_VALUE: 18
PIF_VALUE: 19
PIF_VALUE: 19
PIF_VALUE: 18
PIF_VALUE: 31
PIF_VALUE: 21
PIF_VALUE: 25
PIF_VALUE: 20
PIF_VALUE: 21
PIF_VALUE: 17
PIF_VALUE: 26
PIF_VALUE: 21
PIF_VALUE: 22
PIF_VALUE: 20
PIF_VALUE: 18
PIF_VALUE: 25
PIF_VALUE: 25
PIF_VALUE: 21
PIF_VALUE: 22
PIF_VALUE: 18
PIF_VALUE: 22
PIF_VALUE: 20
PIF_VALUE: 20
PIF_VALUE: 26
PIF_VALUE: 22
PIF_VALUE: 20
PIF_VALUE: 22
PIF_VALUE: 18
PIF_VALUE: 23
PIF_VALUE: 22
PIF_VALUE: 19
PIF_VALUE: 23
PIF_VALUE: 25
PIF_VALUE: 22
PIF_VALUE: 18
PIF_VALUE: 23
PIF_VALUE: 20
PIF_VALUE: 19
PIF_VALUE: 18
PIF_VALUE: 21

## 2021-01-01 ASSESSMENT — ENCOUNTER SYMPTOMS
ABDOMINAL PAIN: 0
RHINORRHEA: 0
COUGH: 1
BACK PAIN: 0
SHORTNESS OF BREATH: 0
COLOR CHANGE: 0
EYE DISCHARGE: 0
NAUSEA: 0
VOMITING: 0

## 2021-01-01 ASSESSMENT — PAIN SCALES - GENERAL
PAINLEVEL_OUTOF10: 0
PAINLEVEL_OUTOF10: 2
PAINLEVEL_OUTOF10: 0
PAINLEVEL_OUTOF10: 4
PAINLEVEL_OUTOF10: 0

## 2021-01-04 NOTE — TELEPHONE ENCOUNTER
EMILY was reviewed today per office protocol. Report shows No discrepancies. Fill pattern is consistent from single provider(s) at single pharmacy(s).     Presciption Escribed

## 2021-02-18 PROBLEM — J44.1 ACUTE EXACERBATION OF CHRONIC OBSTRUCTIVE PULMONARY DISEASE (COPD) (HCC): Status: ACTIVE | Noted: 2021-01-01

## 2021-02-18 PROBLEM — E87.5 ACUTE HYPERKALEMIA: Status: ACTIVE | Noted: 2021-01-01

## 2021-02-18 PROBLEM — E16.2 HYPOGLYCEMIA: Status: ACTIVE | Noted: 2021-01-01

## 2021-02-18 PROBLEM — E87.29 METABOLIC ACIDOSIS, INCREASED ANION GAP: Status: ACTIVE | Noted: 2021-01-01

## 2021-02-18 PROBLEM — E87.5 HYPERKALEMIA: Status: ACTIVE | Noted: 2021-01-01

## 2021-02-18 NOTE — ED NOTES
Pt's lethargic. Pt's pupils 7mm. Pt still responds and knows she's at [facility]. Unable to  a O2. HR 48. 155/107. Resp 12. Notified Dr Uma Farnsworth. Dr Uma Farnsworth at bedside. RT notified.                   Leslie Valenzuela RN  02/18/21 [id number]

## 2021-02-18 NOTE — CONSULTS
Nephrology ([address] [facility]) Consult Note      Patient:  Reza Gonzalez  YOB: 1940  Date of Service: 2/18/2021  MRN: [id number]   Acct: [de-identified]   Primary Care Physician: Altaf Gil MD  Advance Directive: Full Code  Admit Date: 2/17/2021       Hospital Day: 0  Referring Provider: Cyndi Bain MD    Patient independently seen and examined, Chart, Consults, Notes, Operative notes, Labs, Cardiology, and Radiology studies reviewed as available. Chief complaint: Abnormal labs. Subjective:  Reza Gonzalez is a 68 y.o. female  whom we were consulted for acute kidney injury/hyperkalemia. Presented to the emergency room as she was complaining of weakness, lack of energy and shortness of breath. Patient has history of chronic obstructive pulmonary disease and was diagnosed with COPD exacerbation. She was initially treated with BiPAP, later on she was intubated in ER. Incidental finding in the lab confirmed that her serum creatinine was 3.0 mg with a potassium was 7.0 mmol . Her initial potassium was even much higher as her pH was 6.99. She was treated with insulin, D50, calcium gluconate. Patient is still acidotic.   She is now admitted with acute kidney injury and acute respiratory failure    Allergies:  Doxycycline, Abilify [aripiprazole], Celebrex [celecoxib], Naproxen, and Lactose intolerance (gi)    Medicines:  Current Facility-Administered Medications   Medication Dose Route Frequency Provider Last Rate Last Admin    sodium chloride flush 0.9 % injection 10 mL  10 mL Intravenous 2 times per day Henrique Cannon MD        sodium chloride flush 0.9 % injection 10 mL  10 mL Intravenous PRN Henrique Cannon MD        potassium chloride 10 mEq/100 mL IVPB (Peripheral Line)  10 mEq Intravenous PRN Henrique Cannon MD        magnesium sulfate 1000 mg in dextrose 5% 100 mL IVPB  1,000 mg Intravenous PRN Ary Pendleton MD  heparin (porcine) injection 5,000 Units  5,000 Units Subcutaneous 3 times per day Henrique Cannon MD   5,000 Units at 02/18/21 0618    promethazine (PHENERGAN) tablet 12.5 mg  12.5 mg Oral Q6H PRN Henrique Cannon MD        Or    ondansetron (ZOFRAN) injection 4 mg  4 mg Intravenous Q6H PRN Henrique Cannon MD        polyethylene glycol (GLYCOLAX) packet 17 g  17 g Oral Daily PRN Henrique Cannon MD        acetaminophen (TYLENOL) tablet 650 mg  650 mg Oral Q6H PRN Henrique Cannon MD        Or    acetaminophen (TYLENOL) suppository 650 mg  650 mg Rectal Q6H PRN Henrique Cannon MD        cefepime (MAXIPIME) 1,000 mg in sterile water 10 mL IV syringe  1,000 mg Intravenous Q12H Henrique Cannon MD   Stopped at 02/18/21 0434    famotidine (PEPCID) injection 20 mg  20 mg Intravenous Daily Henrique Cannon MD   20 mg at 02/18/21 0940    sodium bicarbonate 150 mEq in dextrose 5 % 1,000 mL infusion   Intravenous Continuous Cooper Velasquez  mL/hr at 02/18/21 0950 New Bag at 02/18/21 0950    methylPREDNISolone sodium (SOLU-MEDROL) injection 40 mg  40 mg Intravenous Q8H Tess Nelson MD   40 mg at 02/18/21 0940    ipratropium-albuterol (Charol Rodes) nebulizer solution 1 ampule  1 ampule Inhalation Q4H Tess Nelson MD   1 ampule at 02/18/21 1022       Past Medical History:  Past Medical History:   Diagnosis Date    Acid reflux     Allergic rhinitis     Bilateral carotid artery stenosis 1/28/2020    Hyperlipidemia     Hypertension     Irritable bowel syndrome with diarrhea 7/5/2019    Lung cancer ([facility] [address]     Lung with chemo    Mild single current episode of major depressive disorder ([facility] [address] 5/2/2018    New onset of congestive heart failure ([facility] [address] 10/7/2020    Osteoarthritis     Osteoporosis     Other emphysema ([facility] [address] 12/22/2017    Palliative care patient 08/06/2019    Panic disorder 8/16/2019    Stage 3 severe COPD by GOLD classification ([facility] [address] 6/6/2018    FEV1 42%    Tobacco abuse 2/8/2018    Urinary incontinence stress incontinence       Past Surgical History:  Past Surgical History:   Procedure Laterality Date    APPENDECTOMY      CARDIAC CATHETERIZATION  5/3/2013   MDL    EF 60%    HEMORRHOID SURGERY      HYSTERECTOMY      HYSTERECTOMY, TOTAL ABDOMINAL      LUNG CANCER SURGERY      TONSILLECTOMY      VASCULAR SURGERY  10/11/2019    [facility]. Aortogram and runoff. PTA and stenting of the left external iliac artery with a 7x 80 and innova stent dilated to 7.2mm. PTA of the left popliteal artery with 5mm x 2cm cutting balloon. attempted recalization of the peroneal artery aborted. Family History  Family History   Problem Relation Age of Onset    High Blood Pressure Mother     High Blood Pressure Father     Diabetes Sister     High Blood Pressure Brother        Social History  Social History     Socioeconomic History    Marital status:       Spouse name: Not on file    Number of children: Not on file    Years of education: Not on file    Highest education level: Not on file   Occupational History    Not on file   Social Needs    Financial resource strain: Not on file    Food insecurity     Worry: Not on file     Inability: Not on file    Transportation needs     Medical: Not on file     Non-medical: Not on file   Tobacco Use    Smoking status: Former Smoker     Packs/day: 0.50     Years: 30.00     Pack years: 15.00     Types: Cigarettes     Start date: 1970     Quit date: 2019     Years since quittin.5    Smokeless tobacco: Never Used   Substance and Sexual Activity    Alcohol use: Yes     Comment: occcasionally    Drug use: No    Sexual activity: Yes     Partners: Male   Lifestyle    Physical activity     Days per week: Not on file     Minutes per session: Not on file    Stress: Not on file   Relationships    Social connections     Talks on phone: Not on file     Gets together: Not on file     Attends Faith service: Not on file Active member of club or organization: Not on file     Attends meetings of clubs or organizations: Not on file     Relationship status: Not on file    Intimate partner violence     Fear of current or ex partner: Not on file     Emotionally abused: Not on file     Physically abused: Not on file     Forced sexual activity: Not on file   Other Topics Concern    Not on file   Social History Narrative    Not on file         Review of Systems:  Unable to obtain review of system as she is intubated and sedated.     Objective:  Patient Vitals for the past 24 hrs:   BP Temp Temp src Pulse Resp SpO2 Height Weight   02/18/21 1023    68 19 100 %     02/18/21 1022     20 99 %     02/18/21 0840     16 99 %     02/18/21 0800 (!) 161/69 95.4 °F (35.2 °C) Temporal 101 18 99 %     02/18/21 0622    111 16 100 %     02/18/21 0621     18 100 %     02/18/21 0600 (!) 155/43   98 18 100 %     02/18/21 0515 (!) 148/48 96.4 °F (35.8 °C) Temporal 91 (!) 33 100 %  133 lb 12.8 oz (60.7 kg)   02/18/21 0425 (!) 148/62   106 16 100 %     02/18/21 0405    88 16      02/18/21 0348 (!) 158/73   90 16      02/18/21 0328 (!) 131/95   (!) 42 22      02/18/21 0315 101/67   67 20      02/18/21 0300     18      02/18/21 0157 (!) 166/102 97.6 °F (36.4 °C)  59 22 95 %     02/18/21 0034 (!) 172/88   59 15      02/17/21 2333 103/78   62 23      02/17/21 2303 (!) 165/71   61 17 (!) 82 %     02/17/21 2233 (!) 159/64   61 22 95 %     02/17/21 2212     19 94 %     02/17/21 2203 (!) 142/99   65 20 94 %     02/17/21 2159    60 27 99 %     02/17/21 2147    60 22 100 %     02/17/21 2133 132/70   58 16      02/17/21 2103 138/70   58 16      02/17/21 2033 (!) 156/84   60 18      02/17/21 1914    78       02/17/21 1913      100 %     02/17/21 1903 (!) 154/96     100 %   02/17/21 1731 (!) 177/91 98.1 °F (36.7 °C) Oral  24  5' (1.524 m) 144 lb (65.3 kg)       Intake/Output Summary (Last 24 hours) at 2/18/2021 1046  Last data filed at 2/18/2021 0715  Gross per 24 hour   Intake    Output 250 ml   Net -250 ml     General: Intubated/sedated. HEENT: Normocephalic atraumatic head with orotracheal intubation. Neck: Supple with no JVD or carotid bruits. Chest:  Decreased breath sound on both lung field. Expiratory wheezing  CVS: Irregularly irregular S1 and S2. Abdominal: soft, nontender, normal bowel sounds  Extremities: no cyanosis or edema  Skin: warm and dry without rash      Labs:  BMP:   Recent Labs     02/17/21 1804 02/17/21  1804 02/18/21  0401 02/18/21  0744 02/18/21  0837     --  137 144  --    K 5.6*   < > 7.7* 7.1* 6.6     --  108 109  --    CO2 18*  --  14* 13*  --    PHOS  --   --  9.3*  --   --    BUN 90*  --  88* 95*  --    CREATININE 2.8*  --  2.9* 3.0*  --    CALCIUM 8.7*  --  9.4 9.0  --     < > = values in this interval not displayed. CBC:   Recent Labs     02/17/21 1804 02/18/21  0401   WBC 19.5* 13.6*   HGB 8.8* 8.6*   HCT 32.8* 34.4*   MCV 77.9* 83.3   * 418*     LIVER PROFILE:   Recent Labs     02/17/21 1804 02/18/21  0401   * 411*   * 275*   BILITOT 0.7 0.9   ALKPHOS 245* 222*     PT/INR: No results for input(s): PROTIME, INR in the last 72 hours. APTT: No results for input(s): APTT in the last 72 hours. BNP:  No results for input(s): BNP in the last 72 hours. Ionized Calcium:No results for input(s): IONCA in the last 72 hours. Magnesium:  Recent Labs     02/17/21  1804   MG 2.4     Phosphorus:  Recent Labs     02/18/21  0401   PHOS 9.3*     HgbA1C: No results for input(s): LABA1C in the last 72 hours.   Hepatic:   Recent Labs     02/17/21  1804 02/18/21  0401   ALKPHOS 245* 222*   * 275*   * 411*   PROT 7.3 6.9   BILITOT 0.7 0.9   LABALBU 3.4* 2.7*     Lactic Acid:   Recent Labs     02/18/21  0744 LACTA 4.7*     Troponin: No results for input(s): CKTOTAL, CKMB, TROPONINT in the last 72 hours. ABGs: No results for input(s): PH, PCO2, PO2, HCO3, O2SAT in the last 72 hours. CRP:  No results for input(s): CRP in the last 72 hours. Sed Rate:  No results for input(s): SEDRATE in the last 72 hours. Cultures:   No results for input(s): CULTURE in the last 72 hours. No results for input(s): BC, Johnice Sailors in the last 72 hours. No results for input(s): CXSURG in the last 72 hours. Radiology reports as per the Radiologist  Radiology: Ct Head Wo Contrast    Result Date: 2/17/2021  CT HEAD WO CONTRAST 2/17/2021 7:50 PM History: Altered mental status. Noncontrast head CT compared with 29 June 2020. In order to have a CT radiation dose as low as reasonably achievable Automated Exposure Control was utilized for adjustment of the mA and/or KV according to patient size. DLP in mGycm= 742. Left maxillary sinus fluid and mucosal thickening is increased. Mild atrophy and moderate small vessel disease. Normal ventricle size. No hemorrhage or mass effect. 1. Left maxillary sinusitis changes. 2. Stable atrophy and small vessel disease with no evidence of mass or infarct. Signed by Dr Arelis Berg on 2/17/2021 7:51 PM    Us Renal Complete    Result Date: 2/18/2021  EXAMINATION: US RENAL COMPLETE 2/18/2021 9:44 AM HISTORY: Acute kidney injury COMPARISON: None FINDINGS: Transabdominal sonographic evaluation of the kidneys and urinary bladder was performed in the transverse and longitudinal projections. Right kidney measures 8.6 x 4.1 x 4.3 cm in size. The cortex appears mildly atrophic. There is no sign of collecting system dilatation or solid renal mass. The inferior right kidney is obscured due to shadowing from bowel gas. The urinary bladder is decompressed and therefore not well evaluated. The left kidney is completely obscured due to shadowing from bowel gas. Markedly limited examination due to shadowing from bowel gas. The right kidney is visible and appears mildly atrophic. The left kidney is completely obscured. Signed by Dr Sheba Reyez on 2/18/2021 9:50 AM    Xr Chest Portable    Result Date: 2/18/2021  Examination. XR CHEST PORTABLE 2/18/2021 2:43 AM History: Intubation. A single frontal portable supine view of the chest is compared with the previous study dated 2/17/2021. There are respiratory motion artifacts. Chronic appearing interstitial changes and granulomas are seen in the lungs bilaterally, similar to the previous study. There is no pleural effusion, pulmonary congestion or pneumothorax. There is moderate cardiomegaly. Atheromatous changes thoracic aorta are noted. No acute bony abnormality. An endotracheal tube is seen with distal end 5 cm above trachea bifurcation. This is in appropriate position. No active cardiopulmonary disease. ET tube in appropriate position. Chronic inflammatory and granulomatous lung changes. A moderate cardiomegaly. Signed by Dr Viet Odell on 2/18/2021 7:01 AM    Xr Chest Portable    Result Date: 2/17/2021  XR CHEST PORTABLE 2/17/2021 8:10 PM History: Cough and congestion. Portable chest x-ray compared with 7 October 2020. The heart size is normal. The mediastinum is within normal limits. The lungs are adequately expanded with no pneumonia or pneumothorax. There is mild chronic interstitial disease with scattered calcified granulomas. There is no significant pleural fluid. No congestive failure changes. 1. No acute disease. Signed by Dr Valerie Mckeon on 2/17/2021 8:10 PM       Assessment   1. Acute kidney injury/stage III  2. Stage IIIb chronic kidney disease baseline. 3.  Severe hyperkalemia/improving. 4.  Metabolic acidemia. 5.  COPD exacerbation. 6.  Acute respiratory failure/intubated. 7.  Elevated LFTs. 8.  Irritable bowel syndrome/chronic diarrhea. 9.  Hypoxemic ATN. Plan:  1. IV fluid with sodium bicarbonate. 2.  Urinary electrolyte. 3.  Monitor renal function closely. 4.  Baseline renal ultrasound  5. Plan was discussed with Dr. Marilu Handy      Thank you for the consult, we appreciate the opportunity to provide care to your patients. Feel free to contact me if I can be of any further assistance.       Eliazar Dillard MD  02/18/21  10:46 AM

## 2021-02-18 NOTE — PROGRESS NOTES
From [facility]    Component Value Ref Range & Units Status Collected Lab   pH, Arterial 7.260Low Panic   7.350 - 7.450 Final 02/18/2021  5:01 PM [address] [place] [address] Lab   pCO2, Arterial 46. 0High   35.0 - 45.0 mmHg Final 02/18/2021  5:01 PM [facility] Lab   pO2, Arterial 59. 0Low   80.0 - 100.0 mmHg Final 02/18/2021  5:01 PM [address] [place] [address] Lab   HCO3, Arterial 20.6Low   22.0 - 26.0 mmol/L Final 02/18/2021  5:01 PM [facility] Excess, Arterial -6.2Low   -2.0 - 2.0 mmol/L Final 02/18/2021  5:01 PM [address] [place] [address] Lab   Hemoglobin, Art, Extended 8.9Low   12.0 - 16.0 g/dL Final 02/18/2021  5:01 PM [address] [place] [address] Lab   O2 Sat, Arterial 88.7Low   >92 % Final 02/18/2021  5:01 PM [facility] Lab   Carboxyhgb, Arterial 3.3  0.0 - 5.0 % Final 02/18/2021  5:01 PM  - [address]   0.0-1.5   (Smokers 1.5-5.0)    Methemoglobin, Arterial 0.2  <1.5 % Final 02/18/2021  5:01 PM [address] [place] [address] Lab   O2 Content, Arterial 11.2  Not Established mL/dL Final 02/18/2021  5:01 PM [address] [place] [address] Lab   O2 Therapy Unknown   Final 02/18/2021  5:01  [facility] Performed By    Valerio Mckeon Name Director Address Valid Date Range   [address] [address] LAB Hao Hauser  Zoila Mckeon,[address]   [address] [facility] [place] [address] 08/30/17 0733-Present   Lab and Collection    Blood Gas, Arterial - 2/18/2021  Result History    Blood Gas, Arterial on 2/18/2021   Result Information    Flag: CriticalPanic   Status: Final result (Collected: 2/18/2021 17:01) Provider Status: Ordered   Routing History    Priority Sent On From To Message Type    2/18/2021 10:32 AM Marylou Christine Incoming Lab Results From [address] [place] [address]    Click to Print Result   View SmartLink Info    Blood Gas, Arterial (Order #[id number]) on 2/18/21   Order Report    Order Details  Ac 14 450 +5 40% RR

## 2021-02-18 NOTE — CONSULTS
[address] [facility] Neurology  [address] [place] [address] Drive, [address]  [place], [facility]  Phone [phone]     Neurology Consultation     Date of Admission: 2021  5:20 PM  Date of Consultation: 21    Attending Provider: Kia Hammond MD  Consulting Provider: Riri Akins M.D. Patient: Gaetana Gilford  :  1940  Age:  68 y.o. MRN:  [id number]    CHIEF COMPLAINT:  Altered mental status    History Source: History obtained from chart review and the patient. PCP: Ana Lilia Alcantar MD    HISTORY OF PRESENT ILLNESS:   Gaetana Gilford is a 68y.o. year old woman with a history of COPD and CHF who was admitted last night with acute renal failure and acute respiratory failure. She has chronic respiratory failure and is on oxygen day and night. The last few days, she has been somewhat confused and slurring her speech. Her son became concerned and brought her to the ER yesterday. CT head showed age related changes. Labs showed severe acute renal failure. She was in the ER awaiting admission when she developed acute respiratory failure requiring intubation. That was at 3:49 am today and she was given some vecuronium and etomidate but has had no other sedating medications. She has remained unresponsive with unresponsive pupils now 12 hours later.       PAST MEDICAL HISTORY:    Medical History:      Diagnosis Date    Acid reflux     Acute renal failure superimposed on chronic kidney disease (HCC)     Allergic rhinitis     Bilateral carotid artery stenosis 2020    Hyperlipidemia     Hypertension     Irritable bowel syndrome with diarrhea 2019    Lung cancer ([address]     Lung with chemo    Mild single current episode of major depressive disorder ([address] 2018    New onset of congestive heart failure ([address] 10/7/2020    Osteoarthritis     Osteoporosis     Other emphysema ([address] 2017    Palliative care patient 2019    Panic disorder 2019  Stage 3 severe COPD by GOLD classification ([facility] 6/6/2018    FEV1 42%    Tobacco abuse 2/8/2018    Urinary incontinence     stress incontinence       Surgical History:      Procedure Laterality Date    APPENDECTOMY      CARDIAC CATHETERIZATION  5/3/2013   MDL    EF 60%    HEMORRHOID SURGERY      HYSTERECTOMY      HYSTERECTOMY, TOTAL ABDOMINAL      LUNG CANCER SURGERY      TONSILLECTOMY      VASCULAR SURGERY  10/11/2019    TJR. Aortogram and runoff. PTA and stenting of the left external iliac artery with a 7x 80 and innova stent dilated to 7.2mm. PTA of the left popliteal artery with 5mm x 2cm cutting balloon. attempted recalization of the peroneal artery aborted. Medications Prior to Admission:    Prior to Admission medications    Medication Sig Start Date End Date Taking? Authorizing Provider   montelukast (SINGULAIR) 10 MG tablet Take 1 tablet by mouth daily 2/12/21   Soumya Wood MD   rOPINIRole (REQUIP) 0.25 MG tablet TAKE 1-2 TABLETS BY MOUTH THREE TIMES DAILY 2/12/21   Soumya Wood MD   ipratropium-albuterol (DUONEB) 0.5-2.5 (3) MG/3ML SOLN nebulizer solution INHALE CONTENTS OF 1 VIAL VIA NEBULIZATION EVERY 4 HOURS 1/13/21   Soumya Wood MD   fluticasone-umeclidin-vilant (TRELEGY ELLIPTA) 080-32.5-67 MCG/INH AEPB Inhale 1 puff into the lungs daily 1/8/21   Soumya Wood MD   Misc.  Devices [facility]) [address] scooter Dx: respiratory failure, impaired mobility and gait 12/15/20   Soumya Wood MD   furosemide (LASIX) 20 MG tablet Take 1 tablet by mouth daily 10/12/20   Soumya Wood MD   PARoxetine (PAXIL) 40 MG tablet Take 1 tablet by mouth every morning 10/12/20   Soumya Wood MD   ferrous sulfate (FE TABS) 325 (65 Fe) MG EC tablet Take 1 tablet by mouth 2 times daily 10/10/20   Arlen Multani PA-C   guaiFENesin (MUCINEX) 600 MG extended release tablet Take 2 tablets by mouth 2 times daily Patient taking differently: Take 1,200 mg by mouth 2 times daily As needed 4/65/70   DIANA Daigle   nystatin (MYCOSTATIN) 462281 UNIT/ML suspension Take 5 mLs by mouth 4 times daily 4/06/03   DIANA Daigle   atorvastatin (LIPITOR) 40 MG tablet Take 1 tablet by mouth daily 8/29/20   Angelika Melara MD   losartan (COZAAR) 100 MG tablet Take 1 tablet by mouth daily 8/29/20   Angelika Melara MD   cetirizine (ZYRTEC) 10 MG tablet Take 1 tablet by mouth daily 8/29/20   Angelika Melara MD   omeprazole (PRILOSEC) 20 MG delayed release capsule TAKE 1 CAPSULE BY MOUTH DAILY 8/24/20   Angelika Melara MD   potassium chloride (KLOR-CON M) 10 MEQ extended release tablet TAKE 2 TABLETS BY MOUTH EVERY DAY 8/5/20   Angelika Melara MD   aspirin EC 81 MG EC tablet Take 1 tablet by mouth daily 8/5/20   Angelika Melara MD   propranolol (INDERAL) 40 MG tablet Take 1 tablet by mouth 3 times a day 8/5/20   Angelika Melara MD   oxybutynin (DITROPAN) 5 MG tablet Take 1 tablet by mouth 3 times daily 7/9/20   Angelika Melara MD   mirabegron [facility]) 25 MG TB24 Take 1 tablet by mouth daily 5/11/20   Angelika Melara MD   fluticasone [place]) 50 MCG/ACT nasal spray 2 sprays by Nasal route daily 1/3/20   DIANA Colvin NP   OXYGEN Inhale 2 L/min into the lungs continuous  Patient taking differently: Inhale 2 L/min into the lungs continuous PRN, Pt uses most of the day and at night.  12/27/19   DIANA Colvin NP   CALCIUM PO Take 500 mg by mouth daily    Historical Provider, MD   Black Cohosh 20 MG TABS Take by mouth nightly     Historical Provider, MD   albuterol sulfate HFA (PROAIR HFA) 108 (90 Base) MCG/ACT inhaler Inhale 2 puffs into the lungs every 6 hours as needed for Wheezing 6/6/18   Angelika Melara MD   melatonin 5 MG TABS tablet Take 5 mg by mouth nightly as needed     Historical Provider, MD       Current Medications: Current Facility-Administered Medications: sodium chloride flush 0.9 % injection 10 mL, 10 mL, Intravenous, 2 times per day  sodium chloride flush 0.9 % injection 10 mL, 10 mL, Intravenous, PRN  potassium chloride 10 mEq/100 mL IVPB (Peripheral Line), 10 mEq, Intravenous, PRN  magnesium sulfate 1000 mg in dextrose 5% 100 mL IVPB, 1,000 mg, Intravenous, PRN  heparin (porcine) injection 5,000 Units, 5,000 Units, Subcutaneous, 3 times per day  promethazine (PHENERGAN) tablet 12.5 mg, 12.5 mg, Oral, Q6H PRN **OR** ondansetron (ZOFRAN) injection 4 mg, 4 mg, Intravenous, Q6H PRN  polyethylene glycol (GLYCOLAX) packet 17 g, 17 g, Oral, Daily PRN  acetaminophen (TYLENOL) tablet 650 mg, 650 mg, Oral, Q6H PRN **OR** acetaminophen (TYLENOL) suppository 650 mg, 650 mg, Rectal, Q6H PRN  cefepime (MAXIPIME) 1,000 mg in sterile water 10 mL IV syringe, 1,000 mg, Intravenous, Q12H  famotidine (PEPCID) injection 20 mg, 20 mg, Intravenous, Daily  sodium bicarbonate 150 mEq in dextrose 5 % 1,000 mL infusion, , Intravenous, Continuous  methylPREDNISolone sodium (SOLU-MEDROL) injection 40 mg, 40 mg, Intravenous, Q8H  ipratropium-albuterol (DUONEB) nebulizer solution 1 ampule, 1 ampule, Inhalation, Q4H  hydrALAZINE (APRESOLINE) injection 10 mg, 10 mg, Intravenous, Q6H PRN  glucose (GLUTOSE) 40 % oral gel 15 g, 15 g, Oral, PRN  dextrose 50 % IV solution, 12.5 g, Intravenous, PRN  glucagon (rDNA) injection 1 mg, 1 mg, Intramuscular, PRN  dextrose 5 % solution, 100 mL/hr, Intravenous, PRN  lactulose (CHRONULAC) 10 GM/15ML solution 20 g, 20 g, Oral, Once  0.9 % sodium chloride bolus, 500 mL, Intravenous, Once  miconazole (MICOTIN) 2 % powder, , Topical, BID  dexmedetomidine (PRECEDEX) 400 mcg in sodium chloride 0.9 % 100 mL infusion, 0.2-1.4 mcg/kg/hr, Intravenous, Continuous    Allergies:  Doxycycline, Abilify [aripiprazole], Celebrex [celecoxib], Naproxen, and Lactose intolerance (gi)    Habits: TOBACCO:   reports that she quit smoking about 18 months ago. Her smoking use included cigarettes. She started smoking about 50 years ago. She has a 15.00 pack-year smoking history. She has never used smokeless tobacco.  ETOH:   reports current alcohol use. DRUGS:    Social History     Substance and Sexual Activity   Drug Use No       SOCIAL HISTORY:   Pastora Hearn is , lives in [place], [place], and is retired. FAMILY HISTORY:       Problem Relation Age of Onset    High Blood Pressure Mother     High Blood Pressure Father     Diabetes Sister     High Blood Pressure Brother        REVIEW OF SYSTEMS:  Review of Systems   Unable to perform ROS: Intubated       PHYSICAL EXAMINATION:  Vitals: BP (!) 116/39   Pulse 73   Temp 97.6 °F (36.4 °C) (Temporal)   Resp 18   Ht 5' (1.524 m)   Wt 133 lb 12.8 oz (60.7 kg)   SpO2 98%   BMI 26.13 kg/m²   General appearance: She is an elderly woman who is intubated and mildly agitated in the ICU. Skin: Skin color, texture, turgor normal. No rashes or lesions  HEENT: Head: Normal, normocephalic, atraumatic. No nuchal rigidity. Neck:no adenopathy, no carotid bruit, no JVD, supple, symmetrical, trachea midline and thyroid not enlarged, symmetric, no tenderness/mass/nodules  Lungs: wheezes bilaterally  Heart: regular rate and rhythm, S1, S2 normal, no murmur, click, rub or gallop  Abdomen: soft, non-tender; bowel sounds normal; no masses,  no organomegaly  Extremities: extremities normal, atraumatic, no cyanosis or edema      NEUROLOGIC EXAMINATION:  Neurologic Exam     Mental Status   She wakens to voice. She opens her eyes. She is able to follow simple commands - squeeze with either hand and move her feet. She moves her head from side to side and opens and closes her mouth spontaneously.        Cranial Nerves Her pupils are 5mm bilaterally and unresponsive. Her eyes are very dry. She does not blink to threat. No facial asymmetry. She breaths over vent and coughs with suctioning. Motor Exam   She moves her limbs purposefully without evident gross weakness. Gait, Coordination, and Reflexes     Reflexes   Right brachioradialis: 0  Left brachioradialis: 0  Right biceps: 0  Left biceps: 0  Right triceps: 0  Left triceps: 0  Right patellar: 0  Left patellar: 0  Right achilles: 0  Left achilles: 0  Right plantar: normal  Left plantar: normal  Right Johnson: absent  Left Johnson: absent  Right ankle clonus: absent  Left ankle clonus: absent      ADDITIONAL REVIEW:  CBC:    Recent Labs     02/17/21 1804 02/18/21  0401   WBC 19.5* 13.6*   HGB 8.8* 8.6*   * 418*     BMP:     Recent Labs     02/17/21 1804 02/17/21 1804 02/18/21  0330 02/18/21  0401 02/18/21  0744 02/18/21  0837 02/18/21  1331     --   --  137 144  --   --    K 5.6*   < >  --  7.7* 7.1* 6.6 7.0*     --   --  108 109  --   --    CO2 18*  --   --  14* 13*  --   --    BUN 90*  --   --  88* 95*  --   --    CREATININE 2.8*  --   --  2.9* 3.0*  --   --    GLUCOSE 82  --  62 137* 131*  --   --     < > = values in this interval not displayed. Hepatic:  Recent Labs     02/17/21 1804 02/18/21 0401   * 411*   * 275*   BILITOT 0.7 0.9   ALKPHOS 245* 222*     Troponin T:    Recent Labs     02/17/21 1804 02/18/21 0401   TROPONINI 0.03 0.05*     ABGs:    Lab Results   Component Value Date    PHART 7.150 02/18/2021    PO2ART 85.0 02/18/2021    JYJ8OEL 44.0 02/18/2021     Narrative   CT HEAD WO CONTRAST    2/17/2021 7:50 PM   History: Altered mental status. Noncontrast head CT compared with 29 June 2020. In order to have a CT radiation dose as low as reasonably achievable   Automated Exposure Control was utilized for adjustment of the mA   and/or KV according to patient size. DLP in mGycm= 742. Left maxillary sinus fluid and mucosal thickening is increased. Mild atrophy and moderate small vessel disease. Normal ventricle size. No hemorrhage or mass effect.       Impression   1. Left maxillary sinusitis changes. 2. Stable atrophy and small vessel disease with no evidence of mass or   infarct. Signed by Dr Ginger Fox on 2/17/2021 7:51 PM       IMPRESSION:  1. Metabolic encephalopathy. Seems to be improving. Her residual pupillary abnormalities are likely related to medications. 2. Acute respiratory failure  3. Acute renal failure  4. Elevated liver enzymes  5. Hyperkalemia  I discussed the above with her son. PLAN:  1. If sedation is required, try Precedex  2. Ventilator, nebs, steroids  3. IVFs, bicarb  4.          DVT prophylaxis    Binta Fernandez M.D.

## 2021-02-18 NOTE — ED PROVIDER NOTES
Patient was seen earlier in the evening by another provider and has been admitted by the hospitalist and is boarding in the ER. Called to patient's room by patient's nurse due to patient having significantly decreased mental status. Has already been admitted by hospitalist and awaiting transfer to floor. Contacted hospitalist, Dr. Graeme Presley, who came to the ER and placed orders. Patient was placed on BiPAP and found to be hyperkalemic and hypoglycemic. Dr. Graeme Presley will continue to manage patient but due to her decreased mental status wants patient intubated and requested that I intubate patient for him. See procedure note below. Dr. Graeme Presley will continue to manage patient and will admit patient to ICU. Intubation    Date/Time: 2/18/2021 3:53 AM  Performed by: Arslan Toro MD  Authorized by: Colt Crespo MD     Consent:     Consent obtained:  Emergent situation  Pre-procedure details:     Patient status:  Unresponsive    Mallampati score:  III    Paralytics:  Vecuronium  Procedure details:     Preoxygenation:  BiPAP    CPR in progress: no      Intubation method:  Oral    Oral intubation technique:  Video-assisted    Laryngoscope blade: Mac 4    Tube size (mm):  7.5    Tube type:  Cuffed    Number of attempts:  2    Ventilation between attempts: no      Cricoid pressure: no      Tube visualized through cords: yes    Placement assessment:     ETT to lip:  21    Tube secured with:  ETT rachel    Breath sounds:  Equal and absent over the epigastrium    Placement verification: chest rise, condensation, CXR verification, equal breath sounds, ETCO2 detector and tube exhalation      CXR findings:  ETT in proper place  Post-procedure details:     Patient tolerance of procedure:   Tolerated well, no immediate complications           Arslan Toro MD  02/18/21 [id number]

## 2021-02-18 NOTE — PROGRESS NOTES
Results From [facility]    Component Value Ref Range & Units Status Collected Lab   pH, Arterial 7.150Low Panic   7.350 - 7.450 Final 02/18/2021  8:37 AM [facility] Lab   pCO2, Arterial 44.0  35.0 - 45.0 mmHg Final 02/18/2021  8:37 AM [facility] Lab   pO2, Arterial 85.0  80.0 - 100.0 mmHg Final 02/18/2021  8:37 AM [facility] Lab   HCO3, Arterial 15.3Low   22.0 - 26.0 mmol/L Final 02/18/2021  8:37 AM [facility] Excess, Arterial -12. 9Low   -2.0 - 2.0 mmol/L Final 02/18/2021  8:37 AM [facility] Lab   Hemoglobin, Art, Extended 9.8Low   12.0 - 16.0 g/dL Final 02/18/2021  8:37 AM [facility] Lab   O2 Sat, Arterial 93.1  >92 % Final 02/18/2021  8:37 AM [facility] Lab   Carboxyhgb, Arterial 2.1  0.0 - 5.0 % Final 02/18/2021  8:37 AM [facility] Lab        0.0-1.5   (Smokers 1.5-5.0)    Methemoglobin, Arterial 0.7  <1.5 % Final 02/18/2021  8:37 AM [facility] Lab   O2 Content, Arterial 12.9  Not Established mL/dL Final 02/18/2021  8:37 AM [facility]   O2 Therapy Unknown   Final 02/18/2021  8:37 AM [facility]   Testing Performed By    [address] Name Director Address Valid Date Range   [address] - [address] LAB Danna Lemon  [place],[address]   [address] [facility] [place] [address] 08/30/17 0733-Present   Narrative  Performed by: [facility]  CALL  Steele  [facility] tel. , eloina llanos rn, 02/18/2021 08:39, by [facility]   Lab and Collection    Blood Gas, Arterial - 2/18/2021  Result History    Blood Gas, Arterial on 2/18/2021   Result Information    Flag: CriticalPanic   Status: Final result (Collected: 2/18/2021 08:37) Provider Status: Ordered   Click to Print Result   View SmartLink Info    Blood Gas, Arterial (Order #[id number]) on 2/18/21   Order Report    Order Details  AV 16 450 +5 60% RR

## 2021-02-18 NOTE — ED PROVIDER NOTES
[facility] EMERGENCY DEPT  eMERGENCY dEPARTMENT eNCOUnter      Pt Name: Kin Encarnacion  MRN: [id number]  [place] 1940  Date of evaluation: 2/17/2021  Provider: Salvador Lu MD    [address] [place] [address]       Chief Complaint   Patient presents with    Altered Mental Status         HISTORY OF PRESENT ILLNESS   (Location/Symptom, Timing/Onset,Context/Setting, Quality, Duration, Modifying Factors, Severity)  Note limiting factors. Kin Encarnacion is a 68 y.o. female who presents to the emergency department with altered mental status. Patient states \"my son made me come in to the hospital.  He thinks I have Parkinson's. \"  Son interviewed via phone states patient has had intermittent slurring of her speech, memory loss, Sonia Johnson is imagining things and seeing things\", and \"cannot finish a sentence. \"  Patient is concerned that Sonia Johnson is not herself. \"  Patient states that she has been having chest pain x1 week. Pain is in the right chest.  Pain is intermittent, patient cannot describe duration. \"It just hurts. \"  Patient has had a recent cough that is nonproductive. Patient denies fever, nausea, vomiting, diarrhea, or shortness of breath. Patient notes that she uses her nebulizer 4 times daily. HPI    NursingNotes were reviewed. REVIEW OF SYSTEMS    (2-9 systems for level 4, 10 or more for level 5)     Review of Systems   Constitutional: Negative for chills and fever. HENT: Negative for congestion and rhinorrhea. Eyes: Negative for discharge and visual disturbance. Respiratory: Positive for cough. Negative for shortness of breath. Cardiovascular: Positive for chest pain. Negative for palpitations. Gastrointestinal: Negative for abdominal pain, nausea and vomiting. Genitourinary: Negative for difficulty urinating and dysuria. Musculoskeletal: Negative for back pain and neck pain. Skin: Negative for color change and pallor. Neurological: Positive for speech difficulty. Negative for syncope, facial asymmetry, light-headedness and numbness. Psychiatric/Behavioral: Positive for confusion. Negative for agitation and decreased concentration. All other systems reviewed and are negative. PAST MEDICALHISTORY     Past Medical History:   Diagnosis Date    Acid reflux     Allergic rhinitis     Bilateral carotid artery stenosis 1/28/2020    Hyperlipidemia     Hypertension     Irritable bowel syndrome with diarrhea 7/5/2019    Lung cancer ([facility] [address]     Lung with chemo    Mild single current episode of major depressive disorder ([facility] [address] 5/2/2018    New onset of congestive heart failure ([facility] [address] 10/7/2020    Osteoarthritis     Osteoporosis     Other emphysema ([facility] [address] 12/22/2017    Palliative care patient 08/06/2019    Panic disorder 8/16/2019    Stage 3 severe COPD by GOLD classification ([facility] 6/6/2018    FEV1 42%    Tobacco abuse 2/8/2018    Urinary incontinence     stress incontinence         SURGICAL HISTORY       Past Surgical History:   Procedure Laterality Date    APPENDECTOMY      CARDIAC CATHETERIZATION  5/3/2013   MDL    EF 60%    HEMORRHOID SURGERY      HYSTERECTOMY      HYSTERECTOMY, TOTAL ABDOMINAL      LUNG CANCER SURGERY      TONSILLECTOMY      VASCULAR SURGERY  10/11/2019    TJR. Aortogram and runoff. PTA and stenting of the left external iliac artery with a 7x 80 and innova stent dilated to 7.2mm. PTA of the left popliteal artery with 5mm x 2cm cutting balloon. attempted recalization of the peroneal artery aborted.          CURRENT MEDICATIONS     Previous Medications    ALBUTEROL SULFATE HFA (PROAIR HFA) 108 (90 BASE) MCG/ACT INHALER    Inhale 2 puffs into the lungs every 6 hours as needed for Wheezing    ASPIRIN EC 81 MG EC TABLET    Take 1 tablet by mouth daily    ATORVASTATIN (LIPITOR) 40 MG TABLET    Take 1 tablet by mouth daily    BLACK COHOSH 20 MG TABS    Take by mouth nightly CALCIUM PO    Take 500 mg by mouth daily    CETIRIZINE (ZYRTEC) 10 MG TABLET    Take 1 tablet by mouth daily    FERROUS SULFATE (FE TABS) 325 (65 FE) MG EC TABLET    Take 1 tablet by mouth 2 times daily    FLUTICASONE (FLONASE) 50 MCG/ACT NASAL SPRAY    2 sprays by Nasal route daily    FLUTICASONE-UMECLIDIN-VILANT (TRELEGY ELLIPTA) 100-62.5-25 MCG/INH AEPB    Inhale 1 puff into the lungs daily    FUROSEMIDE (LASIX) 20 MG TABLET    Take 1 tablet by mouth daily    GUAIFENESIN (MUCINEX) 600 MG EXTENDED RELEASE TABLET    Take 2 tablets by mouth 2 times daily    IPRATROPIUM-ALBUTEROL (DUONEB) 0.5-2.5 (3) MG/3ML SOLN NEBULIZER SOLUTION    INHALE CONTENTS OF 1 VIAL VIA NEBULIZATION EVERY 4 HOURS    LOSARTAN (COZAAR) 100 MG TABLET    Take 1 tablet by mouth daily    MELATONIN 5 MG TABS TABLET    Take 5 mg by mouth nightly as needed     MIRABEGRON (MYRBETRIQ) 25 MG TB24    Take 1 tablet by mouth daily    MISC.  DEVICES [facility]) [id number] [facility]    Power scooter Dx: respiratory failure, impaired mobility and gait    MONTELUKAST (SINGULAIR) 10 MG TABLET    Take 1 tablet by mouth daily    NYSTATIN (MYCOSTATIN) 903538 UNIT/ML SUSPENSION    Take 5 mLs by mouth 4 times daily    OMEPRAZOLE (PRILOSEC) 20 MG DELAYED RELEASE CAPSULE    TAKE 1 CAPSULE BY MOUTH DAILY    OXYBUTYNIN (DITROPAN) 5 MG TABLET    Take 1 tablet by mouth 3 times daily    OXYGEN    Inhale 2 L/min into the lungs continuous    PAROXETINE (PAXIL) 40 MG TABLET    Take 1 tablet by mouth every morning    POTASSIUM CHLORIDE (KLOR-CON M) 10 MEQ EXTENDED RELEASE TABLET    TAKE 2 TABLETS BY MOUTH EVERY DAY    PROPRANOLOL (INDERAL) 40 MG TABLET    Take 1 tablet by mouth 3 times a day    ROPINIROLE (REQUIP) 0.25 MG TABLET    TAKE 1-2 TABLETS BY MOUTH THREE TIMES DAILY       ALLERGIES     Doxycycline, Abilify [aripiprazole], Celebrex [celecoxib], Naproxen, and Lactose intolerance (gi)    FAMILY HISTORY       Family History   Problem Relation Age of Onset  High Blood Pressure Mother     High Blood Pressure Father     Diabetes Sister     High Blood Pressure Brother           SOCIAL HISTORY       Social History     Socioeconomic History    Marital status:      Spouse name: None    Number of children: None    Years of education: None    Highest education level: None   Occupational History    None   Social Needs    Financial resource strain: None    Food insecurity     Worry: None     Inability: None    Transportation needs     Medical: None     Non-medical: None   Tobacco Use    Smoking status: Former Smoker     Packs/day: 0.50     Years: 30.00     Pack years: 15.00     Types: Cigarettes     Start date: 1970     Quit date: 2019     Years since quittin.5    Smokeless tobacco: Never Used   Substance and Sexual Activity    Alcohol use: Yes     Comment: occcasionally    Drug use: No    Sexual activity: Yes     Partners: Male   Lifestyle    Physical activity     Days per week: None     Minutes per session: None    Stress: None   Relationships    Social connections     Talks on phone: None     Gets together: None     Attends Taoist service: None     Active member of club or organization: None     Attends meetings of clubs or organizations: None     Relationship status: None    Intimate partner violence     Fear of current or ex partner: None     Emotionally abused: None     Physically abused: None     Forced sexual activity: None   Other Topics Concern    None   Social History Narrative    None       SCREENINGS             PHYSICAL EXAM    (up to 7 for level 4, 8 or more for level 5)     ED Triage Vitals   BP Temp Temp Source Pulse Resp SpO2 Height Weight   21 1731 21 1731 21 1731 21 1914 21 1731 21 1903 21 1731 21 1731   (!) 177/91 98.1 °F (36.7 °C) Oral 79 24 100 % 5' (1.524 m) 144 lb (65.3 kg)       Physical Exam  Vitals signs and nursing note reviewed.    Constitutional: General: She is not in acute distress. Comments: Patient sits in bed moaning loudly. When asked why she is moaning, patient states \"my mother said that I came out moaning and I have been moaning ever since. \"   HENT:      Head: Normocephalic and atraumatic. Right Ear: External ear normal.      Left Ear: External ear normal.      Nose: Nose normal.      Mouth/Throat:      Mouth: Mucous membranes are dry. Pharynx: Oropharynx is clear. No oropharyngeal exudate or posterior oropharyngeal erythema. Eyes:      General: No scleral icterus. Conjunctiva/sclera: Conjunctivae normal.      Pupils: Pupils are equal, round, and reactive to light. Neck:      Musculoskeletal: Neck supple. No neck rigidity. Cardiovascular:      Rate and Rhythm: Normal rate and regular rhythm. Pulses: Normal pulses. Heart sounds: Normal heart sounds. Pulmonary:      Effort: Pulmonary effort is normal. No respiratory distress. Breath sounds: Wheezing (Bilateral wheezing with diminished air entry) and rales (Soft bilateral crackles) present. Abdominal:      General: Bowel sounds are normal.      Palpations: Abdomen is soft. Tenderness: There is no abdominal tenderness. There is no guarding. Musculoskeletal:         General: No tenderness or deformity. Right lower leg: Edema (Trace bilateral pedal edema.) present. Left lower leg: Edema present. Skin:     General: Skin is warm and dry. Capillary Refill: Capillary refill takes 2 to 3 seconds. Coloration: Skin is pale (Feet and fingers are bluish in color and slightly cool to touch. Capillary refill is mildly prolonged. Both patient and son state that this is normal for patient. ). Skin is not jaundiced. Neurological:      General: No focal deficit present. Mental Status: She is alert and oriented to person, place, and time. Mental status is at baseline. GCS: GCS eye subscore is 4. GCS verbal subscore is 5. GCS motor subscore is 6. Cranial Nerves: No dysarthria or facial asymmetry. Sensory: Sensation is intact. Motor: Weakness (Generalized weakness) and tremor present. No abnormal muscle tone or seizure activity. Coordination: Coordination normal.      Comments: Patient occasionally starts sentences without finishing them. Patient displays intermittent mild confusion. Psychiatric:         Mood and Affect: Mood normal.         Behavior: Behavior normal.         DIAGNOSTIC RESULTS     EKG: All EKG's areinterpreted by the Emergency Department Physician who either signs or Co-signs this chart in the absence of a cardiologist.    EKG dated 2/17/2021 at 20 1:54 PM: Normal sinus rhythm, rate 60. Prolonged QTC at 510. Artifact present. RADIOLOGY:  Non-plain film images such as CT, Ultrasound and MRI are read by the radiologist. Plain radiographic images are visualized and preliminarily interpreted bythe emergency physician with the below findings:      XR CHEST PORTABLE   Final Result   1. No acute disease. Signed by Dr Hershell Goodpasture on 2/17/2021 8:10 PM      CT Head WO Contrast   Final Result   1. Left maxillary sinusitis changes. 2. Stable atrophy and small vessel disease with no evidence of mass or   infarct.    Signed by Dr Hershell Goodpasture on 2/17/2021 7:51 PM              LABS:  Labs Reviewed   BLOOD GAS, ARTERIAL - Abnormal; Notable for the following components:       Result Value    pH, Arterial 7.260 (*)     pO2, Arterial 157.0 (*)     HCO3, Arterial 17.1 (*)     Base Excess, Arterial -9.2 (*)     Hemoglobin, Art, Extended 7.1 (*)     All other components within normal limits    Narrative:     CALL  Steele  Faustino Gramajo RN ER, 02/17/2021 19:18, by [facility]   CBC WITH AUTO DIFFERENTIAL - Abnormal; Notable for the following components:    WBC 19.5 (*)     Hemoglobin 8.8 (*)     Hematocrit 32.8 (*)     MCV 77.9 (*) MCH 20.9 (*)     MCHC 26.8 (*)     RDW 23.0 (*)     Platelets 253 (*)     Neutrophils % 80.7 (*)     Lymphocytes % 8.4 (*)     Neutrophils Absolute 15.8 (*)     Monocytes Absolute 1.90 (*)     Anisocytosis 4+ (*)     Microcytes 1+ (*)     Polychromasia 2+ (*)     Hypochromia 3+ (*)     Poikilocytes 3+ (*)     Schistocytes Occasional (*)     Alana Cells 3+ (*)     Ovalocytes 2+ (*)     Target Cells Occasional (*)     All other components within normal limits   COMPREHENSIVE METABOLIC PANEL W/ REFLEX TO MG FOR LOW K - Abnormal; Notable for the following components:    Potassium reflex Magnesium 5.6 (*)     CO2 18 (*)     BUN 90 (*)     CREATININE 2.8 (*)     GFR Non- 16 (*)     GFR African American 20 (*)     Calcium 8.7 (*)     Albumin 3.4 (*)     Alkaline Phosphatase 245 (*)      (*)      (*)     All other components within normal limits   BRAIN NATRIURETIC PEPTIDE - Abnormal; Notable for the following components:    Pro-BNP 28,386 (*)     All other components within normal limits   URINE RT REFLEX TO CULTURE - Abnormal; Notable for the following components:    Clarity, UA CLOUDY (*)     Blood, Urine SMALL (*)     Protein, UA 30 (*)     All other components within normal limits   D-DIMER, QUANTITATIVE - Abnormal; Notable for the following components:    D-Dimer, Quant 2.74 (*)     All other components within normal limits   MICROSCOPIC URINALYSIS - Abnormal; Notable for the following components:    Bacteria, UA NEGATIVE (*)     Crystals, UA NEG (*)     All other components within normal limits   COVID-19, RAPID    Narrative:     ORDER WAS CANCELLED 02/17/2021 21:10, wrong order. CULTURE, BLOOD 1   CULTURE, BLOOD 2   POTASSIUM, WHOLE BLOOD   LACTIC ACID, PLASMA   TROPONIN   MAGNESIUM       All other labs were within normal range or not returned as of this dictation.     EMERGENCY DEPARTMENT COURSE and DIFFERENTIAL DIAGNOSIS/MDM:   Vitals:    Vitals: 02/17/21 2303 02/17/21 2333 02/18/21 0034 02/18/21 0157   BP: (!) 165/71 103/78 (!) 172/88 (!) 166/102   Pulse: 61 62 59 59   Resp: 17 23 15 22   Temp:    97.6 °F (36.4 °C)   TempSrc:       SpO2: (!) 82%   95%   Weight:       Height:           MDM  42-year-old female presents with chest pain, cough, and altered mental status. Patient is alert and oriented x3 on arrival.  On later interview, patient has difficulty focusing and completing sentences. Patient has no hallucinations on examination but admits \"I might have seen something. \"  Patient found to have acute on chronic renal insufficiency with BUN of 90 and pH of 7.26 indicating metabolic acidosis. Lab, EKG, and radiology results reviewed. IV fluids initiated in the emergency department. Patient also started on duo nebs secondary to evidence of COPD exacerbation. Discussed with Dr. Elías Winn, hospitalist, who will admit patient for further evaluation and treatment. CONSULTS:  None    PROCEDURES:  Unless otherwise noted below, none     Procedures    FINAL IMPRESSION      1. Altered mental status, unspecified altered mental status type    2. Acute renal failure superimposed on chronic kidney disease, unspecified CKD stage, unspecified acute renal failure type ([address]    3. Metabolic acidosis    4. Leukocytosis, unspecified type    5. Chest pain, unspecified type    6. COPD exacerbation ([facility] [address]          DISPOSITION/PLAN   DISPOSITION Admitted 02/18/2021 12:01:18 AM      PATIENT REFERRED TO:  No follow-up provider specified.     DISCHARGE MEDICATIONS:  New Prescriptions    No medications on file          (Please note that portions of this note were completed with a voice recognition program.  Efforts were made to edit thedictations but occasionally words are mis-transcribed.)    Terry Beltran MD (electronically signed)  Attending Emergency Physician          Terry Beltran MD  02/18/21 [address] Racquel Gomes MD  02/18/21 [id number]

## 2021-02-18 NOTE — ED NOTES
Spoke to patients son, Wilbur Lev, and updated on patients condition.       Stephania Neal RN  02/17/21 2422

## 2021-02-18 NOTE — ED NOTES
Dr. Roxy Osuna and RT at bedside to intubate. Pt intubated 7.5, 21 at the lip.  Yellow color change on C02 detector, bilateral breath sounds auscultate by Dr. Tete Beltre, RN  02/18/21 1380

## 2021-02-18 NOTE — PROGRESS NOTES
Hospitalist Progress Note  [facility]     Patient: Gaetana Gilford  : 1940  MRN: [id number]  Code Status: Full Code    Hospital Day: 0   Date of Service: 2021    Subjective:   Pt seen and examined. Intubated. Not requiring sedation. Past Medical History:   Diagnosis Date    Acid reflux     Allergic rhinitis     Bilateral carotid artery stenosis 2020    Hyperlipidemia     Hypertension     Irritable bowel syndrome with diarrhea 2019    Lung [facility] ([facility]     Lung with chemo    Mild single current episode of major depressive disorder ([facility] 2018    New onset of congestive heart failure ([facility] 10/7/2020    Osteoarthritis     Osteoporosis     Other emphysema ([facility] 2017    Palliative care patient 2019    Panic disorder 2019    Stage 3 severe COPD by GOLD classification ([facility] 2018    FEV1 42%    Tobacco abuse 2018    Urinary incontinence     stress incontinence       Past Surgical History:   Procedure Laterality Date    APPENDECTOMY      CARDIAC CATHETERIZATION  5/3/2013   MDL    EF 60%    HEMORRHOID SURGERY      HYSTERECTOMY      HYSTERECTOMY, TOTAL ABDOMINAL      LUNG CANCER SURGERY      TONSILLECTOMY      VASCULAR SURGERY  10/11/2019    TJR. Aortogram and runoff. PTA and stenting of the left external iliac artery with a 7x 80 and innova stent dilated to 7.2mm. PTA of the left popliteal artery with 5mm x 2cm cutting balloon. attempted recalization of the peroneal artery aborted. Family History   Problem Relation Age of Onset    High Blood Pressure Mother     High Blood Pressure Father     Diabetes Sister     High Blood Pressure Brother        Social History     Socioeconomic History    Marital status:       Spouse name: Not on file    Number of children: Not on file    Years of education: Not on file    Highest education level: Not on file   Occupational History    Not on file   Social Needs  Financial resource strain: Not on file   [employer] insecurity     Worry: Not on file     Inability: Not on file   [employer] needs     Medical: Not on file     Non-medical: Not on file   Tobacco Use    Smoking status: Former Smoker     Packs/day: 0.50     Years: 30.00     Pack years: 15.00     Types: Cigarettes     Start date: 1970     Quit date: 2019     Years since quittin.5    Smokeless tobacco: Never Used   Substance and Sexual Activity    Alcohol use: Yes     Comment: occcasionally    Drug use: No    Sexual activity: Yes     Partners: Male   Lifestyle    Physical activity     Days per week: Not on file     Minutes per session: Not on file    Stress: Not on file   Relationships    Social connections     Talks on phone: Not on file     Gets together: Not on file     Attends Voodoo service: Not on file     Active member of club or organization: Not on file     Attends meetings of clubs or organizations: Not on file     Relationship status: Not on file    Intimate partner violence     Fear of current or ex partner: Not on file     Emotionally abused: Not on file     Physically abused: Not on file     Forced sexual activity: Not on file   Other Topics Concern    Not on file   Social History Narrative    Not on file       Current Facility-Administered Medications   Medication Dose Route Frequency Provider Last Rate Last Admin    sodium chloride flush 0.9 % injection 10 mL  10 mL Intravenous 2 times per day Henrique Cannon MD        sodium chloride flush 0.9 % injection 10 mL  10 mL Intravenous PRN Henrique Cannon MD        potassium chloride 10 mEq/100 mL IVPB (Peripheral Line)  10 mEq Intravenous PRN Henrique Cannon MD        magnesium sulfate 1000 mg in dextrose 5% 100 mL IVPB  1,000 mg Intravenous PRN Henrique Cannon MD        ipratropium-albuterol (DUONEB) nebulizer solution 1 ampule  1 ampule Inhalation 4x daily Henrique Cannon MD   1 ampule at 21 9974  heparin (porcine) injection 5,000 Units  5,000 Units Subcutaneous 3 times per day Henrique Cannon MD   5,000 Units at 02/18/21 0618    promethazine (PHENERGAN) tablet 12.5 mg  12.5 mg Oral Q6H PRN Henrique Cannon MD        Or    ondansetron (ZOFRAN) injection 4 mg  4 mg Intravenous Q6H PRN Henrique Cannon MD        polyethylene glycol (GLYCOLAX) packet 17 g  17 g Oral Daily PRN Henrique Cannon MD        acetaminophen (TYLENOL) tablet 650 mg  650 mg Oral Q6H PRN Henrique Cannon MD        Or    acetaminophen (TYLENOL) suppository 650 mg  650 mg Rectal Q6H PRN Henrique Cannon MD        cefepime (MAXIPIME) 1,000 mg in sterile water 10 mL IV syringe  1,000 mg Intravenous Q12H Henrique Cannon MD   Stopped at 02/18/21 0434    famotidine (PEPCID) injection 20 mg  20 mg Intravenous Daily Henrique Cannon MD        sodium bicarbonate 150 mEq in dextrose 5 % 1,000 mL infusion   Intravenous Continuous Renato Anaya MD             IV infusion builder          Objective:   BP (!) 155/43   Pulse 111   Temp 96.4 °F (35.8 °C) (Temporal)   Resp 16   Ht 5' (1.524 m)   Wt 133 lb 12.8 oz (60.7 kg)   SpO2 99%   BMI 26.13 kg/m²     General: no acute distress  HEENT: normocephalic, atraumatic, 7 mm pupils extremely sluggish  Neck: supple, symmetrical, trachea midline   Lungs: bilateral wheezing  Cardiovascular: s1 and s2 normal  Abdomen: soft, positive bowel sounds  Extremities: no edema  Neuro: intubated, not requiring sedation    Recent Labs     02/17/21  1804 02/18/21  0401   WBC 19.5* 13.6*   RBC 4.21 4.13*   HGB 8.8* 8.6*   HCT 32.8* 34.4*   MCV 77.9* 83.3   MCH 20.9* 20.8*   MCHC 26.8* 25.0*   * 418*     Recent Labs     02/17/21  1804 02/17/21  1804 02/18/21  0330 02/18/21  0401 02/18/21  0744 02/18/21  0837     --   --  137 144  --    K 5.6*   < >  --  7.7* 7.1* 6.6   ANIONGAP 16  --   --  15 22*  --      --   --  108 109  --    CO2 18*  --   --  14* 13*  --    BUN 90*  --   --  88* 95*  -- CREATININE 2.8*  --   --  2.9* 3.0*  --    GLUCOSE 82  --  62 137* 131*  --    CALCIUM 8.7*  --   --  9.4 9.0  --     < > = values in this interval not displayed. Recent Labs     02/17/21  1804 02/18/21  0401   MG 2.4  --    PHOS  --  9.3*     Recent Labs     02/17/21  1804 02/18/21  0401   * 411*   * 275*   BILITOT 0.7 0.9   ALKPHOS 245* 222*     No results for input(s): PH, PO2, PCO2, HCO3, BE, O2SAT in the last 72 hours. Recent Labs     02/17/21 1804 02/18/21  0401   TROPONINI 0.03 0.05*     No results for input(s): INR in the last 72 hours. Recent Labs     02/18/21  0744   LACTA 4.7*         Intake/Output Summary (Last 24 hours) at 2/18/2021 0851  Last data filed at 2/18/2021 0715  Gross per 24 hour   Intake    Output 250 ml   Net -250 ml       Ct Head Wo Contrast    Result Date: 2/17/2021  CT HEAD WO CONTRAST 2/17/2021 7:50 PM History: Altered mental status. Noncontrast head CT compared with 29 June 2020. In order to have a CT radiation dose as low as reasonably achievable Automated Exposure Control was utilized for adjustment of the mA and/or KV according to patient size. DLP in mGycm= 742. Left maxillary sinus fluid and mucosal thickening is increased. Mild atrophy and moderate small vessel disease. Normal ventricle size. No hemorrhage or mass effect. 1. Left maxillary sinusitis changes. 2. Stable atrophy and small vessel disease with no evidence of mass or infarct.  Signed by Dr Margie Manzanares on 2/17/2021 7:51 PM    Xr Chest Portable    Result Date: 2/18/2021 Examination. XR CHEST PORTABLE 2/18/2021 2:43 AM History: Intubation. A single frontal portable supine view of the chest is compared with the previous study dated 2/17/2021. There are respiratory motion artifacts. Chronic appearing interstitial changes and granulomas are seen in the lungs bilaterally, similar to the previous study. There is no pleural effusion, pulmonary congestion or pneumothorax. There is moderate cardiomegaly. Atheromatous changes thoracic aorta are noted. No acute bony abnormality. An endotracheal tube is seen with distal end 5 cm above trachea bifurcation. This is in appropriate position. No active cardiopulmonary disease. ET tube in appropriate position. Chronic inflammatory and granulomatous lung changes. A moderate cardiomegaly. Signed by Dr Michelle Hernandez on 2/18/2021 7:01 AM    Xr Chest Portable    Result Date: 2/17/2021  XR CHEST PORTABLE 2/17/2021 8:10 PM History: Cough and congestion. Portable chest x-ray compared with 7 October 2020. The heart size is normal. The mediastinum is within normal limits. The lungs are adequately expanded with no pneumonia or pneumothorax. There is mild chronic interstitial disease with scattered calcified granulomas. There is no significant pleural fluid. No congestive failure changes. 1. No acute disease.  Signed by Dr Annalise Johnson on 2/17/2021 8:10 PM       Assessment and Plan:   COPD exacerbation  Steroids  Nebs  Antibiotics    MUNIRA/CKD3/hyperkalemia  Renal following  Cocktail administered for hyperkalemia  Follow anion gap metabolic acidosis to correction  Trend lactate to clearance    Acute encephalopathy  [facility] 2/17/2021 without evidence for acute process  MRI as warranted  Follow ABG  RPR  Ammonia  Neurology consult as warranted    Transaminitis  Trend LFTs  Avoid offending agents    Ventilator dependent acute hypercapnic respiratory failure  Secondary to above processes Titrate minute ventilation for pH 7.35  Follow ABG    DVT prophylaxis  Heparin    Total critical care time: 50 minutes    Yazan Slater MD   2/18/2021 8:51 AM

## 2021-02-18 NOTE — PROGRESS NOTES
Pharmacy Renal Adjustment    Gaetana Gilford is a 68 y.o. female. Pharmacy has renally adjusted medications per protocol. Recent Labs     02/17/21  1804   BUN 90*       Recent Labs     02/17/21  1804   CREATININE 2.8*       Estimated Creatinine Clearance: 14 mL/min (A) (based on SCr of 2.8 mg/dL (H)).     Height:   Ht Readings from Last 1 Encounters:   02/17/21 5' (1.524 m)     Weight:  Wt Readings from Last 1 Encounters:   02/17/21 144 lb (65.3 kg)       CKD stage: ARF on CKD, unspecified CKD stage    Plan: Adjust the following medications based on renal function:           Cefepime 1000 mg IV every 8 hours to every 12 hours    Electronically signed by Luba Penn RPH on 2/18/2021 at 4:10 AM

## 2021-02-18 NOTE — ED NOTES
Spoke with patients son, Cathi Zaman, and updated on plan of care.        Indra Reese RN  02/17/21 2044

## 2021-02-18 NOTE — ED NOTES
Spoke with Dr Cecilio George and he is coming down to see the pt.       Zoya Spears RN  02/18/21 4259

## 2021-02-18 NOTE — PROGRESS NOTES
Pharmacy Renal Adjustment    Latonia Teresa is a 68 y.o. female. Pharmacy has renally adjusted medications per protocol. Recent Labs     02/17/21  1804 02/18/21  0401   BUN 90* 88*       Recent Labs     02/17/21  1804 02/18/21  0401   CREATININE 2.8* 2.9*       Estimated Creatinine Clearance: 13 mL/min (A) (based on SCr of 2.9 mg/dL (H)).     Height:   Ht Readings from Last 1 Encounters:   02/17/21 5' (1.524 m)     Weight:  Wt Readings from Last 1 Encounters:   02/18/21 133 lb 12.8 oz (60.7 kg)       CKD stage: ARF on CKD, unspecified CKD stage    Plan: Adjust the following medications based on renal function:           Famotidine 20 mg IV twice daily to once daily    Electronically signed by Mariah Hastings, [address] [place] [address] on 2/18/2021 at 5:47 AM

## 2021-02-18 NOTE — ED NOTES
RT and Dr Luis Wilkins at bedside. Pt having agonal, shallow breathing. Pt lifts head. PT altered. HR in 40's. Pt bag assisted by RT.       Curt Perez RN  02/18/21 2161

## 2021-02-18 NOTE — ED NOTES
Pt denies pain, but has been moaning out since admission. Pt states, \"my mom told me I came out moaning, and I have all my life. \"    Pt is also restless.       Shauna Mckeon RN  02/18/21 116

## 2021-02-18 NOTE — ED NOTES
Dr Martin Pride and Dr Amanda Kimball at bedside. Decided to intubate the pt.       Akua Nassar, RN  02/18/21 8352

## 2021-02-18 NOTE — ED NOTES
Called and spoke with the pt's son (POA) and he stated that she has not slept. He stated she is a Full Code.       Corwin Lr RN  02/18/21 1540

## 2021-02-18 NOTE — ED NOTES
Spoke with patients son, Daljit Silver, and updated on plan of care.   Michael Espinosa, RN  02/18/21 7970

## 2021-02-18 NOTE — H&P
[place], [place], [address]    DEPARTMENT OF HOSPITALIST MEDICINE      HISTORY & PHYSICAL:          REASON FOR ADMISSION:  Chief Complaint   Patient presents with    Altered Mental Status        HISTORY OF PRESENT ILLNESS:  Gaetana Gilford is an 68 y.o. female. She is a very unfortunate patient with chronic medical issues who was brought to the ER for evaluation with altered mental status. She is feeling weak and tired for the last few days which is getting worse. Patient's son is the POA who insisted her to come to the hospital for evaluation. Work-up was done which showed shortness of breath with BNP more than 28,000. Potassium level was done which was 5.6. Patient received 1 dose of IV Lasix 20 mg. She also had leukocytosis without any evidence of pneumonia or UTI. CAT scan of the head was done which was normal as well. Patient has initially planned to be admitted with a COPD exacerbation. She was boarding in the ER waiting for the bed when she suddenly developed irregular heart rate with heart rate in 40s. Patient was placed on BiPAP and stat labs were done which showed potassium level of 8.8 and a pH of 6.99 requiring aggressive management for hyperkalemia and immediate intubation. She is now intubated on vent support and is being transferred to ICU for further management and electrolyte monitoring as well as close follow-up with intensivist and nephrology.     PAST MEDICAL HISTORY:  Past Medical History:   Diagnosis Date    Acid reflux     Allergic rhinitis     Bilateral carotid artery stenosis 1/28/2020    Hyperlipidemia     Hypertension     Irritable bowel syndrome with diarrhea 7/5/2019    Lung cancer ([address]     Lung with chemo    Mild single current episode of major depressive disorder ([address] 5/2/2018    New onset of congestive heart failure ([address] 10/7/2020    Osteoarthritis     Osteoporosis     Other emphysema ([address] 12/22/2017    Palliative care patient 08/06/2019  Panic disorder 2019    Stage 3 severe COPD by GOLD classification ([address] 2018    FEV1 42%    Tobacco abuse 2018    Urinary incontinence     stress incontinence         PAST SURGICAL HISTORY:  Past Surgical History:   Procedure Laterality Date    APPENDECTOMY      CARDIAC CATHETERIZATION  5/3/2013   MDL    EF 60%    HEMORRHOID SURGERY      HYSTERECTOMY      HYSTERECTOMY, TOTAL ABDOMINAL      LUNG CANCER SURGERY      TONSILLECTOMY      VASCULAR SURGERY  10/11/2019    TJR. Aortogram and runoff. PTA and stenting of the left external iliac artery with a 7x 80 and innova stent dilated to 7.2mm. PTA of the left popliteal artery with 5mm x 2cm cutting balloon. attempted recalization of the peroneal artery aborted. SOCIAL HISTORY:  Social History     Socioeconomic History    Marital status:       Spouse name: None    Number of children: None    Years of education: None    Highest education level: None   Occupational History    None   Social Needs    Financial resource strain: None    Food insecurity     Worry: None     Inability: None    Transportation needs     Medical: None     Non-medical: None   Tobacco Use    Smoking status: Former Smoker     Packs/day: 0.50     Years: 30.00     Pack years: 15.00     Types: Cigarettes     Start date: 1970     Quit date: 2019     Years since quittin.5    Smokeless tobacco: Never Used   Substance and Sexual Activity    Alcohol use: Yes     Comment: occcasionally    Drug use: No    Sexual activity: Yes     Partners: Male   Lifestyle    Physical activity     Days per week: None     Minutes per session: None    Stress: None   Relationships    Social connections     Talks on phone: None     Gets together: None     Attends Episcopal service: None     Active member of club or organization: None     Attends meetings of clubs or organizations: None     Relationship status: None    Intimate partner violence Fear of current or ex partner: None     Emotionally abused: None     Physically abused: None     Forced sexual activity: None   Other Topics Concern    None   Social History Narrative    None        FAMILY HISTORY:  Family History   Problem Relation Age of Onset    High Blood Pressure Mother     High Blood Pressure Father     Diabetes Sister     High Blood Pressure Brother          ALLERGIES:  Allergies   Allergen Reactions    Doxycycline      Patient states it caused chills and hot flashes severe.  Abilify [Aripiprazole] Hives    Celebrex [Celecoxib]      swelling    Naproxen Hives    Lactose Intolerance (Gi) Nausea And Vomiting        PRIOR TO ADMISSION MEDS:  Not in a hospital admission. REVIEW OF SYSTEMS:    Patient is intubated and sedated! PHYSICAL EXAM:  BP (!) 158/73   Pulse 88   Temp 97.6 °F (36.4 °C)   Resp 16   Ht 5' (1.524 m)   Wt 144 lb (65.3 kg)   SpO2 95%   BMI 28.12 kg/m²   No intake/output data recorded. PHYSICAL EXAMINATION:    Vital Signs: Please see the chart   JASON:   Patient is intubated and sedated   Head/Eyes:  Normocephalic, atraumatic, EOMI and PERRLA bilaterally   ENT: Moist mucous membranes, nasal passages clear   Neck: Supple, full range of motion, no carotid bruit, trachea midline, endotracheal tube in place   Respiratory:   Bilateral decreased air entry in both lung fields, mild B/L crackles,+ scattered bilateral basilar rales   Cardiovascular:  Regular rate and rhythm, S1+S2+0, no murmurs/rubs   Urology: No bilateral CVA tenderness, no suprapubic tenderness   Abdomen:   Soft, non-tender, bowel sounds +ve, no organomegaly   Muscle/Joints: Moves all, decreased range of motion, no muscle spasms   Extremities: No clubbing, no cyanosis, no calf tenderness, no edema   Pulses: 2+ bilaterally, symmetrical   Skin: Warm, dry, no pallor/cyanosis/jaundice, no rashes/lesions   Neurologic:  Unable to obtain . .. Patient is intubated and sedated! Patient may be started on low-dose Lasix 20 mg IV twice daily once potassium level is improved and patient is off of bicarbonate drip      Repeat labs in a.m. Electrolyte replacement as per protocol. Patient will be monitored very closely on the floor. Further recommendations as per the hospital course. Patient's management will be taken over by our [facility] Hospitalist Team in am.    Patient  is on DVT prophylaxis  Current medications reviewed  Lab work reviewed  Radiology/Chest x-ray films reviewed  Discussed with the nurse and addressed all questions/concerns  Discussed with Patient and/or Family at the bedside in detail . .. they verbalize understanding and agree with the management plan. I attest that I spend >60 minutes engaged in critical care of this patient. This includes review of EMR data, imaging, bedside evaluation, patient/family counseling and coordination of care with nursing, providers and consultants. Attestation:  A minimum of two midnights of inpatient hospital care is anticipated to be required based on the patient's clinical condition which requires intensive medical therapy. At this time the patient is not clinically optimized for safe discharge. Emery Javier MD  4:25 AM 2/18/2021      DISCLAIMER: This note was created with electronic voice recognition which does have occasional errors. If you have any questions regarding the content within the note please do not hesitate to contact me. .. Thanks.

## 2021-02-18 NOTE — PLAN OF CARE
Problem: Skin Integrity:  Goal: Will show no infection signs and symptoms  Description: Will show no infection signs and symptoms  2/18/2021 1304 by Campbell Flores RN  Outcome: Ongoing  2/18/2021 0551 by Samina Mena RN  Outcome: Ongoing  Goal: Absence of new skin breakdown  Description: Absence of new skin breakdown  2/18/2021 1304 by Campbell Flores RN  Outcome: Ongoing  2/18/2021 0551 by Samina Mena RN  Outcome: Ongoing     Problem: Falls - Risk of:  Goal: Will remain free from falls  Description: Will remain free from falls  2/18/2021 1304 by Campbell Flores RN  Outcome: Ongoing  2/18/2021 0551 by Samina Mena RN  Outcome: Ongoing  Goal: Absence of physical injury  Description: Absence of physical injury  2/18/2021 1304 by Campbell Flores RN  Outcome: Ongoing  2/18/2021 0551 by Samina Mena RN  Outcome: Ongoing

## 2021-02-18 NOTE — PROGRESS NOTES
2/17/2021  7:18 PM - Marylou Christine Incoming Lab Results From [facility]    Component Value Ref Range & Units Status Collected Lab   pH, Arterial 7.260Low Panic   7.350 - 7.450 Final 02/17/2021  7:12 PM [facility]   pCO2, Arterial 38.0  35.0 - 45.0 mmHg Final 02/17/2021  7:12 PM [facility]   pO2, Arterial 157. 0High   80.0 - 100.0 mmHg Final 02/17/2021  7:12 PM [facility]   HCO3, Arterial 17.1Low   22.0 - 26.0 mmol/L Final 02/17/2021  7:12 PM [facility] Excess, Arterial -9.2Low   -2.0 - 2.0 mmol/L Final 02/17/2021  7:12 PM [facility]   Hemoglobin, Art, Extended 7.1Low   12.0 - 16.0 g/dL Final 02/17/2021  7:12 PM [facility]   O2 Sat, Arterial 96.9  >92 % Final 02/17/2021  7:12 PM [facility]   Carboxyhgb, Arterial 2.3  0.0 - 5.0 % Final 02/17/2021  7:12 PM [facility]        0.0-1.5   (Smokers 1.5-5.0)    Methemoglobin, Arterial 0.5  <1.5 % Final 02/17/2021  7:12 PM [facility]   O2 Content, Arterial 10.1  Not Established mL/dL Final 02/17/2021  7:12 PM [facility]     Pt on 2 lpm nasal cannula as per home dose  resp rate 22  Right radial puncture AT+

## 2021-02-18 NOTE — CONSULTS
Palliative Care Consult Note  2/18/2021 9:18 AM  Subjective:   Admit Date: 2/17/2021  PCP: Rama Croft MD    Reason For Consult:  Code status, Family Support    Requesting Physician:  Dr. Lawyer Zelaya    History Obtained From: EMR    Chief Complaint: intubated and mechanically ventilated    History of Present Illness:  Patient is a 68 yr old female with a PMH of  COPD O2 dependent, CHF, Grade 3 diastolic dysfunction, that presented to the ED on 2/17/2021 from home with complaints of altered mental status. Patient's son reported that patient has had increased memory issues, slurring of speech, difficult with finishing sentences. In the ED, patient was noted to have confusion, CT of the head negative for acute issues and reflected stable atrophy and small vessel disease. CXR negative for acute processes. Labs completed with BNP 28,386, WBC 19.5, K+ 5.6, Cr 2.8, BUN 90, ph 7.26. She was initiated on IV abx, duonebs, received 1 dose of IV lasix. . Patient had further changes in mental status, had noted hypoglycemia, K+ increased to 8.8, bradycardia, and was initially placed on BiPAP but was subsequently intubated and mechanically ventilated, transferred to ICU for further evaluation and treatment, Nephrology consulted. Patient remains in ICU intubated and mechanically ventilated, FiO2 60%, sats 99%.            Past Medical History:        Diagnosis Date    Acid reflux     Allergic rhinitis     Bilateral carotid artery stenosis 1/28/2020    Hyperlipidemia     Hypertension     Irritable bowel syndrome with diarrhea 7/5/2019    Lung cancer [facility] [address]     Lung with chemo    Mild single current episode of major depressive disorder ([address] 5/2/2018    New onset of congestive heart failure ([address] 10/7/2020    Osteoarthritis     Osteoporosis     Other emphysema ([address] 12/22/2017    Palliative care patient 08/06/2019    Panic disorder 8/16/2019    Stage 3 severe COPD by GOLD classification ([facility] [address] 6/6/2018    FEV1 42%  Tobacco abuse 2/8/2018    Urinary incontinence     stress incontinence       Past Surgical History:        Procedure Laterality Date    APPENDECTOMY      CARDIAC CATHETERIZATION  5/3/2013   MDL    EF 60%    HEMORRHOID SURGERY      HYSTERECTOMY      HYSTERECTOMY, TOTAL ABDOMINAL      LUNG CANCER SURGERY      TONSILLECTOMY      VASCULAR SURGERY  10/11/2019    TJR. Aortogram and runoff. PTA and stenting of the left external iliac artery with a 7x 80 and innova stent dilated to 7.2mm. PTA of the left popliteal artery with 5mm x 2cm cutting balloon. attempted recalization of the peroneal artery aborted. Allergies:  Doxycycline, Abilify [aripiprazole], Celebrex [celecoxib], Naproxen, and Lactose intolerance (gi)    Social History:    TOBACCO:   reports that she quit smoking about 18 months ago. Her smoking use included cigarettes. She started smoking about 50 years ago. She has a 15.00 pack-year smoking history. She has never used smokeless tobacco.  ETOH:   reports current alcohol use.     Family History:       Problem Relation Age of Onset    High Blood Pressure Mother     High Blood Pressure Father     Diabetes Sister     High Blood Pressure Brother        Palliative Performance Score: 10%    Review of Systems   Unable to obtain due to acuity of care, intubated and mechanically ventilated      Palliative Review of Advance Directives:     Surrogate Decision Maker:yes, adult children    Durable Power of :no    Advanced Directives/Living Salas: no    Out of hospital medical orders in place to reflect resuscitation status (MOLST/POLST): no    Information Sharing:  Patient's awareness of illness:  [] Terminal [] Life-Threatening [] Serious [] Non life-threatening [] Not serious   [x] Not discussed    Family awareness of illness:   [] Terminal [x] Life-Threatening [] Serious [] Nonlife-threatening [] Not serious   [] Not discussed    Diet NPO Effective Now Exceptions are: Ice Chips, Sips with Meds 0330 02/18/21  0401 02/18/21  0744 02/18/21  0837     --   --  137 144  --    K 5.6*   < >  --  7.7* 7.1* 6.6   ANIONGAP 16  --   --  15 22*  --      --   --  108 109  --    CO2 18*  --   --  14* 13*  --    BUN 90*  --   --  88* 95*  --    CREATININE 2.8*  --   --  2.9* 3.0*  --    GLUCOSE 82  --  62 137* 131*  --    CALCIUM 8.7*  --   --  9.4 9.0  --     < > = values in this interval not displayed. Recent Labs     02/17/21 1804 02/18/21  0401   MG 2.4  --    PHOS  --  9.3*     Recent Labs     02/17/21 1804 02/18/21  0401   * 411*   * 275*   BILITOT 0.7 0.9   ALKPHOS 245* 222*     ABGs:  Recent Labs     02/17/21  1912 02/18/21  0312 02/18/21  0837   PHART 7.260* 6.990* 7.150*   CPQ4VGF 38.0 59.0* 44.0   PO2ART 157.0* 136.0* 85.0   NZC9QYB 17.1* 14.2* 15.3*   BEART -9.2* -16.8* -12.9*   HGBAE 7.1* 9.5* 9.8*   G9UDFSRS 96.9 96.7 93.1   CARBOXHGBART 2.3 2.1 2.1   02THERAPY Unknown Unknown Unknown     HgBA1c: No results for input(s): LABA1C in the last 72 hours. FLP:    Lab Results   Component Value Date    TRIG 135 12/08/2020    HDL 37 12/08/2020    LDLCALC 72 12/08/2020    LDLDIRECT 158 01/18/2016     TSH:    Lab Results   Component Value Date    TSH 3.450 12/08/2020     Troponin T:   Recent Labs     02/17/21  1804 02/18/21  0401   TROPONINI 0.03 0.05*     INR: No results for input(s): INR in the last 72 hours.     Objective:   Vitals: BP (!) 155/43   Pulse 111   Temp 96.4 °F (35.8 °C) (Temporal)   Resp 16   Ht 5' (1.524 m)   Wt 133 lb 12.8 oz (60.7 kg)   SpO2 99%   BMI 26.13 kg/m²   24HR INTAKE/OUTPUT:      Intake/Output Summary (Last 24 hours) at 2/18/2021 0918  Last data filed at 2/18/2021 0715  Gross per 24 hour   Intake    Output 250 ml   Net -250 ml     Physical Exam    General appearance: unresponsive, no acute distress, appears chronically ill HEENT: atraumatic, eyes with clear conjunctiva and normal lids, pupils very sluggish and irises normal, external ears and nose are normal,lips normal.  Neck: without masses, supple   Lungs:  ventilating bilaterally, scattered wheezes throughout  Heart: regular rate and rhythm and S1, S2 normal  Abdomen: soft, non-tender; bowel sounds normal; no masses,  no organomegaly  Genitourinary: No bladder fullness, masses, or tenderness  Extremities: atraumatic, no edema  Neurologic: Unresponsive, not sedated  Psychiatric: no agitation or anxiety  Skin: warm, dry    Assessment and Plan:   Principal Problem:    Acute hyperkalemia  Active Problems:    Acute respiratory failure (HCC)    Lactic acidosis    Pulmonary hypertension due to COPD (HCC)    Panic disorder    Atherosclerosis of native arteries of extremities with intermittent claudication, bilateral legs ([facility])    Palliative care patient    Recurrent major depressive disorder, in full remission ([facility] [address]    Chronic respiratory failure with hypoxia (HCC)    Oxygen dependent    Atherosclerosis of artery of extremity with ulceration (HCC)    Chronic diastolic (congestive) heart failure (HCC)    Hyperlipidemia    Acute exacerbation of chronic obstructive pulmonary disease (COPD) (HCC)    Hypoglycemia    Hyperkalemia  Resolved Problems:    * No resolved hospital problems.  * Visit Summary: Chart reviewed, discussed patient with nursing, patient seen at the bedside. I contacted the patient's son, discussion completed (66 min) to review patient's health history, events leading to hospitalization, current status/treatments, test results, and concerns regarding vent dependence, electrolyte imbalance, lack of response without sedation, HR changes, etc. Patient's son did state that the patient did not take her lasix at home as prescribed due to increased urination, kept herself fluid restricted, but did take her potassium regularly, complications regarding this discussed. He also has concern about the patient sleeping more throughout the day since taking requip, does state that the requip is helpful for patient's RLS though. Should patient improve, he would like to have requip use reviewed prior to discharge. Son acknowledges that patient has very significant COPD, education provided regarding patient diastolic dysfunction and MUNIRA on CKD, metabolic imbalance. I have also discussed concerns regarding patient's lack of response on vent, currently not on sedation, and close monitoring. Patient's son appears to have good understanding of her multiple health issues and all questions answered to [facility]. I have encouraged him to reconsider code status as patient is critically ill and is at risk for decline despite all aggressive treatments and interventions. Cathi Zaman plans to discuss code status with his sibling and will notify nursing of any changes. Emotional support provided. Nursing staff notified of the outcome of my visit. Palliative care will continue to follow. Recommendations:     1. Palliative Care- GOC dependent on patient's progress throughout hospitalization. Code status- Encouraged reconsideration, son plans to discuss with his sibling and will notify nursing/provider of any changes.      2. Acute Hypercapnic Respiratory Failure- vent management per attending 3. MUNIRA/CKDIII- Nephrology following, fluid and electrolyte management    4. COPD exacerbation- per attending, steroids, nebulizers, abx    Thank you for consulting palliative care and allowing us to participate in the care of the patient.       CounselingTopics: Goals of care, Code Status, Disease process education, pt/family support    Time Spent Counseling > 50%:  YES                                   Total Time Spent: 81 min    Electronically signed by DIANA Love on 2/18/2021 at 9:18 AM    (Please note that portions of this note were completed with a voice recognition program.  Efforts were made to edit the dictations but occasionally words are mis-transcribed.)

## 2021-02-18 NOTE — ED NOTES
Dr Patel Broussard called due to pt's irregular heart rate. Dr Patel Broussard gave verbal order for ABG, Lactic Acid, CBC, Mag, Phos, CMP.       Jacqualine Curling, RN  02/18/21 8391

## 2021-02-19 NOTE — PROGRESS NOTES
Hospitalist Progress Note  [facility]     Patient: Pastora Hearn  : 1940  MRN: [id number]  Code Status: Full Code    Hospital Day: 1   Date of Service: 2021    Subjective:   Pt seen and examined. Intubated and sedated. Past Medical History:   Diagnosis Date    Acid reflux     Acute renal failure superimposed on chronic kidney disease (HCC)     Allergic rhinitis     Bilateral carotid artery stenosis 2020    Hyperlipidemia     Hypertension     Irritable bowel syndrome with diarrhea 2019    Lung cancer ([facility] [address]     Lung with chemo    Mild single current episode of major depressive disorder ([facility] [address] 2018    New onset of congestive heart failure ([facility] [address] 10/7/2020    Osteoarthritis     Osteoporosis     Other emphysema ([facility] [address] 2017    Palliative care patient 2019    Panic disorder 2019    Stage 3 severe COPD by GOLD classification ([facility] 2018    FEV1 42%    Tobacco abuse 2018    Urinary incontinence     stress incontinence       Past Surgical History:   Procedure Laterality Date    APPENDECTOMY      CARDIAC CATHETERIZATION  5/3/2013   MDL    EF 60%    HEMORRHOID SURGERY      HYSTERECTOMY      HYSTERECTOMY, TOTAL ABDOMINAL      LUNG CANCER SURGERY      TONSILLECTOMY      VASCULAR SURGERY  10/11/2019    TJR. Aortogram and runoff. PTA and stenting of the left external iliac artery with a 7x 80 and innova stent dilated to 7.2mm. PTA of the left popliteal artery with 5mm x 2cm cutting balloon. attempted recalization of the peroneal artery aborted. Family History   Problem Relation Age of Onset    High Blood Pressure Mother     High Blood Pressure Father     Diabetes Sister     High Blood Pressure Brother        Social History     Socioeconomic History    Marital status:       Spouse name: Not on file    Number of children: Not on file    Years of education: Not on file    Highest education level: Not on file  [START ON 2/20/2021] cefepime (MAXIPIME) 1,000 mg in sterile water 10 mL IV syringe  1,000 mg Intravenous Q24H Henrique Cannon MD        sodium bicarbonate 75 mEq in dextrose 5 % 1,000 mL infusion   Intravenous Continuous Ai Jade  mL/hr at 02/19/21 1110 New Bag at 02/19/21 1110    propofol injection  5-50 mcg/kg/min Intravenous Titrated Angeles Cabral MD        sodium chloride flush 0.9 % injection 10 mL  10 mL Intravenous 2 times per day Henrique Cannon MD   10 mL at 02/19/21 0848    sodium chloride flush 0.9 % injection 10 mL  10 mL Intravenous PRN Henrique Cannon MD        potassium chloride 10 mEq/100 mL IVPB (Peripheral Line)  10 mEq Intravenous PRN Henrique Cannon MD        magnesium sulfate 1000 mg in dextrose 5% 100 mL IVPB  1,000 mg Intravenous PRN Henrique Cannon MD        heparin (porcine) injection 5,000 Units  5,000 Units Subcutaneous 3 times per day Henrique Cannon MD   5,000 Units at 02/19/21 0713    promethazine (PHENERGAN) tablet 12.5 mg  12.5 mg Oral Q6H PRN Henrique Cannon MD        Or    ondansetron (ZOFRAN) injection 4 mg  4 mg Intravenous Q6H PRN Henrique Cannon MD        polyethylene glycol (GLYCOLAX) packet 17 g  17 g Oral Daily PRN Henrique Cannon MD        acetaminophen (TYLENOL) tablet 650 mg  650 mg Oral Q6H PRN Henrique Cannon MD        Or    acetaminophen (TYLENOL) suppository 650 mg  650 mg Rectal Q6H PRN Henrique Cannon MD        famotidine (PEPCID) injection 20 mg  20 mg Intravenous Daily Henrique Cannon MD   20 mg at 02/19/21 0847    methylPREDNISolone sodium (SOLU-MEDROL) injection 40 mg  40 mg Intravenous Q8H Bruce Harvey MD   40 mg at 02/19/21 1000    ipratropium-albuterol (DUONEB) nebulizer solution 1 ampule  1 ampule Inhalation Q4H Bruce Harvey MD   1 ampule at 02/19/21 1024    hydrALAZINE (APRESOLINE) injection 10 mg  10 mg Intravenous Q6H PRN Bruce Harvey MD   10 mg at 02/18/21 1144    glucose (GLUTOSE) 40 % oral gel 15 g  15 g Oral PRN Bruce Harvey MD  dextrose 50 % IV solution  12.5 g Intravenous PRN Charisse Aburto MD        glucagon (rDNA) injection 1 mg  1 mg Intramuscular PRN Charisse Aburto MD        dextrose 5 % solution  100 mL/hr Intravenous PRN Charisse Aburto MD        miconazole (MICOTIN) 2 % powder   Topical BID Charisse Aburto MD   Given at 02/19/21 7827    dexmedetomidine (PRECEDEX) 400 mcg in sodium chloride 0.9 % 100 mL infusion  0.2-1.4 mcg/kg/hr Intravenous Continuous Nikhil Lam MD 21.2 mL/hr at 02/19/21 1048 1.4 mcg/kg/hr at 02/19/21 1048    carboxymethylcellulose (REFRESH PLUS) 0.5 % ophthalmic solution 1 drop  1 drop Both Eyes TID Charisse Aburto MD   1 drop at 02/19/21 0848    insulin lispro (HUMALOG) injection vial 0-6 Units  0-6 Units Subcutaneous TID WC Charisse Aburto MD   1 Units at 02/19/21 0853    insulin lispro (HUMALOG) injection vial 0-3 Units  0-3 Units Subcutaneous Nightly Charisse Aburto MD   1 Units at 02/18/21 2033         norepinephrine 7 mcg/min (02/19/21 1049)    IV infusion builder 125 mL/hr at 02/19/21 1110    propofol      dextrose      dexmedetomidine HCl in NaCl 1.4 mcg/kg/hr (02/19/21 1048)        Objective:   BP (!) 144/90   Pulse 62   Temp 97.3 °F (36.3 °C) (Temporal)   Resp 13   Ht 5' (1.524 m)   Wt 133 lb 12.8 oz (60.7 kg)   SpO2 100%   BMI 26.13 kg/m²     General: mild distress  HEENT: normocephalic, atraumatic  Neck: supple, symmetrical, trachea midline   Lungs: bilateral rhonchi/wheezing  Cardiovascular: s1 and s2 normal  Abdomen: soft, positive bowel sounds  Extremities: rle cool to touch  Neuro: intubated and sedated    Recent Labs     02/17/21  1804 02/18/21  0401 02/19/21  0341   WBC 19.5* 13.6* 26.2*   RBC 4.21 4.13* 4.34   HGB 8.8* 8.6* 9.2*   HCT 32.8* 34.4* 33.0*   MCV 77.9* 83.3 76.0*   MCH 20.9* 20.8* 21.2*   MCHC 26.8* 25.0* 27.9*   * 418* 280     Recent Labs     02/18/21  0401 02/18/21  0744 02/18/21  0744 02/18/21  1701 02/19/21  0341 02/19/21  0809  144  --   --  148*  --    K 7.7* 7.1*   < > 5.3 5.3* 4.9   ANIONGAP 15 22*  --   --  21*  --     109  --   --  110  --    CO2 14* 13*  --   --  17*  --    BUN 88* 95*  --   --  106*  --    CREATININE 2.9* 3.0*  --   --  3.7*  --    GLUCOSE 137* 131*  --   --  127*  --    CALCIUM 9.4 9.0  --   --  7.1*  --     < > = values in this interval not displayed. Recent Labs     02/17/21  1804 02/18/21  0401 02/19/21  0341   MG 2.4  --  2.1   PHOS  --  9.3*  --      Recent Labs     02/17/21 1804 02/18/21 0401 02/19/21  0341   * 411* >7,000*   * 275* 2,565*   BILITOT 0.7 0.9 1.2   ALKPHOS 245* 222* 373*     No results for input(s): PH, PO2, PCO2, HCO3, BE, O2SAT in the last 72 hours. Recent Labs     02/17/21 1804 02/18/21  0401   TROPONINI 0.03 0.05*     No results for input(s): INR in the last 72 hours. Recent Labs     02/19/21  0341   LACTA 3.8*         Intake/Output Summary (Last 24 hours) at 2/19/2021 1130  Last data filed at 2/19/2021 0800  Gross per 24 hour   Intake 3315.63 ml   Output 1180 ml   Net 2135.63 ml       Ct Head Wo Contrast    Result Date: 2/17/2021  CT HEAD WO CONTRAST 2/17/2021 7:50 PM History: Altered mental status. Noncontrast head CT compared with 29 June 2020. In order to have a CT radiation dose as low as reasonably achievable Automated Exposure Control was utilized for adjustment of the mA and/or KV according to patient size. DLP in mGycm= 742. Left maxillary sinus fluid and mucosal thickening is increased. Mild atrophy and moderate small vessel disease. Normal ventricle size. No hemorrhage or mass effect. 1. Left maxillary sinusitis changes. 2. Stable atrophy and small vessel disease with no evidence of mass or infarct.  Signed by Dr Seth Conway on 2/17/2021 7:51 PM    Us Renal Complete    Result Date: 2/18/2021 EXAMINATION: US RENAL COMPLETE 2/18/2021 9:44 AM HISTORY: Acute kidney injury COMPARISON: None FINDINGS: Transabdominal sonographic evaluation of the kidneys and urinary bladder was performed in the transverse and longitudinal projections. Right kidney measures 8.6 x 4.1 x 4.3 cm in size. The cortex appears mildly atrophic. There is no sign of collecting system dilatation or solid renal mass. The inferior right kidney is obscured due to shadowing from bowel gas. The urinary bladder is decompressed and therefore not well evaluated. The left kidney is completely obscured due to shadowing from bowel gas. Markedly limited examination due to shadowing from bowel gas. The right kidney is visible and appears mildly atrophic. The left kidney is completely obscured. Signed by Dr Ofe Batista on 2/18/2021 9:50 AM    Xr Chest Portable    Result Date: 2/19/2021  XR CHEST PORTABLE 2/19/2021 3:43 AM HISTORY: ET tube placement COMPARISON: Chest x-ray dated 2/18/2021. FINDINGS: Endotracheal tube is in good position. Enteric tube is in good position. Atelectasis in the retrocardiac space. Lungs are otherwise clear. No pleural effusion or pneumothorax. Heart size is normal. The pulmonary vasculature are nondilated. No acute bony abnormality. 1. Endotracheal tube is in good position. Atelectasis in the left base. Lungs are otherwise clear.  Signed by Dr Abdoul Guadarrama on 2/19/2021 7:44 AM    Xr Chest Portable    Result Date: 2/18/2021 Examination. XR CHEST PORTABLE 2/18/2021 2:43 AM History: Intubation. A single frontal portable supine view of the chest is compared with the previous study dated 2/17/2021. There are respiratory motion artifacts. Chronic appearing interstitial changes and granulomas are seen in the lungs bilaterally, similar to the previous study. There is no pleural effusion, pulmonary congestion or pneumothorax. There is moderate cardiomegaly. Atheromatous changes thoracic aorta are noted. No acute bony abnormality. An endotracheal tube is seen with distal end 5 cm above trachea bifurcation. This is in appropriate position. No active cardiopulmonary disease. ET tube in appropriate position. Chronic inflammatory and granulomatous lung changes. A moderate cardiomegaly. Signed by Dr Mariam Mckee on 2/18/2021 7:01 AM    Xr Chest Portable    Result Date: 2/17/2021  XR CHEST PORTABLE 2/17/2021 8:10 PM History: Cough and congestion. Portable chest x-ray compared with 7 October 2020. The heart size is normal. The mediastinum is within normal limits. The lungs are adequately expanded with no pneumonia or pneumothorax. There is mild chronic interstitial disease with scattered calcified granulomas. There is no significant pleural fluid. No congestive failure changes. 1. No acute disease.  Signed by Dr Tae Aquino on 2/17/2021 8:10 PM       Assessment and Plan:   Septic shock  Suspect secondary to below  Agents on board since admission  Follow cultures  IVF  Norepinephrine gtt, decreasing requirement  Lactate improving, trend to clearance    Pseudomonas pneumonia  See above    COPD exacerbation  Steroids  Nebs  Antibiotics     MUNIRA/CKD3/hyperkalemia  Renal following  Creatinine worsening  Hyperkalemia improved  Follow anion gap metabolic acidosis to correction    Transaminitis  Suspect shock liver secondary to above processes  Trend Further work-up if no improvement with current treatment plan     Acute encephalopathy  Neurology following  [facility] 2/17/2021 without evidence for acute process  RPR  Ammonia mildly elevated, lactulose     Ventilator dependent acute hypercapnic respiratory failure  Secondary to above processes  Titrate minute ventilation for pH 7.35  Follow ABG    Concern for RLE ischemia  Query secondary to vasopressor  Vascular surgery consulted  Frequent neurovascular checks     DVT prophylaxis  Heparin    Total critical care time: 54 minutes    Fatou Hines MD   2/19/2021 11:30 AM

## 2021-02-19 NOTE — CONSULTS
[facility]    CONSULT    Patient:  Lisseth Escobar  YOB: 1940  Date of Service: 2/19/2021  MRN: [id number]   Acct: [de-identified]   Primary Care Physician: Ramon Castanon MD    Reason for consult: Decreased Perfusion to BLE    Requesting Physician: Dr. Jessica Pride    History Obtained From: Chart    HISTORY OF PRESENT ILLNESS:  Ms. Lisseth Escobar is a 68 y.o. female who presented with altered mental status. Work-up in the ER included CMP that demonstrated MUNIRA with hyperkalemia, along with elevated LFT. proBNP was elevated. CBC with leukocytosis and anemia. CT head demonstrated chronic changes without acute abnormalities. While in the ER, patient developed increased respiratory distress and decreased mentation. She was started on BiPAP. Repeat labs indicated worsening renal function with higher hyperkalemia. She was emergently intubated and placed on ventilatory support. Patient was admitted to the ICU under the hospitalist service with nephrology consultation. Patient has required pressors for hemodynamic support. She was noted to have cold feet, unable to obtain doppler signals. Vascular arterial studies ordered, along with vascular surgery consult.            Past Medical History:       Diagnosis Date    Acid reflux     Acute renal failure superimposed on chronic kidney disease (HCC)     Allergic rhinitis     Bilateral carotid artery stenosis 1/28/2020    Hyperlipidemia     Hypertension     Irritable bowel syndrome with diarrhea 7/5/2019    Lung cancer ([address]     Lung with chemo    Mild single current episode of major depressive disorder ([address] 5/2/2018    New onset of congestive heart failure ([address] 10/7/2020    Osteoarthritis     Osteoporosis     Other emphysema ([address] 12/22/2017    Palliative care patient 08/06/2019    Panic disorder 8/16/2019    Stage 3 severe COPD by GOLD classification ([address] 6/6/2018    FEV1 42%    Tobacco abuse 2/8/2018    Urinary incontinence stress incontinence       Past Surgical History:        Procedure Laterality Date    APPENDECTOMY      CARDIAC CATHETERIZATION  5/3/2013   MDL    EF 60%    HEMORRHOID SURGERY      HYSTERECTOMY      HYSTERECTOMY, TOTAL ABDOMINAL      LUNG CANCER SURGERY      TONSILLECTOMY      VASCULAR SURGERY  10/11/2019    [facility]. Aortogram and runoff. PTA and stenting of the left external iliac artery with a 7x 80 and innova stent dilated to 7.2mm. PTA of the left popliteal artery with 5mm x 2cm cutting balloon. attempted recalization of the peroneal artery aborted.        Allergies:  Doxycycline, Abilify [aripiprazole], Celebrex [celecoxib], Naproxen, and Lactose intolerance (gi)    Medications Current:   Current Facility-Administered Medications   Medication Dose Route Frequency Provider Last Rate Last Admin    norepinephrine-sodium chloride (LEVOPHED) 16-0.9 MG/250ML-% infusion             norepinephrine (LEVOPHED) 16 mg in sodium chloride 0.9 % 250 mL infusion  2-100 mcg/min Intravenous Continuous Henrique Cannon MD 6.6 mL/hr at 02/19/21 1049 7 mcg/min at 02/19/21 1049    chlorhexidine (PERIDEX) 0.12 % solution 15 mL  15 mL Mouth/Throat BID Shanon Floyd MD   15 mL at 02/19/21 0858    lactulose (CHRONULAC) 10 GM/15ML solution 20 g  20 g Oral TID Shanon Floyd MD   20 g at 02/19/21 1442    [START ON 2/20/2021] cefepime (MAXIPIME) 1,000 mg in sterile water 10 mL IV syringe  1,000 mg Intravenous Q24H Henrique Cannon MD        sodium bicarbonate 75 mEq in dextrose 5 % 1,000 mL infusion   Intravenous Continuous Sandie Meyers  mL/hr at 02/19/21 1110 New Bag at 02/19/21 1110    propofol injection  5-50 mcg/kg/min Intravenous Titrated Mai Pretty MD 3.6 mL/hr at 02/19/21 1125 10 mcg/kg/min at 02/19/21 1125    sodium chloride flush 0.9 % injection 10 mL  10 mL Intravenous 2 times per day Henrique Cannon MD   10 mL at 02/19/21 0848  sodium chloride flush 0.9 % injection 10 mL  10 mL Intravenous PRN Henrique Cannon MD        potassium chloride 10 mEq/100 mL IVPB (Peripheral Line)  10 mEq Intravenous PRN Henrique Cannon MD        magnesium sulfate 1000 mg in dextrose 5% 100 mL IVPB  1,000 mg Intravenous PRN Henrique Cannon MD        heparin (porcine) injection 5,000 Units  5,000 Units Subcutaneous 3 times per day Henrique Cannon MD   5,000 Units at 02/19/21 1441    promethazine (PHENERGAN) tablet 12.5 mg  12.5 mg Oral Q6H PRN Henrique Cannon MD        Or    ondansetron (ZOFRAN) injection 4 mg  4 mg Intravenous Q6H PRN Henrique Cannon MD        polyethylene glycol (GLYCOLAX) packet 17 g  17 g Oral Daily PRN Henrique Cannon MD        acetaminophen (TYLENOL) tablet 650 mg  650 mg Oral Q6H PRN Henrique Cannon MD        Or    acetaminophen (TYLENOL) suppository 650 mg  650 mg Rectal Q6H PRN Henrique Cannon MD        famotidine (PEPCID) injection 20 mg  20 mg Intravenous Daily Henrique Cannon MD   20 mg at 02/19/21 0847    methylPREDNISolone sodium (SOLU-MEDROL) injection 40 mg  40 mg Intravenous Q8H Sagrario Ohara MD   40 mg at 02/19/21 1000    ipratropium-albuterol (DUONEB) nebulizer solution 1 ampule  1 ampule Inhalation Q4H Sagrario Ohara MD   1 ampule at 02/19/21 1024    hydrALAZINE (APRESOLINE) injection 10 mg  10 mg Intravenous Q6H PRN Sagrario Ohara MD   10 mg at 02/18/21 1144    glucose (GLUTOSE) 40 % oral gel 15 g  15 g Oral PRN Sagrario Ohara MD        dextrose 50 % IV solution  12.5 g Intravenous PRN Sagrario Ohara MD        glucagon (rDNA) injection 1 mg  1 mg Intramuscular PRN Sagrario Ohara MD        dextrose 5 % solution  100 mL/hr Intravenous PRN Sagrario Ohara MD        miconazole (MICOTIN) 2 % powder   Topical BID Sagrario Ohara MD   Given at 02/19/21 5817  dexmedetomidine (PRECEDEX) 400 mcg in sodium chloride 0.9 % 100 mL infusion  0.2-1.4 mcg/kg/hr Intravenous Continuous Jacquelin Tsnag MD 21.2 mL/hr at 02/19/21 1048 1.4 mcg/kg/hr at 02/19/21 1048    carboxymethylcellulose (REFRESH PLUS) 0.5 % ophthalmic solution 1 drop  1 drop Both Eyes TID Casper Stafford MD   1 drop at 02/19/21 1442    insulin lispro (HUMALOG) injection vial 0-6 Units  0-6 Units Subcutaneous TID WC Casper Stafford MD   1 Units at 02/19/21 1442    insulin lispro (HUMALOG) injection vial 0-3 Units  0-3 Units Subcutaneous Nightly Casper Stafford MD   1 Units at 02/18/21 2033       Social History:  Reports that she quit smoking about 18 months ago. Her smoking use included cigarettes. She started smoking about 50 years ago. She has a 15.00 pack-year smoking history. She has never used smokeless tobacco. She reports current alcohol use. She reports that she does not use drugs. Family History:      Problem Relation Age of Onset    High Blood Pressure Mother     High Blood Pressure Father     Diabetes Sister     High Blood Pressure Brother        REVIEW OF SYSTEMS:  Unable to obtain as patient is sedated on ventilatory support. PHYSICAL EXAM:  BP (!) 128/54   Pulse 63   Temp 97.4 °F (36.3 °C) (Temporal)   Resp 23   Ht 5' (1.524 m)   Wt 133 lb 12.8 oz (60.7 kg)   SpO2 95%   BMI 26.13 kg/m²   General appearance: Demonstrates an ill-appearing female who is sedated on ventilatory support. HEENT: Normocephalic. Atraumatic. KVNG. NECK: Supple. NO JVD. No carotids bruits auscultated. Chest: Coarse with scattered rhonchi. Cardiac: Normal heart tones with regular rate and rhythm, S1, S2 normal. No murmurs, gallops, or rubs auscultated. Abdomen: Soft, non-tender; non-distended normal bowel sounds no masses, no organomegaly. Extremities: No clubbing or cyanosis. No peripheral edema. Bilateral feet cold and discolored, right worse than left. Unable to obtain doppler signals.

## 2021-02-19 NOTE — PROGRESS NOTES
[facility]  [address] [place] [address] Drive, [address]  [place], [place]  Phone [phone]     Neurology Progress Note  2021 11:04 AM  Information:   Patient Name: Jelly Cool  :   1940  Age:   68 y.o. MRN:   [id number]  Account #:  [de-identified]  Admit Date:   2021  Today:  21     ADMIT DX:   Acute hyperkalemia    Subjective:     Jelly Cool is a 68y.o. year old woman with a history of COPD and CHF who was admitted  with acute renal failure and acute respiratory failure. Interval History:   She remains intubated in the ICU. She is maxed out on Precedex and still moving, shaking her head, trying to pull at tubes requiring restraints. BP dropped last night necessitating Levophed. Now weaning that with .       Objective:     Past Medical History:  Past Medical History:   Diagnosis Date    Acid reflux     Acute renal failure superimposed on chronic kidney disease (HCC)     Allergic rhinitis     Bilateral carotid artery stenosis 2020    Hyperlipidemia     Hypertension     Irritable bowel syndrome with diarrhea 2019    Lung cancer ([address]     Lung with chemo    Mild single current episode of major depressive disorder ([address] 2018    New onset of congestive heart failure ([address] 10/7/2020    Osteoarthritis     Osteoporosis     Other emphysema ([address] 2017    Palliative care patient 2019    Panic disorder 2019    Stage 3 severe COPD by GOLD classification ([address] 2018    FEV1 42%    Tobacco abuse 2018    Urinary incontinence     stress incontinence       Past Surgical History:   Procedure Laterality Date    APPENDECTOMY      CARDIAC CATHETERIZATION  5/3/2013   MDL    EF 60%    HEMORRHOID SURGERY      HYSTERECTOMY      HYSTERECTOMY, TOTAL ABDOMINAL      LUNG CANCER SURGERY      TONSILLECTOMY      VASCULAR SURGERY  10/11/2019 [facility].Aortogram and runoff. PTA and stenting of the left external iliac artery with a 7x 80 and innova stent dilated to 7.2mm. PTA of the left popliteal artery with 5mm x 2cm cutting balloon. attempted recalization of the peroneal artery aborted. Family History   Problem Relation Age of Onset    High Blood Pressure Mother     High Blood Pressure Father     Diabetes Sister     High Blood Pressure Brother        Social History     Socioeconomic History    Marital status:       Spouse name: Not on file    Number of children: Not on file    Years of education: Not on file    Highest education level: Not on file   Occupational History    Not on file   Social Needs    Financial resource strain: Not on file    Food insecurity     Worry: Not on file     Inability: Not on file    Transportation needs     Medical: Not on file     Non-medical: Not on file   Tobacco Use    Smoking status: Former Smoker     Packs/day: 0.50     Years: 30.00     Pack years: 15.00     Types: Cigarettes     Start date: 1970     Quit date: 2019     Years since quittin.5    Smokeless tobacco: Never Used   Substance and Sexual Activity    Alcohol use: Yes     Comment: occcasionally    Drug use: No    Sexual activity: Yes     Partners: Male   Lifestyle    Physical activity     Days per week: Not on file     Minutes per session: Not on file    Stress: Not on file   Relationships    Social connections     Talks on phone: Not on file     Gets together: Not on file     Attends [employer] service: Not on file     Active member of club or organization: Not on file     Attends meetings of clubs or organizations: Not on file     Relationship status: Not on file    Intimate partner violence     Fear of current or ex partner: Not on file     Emotionally abused: Not on file     Physically abused: Not on file     Forced sexual activity: Not on file   Other Topics Concern    Not on file   Social History Narrative  Not on file       Medications:   norepinephrine 7 mcg/min (02/19/21 1049)    IV infusion builder      dextrose      dexmedetomidine HCl in NaCl 1.4 mcg/kg/hr (02/19/21 1048)      norepinephrine-sodium chloride        chlorhexidine  15 mL Mouth/Throat BID    lactulose  20 g Oral TID    [START ON 2/20/2021] cefepime  1,000 mg Intravenous Q24H    sodium chloride flush  10 mL Intravenous 2 times per day    heparin (porcine)  5,000 Units Subcutaneous 3 times per day    famotidine (PEPCID) injection  20 mg Intravenous Daily    methylPREDNISolone  40 mg Intravenous Q8H    ipratropium-albuterol  1 ampule Inhalation Q4H    miconazole   Topical BID    carboxymethylcellulose  1 drop Both Eyes TID    insulin lispro  0-6 Units Subcutaneous TID WC    insulin lispro  0-3 Units Subcutaneous Nightly       Diagnostic Studies:  Reviewed all labs/diagnositcs since last 24hrs:  Recent Results (from the past 24 hour(s))   Potassium    Collection Time: 02/18/21  1:31 PM   Result Value Ref Range    Potassium 7.0 (HH) 3.5 - 5.0 mmol/L   POCT Glucose    Collection Time: 02/18/21  2:37 PM   Result Value Ref Range    POC Glucose 228 (H) 70 - 99 mg/dl    Performed on AccuChek    SPECIMEN REJECTION    Collection Time: 02/18/21  2:39 PM   Result Value Ref Range    Rejected Test NH3     Reason for Rejection EXCEEDED    SPECIMEN REJECTION    Collection Time: 02/18/21  2:41 PM   Result Value Ref Range    Rejected Test LACID    Ammonia    Collection Time: 02/18/21  3:19 PM   Result Value Ref Range    Ammonia 55 (H) 11 - 51 umol/L   Lactic Acid, Plasma    Collection Time: 02/18/21  3:19 PM   Result Value Ref Range    Lactic Acid 5.3 (HH) 0.5 - 1.9 mmol/L   Blood Gas, Arterial    Collection Time: 02/18/21  5:01 PM   Result Value Ref Range    pH, Arterial 7.260 (LL) 7.350 - 7.450    pCO2, Arterial 46.0 (H) 35.0 - 45.0 mmHg    pO2, Arterial 59.0 (L) 80.0 - 100.0 mmHg    HCO3, Arterial 20.6 (L) 22.0 - 26.0 mmol/L Base Excess, Arterial -6.2 (L) -2.0 - 2.0 mmol/L    Hemoglobin, Art, Extended 8.9 (L) 12.0 - 16.0 g/dL    O2 Sat, Arterial 88.7 (L) >92 %    Carboxyhgb, Arterial 3.3 0.0 - 5.0 %    Methemoglobin, Arterial 0.2 <1.5 %    O2 Content, Arterial 11.2 Not Established mL/dL    O2 Therapy Unknown    Potassium, Whole Blood    Collection Time: 02/18/21  5:01 PM   Result Value Ref Range    Potassium, Whole Blood 5.3    POCT Glucose    Collection Time: 02/18/21  5:54 PM   Result Value Ref Range    POC Glucose 301 (H) 70 - 99 mg/dl    Performed on AccuChek    POCT Glucose    Collection Time: 02/18/21  7:55 PM   Result Value Ref Range    POC Glucose 242 (H) 70 - 99 mg/dl    Performed on AccuChek    Lactic Acid, Plasma    Collection Time: 02/18/21  8:48 PM   Result Value Ref Range    Lactic Acid 6.7 (HH) 0.5 - 1.9 mmol/L   CBC Auto Differential    Collection Time: 02/19/21  3:41 AM   Result Value Ref Range    WBC 26.2 (H) 4.8 - 10.8 K/uL    RBC 4.34 4.20 - 5.40 M/uL    Hemoglobin 9.2 (L) 12.0 - 16.0 g/dL    Hematocrit 33.0 (L) 37.0 - 47.0 %    MCV 76.0 (L) 81.0 - 99.0 fL    MCH 21.2 (L) 27.0 - 31.0 pg    MCHC 27.9 (L) 33.0 - 37.0 g/dL    RDW 23.7 (H) 11.5 - 14.5 %    Platelets 696 205 - 450 K/uL    MPV 10.3 9.4 - 12.3 fL    PLATELET SLIDE REVIEW Adequate     Neutrophils % 89.0 (H) 50.0 - 65.0 %    Lymphocytes % 2.0 (L) 20.0 - 40.0 %    Monocytes % 1.0 0.0 - 10.0 %    Eosinophils % 0.0 0.0 - 5.0 %    Basophils % 0.0 0.0 - 1.0 %    Neutrophils Absolute 25.4 (H) 1.5 - 7.5 K/uL    Immature Granulocytes # 0.1 K/uL    Lymphocytes Absolute 0.5 (L) 1.1 - 4.5 K/uL    Monocytes Absolute 0.30 0.00 - 0.90 K/uL    Eosinophils Absolute 0.00 0.00 - 0.60 K/uL    Basophils Absolute 0.00 0.00 - 0.20 K/uL    Bands Relative 8 (H) 0 - 5 %    Anisocytosis 3+ (A)     Microcytes 1+ (A)     Polychromasia 1+ (A)     Hypochromia 3+ (A)     Poikilocytes 2+ (A)     Schistocytes Occasional (A)     Acanthocytes Occasional (A)     [place] Cells 1+ (A) Ovalocytes 1+ (A)    Comprehensive Metabolic Panel    Collection Time: 02/19/21  3:41 AM   Result Value Ref Range    Sodium 148 (H) 136 - 145 mmol/L    Potassium 5.3 (H) 3.5 - 5.0 mmol/L    Chloride 110 98 - 111 mmol/L    CO2 17 (L) 22 - 29 mmol/L    Anion Gap 21 (H) 7 - 19 mmol/L    Glucose 127 (H) 74 - 109 mg/dL     (H) 8 - 23 mg/dL    CREATININE 3.7 (H) 0.5 - 0.9 mg/dL    GFR Non- 12 (A) >60    GFR  14 (L) >59    Calcium 7.1 (L) 8.8 - 10.2 mg/dL    Total Protein 5.3 (L) 6.6 - 8.7 g/dL    Albumin 2.4 (L) 3.5 - 5.2 g/dL    Total Bilirubin 1.2 0.2 - 1.2 mg/dL    Alkaline Phosphatase 373 (H) 35 - 104 U/L    ALT 2,565 (H) 5 - 33 U/L    AST >7,000 (H) 5 - 32 U/L   Magnesium    Collection Time: 02/19/21  3:41 AM   Result Value Ref Range    Magnesium 2.1 1.6 - 2.4 mg/dL   Ammonia    Collection Time: 02/19/21  3:41 AM   Result Value Ref Range    Ammonia 62 (H) 11 - 51 umol/L   Lactic Acid, Plasma    Collection Time: 02/19/21  3:41 AM   Result Value Ref Range    Lactic Acid 3.8 (HH) 0.5 - 1.9 mmol/L   Blood Gas, Arterial    Collection Time: 02/19/21  8:09 AM   Result Value Ref Range    pH, Arterial 7.370 7.350 - 7.450    pCO2, Arterial 33.0 (L) 35.0 - 45.0 mmHg    pO2, Arterial 89.0 80.0 - 100.0 mmHg    HCO3, Arterial 19.1 (L) 22.0 - 26.0 mmol/L    Base Excess, Arterial -5.5 (L) -2.0 - 2.0 mmol/L    Hemoglobin, Art, Extended 9.7 (L) 12.0 - 16.0 g/dL    O2 Sat, Arterial 95.3 >92 %    Carboxyhgb, Arterial 2.6 0.0 - 5.0 %    Methemoglobin, Arterial 0.2 <1.5 %    O2 Content, Arterial 13.1 Not Established mL/dL    O2 Therapy Unknown    Potassium, Whole Blood    Collection Time: 02/19/21  8:09 AM   Result Value Ref Range    Potassium, Whole Blood 4.9    POCT Glucose    Collection Time: 02/19/21  8:46 AM   Result Value Ref Range    POC Glucose 161 (H) 70 - 99 mg/dl    Performed on AccuChek        Diet:  Diet NPO Effective Now Exceptions are: Ice Chips, Sips with Meds    Examination:

## 2021-02-19 NOTE — PROGRESS NOTES
Nephrology ([facility]) Progress Note      Patient:  Germán Muse  YOB: 1940  Date of Service: 2/19/2021  MRN: [id number]   Acct: [de-identified]   Primary Care Physician: Anup Vega MD  Advance Directive: Full Code  Admit Date: 2/17/2021       Hospital Day: 1  Referring Provider: Casper Stafford MD    Patient independently seen and examined, Chart, Consults, Notes, Operative notes, Labs, Cardiology, and Radiology studies reviewed as available. Chief complaint: Abnormal labs. Subjective:  Germán Muse is a 68 y.o. female  whom we were consulted for acute kidney injury/hyperkalemia. Presented to the emergency room as she was complaining of weakness, lack of energy and shortness of breath. Patient has history of chronic obstructive pulmonary disease and was diagnosed with COPD exacerbation. She was initially treated with BiPAP, later on she was intubated in ER. Incidental finding in the lab confirmed that her serum creatinine was 3.0 mg with a potassium was 7.0 mmol . Her initial potassium was even much higher as her pH was 6.99. She was treated with insulin, D50, calcium gluconate. Her serum potassium continues to improve however her kidney function worsened. This morning she was seen in the ICU, still intubated has some good urine output her hemodynamics are stable.     Allergies:  Doxycycline, Abilify [aripiprazole], Celebrex [celecoxib], Naproxen, and Lactose intolerance (gi)    Medicines:  Current Facility-Administered Medications   Medication Dose Route Frequency Provider Last Rate Last Admin    norepinephrine-sodium chloride (LEVOPHED) 16-0.9 MG/250ML-% infusion             norepinephrine (LEVOPHED) 16 mg in sodium chloride 0.9 % 250 mL infusion  2-100 mcg/min Intravenous Continuous Henrique Cannon MD 8.4 mL/hr at 02/19/21 0940 9 mcg/min at 02/19/21 0940  chlorhexidine (PERIDEX) 0.12 % solution 15 mL  15 mL Mouth/Throat BID Luis Nguyen MD   15 mL at 02/19/21 0858    lactulose (CHRONULAC) 10 GM/15ML solution 20 g  20 g Oral TID Luis Nguyen MD   20 g at 02/19/21 0902    [START ON 2/20/2021] cefepime (MAXIPIME) 1,000 mg in sterile water 10 mL IV syringe  1,000 mg Intravenous Q24H Henrique Cannon MD        sodium chloride flush 0.9 % injection 10 mL  10 mL Intravenous 2 times per day Henrique Cannon MD   10 mL at 02/19/21 0848    sodium chloride flush 0.9 % injection 10 mL  10 mL Intravenous PRN Henrique Cannon MD        potassium chloride 10 mEq/100 mL IVPB (Peripheral Line)  10 mEq Intravenous PRN Henrique Cannon MD        magnesium sulfate 1000 mg in dextrose 5% 100 mL IVPB  1,000 mg Intravenous PRN Henrique Cannon MD        heparin (porcine) injection 5,000 Units  5,000 Units Subcutaneous 3 times per day Henrique Cannon MD   5,000 Units at 02/19/21 0713    promethazine (PHENERGAN) tablet 12.5 mg  12.5 mg Oral Q6H PRN Henrique Cannon MD        Or    ondansetron (ZOFRAN) injection 4 mg  4 mg Intravenous Q6H PRN Henrique Cannon MD        polyethylene glycol (GLYCOLAX) packet 17 g  17 g Oral Daily PRN Henrique Cannon MD        acetaminophen (TYLENOL) tablet 650 mg  650 mg Oral Q6H PRN Henrique Cannon MD        Or    acetaminophen (TYLENOL) suppository 650 mg  650 mg Rectal Q6H PRN Henrique Cannon MD        famotidine (PEPCID) injection 20 mg  20 mg Intravenous Daily Henrique Cannon MD   20 mg at 02/19/21 0847    sodium bicarbonate 150 mEq in dextrose 5 % 1,000 mL infusion   Intravenous Continuous Moody MD Watson 100 mL/hr at 02/19/21 0833 New Bag at 02/19/21 0833    methylPREDNISolone sodium (SOLU-MEDROL) injection 40 mg  40 mg Intravenous Q8H uLis Nguyen MD   40 mg at 02/19/21 1000    ipratropium-albuterol (DUONEB) nebulizer solution 1 ampule  1 ampule Inhalation Q4H Luis Nguyen MD   1 ampule at 02/19/21 4772 Past Surgical History:   Procedure Laterality Date    APPENDECTOMY      CARDIAC CATHETERIZATION  5/3/2013   MDL    EF 60%    HEMORRHOID SURGERY      HYSTERECTOMY      HYSTERECTOMY, TOTAL ABDOMINAL      LUNG CANCER SURGERY      TONSILLECTOMY      VASCULAR SURGERY  10/11/2019    [facility]. Aortogram and runoff. PTA and stenting of the left external iliac artery with a 7x 80 and innova stent dilated to 7.2mm. PTA of the left popliteal artery with 5mm x 2cm cutting balloon. attempted recalization of the peroneal artery aborted. Family History  Family History   Problem Relation Age of Onset    High Blood Pressure Mother     High Blood Pressure Father     Diabetes Sister     High Blood Pressure Brother        Social History  Social History     Socioeconomic History    Marital status:       Spouse name: Not on file    Number of children: Not on file    Years of education: Not on file    Highest education level: Not on file   Occupational History    Not on file   Social Needs    Financial resource strain: Not on file    Food insecurity     Worry: Not on file     Inability: Not on file    Transportation needs     Medical: Not on file     Non-medical: Not on file   Tobacco Use    Smoking status: Former Smoker     Packs/day: 0.50     Years: 30.00     Pack years: 15.00     Types: Cigarettes     Start date: 1970     Quit date: 2019     Years since quittin.5    Smokeless tobacco: Never Used   Substance and Sexual Activity    Alcohol use: Yes     Comment: occcasionally    Drug use: No    Sexual activity: Yes     Partners: Male   Lifestyle    Physical activity     Days per week: Not on file     Minutes per session: Not on file    Stress: Not on file   Relationships    Social connections     Talks on phone: Not on file     Gets together: Not on file     Attends Restoration service: Not on file     Active member of club or organization: Not on file Attends meetings of clubs or organizations: Not on file     Relationship status: Not on file    Intimate partner violence     Fear of current or ex partner: Not on file     Emotionally abused: Not on file     Physically abused: Not on file     Forced sexual activity: Not on file   Other Topics Concern    Not on file   Social History Narrative    Not on file         Review of Systems:  Unable to obtain review of system as she is intubated and sedated.     Objective:  Patient Vitals for the past 24 hrs:   BP Temp Temp src Pulse Resp SpO2   02/19/21 1007    62 21 100 %   02/19/21 0812     27 100 %   02/19/21 0800 (!) 144/90 97.3 °F (36.3 °C) Temporal 64 19 100 %   02/19/21 0700 (!) 130/96   64 20 100 %   02/19/21 0621    60 16 100 %   02/19/21 0620     16 100 %   02/19/21 0600 (!) 107/44   61 20 100 %   02/19/21 0400 (!) 105/45 96.4 °F (35.8 °C) Temporal 61 26 98 %   02/19/21 0300 (!) 100/50   59 21 96 %   02/19/21 0200 (!) 93/49   61 23 92 %   02/19/21 0152    61 22 95 %   02/19/21 0145     17 100 %   02/19/21 0100 132/64   66 17 93 %   02/19/21 0017    65     02/19/21 0000 134/66 97.6 °F (36.4 °C) Temporal 65 21 100 %   02/18/21 2330 127/61   65 18 91 %   02/18/21 2220    64 18 99 %   02/18/21 2218     22 99 %   02/18/21 2200 (!) 117/59   64 19 100 %   02/18/21 2100 104/63   66 23 100 %   02/18/21 2000 (!) 113/53 97.3 °F (36.3 °C) Temporal 66 23 99 %   02/18/21 1900 (!) 132/54   69 17 93 %   02/18/21 1837     22 97 %   02/18/21 1815    72 23 95 %   02/18/21 1800 127/73   69 18 96 %   02/18/21 1736     18 94 %   02/18/21 1735     17 92 %   02/18/21 1700 (!) 137/43   74 17 94 %   02/18/21 1600 (!) 124/57 97.5 °F (36.4 °C) Temporal 71 18 100 %   02/18/21 1541    73 18 98 %   02/18/21 1500 (!) 92/40   78 21 95 %   02/18/21 1421    109 18 95 %   02/18/21 1420     16 95 %   02/18/21 1400 (!) 116/39   90 16 91 % PHOS 9.3*     HgbA1C: No results for input(s): LABA1C in the last 72 hours. Hepatic:   Recent Labs     02/17/21  1804 02/18/21  0401 02/19/21  0341   ALKPHOS 245* 222* 373*   * 275* 2,565*   * 411* >7,000*   PROT 7.3 6.9 5.3*   BILITOT 0.7 0.9 1.2   LABALBU 3.4* 2.7* 2.4*     Lactic Acid:   Recent Labs     02/19/21  0341   LACTA 3.8*     Troponin: No results for input(s): CKTOTAL, CKMB, TROPONINT in the last 72 hours. ABGs: No results for input(s): PH, PCO2, PO2, HCO3, O2SAT in the last 72 hours. CRP:  No results for input(s): CRP in the last 72 hours. Sed Rate:  No results for input(s): SEDRATE in the last 72 hours. Cultures:   No results for input(s): CULTURE in the last 72 hours. Recent Labs     02/18/21  0412 02/18/21  0422   BC No Growth to date. Any change in status will be called. --    BLOODCULT2  --  No Growth to date. Any change in status will be called. No results for input(s): CXSURG in the last 72 hours. Radiology reports as per the Radiologist  Radiology: Ct Head Wo Contrast    Result Date: 2/17/2021  CT HEAD WO CONTRAST 2/17/2021 7:50 PM History: Altered mental status. Noncontrast head CT compared with 29 June 2020. In order to have a CT radiation dose as low as reasonably achievable Automated Exposure Control was utilized for adjustment of the mA and/or KV according to patient size. DLP in mGycm= 742. Left maxillary sinus fluid and mucosal thickening is increased. Mild atrophy and moderate small vessel disease. Normal ventricle size. No hemorrhage or mass effect. 1. Left maxillary sinusitis changes. 2. Stable atrophy and small vessel disease with no evidence of mass or infarct.  Signed by Dr Edi Garcia on 2/17/2021 7:51 PM    Us Renal Complete    Result Date: 2/18/2021 EXAMINATION: US RENAL COMPLETE 2/18/2021 9:44 AM HISTORY: Acute kidney injury COMPARISON: None FINDINGS: Transabdominal sonographic evaluation of the kidneys and urinary bladder was performed in the transverse and longitudinal projections. Right kidney measures 8.6 x 4.1 x 4.3 cm in size. The cortex appears mildly atrophic. There is no sign of collecting system dilatation or solid renal mass. The inferior right kidney is obscured due to shadowing from bowel gas. The urinary bladder is decompressed and therefore not well evaluated. The left kidney is completely obscured due to shadowing from bowel gas. Markedly limited examination due to shadowing from bowel gas. The right kidney is visible and appears mildly atrophic. The left kidney is completely obscured. Signed by Dr Bart Larsen on 2/18/2021 9:50 AM    Xr Chest Portable    Result Date: 2/18/2021  Examination. XR CHEST PORTABLE 2/18/2021 2:43 AM History: Intubation. A single frontal portable supine view of the chest is compared with the previous study dated 2/17/2021. There are respiratory motion artifacts. Chronic appearing interstitial changes and granulomas are seen in the lungs bilaterally, similar to the previous study. There is no pleural effusion, pulmonary congestion or pneumothorax. There is moderate cardiomegaly. Atheromatous changes thoracic aorta are noted. No acute bony abnormality. An endotracheal tube is seen with distal end 5 cm above trachea bifurcation. This is in appropriate position. No active cardiopulmonary disease. ET tube in appropriate position. Chronic inflammatory and granulomatous lung changes. A moderate cardiomegaly.  Signed by Dr Gisselle Newton on 2/18/2021 7:01 AM    Xr Chest Portable    Result Date: 2/17/2021 XR CHEST PORTABLE 2/17/2021 8:10 PM History: Cough and congestion. Portable chest x-ray compared with 7 October 2020. The heart size is normal. The mediastinum is within normal limits. The lungs are adequately expanded with no pneumonia or pneumothorax. There is mild chronic interstitial disease with scattered calcified granulomas. There is no significant pleural fluid. No congestive failure changes. 1. No acute disease. Signed by Dr Quincy Figuerao on 2/17/2021 8:10 PM       Assessment   1. Acute kidney injury/stage III/worsen. 2.  Stage IIIb chronic kidney disease baseline. 3.  Severe hyperkalemia/improving. 4.  Metabolic acidemia. 5.  COPD exacerbation. 6.  Acute respiratory failure/intubated. 7.  Elevated LFTs. 8.  Irritable bowel syndrome/chronic diarrhea. 9.  Hypoxemic ATN. Plan:  1. Change IV fluid composition. 2.  Renal ultrasound report was nonconclusive  3. Continue supportive care.   Jenise Camacho MD  02/19/21  10:17 AM

## 2021-02-19 NOTE — PROGRESS NOTES
Blood Gas, Arterial [[id number]] (Abnormal) Collected: 02/19/21 0809     Specimen: Blood gases Updated: 02/19/21 0810      pH, Arterial 7.370      pCO2, Arterial 33.0 mmHg       pO2, Arterial 89.0 mmHg       HCO3, Arterial 19.1 mmol/L       Base Excess, Arterial -5.5 mmol/L       Hemoglobin, Art, Extended 9.7 g/dL       O2 Sat, Arterial 95.3 %       Carboxyhgb, Arterial 2.6 %       Methemoglobin, Arterial 0.2 %       O2 Content, Arterial 13.1 mL/dL       O2 Therapy Unknown     Potassium, Whole Blood [[id number]] Collected: 02/19/21 0809      Updated: 02/19/21 0810      Potassium, Whole Blood 4.9     AT+, L rad, Vent settings: ACVC , Rate16, , 50% O2, PEEP 5

## 2021-02-19 NOTE — PROCEDURES
PICC Line Insertion Procedure Note    Procedure: Insertion of #5 FR/18G PICC- Dual lumen    Indications:  Poor Access/ Levophed    Procedure Details   Informed consent was obtained for the procedure, including local anesthetic. Risks and benefits were discussed. #5 FR/18G PICC inserted to the L Cephalic vein per hospital protocol. Blood return:  yes    Findings:  Catheter inserted to 36 cm, with 0 cm exposed. Catheter was flushed with 20 cc NS. Patient did tolerate procedure well. Recommendations:  CXR ordered to verify placement. Tip confirmed within the SVC. Okay to use. PICC Brochure given to patient with teaching instruction.

## 2021-02-19 NOTE — PROGRESS NOTES
Comprehensive Nutrition Assessment    Type and Reason for Visit:  Initial    Nutrition Assessment:  Pt is mechanically ventilated and NPO. Recommend to consider RODERICK to meet nutritional needs. Malnutrition Assessment:  Malnutrition Status: At risk for malnutrition (Comment)    Context:  Acute Illness     Findings of the 6 clinical characteristics of malnutrition:  Energy Intake:  Mild decrease in energy intake (Comment)  Weight Loss:  No significant weight loss     Body Fat Loss:  No significant body fat loss     Muscle Mass Loss:  No significant muscle mass loss    Fluid Accumulation:  No significant fluid accumulation     Strength:  Not Performed    Estimated Daily Nutrient Needs:  Energy (kcal):  5100-7881 kcals/day; Weight Used for Energy Requirements:  Current(20-25)     Protein (g):  54-90 g/PRO/day; Weight Used for Protein Requirements:  Ideal(1.2-2.0)        Fluid (ml/day):  7910-9631 mL/day; Method Used for Fluid Requirements:  1 ml/kcal      Current Nutrition Therapies:    Diet NPO Effective Now Exceptions are: Ice Chips, Sips with Meds    Anthropometric Measures:  · Height: 5' (152.4 cm)  · Current Body Weight: 133 lb (60.3 kg)   · Ideal Body Weight: 100 lbs  · BMI: 26  · BMI Categories: Overweight (BMI 25.0-29. 9)       Nutrition Diagnosis:   · Inadequate oral intake related to impaired respiratory function as evidenced by intubation, NPO or clear liquid status due to medical condition    Nutrition Interventions:   Food and/or Nutrient Delivery:  Continue NPO  Coordination of Nutrition Care:  Continue to monitor while inpatient    Goals:  Pt will extubate and progress to an oral diet or nutritional needs will be met with RODERICK       Nutrition Monitoring and Evaluation:   Food/Nutrient Intake Outcomes:  Diet Advancement/Tolerance  Physical Signs/Symptoms Outcomes:  Biochemical Data, Nutrition Focused Physical Findings, Weight Electronically signed by Valeriano Mcfadden, MS, RD, LD on 2/19/21 at 9:11 AM CST    Contact: [phone]

## 2021-02-19 NOTE — PLAN OF CARE
Problem: Skin Integrity:  Goal: Will show no infection signs and symptoms  Description: Will show no infection signs and symptoms  2/19/2021 0017 by Luba Ayala RN  Outcome: Ongoing  2/18/2021 1304 by Norma Noe RN  Outcome: Ongoing  Goal: Absence of new skin breakdown  Description: Absence of new skin breakdown  2/19/2021 0017 by Luba Ayala RN  Outcome: Ongoing  2/18/2021 1304 by Norma Noe RN  Outcome: Ongoing     Problem: Falls - Risk of:  Goal: Will remain free from falls  Description: Will remain free from falls  2/19/2021 0017 by Luba Ayala RN  Outcome: Ongoing  2/18/2021 1304 by Norma Noe RN  Outcome: Ongoing  Goal: Absence of physical injury  Description: Absence of physical injury  2/19/2021 0017 by Luba Ayala RN  Outcome: Ongoing  2/18/2021 1304 by Norma Noe RN  Outcome: Ongoing

## 2021-02-19 NOTE — PROGRESS NOTES
Patient vent alarms went off. Went into assess patient. Patient was not puling volumes. Felt cuff pressure was low. Bag patient and called respiratory. Respiratory came and re intubated.  Breath sounds heard bilateral. chest xray ordered to confirm placement      Electronically signed by Ca Lima RN on 2/19/2021 at 04:23am

## 2021-02-19 NOTE — PROGRESS NOTES
Pharmacy Renal Adjustment    Nba Hunt is a 68 y.o. female. Pharmacy has renally adjusted medications per protocol. Recent Labs     02/18/21  0744 02/19/21  0341   BUN 95* 106*       Recent Labs     02/18/21  0744 02/19/21  0341   CREATININE 3.0* 3.7*       Estimated Creatinine Clearance: 10 mL/min (A) (based on SCr of 3.7 mg/dL (H)). Height:   Ht Readings from Last 1 Encounters:   02/17/21 5' (1.524 m)     Weight:  Wt Readings from Last 1 Encounters:   02/18/21 133 lb 12.8 oz (60.7 kg)         Plan: Adjust the following medications based on renal function:  Change Cefepime to 1gm IV q 24 hours.     LANE BARNHART, PHARM D, 2/19/2021, 8:49 AM

## 2021-02-19 NOTE — PROGRESS NOTES
Palliative Care:    Patient currently intubated and mechanically ventilated. Received update from patient's nurse today in ICU. She states that patient's pupils are more reactive today, however patient had to be started on Levophed due to blood pressure dropping, and has not been able to be weaned. Patient was also started on Propofol. Patient on antibiotic. Liver and Kidney enzymes are more elevated today. Spoke with patient's son, Katlin Perez, and updated him on patient status. Katlin Perez states that family has discussed the code status of patient and they want us to continue to do all that we can for patient. He asked that patient remain a full code. SN explained CPR to patient's son, he states that he does want to continue with Full Code Status at this time. Past Medical History:        Past Medical History:   Diagnosis Date    Acid reflux     Acute renal failure superimposed on chronic kidney disease (HCC)     Allergic rhinitis     Bilateral carotid artery stenosis 1/28/2020    Hyperlipidemia     Hypertension     Irritable bowel syndrome with diarrhea 7/5/2019    Lung cancer ([facility] [address]     Lung with chemo    Mild single current episode of major depressive disorder ([address] 5/2/2018    New onset of congestive heart failure ([address] 10/7/2020    Osteoarthritis     Osteoporosis     Other emphysema ([address] 12/22/2017    Palliative care patient 08/06/2019    Panic disorder 8/16/2019    Stage 3 severe COPD by GOLD classification ([facility] [address] 6/6/2018    FEV1 42%    Tobacco abuse 2/8/2018    Urinary incontinence     stress incontinence       Advance Directives:    Full Code. See ACP Note. Pain/Other Symptoms:   Patient is mechanically ventilated. Laying quietly in bed at this time. Activity:        As Tolerated     Psychological/Spiritual:          Kelley Fiddler at bedside today during my discussion with patient's ICU nurse. Patient is [employer] in [address] [place] [address]. Plan:    Continue with medical treatment and monitoring. Patient/family discussion r/t goals:  Patient's family would like follow up call with update on Monday from DIANA Cruz if possible.       Electronically signed by Henrry Patterson RN on 2/19/2021 at 2:16 PM

## 2021-02-19 NOTE — PLAN OF CARE
Problem: Skin Integrity:  Goal: Will show no infection signs and symptoms  Description: Will show no infection signs and symptoms  Outcome: Ongoing  Goal: Absence of new skin breakdown  Description: Absence of new skin breakdown  Outcome: Ongoing     Problem: Falls - Risk of:  Goal: Will remain free from falls  Description: Will remain free from falls  Outcome: Ongoing  Goal: Absence of physical injury  Description: Absence of physical injury  Outcome: Ongoing     Problem: Nutrition  Goal: Optimal nutrition therapy  Outcome: Ongoing     Problem: Non-Violent Restraints  Goal: Removal from restraints as soon as assessed to be safe  Outcome: Ongoing  Goal: No harm/injury to patient while restraints in use  Outcome: Ongoing  Goal: Patient's dignity will be maintained  Outcome: Ongoing     Problem: Infection - Central Venous Catheter-Associated Bloodstream Infection:  Goal: Will show no infection signs and symptoms  Description: Will show no infection signs and symptoms  Outcome: Ongoing

## 2021-02-19 NOTE — PROGRESS NOTES
Vascular preliminary report: Lower Arterial Segmental Study    Right (PT) BERT: 0.42  Right (DP) BERT: 0.40  Right TBI: 0      Left (PT) BERT: 0.39  Left (DP) BERT: 0.48  Left TBI: 0      Final Report Pending.

## 2021-02-20 NOTE — PROGRESS NOTES
Nephrology ([id number]) Progress Note      Patient:  Carmen Rivera  YOB: 1940  Date of Service: 2/20/2021  MRN: [id number]   Acct: [de-identified]   Primary Care Physician: Jessica West MD  Advance Directive: Full Code  Admit Date: 2/17/2021       Hospital Day: 2  Referring Provider: Marifer Escobar MD    Patient independently seen and examined, Chart, Consults, Notes, Operative notes, Labs, Cardiology, and Radiology studies reviewed as available. Chief complaint: Abnormal labs. Subjective:  Carmen Rivera is a 68 y.o. female  whom we were consulted for acute kidney injury/hyperkalemia. Presented to the emergency room as she was complaining of weakness, lack of energy and shortness of breath. Patient has history of chronic obstructive pulmonary disease and was diagnosed with COPD exacerbation. She was initially treated with BiPAP, later on she was intubated in ER. Incidental finding in the lab confirmed that her serum creatinine was 3.0 mg with a potassium was 7.0 mmol . Her initial potassium was even much higher as her pH was 6.99. She was treated with insulin, D50, calcium gluconate. Her serum potassium continues to improve however her kidney function worsened. This morning patient was seen in the ICU and has picked up some urine output. Her potassium is back to normal.  And renal function has no improvement.     Allergies:  Doxycycline, Abilify [aripiprazole], Celebrex [celecoxib], Naproxen, and Lactose intolerance (gi)    Medicines:  Current Facility-Administered Medications   Medication Dose Route Frequency Provider Last Rate Last Admin    propofol injection  5-50 mcg/kg/min Intravenous Titrated Paulina Burgos MD 8 mL/hr at 02/20/21 0733 20 mcg/kg/min at 02/20/21 7020  dexmedetomidine (PRECEDEX) 400 mcg in sodium chloride 0.9 % 100 mL infusion  0.2-1.4 mcg/kg/hr Intravenous Continuous Arnold Jones MD 3.3 mL/hr at 02/20/21 0732 0.2 mcg/kg/hr at 02/20/21 0732    0.9 % sodium chloride infusion   Intravenous Continuous Jose Das  mL/hr at 02/20/21 0943 New Bag at 02/20/21 0943    calcium gluconate 2000 mg in sodium chloride 100 mL  2,000 mg Intravenous Once Jose Das MD        norepinephrine (LEVOPHED) 16 mg in sodium chloride 0.9 % 250 mL infusion  2-100 mcg/min Intravenous Continuous Henrique Cannon MD 3.8 mL/hr at 02/20/21 0831 4 mcg/min at 02/20/21 0831    chlorhexidine (PERIDEX) 0.12 % solution 15 mL  15 mL Mouth/Throat BID Charisse Aburto MD   15 mL at 02/20/21 0732    lactulose (CHRONULAC) 10 GM/15ML solution 20 g  20 g Oral TID Charisse Aburto MD   20 g at 02/20/21 0732    cefepime (MAXIPIME) 1,000 mg in sterile water 10 mL IV syringe  1,000 mg Intravenous Q24H Henrique Cannon MD   Stopped at 02/20/21 0559    lactated ringers bolus  500 mL Intravenous Once Charisse Aburto MD   Stopped at 02/19/21 1935    sodium chloride flush 0.9 % injection 10 mL  10 mL Intravenous 2 times per day Henrique Cannon MD   10 mL at 02/20/21 0732    sodium chloride flush 0.9 % injection 10 mL  10 mL Intravenous PRN Henrique Cannon MD        potassium chloride 10 mEq/100 mL IVPB (Peripheral Line)  10 mEq Intravenous PRN Henrique Cannon MD        magnesium sulfate 1000 mg in dextrose 5% 100 mL IVPB  1,000 mg Intravenous PRN Henrique Cannon MD        heparin (porcine) injection 5,000 Units  5,000 Units Subcutaneous 3 times per day Henrique Cannon MD   5,000 Units at 02/20/21 0548    promethazine (PHENERGAN) tablet 12.5 mg  12.5 mg Oral Q6H PRN Henrique Cannon MD        Or    ondansetron (ZOFRAN) injection 4 mg  4 mg Intravenous Q6H PRN Henrique Cannon MD        polyethylene glycol (GLYCOLAX) packet 17 g  17 g Oral Daily PRN Emery Javier MD  acetaminophen (TYLENOL) tablet 650 mg  650 mg Oral Q6H PRN Henrique Cannon MD        Or    acetaminophen (TYLENOL) suppository 650 mg  650 mg Rectal Q6H PRN Henrique Cannon MD        famotidine (PEPCID) injection 20 mg  20 mg Intravenous Daily Henrique Cannon MD   20 mg at 02/20/21 0732    methylPREDNISolone sodium (SOLU-MEDROL) injection 40 mg  40 mg Intravenous Q8H Helena Goodell, MD   40 mg at 02/20/21 0828    ipratropium-albuterol (DUONEB) nebulizer solution 1 ampule  1 ampule Inhalation Q4H Helena Goodell, MD   1 ampule at 02/20/21 4712    hydrALAZINE (APRESOLINE) injection 10 mg  10 mg Intravenous Q6H PRN Helena Goodell, MD   10 mg at 02/18/21 1144    glucose (GLUTOSE) 40 % oral gel 15 g  15 g Oral PRN Helena Goodell, MD        dextrose 50 % IV solution  12.5 g Intravenous PRN Helena Goodell, MD        glucagon (rDNA) injection 1 mg  1 mg Intramuscular PRN Helena Goodell, MD        dextrose 5 % solution  100 mL/hr Intravenous PRN Helena Goodell, MD        miconazole (MICOTIN) 2 % powder   Topical BID Helena Goodell, MD   Given at 02/20/21 4371    carboxymethylcellulose (REFRESH PLUS) 0.5 % ophthalmic solution 1 drop  1 drop Both Eyes TID Helena Goodell, MD   1 drop at 02/20/21 0732    insulin lispro (HUMALOG) injection vial 0-6 Units  0-6 Units Subcutaneous TID WC Helena Goodell, MD   3 Units at 02/20/21 0720    insulin lispro (HUMALOG) injection vial 0-3 Units  0-3 Units Subcutaneous Nightly Helena Goodell, MD   1 Units at 02/19/21 2106       Past Medical History:  Past Medical History:   Diagnosis Date    Acid reflux     Acute renal failure superimposed on chronic kidney disease ([facility] [address]     Allergic rhinitis     Bilateral carotid artery stenosis 1/28/2020    Hyperlipidemia     Hypertension     Irritable bowel syndrome with diarrhea 7/5/2019    Lung cancer ([facility] [address]     Lung with chemo    Mild single current episode of major depressive disorder ([facility] [address] 5/2/2018 Partners: Male   Lifestyle    Physical activity     Days per week: Not on file     Minutes per session: Not on file    Stress: Not on file   Relationships    Social connections     Talks on phone: Not on file     Gets together: Not on file     Attends Gnosticist service: Not on file     Active member of club or organization: Not on file     Attends meetings of clubs or organizations: Not on file     Relationship status: Not on file    Intimate partner violence     Fear of current or ex partner: Not on file     Emotionally abused: Not on file     Physically abused: Not on file     Forced sexual activity: Not on file   Other Topics Concern    Not on file   Social History Narrative    Not on file         Review of Systems:  Unable to obtain review of system as she is intubated and sedated.     Objective:  Patient Vitals for the past 24 hrs:   BP Temp Temp src Pulse Resp SpO2 Weight   02/20/21 0900 (!) 106/45   62 15 93 %    02/20/21 0800 133/60 96.8 °F (36 °C) Temporal 65 20 92 %    02/20/21 0700 130/61   65 15 94 %    02/20/21 0630     15 93 %    02/20/21 0628    64 18 99 %    02/20/21 0627    63 17 98 %    02/20/21 0559       147 lb 8 oz (66.9 kg)   02/20/21 0504    63      02/20/21 0500 (!) 116/56   63 16 98 %    02/20/21 0400 (!) 112/46 96.5 °F (35.8 °C) Temporal 63 18 92 %    02/20/21 0300 (!) 140/57   65 16 93 %    02/20/21 0200 (!) 139/56   68 17 93 %    02/20/21 0100 (!) 138/53   66 19 93 %    02/20/21 0000 (!) 150/54 97.2 °F (36.2 °C) Temporal 67 17 96 %    02/19/21 2300 (!) 120/39   65 17 97 %    02/19/21 2200 (!) 121/39   64 19 97 %    02/19/21 2100 121/64   63 19 93 %    02/19/21 2000 (!) 136/51 97.2 °F (36.2 °C) Temporal 64 17 92 %    02/19/21 1900 137/60   64 21 92 %    02/19/21 1800 (!) 130/48   64 21 92 %    02/19/21 1700 (!) 133/58   66 21 94 %    02/19/21 1600 (!) 140/48 97.1 °F (36.2 °C) Temporal 64 20 93 %  02/19/21 1500 (!) 114/56   63 23 94 %    02/19/21 1422     23 95 %    02/19/21 1421    63 22 94 %    02/19/21 1400 (!) 128/54   64 23 95 %    02/19/21 1300 98/61   63 22 99 %    02/19/21 1200 (!) 106/38 97.4 °F (36.3 °C) Temporal 64 22 99 %    02/19/21 1100 (!) 136/109   66 29 92 %    02/19/21 1025     13 100 %        Intake/Output Summary (Last 24 hours) at 2/20/2021 1010  Last data filed at 2/20/2021 0800  Gross per 24 hour   Intake 2799.31 ml   Output 655 ml   Net 2144.31 ml     General: Intubated/sedated. HEENT: Normocephalic atraumatic head with orotracheal intubation. Neck: Supple with no JVD or carotid bruits. Chest:  Decreased breath sound on both lung field. Expiratory wheezing  CVS: Irregularly irregular S1 and S2. Abdominal: soft, nontender, normal bowel sounds  Extremities: no cyanosis or edema  Skin: warm and dry without rash      Labs:  BMP:   Recent Labs     02/18/21  0401 02/18/21  0744 02/18/21  0744 02/19/21  0341 02/19/21  0809 02/20/21  0329    144  --  148*  --  140   K 7.7* 7.1*   < > 5.3* 4.9 4.8    109  --  110  --  100   CO2 14* 13*  --  17*  --  23   PHOS 9.3*  --   --   --   --   --    BUN 88* 95*  --  106*  --  116*   CREATININE 2.9* 3.0*  --  3.7*  --  3.8*   CALCIUM 9.4 9.0  --  7.1*  --  6.8*    < > = values in this interval not displayed. CBC:   Recent Labs     02/18/21  0401 02/19/21  0341 02/20/21  0329   WBC 13.6* 26.2* 37.1*   HGB 8.6* 9.2* 9.0*   HCT 34.4* 33.0* 31.6*   MCV 83.3 76.0* 74.5*   * 280 239     LIVER PROFILE:   Recent Labs     02/18/21  0401 02/19/21  0341 02/20/21  0329   * >7,000* 4,309*   * 2,565* 1,889*   BILITOT 0.9 1.2 1.4*   ALKPHOS 222* 373* 421*     PT/INR: No results for input(s): PROTIME, INR in the last 72 hours. APTT: No results for input(s): APTT in the last 72 hours. BNP:  No results for input(s): BNP in the last 72 hours. Us Renal Complete    Result Date: 2/18/2021  EXAMINATION: US RENAL COMPLETE 2/18/2021 9:44 AM HISTORY: Acute kidney injury COMPARISON: None FINDINGS: Transabdominal sonographic evaluation of the kidneys and urinary bladder was performed in the transverse and longitudinal projections. Right kidney measures 8.6 x 4.1 x 4.3 cm in size. The cortex appears mildly atrophic. There is no sign of collecting system dilatation or solid renal mass. The inferior right kidney is obscured due to shadowing from bowel gas. The urinary bladder is decompressed and therefore not well evaluated. The left kidney is completely obscured due to shadowing from bowel gas. Markedly limited examination due to shadowing from bowel gas. The right kidney is visible and appears mildly atrophic. The left kidney is completely obscured. Signed by Dr Iliana Briones on 2/18/2021 9:50 AM    Xr Chest Portable    Result Date: 2/18/2021  Examination. XR CHEST PORTABLE 2/18/2021 2:43 AM History: Intubation. A single frontal portable supine view of the chest is compared with the previous study dated 2/17/2021. There are respiratory motion artifacts. Chronic appearing interstitial changes and granulomas are seen in the lungs bilaterally, similar to the previous study. There is no pleural effusion, pulmonary congestion or pneumothorax. There is moderate cardiomegaly. Atheromatous changes thoracic aorta are noted. No acute bony abnormality. An endotracheal tube is seen with distal end 5 cm above trachea bifurcation. This is in appropriate position. No active cardiopulmonary disease. ET tube in appropriate position. Chronic inflammatory and granulomatous lung changes. A moderate cardiomegaly.  Signed by Dr Krystal Zuniga on 2/18/2021 7:01 AM    Xr Chest Portable    Result Date: 2/17/2021 XR CHEST PORTABLE 2/17/2021 8:10 PM History: Cough and congestion. Portable chest x-ray compared with 7 October 2020. The heart size is normal. The mediastinum is within normal limits. The lungs are adequately expanded with no pneumonia or pneumothorax. There is mild chronic interstitial disease with scattered calcified granulomas. There is no significant pleural fluid. No congestive failure changes. 1. No acute disease. Signed by Dr Marixa Walton on 2/17/2021 8:10 PM       Assessment   1. Acute kidney injury/stage III/worsen. 2.  Stage IIIb chronic kidney disease baseline. 3.  Severe hyperkalemia/improving. 4.  Metabolic acidemia. 5.  COPD exacerbation. 6.  Acute respiratory failure/intubated. 7.  Shock liver improving. 8.  Irritable bowel syndrome/chronic diarrhea. 9.  Hypoxemic ATN. 10.  Hypocalcemia  11. Septic shock. Plan:  1. IV fluid bolus. 2.  Maintenance normal saline. 3.  Wean off Levophed. 4.  Intravenous calcium gluconate.   Rossana Beltran MD  02/20/21  10:10 AM

## 2021-02-20 NOTE — PROGRESS NOTES
Restraints applied at this time. I am attempting to wean the patient from Propofol and Leveophed. The patient is currently on Precedex. Will continue to monitor.

## 2021-02-20 NOTE — PROGRESS NOTES
The patient's head continually shaking back and forth. She is unable to follow commands. Propfol restarted.

## 2021-02-20 NOTE — PROGRESS NOTES
I spoke with the patient's son NISREEN Anderson via phone about the patient's code status. I informed him of the stroke that his mother had according to the CT scan. I read several of the doctor's notes to him. He wanted to discuss things again with his family and he would call back. He called back and said the he wanted to discuss things with the doctor. I informed Dr. Karen Kaur and he said the he would call the patient's family and set up a time to talk with them.

## 2021-02-20 NOTE — PROGRESS NOTES
Hospitalist Progress Note  [facility]     Patient: Vilma Duncan  : 1940  MRN: [id number]  Code Status: Full Code    Hospital Day: 2   Date of Service: 2021    Subjective:   Pt seen and examined. Intubated and sedated. Past Medical History:   Diagnosis Date    Acid reflux     Acute renal failure superimposed on chronic kidney disease (HCC)     Allergic rhinitis     Bilateral carotid artery stenosis 2020    Hyperlipidemia     Hypertension     Irritable bowel syndrome with diarrhea 2019    Lung cancer ([facility]     Lung with chemo    Mild single current episode of major depressive disorder ([facility] 2018    New onset of congestive heart failure ([facility] [facility] 10/7/2020    Osteoarthritis     Osteoporosis     Other emphysema ([facility] [address] [facility] 2017    Palliative care patient 2019    Panic disorder 2019    Stage 3 [facility] 2018    FEV1 42%    Tobacco abuse 2018    Urinary incontinence     stress incontinence       Past Surgical History:   Procedure Laterality Date    APPENDECTOMY      CARDIAC CATHETERIZATION  5/3/2013   MDL    EF 60%    HEMORRHOID SURGERY      HYSTERECTOMY      HYSTERECTOMY, TOTAL ABDOMINAL      LUNG CANCER SURGERY      TONSILLECTOMY      VASCULAR SURGERY  10/11/2019    TJR. Aortogram and runoff. PTA and stenting of the left external iliac artery with a 7x 80 and innova stent dilated to 7.2mm. PTA of the left popliteal artery with 5mm x 2cm cutting balloon. attempted recalization of the peroneal artery aborted. Family History   Problem Relation Age of Onset    High Blood Pressure Mother     High Blood Pressure Father     Diabetes Sister     High Blood Pressure Brother        Social History     Socioeconomic History    Marital status:       Spouse name: Not on file    Number of children: Not on file    Years of education: Not on file    Highest education level: Not on file Occupational History    Not on file   Social Needs    Financial resource strain: Not on file    Food insecurity     Worry: Not on file     Inability: Not on file    Transportation needs     Medical: Not on file     Non-medical: Not on file   Tobacco Use    Smoking status: Former Smoker     Packs/day: 0.50     Years: 30.00     Pack years: 15.00     Types: Cigarettes     Start date: 1970     Quit date: 2019     Years since quittin.5    Smokeless tobacco: Never Used   Substance and Sexual Activity    Alcohol use: Yes     Comment: occcasionally    Drug use: No    Sexual activity: Yes     Partners: Male   Lifestyle    Physical activity     Days per week: Not on file     Minutes per session: Not on file    Stress: Not on file   Relationships    Social connections     Talks on phone: Not on file     Gets together: Not on file     Attends Islam service: Not on file     Active member of club or organization: Not on file     Attends meetings of clubs or organizations: Not on file     Relationship status: Not on file    Intimate partner violence     Fear of current or ex partner: Not on file     Emotionally abused: Not on file     Physically abused: Not on file     Forced sexual activity: Not on file   Other Topics Concern    Not on file   Social History Narrative    Not on file       Current Facility-Administered Medications   Medication Dose Route Frequency Provider Last Rate Last Admin    propofol injection  5-50 mcg/kg/min Intravenous Titrated Landry Fernando MD 8 mL/hr at 21 1049 20 mcg/kg/min at 21 1049    dexmedetomidine (PRECEDEX) 400 mcg in sodium chloride 0.9 % 100 mL infusion  0.2-1.4 mcg/kg/hr Intravenous Continuous Landry Fernando MD 3.3 mL/hr at 21 0732 0.2 mcg/kg/hr at 21 0732    0.9 % sodium chloride infusion   Intravenous Continuous Alma Ortiz  mL/hr at 21 0943 New Bag at 21 0878  calcium gluconate 2000 mg in sodium chloride 100 mL  2,000 mg Intravenous Once Junaid Sanchez MD 50 mL/hr at 02/20/21 1013 2,000 mg at 02/20/21 1013    0.9 % sodium chloride bolus  1,000 mL Intravenous Once Junaid Sanchez .3 mL/hr at 02/20/21 1024 1,000 mL at 02/20/21 1024    norepinephrine (LEVOPHED) 16 mg in sodium chloride 0.9 % 250 mL infusion  2-100 mcg/min Intravenous Continuous Henrique Cannon MD 2.8 mL/hr at 02/20/21 1048 3 mcg/min at 02/20/21 1048    chlorhexidine (PERIDEX) 0.12 % solution 15 mL  15 mL Mouth/Throat BID Casper Stafford MD   15 mL at 02/20/21 0732    lactulose (CHRONULAC) 10 GM/15ML solution 20 g  20 g Oral TID Casper Stafford MD   20 g at 02/20/21 0732    cefepime (MAXIPIME) 1,000 mg in sterile water 10 mL IV syringe  1,000 mg Intravenous Q24H Henrique Cannon MD   Stopped at 02/20/21 0559    lactated ringers bolus  500 mL Intravenous Once Casper Stafford MD   Stopped at 02/19/21 1935    sodium chloride flush 0.9 % injection 10 mL  10 mL Intravenous 2 times per day Henrique Cannon MD   10 mL at 02/20/21 0732    sodium chloride flush 0.9 % injection 10 mL  10 mL Intravenous PRN Henrique Cannon MD        potassium chloride 10 mEq/100 mL IVPB (Peripheral Line)  10 mEq Intravenous PRN Henrique Cannon MD        magnesium sulfate 1000 mg in dextrose 5% 100 mL IVPB  1,000 mg Intravenous PRN Henrique Cannon MD        heparin (porcine) injection 5,000 Units  5,000 Units Subcutaneous 3 times per day Henrique Cannon MD   5,000 Units at 02/20/21 0548    promethazine (PHENERGAN) tablet 12.5 mg  12.5 mg Oral Q6H PRN Henrique Cannon MD        Or    ondansetron (ZOFRAN) injection 4 mg  4 mg Intravenous Q6H PRN Henrique Cannon MD        polyethylene glycol (GLYCOLAX) packet 17 g  17 g Oral Daily PRN Henrique Cannon MD        acetaminophen (TYLENOL) tablet 650 mg  650 mg Oral Q6H PRN Henrique Cannon MD        Or    acetaminophen (TYLENOL) suppository 650 mg  650 mg Rectal Q6H PRN Neena Nguyen MD  famotidine (PEPCID) injection 20 mg  20 mg Intravenous Daily Henrique Cannon MD   20 mg at 02/20/21 0732    methylPREDNISolone sodium (SOLU-MEDROL) injection 40 mg  40 mg Intravenous Q8H Sagrario Ohara MD   40 mg at 02/20/21 0828    ipratropium-albuterol (DUONEB) nebulizer solution 1 ampule  1 ampule Inhalation Q4H Sagrario Ohara MD   1 ampule at 02/20/21 1022    hydrALAZINE (APRESOLINE) injection 10 mg  10 mg Intravenous Q6H PRN Sagrario Ohara MD   10 mg at 02/18/21 1144    glucose (GLUTOSE) 40 % oral gel 15 g  15 g Oral PRN Sagrario Ohara MD        dextrose 50 % IV solution  12.5 g Intravenous PRN Sagrario Ohara MD        glucagon (rDNA) injection 1 mg  1 mg Intramuscular PRN Sagrario Ohara MD        dextrose 5 % solution  100 mL/hr Intravenous PRN Sagrario Ohara MD        miconazole (MICOTIN) 2 % powder   Topical BID Sagrario Ohara MD   Given at 02/20/21 3318    carboxymethylcellulose (REFRESH PLUS) 0.5 % ophthalmic solution 1 drop  1 drop Both Eyes TID Sagrario Ohara MD   1 drop at 02/20/21 0732    insulin lispro (HUMALOG) injection vial 0-6 Units  0-6 Units Subcutaneous TID WC Sagrario Ohara MD   3 Units at 02/20/21 0720    insulin lispro (HUMALOG) injection vial 0-3 Units  0-3 Units Subcutaneous Nightly Sagrario Ohara MD   1 Units at 02/19/21 2106         propofol 20 mcg/kg/min (02/20/21 1049)    dexmedetomidine HCl in NaCl 0.2 mcg/kg/hr (02/20/21 0732)    sodium chloride 100 mL/hr at 02/20/21 0943    norepinephrine 3 mcg/min (02/20/21 1048)    dextrose          Objective:   BP (!) 100/49   Pulse 62   Temp 96.8 °F (36 °C) (Temporal)   Resp 13   Ht 5' (1.524 m)   Wt 147 lb 8 oz (66.9 kg)   SpO2 95%   BMI 28.81 kg/m²     General: no acute distress  HEENT: normocephalic, atraumatic, 4 mm right pupil reactive, 3 mm left pupil reactive  Neck: supple, symmetrical, trachea midline   Lungs: bilateral rhonchi/wheezing   Cardiovascular: s1 and s2 normal  Abdomen: soft, positive bowel sounds Extremities: rle now slightly warmer to touch vs prior exam  Neuro: intubated and sedated      Recent Labs     02/18/21  0401 02/19/21  0341 02/20/21  0329   WBC 13.6* 26.2* 37.1*   RBC 4.13* 4.34 4.24   HGB 8.6* 9.2* 9.0*   HCT 34.4* 33.0* 31.6*   MCV 83.3 76.0* 74.5*   MCH 20.8* 21.2* 21.2*   MCHC 25.0* 27.9* 28.5*   * 280 239     Recent Labs     02/18/21  0744 02/18/21  0744 02/19/21  0341 02/19/21  0809 02/20/21  0329     --  148*  --  140   K 7.1*   < > 5.3* 4.9 4.8   ANIONGAP 22*  --  21*  --  17     --  110  --  100   CO2 13*  --  17*  --  23   BUN 95*  --  106*  --  116*   CREATININE 3.0*  --  3.7*  --  3.8*   GLUCOSE 131*  --  127*  --  223*   CALCIUM 9.0  --  7.1*  --  6.8*    < > = values in this interval not displayed. Recent Labs     02/17/21  1804 02/18/21 0401 02/19/21 0341 02/20/21  0329   MG 2.4  --  2.1 1.8   PHOS  --  9.3*  --   --      Recent Labs     02/18/21  0401 02/19/21 0341 02/20/21  0329   * >7,000* 4,309*   * 2,565* 1,889*   BILITOT 0.9 1.2 1.4*   ALKPHOS 222* 373* 421*     No results for input(s): PH, PO2, PCO2, HCO3, BE, O2SAT in the last 72 hours. Recent Labs     02/17/21  1804 02/18/21  0401   TROPONINI 0.03 0.05*     No results for input(s): INR in the last 72 hours.   Recent Labs     02/19/21  1231   LACTA 5.0*         Intake/Output Summary (Last 24 hours) at 2/20/2021 1138  Last data filed at 2/20/2021 1000  Gross per 24 hour   Intake 2829.66 ml   Output 655 ml   Net 2174.66 ml       Ct Head Wo Contrast    Result Date: 2/20/2021 EXAM: CT HEAD WO CONTRAST -- 2/19/2021 11:52 PM HISTORY: 68 years, Female, neuro change, altered mental status COMPARISON: 2/17/2021 DLP: 794 mGy cm. Automated exposure control was utilized to minimize patient radiation dose. TECHNIQUE: Unenhanced axial CT images obtained from vertex to skull base with coronal and sagittal reformats. FINDINGS: No acute intracranial hemorrhage or midline shift. New linear hypodensity at the left cerebellum compared to 2/17/2021, concerning for infarct. Mild periventricular and subcortical white matter hypodensities, most likely due to chronic microvascular disease. Similar probable right caudate lacunar infarct. Ventricles, sulci, basilar cisterns are normal in size and configuration for the patient's age. Globes, retrobulbar soft tissues, paranasal sinuses, mastoid air cells and external auditory canals are within normal limits. Calvarium appears intact. Subcutaneous tissues within normal limits. 1. New linear hyperdensity at the left cerebellum compared to 2/17/2021, concerning for acute infarct. 2. Similar probable underlying chronic microvascular changes. Preliminary interpretation performed by [facility] and I agree with the preliminary findings. Resultant notification documented on preliminary report.  Signed by Dr Quintin Stern on 2/20/2021 6:34 AM    Xr Chest Portable    Result Date: 2/19/2021 EXAMINATION: XR CHEST PORTABLE 2/19/2021 3:05 PM HISTORY: PIC line placement COMPARISON: 2/19/2021 FINDINGS: Endotracheal tube is in place with tip above the alis. Enteric tube is in place with tip curled and projecting over the left upper quadrant of the abdomen, presumably in the stomach. A left approach PICC line is in place with tip projecting over the mid SVC. The heart and mediastinal contours are stable. Diffuse increased interstitial markings are present. There is consolidation in the left lung base. Trace pleural fluid is suspected bilaterally. There is no appreciable pneumothorax. Interval placement of PIC line with tip projecting over the mid SVC. Otherwise, stable exam. Signed by Dr Ofe Batista on 2/19/2021 3:06 PM    Xr Chest Portable    Result Date: 2/19/2021  XR CHEST PORTABLE 2/19/2021 3:43 AM HISTORY: ET tube placement COMPARISON: Chest x-ray dated 2/18/2021. FINDINGS: Endotracheal tube is in good position. Enteric tube is in good position. Atelectasis in the retrocardiac space. Lungs are otherwise clear. No pleural effusion or pneumothorax. Heart size is normal. The pulmonary vasculature are nondilated. No acute bony abnormality. 1. Endotracheal tube is in good position. Atelectasis in the left base. Lungs are otherwise clear.  Signed by Dr Abdoul Guadarrama on 2/19/2021 7:44 AM    Vl Lower Extremity Arterial Segmental Pressures W Ppg    Result Date: 2/19/2021 Vascular Lower Arterial Plethysmography Procedure  Demographics   Patient Name     Enedelia Harris Age                   68   Patient Number   [id number]      Gender                Female   Visit Number     [id number]   Interpreting          New Del Rio MD                               Physician   Date of Birth    1940  Referring Physician   New Del Rio MD   Accession Number [id number]  [id number] [facility], [place]  Procedure Type of Study:   Extremities Arteries:Lower Arterial Plethysmography, VASC LOWER EXTREMITY  ART SEGMENTAL PRESSURES W PPG. Indications for Study:Decreased pulses in feet. Risk Factors   - The patient's risk factor(s) include: dyslipidemia and arterial     hypertension.   - The patient has a former tobacco history. - The patient's risk factor(s) include: Prior CVA, ,  Impression   Bilateral great toe pressures 0. This is likely consistent with chronic  PAD plus arterial spasm from pressors and hypotension. Clinical  correlation recommended. The patient has moderately to severely diminished ankle-brachial indices  bilateral lower extremities. Based on segmental pressures and doppler waveforms, the patient likely has  disease in the bilateral superficial femoral and tibial arterial segments. Signature   ----------------------------------------------------------------  Electronically signed by New Del Rio MD(Interpreting  physician) on 02/19/2021 04:16 PM  ----------------------------------------------------------------  Velocities are measured in cm/s ; Diameters are measured in mm Pressures +--------------------------------------++--------+-----+----+--------+-----+ ! ! !Right   ! ! Left!        !     ! +--------------------------------------++--------+-----+----+--------+-----+ ! Location                              ! !Pressure! Ratio! !Pressure! Ratio! +--------------------------------------++--------+-----+----+--------+-----+ ! Upper Thigh                           !!144     !1.03 !    !144     ! 1.03 ! +--------------------------------------++--------+-----+----+--------+-----+ ! Lower Thigh                           !!125     !0.89 !    !125     ! 0.89 ! +--------------------------------------++--------+-----+----+--------+-----+ ! Calf                                  !!101     !0.72 !    !122     !0.87 ! +--------------------------------------++--------+-----+----+--------+-----+ ! Ankle PT                              !!59      !0.42 ! !54      !0.39 ! +--------------------------------------++--------+-----+----+--------+-----+ ! DP                                    ! !56      !0.4  ! !67      !0.48 ! +--------------------------------------++--------+-----+----+--------+-----+ ! Great Toe                             !!0       !0    !    !0       !0    ! +--------------------------------------++--------+-----+----+--------+-----+   - Brachial Pressure:Right: 140.   - BERT:Right: 0.42. Left: 0.48. Plethysmographic Digit Evaluation +---------++--------+-----+---------------++--------+-----+----------------+ ! ! !Right   ! ! Left           !!        !     !                ! +---------++--------+-----+---------------++--------+-----+----------------+ ! Location ! !Pressure! Ratio! PPG Wave Form  ! !Pressure! Ratio! PPG Wave Form   ! +---------++--------+-----+---------------++--------+-----+----------------+ ! Great Toe!!0       !0    !               !!0       !0    !                ! +---------++--------+-----+---------------++--------+-----+----------------+       Assessment and Plan:   Septic shock  Suspect secondary to below  Agents on board since admission  Follow cultures  IVF  Norepinephrine gtt  Trend lactate to clearance     Pseudomonas pneumonia  See above     COPD exacerbation  Steroids  Nebs  Antibiotics     MUNIRA/CKD3/hyperkalemia  Renal following Creatinine worsening  Hyperkalemia resolved  Anion gap metabolic acidosis resolved     Transaminitis  Improving  Suspect shock liver secondary to above processes  Trend  Further work-up if no improvement with current treatment plan     Acute encephalopathy  Neurology following  [facility] 2/17/2021: without evidence for acute process  [facility] 2/20/2021: Concern for acute left cerebellar infarct  Await further neurology input  RPR  Ammonia mildly elevated, lactulose     Ventilator dependent acute hypercapnic respiratory failure  Secondary to above processes  Titrate minute ventilation for pH 7.35  Follow ABG     Concern for RLE ischemia  Vascular surgery following  Imaging consistent with chronic PAD    Frequent neurovascular checks     DVT prophylaxis  Heparin    Poor prognosis    Total critical care time: 47 minutes    Eran Hill MD   2/20/2021 11:38 AM

## 2021-02-20 NOTE — PLAN OF CARE
[place], [place], [address]    DEPARTMENT OF HOSPITALIST MEDICINE      PLAN  OF  CARE  NOTE:      ER nurse noticed unequal pupils on the patient. I ordered CAT scan of the head without contrast which showed findings consistent with mild stroke. I ordered for a neurology consult in the morning and will endorse the intensivist about the case.       Srinivasa Falcon MD  2/20/2021, 2:06 AM

## 2021-02-20 NOTE — PROGRESS NOTES
Elmer Jim M.D. Daily Progress Note    Pt Name: Liliana Mae Record Number: [id number]  Date of Birth 1940   Today's Date: 2/20/2021    Chief Complaint:  Chief Complaint   Patient presents with    Altered Mental Status       SUBJECTIVE:     Patient was seen and examined. She remains intubated and is not following commands for me. She had a cerebrovascular accident last night. The nurse noted one of her pupils was dilated and a CT scan of the brain was ordered with findings noted.     OBJECTIVE:     Patient Vitals for the past 24 hrs:   BP Temp Temp src Pulse Resp SpO2 Weight   02/20/21 1508 (!) 93/47   62 10 97 %    02/20/21 1505 (!) 79/47   62 12 98 %    02/20/21 1500 (!) 67/42   63 13 99 %    02/20/21 1420     15 98 %    02/20/21 1419    64 15 100 %    02/20/21 1400 (!) 107/53   61 13 92 %    02/20/21 1300 (!) 130/59   63 14 90 %    02/20/21 1200 (!) 92/49 97.5 °F (36.4 °C) Temporal 62 13 92 %    02/20/21 1100 (!) 100/49   62 13 95 %    02/20/21 1020    63 13 95 %    02/20/21 1000 (!) 115/50   63 12 94 %    02/20/21 0900 (!) 106/45   62 15 93 %    02/20/21 0800 133/60 96.8 °F (36 °C) Temporal 65 20 92 %    02/20/21 0700 130/61   65 15 94 %    02/20/21 0630     15 93 %    02/20/21 0628    64 18 99 %    02/20/21 0627    63 17 98 %    02/20/21 0559       147 lb 8 oz (66.9 kg)   02/20/21 0504    63      02/20/21 0500 (!) 116/56   63 16 98 %    02/20/21 0400 (!) 112/46 96.5 °F (35.8 °C) Temporal 63 18 92 %    02/20/21 0300 (!) 140/57   65 16 93 %    02/20/21 0200 (!) 139/56   68 17 93 %    02/20/21 0100 (!) 138/53   66 19 93 %    02/20/21 0000 (!) 150/54 97.2 °F (36.2 °C) Temporal 67 17 96 %    02/19/21 2300 (!) 120/39   65 17 97 %    02/19/21 2200 (!) 121/39   64 19 97 %    02/19/21 2100 121/64   63 19 93 %    02/19/21 2000 (!) 136/51 97.2 °F (36.2 °C) Temporal 64 17 92 %  02/19/21 1900 137/60   64 21 92 %    02/19/21 1800 (!) 130/48   64 21 92 %    02/19/21 1700 (!) 133/58   66 21 94 %    02/19/21 1600 (!) 140/48 97.1 °F (36.2 °C) Temporal 64 20 93 %          Intake/Output Summary (Last 24 hours) at 2/20/2021 1531  Last data filed at 2/20/2021 1400  Gross per 24 hour   Intake 2880.74 ml   Output 870 ml   Net 2010.74 ml       In: 1634.4 [I.V.:1634.4]  Out: 665 [Urine:665]    I/O last 3 completed shifts: In: 2880.7 [I.V.:2630.7; IV Piggyback:250]  Out: 596 [Urine:820; Emesis/NG output:50]     Date 02/20/21 0000 - 02/20/21 2359   Shift 9353-8889 6106-9560 0236-8570 24 Hour Total   INTAKE   I.V.(mL/kg) 830.6(12.4) 803.9(12)  1634. 4(24.4)   IV Piggyback(mL/kg)  0(0)  0(0)   Shift Total(mL/kg) 830.6(12.4) 803.9(12)  1634. 4(24.4)   OUTPUT   Urine(mL/kg/hr) 240(0.4) 425  665   Stool(mL/kg)  0(0)  0(0)   Shift Total(mL/kg) 240(3.6) 425(6.4)  665(9.9)   Weight (kg) 66.9 66.9 66.9 66.9       Wt Readings from Last 3 Encounters:   02/20/21 147 lb 8 oz (66.9 kg)   12/15/20 141 lb (64 kg)   10/22/20 136 lb (61.7 kg)        Body mass index is 28.81 kg/m².      Diet: Diet NPO Effective Now Exceptions are: Ice Chips, Sips with Meds        MEDS:     Scheduled Meds:   chlorhexidine  15 mL Mouth/Throat BID    lactulose  20 g Oral TID    cefepime  1,000 mg Intravenous Q24H    lactated ringers bolus  500 mL Intravenous Once    sodium chloride flush  10 mL Intravenous 2 times per day    heparin (porcine)  5,000 Units Subcutaneous 3 times per day    famotidine (PEPCID) injection  20 mg Intravenous Daily    methylPREDNISolone  40 mg Intravenous Q8H    ipratropium-albuterol  1 ampule Inhalation Q4H    miconazole   Topical BID    carboxymethylcellulose  1 drop Both Eyes TID    insulin lispro  0-6 Units Subcutaneous TID WC    insulin lispro  0-3 Units Subcutaneous Nightly     Continuous Infusions:   propofol 10 mcg/kg/min (02/20/21 0427)  dexmedetomidine HCl in NaCl Stopped (02/20/21 1507)    sodium chloride 100 mL/hr at 02/20/21 0943    dextrose      norepinephrine 2 mcg/min (02/20/21 1502)     PRN Meds:    glucose, 15 g, PRN      dextrose, 12.5 g, PRN      glucagon (rDNA), 1 mg, PRN      dextrose, 100 mL/hr, PRN      sodium chloride flush, 10 mL, PRN      potassium chloride, 10 mEq, PRN      magnesium sulfate, 1,000 mg, PRN      promethazine, 12.5 mg, Q6H PRN    Or      ondansetron, 4 mg, Q6H PRN      polyethylene glycol, 17 g, Daily PRN      acetaminophen, 650 mg, Q6H PRN    Or      acetaminophen, 650 mg, Q6H PRN      hydrALAZINE, 10 mg, Q6H PRN          PHYSICAL EXAM:     She is intubated and critically ill. She continues on Levophed drip. However, her blood pressure is improved compared to yesterday. Her right foot is mottled. It becomes warm around the ankle level. Her left foot is warm but somewhat mottled toes and midfoot. She has nonpalpable bilateral pedal pulses.   She has 1+ palpable bilateral femoral artery pulses  LABS:       CBC:   Recent Labs     02/18/21  0401 02/19/21  0341 02/20/21  0329   WBC 13.6* 26.2* 37.1*   RBC 4.13* 4.34 4.24   HGB 8.6* 9.2* 9.0*   HCT 34.4* 33.0* 31.6*   MCV 83.3 76.0* 74.5*   MCH 20.8* 21.2* 21.2*   MCHC 25.0* 27.9* 28.5*   RDW 23.0* 23.7* 24.3*   * 280 239   MPV 9.5 10.3 10.2      Last 3 CMP:   Recent Labs     02/18/21  0401 02/18/21  0744 02/18/21  0744 02/19/21  0341 02/19/21  0809 02/20/21  0329    144  --  148*  --  140   K 7.7* 7.1*   < > 5.3* 4.9 4.8    109  --  110  --  100   CO2 14* 13*  --  17*  --  23   BUN 88* 95*  --  106*  --  116*   CREATININE 2.9* 3.0*  --  3.7*  --  3.8*   GLUCOSE 137* 131*  --  127*  --  223*   CALCIUM 9.4 9.0  --  7.1*  --  6.8*   PROT 6.9  --   --  5.3*  --  5.5*   LABALBU 2.7*  --   --  2.4*  --  2.0*   BILITOT 0.9  --   --  1.2  --  1.4*   ALKPHOS 222*  --   --  373*  --  421*   *  --   --  >7,000*  --  4,309* *  --   --  2,565*  --  1,889*    < > = values in this interval not displayed. Troponin:   Recent Labs     02/17/21  1804 02/18/21  0401   TROPONINI 0.03 0.05*     Calcium:   Lab Results   Component Value Date    CALCIUM 6.8 02/20/2021    CALCIUM 7.1 02/19/2021    CALCIUM 9.0 02/18/2021      Ionized Calcium: No results found for: IONCA   Lipids: No results for input(s): CHOL, HDL in the last 72 hours. Invalid input(s): LDLCALCU  INR: No results for input(s): INR in the last 72 hours. Lactic Acid:   Lab Results   Component Value Date    LACTA 3.5 02/20/2021    LACTA 5.0 02/19/2021    LACTA 3.8 02/19/2021                    DVT prophylaxis: [] Lovenox                                 [] SCDs                                 [] SQ Heparin                                 [] Encourage ambulation, low risk for DVT, no chemical or                                      mechanical prophylaxis necessary              [] Already on Anticoagulation      RADIOLOGY:                ASSESSMENT:     1. She has a right greater than left lower extremity ischemia. Most likely, this is from pre-existing peripheral vascular occlusive disease as well as hypotension with sepsis and hypotension requiring Levophed necessary to maintain blood pressure. Unfortunately, overnight, the patient also had a fairly significant cerebrovascular accident.     2. HD # 2  Patient Active Problem List   Diagnosis    Basal ganglia infarction ([address]    Mild single current episode of major depressive disorder ([address]    Stage 4 very severe COPD by GOLD classification ([address]    COPD with acute exacerbation ([address]    Inflammation of left sacroiliac joint ([address]    Inflammation of right sacroiliac joint ([address]    Cigarette smoker    Irritable bowel syndrome with diarrhea    Acute respiratory failure (HCC)    Lactic acidosis    Acute respiratory failure with hypoxia and hypercapnia ([address]    Pulmonary hypertension due to COPD ([address]  Panic disorder    Spider veins    Atherosclerosis of native arteries of extremities with intermittent claudication, bilateral legs (HCC)    Atherosclerosis of native artery of both lower extremities with intermittent claudication (HCC)    Bilateral carotid artery stenosis    Acute hypoxemic respiratory failure (HCC)    Palliative care patient    Pulmonary nodule seen on imaging study    Recurrent major depressive disorder, in full remission (HCC)    Chronic respiratory failure with hypoxia (HCC)    Oxygen dependent    Atherosclerosis of artery of extremity with ulceration (HCC)    Elevated serum creatinine    Chronic diastolic (congestive) heart failure (HCC)    Nonrheumatic mitral valve stenosis    Nonrheumatic aortic valve stenosis    Essential hypertension    Hyperlipidemia    Acute exacerbation of chronic obstructive pulmonary disease (COPD) (HCC)    Acute hyperkalemia    Hypoglycemia    Hyperkalemia    Altered mental status    Acute renal failure superimposed on chronic kidney disease (HCC)    Atherosclerosis of artery of both lower extremities ([facility]   3. PLAN:     1. I think that it is reasonable to continue with heparin 5000 units subcutaneous 3 times daily if okay with neurology. 2. If and when she becomes more stable with some recovery of kidney function, I might consider bilateral lower extremity arteriogram.  However, any dye load may push her into renal failure requiring chronic hemodialysis. Right now, her prognosis is fairly poor and she might well lose the right lower extremity if she survives the sepsis and cerebrovascular accident. Vicky Colmenares M.D.    Electronically signed 2/20/2021 at 3:31 PM

## 2021-02-20 NOTE — PROGRESS NOTES
[id number] [facility] Neurology Progress Note      Patient:   Bethany Valdovinos  MR#:    [id number]   Room:    [facility]320-56   YOB: 1940  Date of Progress Note: 2/20/2021  Time of Note                           1:54 PM  Consulting Physician:  Olivia Velasquez DO  Attending Physician:  Brent Rasmussen MD      INTERVAL HISTORY:  No acute events, largely unchanged, CT results noted. REVIEW OF SYSTEMS:  Unable to obtain    PHYSICAL EXAM:    Constitutional    BP (!) 130/59   Pulse 63   Temp 97.5 °F (36.4 °C) (Temporal)   Resp 14   Ht 5' (1.524 m)   Wt 147 lb 8 oz (66.9 kg)   SpO2 90%   BMI 28.81 kg/m²   General appearance: Intubated, sedated. EYES -   Conjunctiva normal  Pupillary exam as below, see CN exam in the neurologic exam  ENT-    No scars, masses, or lesions over external nose or ears  Neck-   No neck masses noted  Thyroid normal   No jugular vein distension  Cardiovascular -   No clubbing, cyanosis, or edema   Pulmonary-   Good expansion, normal effort without use of accessory muscles  Musculoskeletal    No significant wasting of muscles noted  Gait as below, see gait exam in the neurologic exam  Muscle strength, tone, stability as below see the motor exam in the neurologic exam.   No bony deformities  Skin    Warm, dry, and intact to inspection and palpation. No rash, erythema, or pallor      NEUROLOGICAL EXAM    Mental status   [] Awake, alert, oriented   [] Affect attention and concentration appear appropriate  [] Recent and remote memory appears unremarkable  [] Speech normal without dysarthria or aphasia, comprehension and repetition intact.    COMMENTS:  Sedated, unresponsive   Cranial Nerves [] No VF deficit to confrontation,  no papilledema on fundoscopic exam.  [] PERRLA, EOMI, no nystagmus, conjugate eye movements, no ptosis  [] Face symmetric  [] Facial sensation intact  [] Tongue midline no atrophy or fasciculations present  [] Palate midline, hearing to finger rub normal [] Shoulder shrug and SCM testing normal  COMMENTS:  Mild anisocoria noted, pupils reactive, face appears symmetric   Motor   [] 5/5 strength x 4 extremities  [x] Normal bulk and tone  [x] No tremor present  [] No rigidity or bradykinesia noted  COMMENTS: No withdrawal x 4    Sensory  [] Sensation intact to light touch, pin prick, vibration, and proprioception BLE  [] Sensation intact to light touch, pin prick, vibration, and proprioception BUE  COMMENTS:  Limited   Coordination [] FTN normal bilaterally   [] HTS normal bilaterally  [] ERIN normal.   COMMENTS: Limited   Reflexes  [] Symmetric and non-pathological  [x] Toes downgoing bilaterally  [x] No clonus present  COMMENTS: Hypoactive throughout    Gait                  [] Normal steady gait    [] Ataxic    [] Spastic     [] Magnetic     [] Shuffling  [x] Not assessed  COMMENTS:       LABS/IMAGING:    Ct Head Wo Contrast    Result Date: 2/17/2021  CT HEAD WO CONTRAST 2/17/2021 7:50 PM History: Altered mental status. Noncontrast head CT compared with 29 June 2020. In order to have a CT radiation dose as low as reasonably achievable Automated Exposure Control was utilized for adjustment of the mA and/or KV according to patient size. DLP in mGycm= 742. Left maxillary sinus fluid and mucosal thickening is increased. Mild atrophy and moderate small vessel disease. Normal ventricle size. No hemorrhage or mass effect. 1. Left maxillary sinusitis changes. 2. Stable atrophy and small vessel disease with no evidence of mass or infarct.  Signed by Dr Christiano Mckeon on 2/17/2021 7:51 PM    Us Renal Complete    Result Date: 2/18/2021 EXAMINATION: US RENAL COMPLETE 2/18/2021 9:44 AM HISTORY: Acute kidney injury COMPARISON: None FINDINGS: Transabdominal sonographic evaluation of the kidneys and urinary bladder was performed in the transverse and longitudinal projections. Right kidney measures 8.6 x 4.1 x 4.3 cm in size. The cortex appears mildly atrophic. There is no sign of collecting system dilatation or solid renal mass. The inferior right kidney is obscured due to shadowing from bowel gas. The urinary bladder is decompressed and therefore not well evaluated. The left kidney is completely obscured due to shadowing from bowel gas. Markedly limited examination due to shadowing from bowel gas. The right kidney is visible and appears mildly atrophic. The left kidney is completely obscured. Signed by Dr Casper Cancer on 2/18/2021 9:50 AM    Xr Chest Portable    Result Date: 2/18/2021  Examination. XR CHEST PORTABLE 2/18/2021 2:43 AM History: Intubation. A single frontal portable supine view of the chest is compared with the previous study dated 2/17/2021. There are respiratory motion artifacts. Chronic appearing interstitial changes and granulomas are seen in the lungs bilaterally, similar to the previous study. There is no pleural effusion, pulmonary congestion or pneumothorax. There is moderate cardiomegaly. Atheromatous changes thoracic aorta are noted. No acute bony abnormality. An endotracheal tube is seen with distal end 5 cm above trachea bifurcation. This is in appropriate position. No active cardiopulmonary disease. ET tube in appropriate position. Chronic inflammatory and granulomatous lung changes. A moderate cardiomegaly.  Signed by Dr Coleen Leon on 2/18/2021 7:01 AM    Xr Chest Portable    Result Date: 2/17/2021 XR CHEST PORTABLE 2/17/2021 8:10 PM History: Cough and congestion. Portable chest x-ray compared with 7 October 2020. The heart size is normal. The mediastinum is within normal limits. The lungs are adequately expanded with no pneumonia or pneumothorax. There is mild chronic interstitial disease with scattered calcified granulomas. There is no significant pleural fluid. No congestive failure changes. 1. No acute disease. Signed by Dr Jp Perry on 2/17/2021 8:10 PM      Lab Results   Component Value Date    WBC 37.1 (H) 02/20/2021    HGB 9.0 (L) 02/20/2021    HCT 31.6 (L) 02/20/2021    MCV 74.5 (L) 02/20/2021     02/20/2021     Lab Results   Component Value Date     02/20/2021    K 4.8 02/20/2021     02/20/2021    CO2 23 02/20/2021     (H) 02/20/2021    CREATININE 3.8 (H) 02/20/2021    GLUCOSE 223 (H) 02/20/2021    CALCIUM 6.8 (LL) 02/20/2021    PROT 5.5 (L) 02/20/2021    LABALBU 2.0 (L) 02/20/2021    BILITOT 1.4 (H) 02/20/2021    ALKPHOS 421 (H) 02/20/2021    AST 4,309 (H) 02/20/2021    ALT 1,889 (H) 02/20/2021    LABGLOM 11 (A) 02/20/2021    GFRAA 14 (L) 02/20/2021    GLOB 3.3 01/10/2017     Lab Results   Component Value Date    INR 1.13 06/29/2020    INR 1.02 01/17/2020    INR 1.11 08/03/2019    PROTIME 14.4 06/29/2020    PROTIME 12.8 01/17/2020    PROTIME 13.7 08/03/2019       RECORD REVIEW:   Previous medical records, medications were reviewed at today's visit. Nursing/physician notes, imaging, labs and vitals reviewed. PT,OT and/or speech notes reviewed      ASSESSMENT:   68 y.o. admitted with sepsis secondary to pseudomonas pneumonia, COPD, respiratory failure, acute on chronic renal failure, shock liver, metabolic acidosis, multiple electrolyte abnormalities, RLE ischemia, prolonged AMS with possible new left cerebellar stroke noted on repeat CT. Exam unchanged overnight, no improvement noted. PLAN:   1. Supportive care from a possible stroke standpoint. Recent ECHO negative. Consider carotid doppler if stabilizes medically but would not be a candidate for any carotid intervention currently, infarct is also posterior circulation at any rate. Add antiplatelet if medically can tolerate, defer to medicine. 2.  Continue addressing sepsis, respiratory, renal, and liver failure. 3.  Try to limit sedation if possible  4. Vascular surgery, nephology following. 5.  Overall medical prognosis is felt poor. Please feel free to call with any questions. [phone] (cell phone).       Nuris Pearson, DO  Board Certified Neurology

## 2021-02-20 NOTE — PROGRESS NOTES
Propofol off. Precedex at 0.06. The patient is moving her head back and forth but unable to squeeze my hand. Will continue to monitor.

## 2021-02-20 NOTE — PLAN OF CARE
Problem: Skin Integrity:  Goal: Will show no infection signs and symptoms  Description: Will show no infection signs and symptoms  2/20/2021 0505 by Nayely Swanson RN  Outcome: Ongoing  2/19/2021 1555 by Ariadne Hartley RN  Outcome: Ongoing  Goal: Absence of new skin breakdown  Description: Absence of new skin breakdown  2/20/2021 0505 by Nayely Swanson RN  Outcome: Ongoing  2/19/2021 1555 by Ariadne Hartley RN  Outcome: Ongoing     Problem: Falls - Risk of:  Goal: Will remain free from falls  Description: Will remain free from falls  2/20/2021 0505 by Nayely Swanson RN  Outcome: Ongoing  2/19/2021 1555 by Ariadne Hartley RN  Outcome: Ongoing  Goal: Absence of physical injury  Description: Absence of physical injury  2/20/2021 0505 by Nayely Swanson RN  Outcome: Ongoing  2/19/2021 1555 by Ariadne Hartley RN  Outcome: Ongoing     Problem: Nutrition  Goal: Optimal nutrition therapy  2/20/2021 0505 by Nayely Swanson RN  Outcome: Ongoing  2/19/2021 1555 by Ariadne Hartley RN  Outcome: Ongoing     Problem: Non-Violent Restraints  Goal: Removal from restraints as soon as assessed to be safe  2/20/2021 0505 by Nayely Swanson RN  Outcome: Ongoing  2/19/2021 1555 by Ariadne Hartley RN  Outcome: Ongoing  Goal: No harm/injury to patient while restraints in use  2/20/2021 0505 by Nayely Swanson RN  Outcome: Ongoing  2/19/2021 1555 by Ariadne Hartley RN  Outcome: Ongoing  Goal: Patient's dignity will be maintained  2/20/2021 0505 by Nayely Swanson RN  Outcome: Ongoing  2/19/2021 1555 by Ariadne Hartley RN  Outcome: Ongoing     Problem: Infection - Central Venous Catheter-Associated Bloodstream Infection:  Goal: Will show no infection signs and symptoms  Description: Will show no infection signs and symptoms  2/20/2021 0505 by Nayely Swanson RN  Outcome: Ongoing  2/19/2021 1555 by Ariadne Hartley RN  Outcome: Ongoing

## 2021-02-21 NOTE — PROGRESS NOTES
Hospitalist Progress Note  [facility]     Patient: Peyton Viveros  : 1940  MRN: [id number]  Code Status: Full Code    Hospital Day: 3   Date of Service: 2021    Subjective:   Pt seen and examined. Intubated and sedated. Past Medical History:   Diagnosis Date    Acid reflux     Acute renal failure superimposed on chronic kidney disease (HCC)     Allergic rhinitis     Bilateral carotid artery stenosis 2020    Hyperlipidemia     Hypertension     Irritable bowel syndrome with diarrhea 2019    Lung cancer ([facility]     Lung with chemo    Mild single current episode of major depressive disorder ([address] 2018    New onset of congestive heart failure ([address] 10/7/2020    Osteoarthritis     Osteoporosis     Other emphysema ([facility] [address] 2017    Palliative care patient 2019    Panic disorder 2019    Stage 3 severe COPD by GOLD classification ([facility] [address] 2018    FEV1 42%    Tobacco abuse 2018    Urinary incontinence     stress incontinence       Past Surgical History:   Procedure Laterality Date    APPENDECTOMY      CARDIAC CATHETERIZATION  5/3/2013   MDL    EF 60%    HEMORRHOID SURGERY      HYSTERECTOMY      HYSTERECTOMY, TOTAL ABDOMINAL      LUNG CANCER SURGERY      TONSILLECTOMY      VASCULAR SURGERY  10/11/2019    [facility]. Aortogram and runoff. PTA and stenting of the left external iliac artery with a 7x 80 and innova stent dilated to 7.2mm. PTA of the left popliteal artery with 5mm x 2cm cutting balloon. attempted recalization of the peroneal artery aborted. Family History   Problem Relation Age of Onset    High Blood Pressure Mother     High Blood Pressure Father     Diabetes Sister     High Blood Pressure Brother        Social History     Socioeconomic History    Marital status:       Spouse name: Not on file    Number of children: Not on file    Years of education: Not on file    Highest education level: Not on file Occupational History    Not on file   Social Needs    Financial resource strain: Not on file    Food insecurity     Worry: Not on file     Inability: Not on file    Transportation needs     Medical: Not on file     Non-medical: Not on file   Tobacco Use    Smoking status: Former Smoker     Packs/day: 0.50     Years: 30.00     Pack years: 15.00     Types: Cigarettes     Start date: 1970     Quit date: 2019     Years since quittin.5    Smokeless tobacco: Never Used   Substance and Sexual Activity    Alcohol use: Yes     Comment: occcasionally    Drug use: No    Sexual activity: Yes     Partners: Male   Lifestyle    Physical activity     Days per week: Not on file     Minutes per session: Not on file    Stress: Not on file   Relationships    Social connections     Talks on phone: Not on file     Gets together: Not on file     Attends Congregational service: Not on file     Active member of club or organization: Not on file     Attends meetings of clubs or organizations: Not on file     Relationship status: Not on file    Intimate partner violence     Fear of current or ex partner: Not on file     Emotionally abused: Not on file     Physically abused: Not on file     Forced sexual activity: Not on file   Other Topics Concern    Not on file   Social History Narrative    Not on file       Current Facility-Administered Medications   Medication Dose Route Frequency Provider Last Rate Last Admin    0.9 % sodium chloride bolus  250 mL Intravenous Once Yazan Slater MD        propofol injection  5-50 mcg/kg/min Intravenous Titrated Jayjay Marcelino MD 6 mL/hr at 21 0639 15 mcg/kg/min at 21 0639    dexmedetomidine (PRECEDEX) 400 mcg in sodium chloride 0.9 % 100 mL infusion  0.2-1.4 mcg/kg/hr Intravenous Continuous Jayjay Marcelino MD   Stopped at 21 5019  0.9 % sodium chloride infusion   Intravenous Continuous Joanna Asencio  mL/hr at 02/21/21 0340 New Bag at 02/21/21 0340    glucose (GLUTOSE) 40 % oral gel 15 g  15 g Oral PRN Olga Lidia Harding MD        dextrose 50 % IV solution  12.5 g Intravenous PRN Olga Lidia Harding MD        glucagon (rDNA) injection 1 mg  1 mg Intramuscular PRN Olga Lidia Harding MD        dextrose 5 % solution  100 mL/hr Intravenous PRN Olga Lidia Harding MD        norepinephrine (LEVOPHED) 16 mg in sodium chloride 0.9 % 250 mL infusion  2-100 mcg/min Intravenous Continuous Henrique Cannon MD   Stopped at 02/21/21 0545    chlorhexidine (PERIDEX) 0.12 % solution 15 mL  15 mL Mouth/Throat BID Olga Lidia Harding MD   15 mL at 02/21/21 0812    lactulose (CHRONULAC) 10 GM/15ML solution 20 g  20 g Oral TID Olga Lidia Harding MD   20 g at 02/21/21 0362    cefepime (MAXIPIME) 1,000 mg in sterile water 10 mL IV syringe  1,000 mg Intravenous Q24H Henrique Cannon MD   Stopped at 02/21/21 0603    lactated ringers bolus  500 mL Intravenous Once Olga Lidia Harding MD   Stopped at 02/19/21 1935    sodium chloride flush 0.9 % injection 10 mL  10 mL Intravenous 2 times per day Henrique Cannon MD   10 mL at 02/21/21 0811    sodium chloride flush 0.9 % injection 10 mL  10 mL Intravenous PRN Henrique Cannon MD        potassium chloride 10 mEq/100 mL IVPB (Peripheral Line)  10 mEq Intravenous PRN Henrique Cannon MD        magnesium sulfate 1000 mg in dextrose 5% 100 mL IVPB  1,000 mg Intravenous PRN Henrique Cannon MD        heparin (porcine) injection 5,000 Units  5,000 Units Subcutaneous 3 times per day Henrique Cannon MD   5,000 Units at 02/21/21 0558    promethazine (PHENERGAN) tablet 12.5 mg  12.5 mg Oral Q6H PRN Henrique Cannon MD        Or    ondansetron (ZOFRAN) injection 4 mg  4 mg Intravenous Q6H PRN Henrique Cannon MD        polyethylene glycol (GLYCOLAX) packet 17 g  17 g Oral Daily PRN Pepito Swift MD  acetaminophen (TYLENOL) tablet 650 mg  650 mg Oral Q6H PRN Henrique Cannon MD        Or    acetaminophen (TYLENOL) suppository 650 mg  650 mg Rectal Q6H PRN Henrique Cannon MD        famotidine (PEPCID) injection 20 mg  20 mg Intravenous Daily Henirque Cannon MD   20 mg at 02/21/21 0811    methylPREDNISolone sodium (SOLU-MEDROL) injection 40 mg  40 mg Intravenous Q8H Casper Stafford MD   40 mg at 02/21/21 7444    ipratropium-albuterol (DUONEB) nebulizer solution 1 ampule  1 ampule Inhalation Q4H Casper Stafford MD   1 ampule at 02/21/21 6008    hydrALAZINE (APRESOLINE) injection 10 mg  10 mg Intravenous Q6H PRN Casper Stafford MD   10 mg at 02/18/21 1144    miconazole (MICOTIN) 2 % powder   Topical BID Casper Stafford MD   Given at 02/21/21 6593    carboxymethylcellulose (REFRESH PLUS) 0.5 % ophthalmic solution 1 drop  1 drop Both Eyes TID Casper Stafford MD   1 drop at 02/21/21 0811    insulin lispro (HUMALOG) injection vial 0-6 Units  0-6 Units Subcutaneous TID WC Casper Stafford MD   3 Units at 02/20/21 0720    insulin lispro (HUMALOG) injection vial 0-3 Units  0-3 Units Subcutaneous Nightly Casper Stafford MD   1 Units at 02/19/21 2106         propofol 15 mcg/kg/min (02/21/21 6061)    dexmedetomidine HCl in NaCl Stopped (02/20/21 1507)    sodium chloride 100 mL/hr at 02/21/21 0340    dextrose      norepinephrine Stopped (02/21/21 0545)        Objective:   BP (!) 128/57   Pulse 83   Temp 96.1 °F (35.6 °C) (Temporal)   Resp 27   Ht 5' (1.524 m)   Wt 143 lb 4.8 oz (65 kg)   SpO2 91%   BMI 27.99 kg/m²     General: no acute distress  HEENT: normocephalic, atraumatic, 4 mm right pupil reactive, 3 mm left pupil reactive  Neck: supple, symmetrical, trachea midline   Lungs: bilateral rhonchi/wheezing   Cardiovascular: s1 and s2 normal  Abdomen: soft, positive bowel sounds  Extremities: chronic PVD  Neuro: intubated and sedated     Recent Labs     02/19/21  0341 02/20/21  0329 02/21/21  0330 WBC 26.2* 37.1* 39.3*   RBC 4.34 4.24 4.53   HGB 9.2* 9.0* 9.5*   HCT 33.0* 31.6* 33.3*   MCV 76.0* 74.5* 73.5*   MCH 21.2* 21.2* 21.0*   MCHC 27.9* 28.5* 28.5*    239 189     Recent Labs     02/19/21  0341 02/19/21  0809 02/20/21 0329 02/21/21  0330   *  --  140 146*   K 5.3* 4.9 4.8 4.3   ANIONGAP 21*  --  17 19     --  100 104   CO2 17*  --  23 23   *  --  116* 120*   CREATININE 3.7*  --  3.8* 3.6*   GLUCOSE 127*  --  223* 115*   CALCIUM 7.1*  --  6.8* 7.2*     Recent Labs     02/19/21 0341 02/20/21 0329 02/21/21  0330   MG 2.1 1.8 1.8     Recent Labs     02/19/21 0341 02/20/21 0329 02/21/21  0330   AST >7,000* 4,309* 1,529*   ALT 2,565* 1,889* 1,242*   BILITOT 1.2 1.4* 1.7*   ALKPHOS 373* 421* 398*     No results for input(s): PH, PO2, PCO2, HCO3, BE, O2SAT in the last 72 hours. No results for input(s): TROPONINI in the last 72 hours. No results for input(s): INR in the last 72 hours.   Recent Labs     02/20/21  1223   LACTA 3.5*         Intake/Output Summary (Last 24 hours) at 2/21/2021 0953  Last data filed at 2/21/2021 0800  Gross per 24 hour   Intake 3555.19 ml   Output 1420 ml   Net 2135.19 ml       Ct Head Wo Contrast    Result Date: 2/20/2021 EXAM: CT HEAD WO CONTRAST -- 2/19/2021 11:52 PM HISTORY: 68 years, Female, neuro change, altered mental status COMPARISON: 2/17/2021 DLP: 794 mGy cm. Automated exposure control was utilized to minimize patient radiation dose. TECHNIQUE: Unenhanced axial CT images obtained from vertex to skull base with coronal and sagittal reformats. FINDINGS: No acute intracranial hemorrhage or midline shift. New linear hypodensity at the left cerebellum compared to 2/17/2021, concerning for infarct. Mild periventricular and subcortical white matter hypodensities, most likely due to chronic microvascular disease. Similar probable right caudate lacunar infarct. Ventricles, sulci, basilar cisterns are normal in size and configuration for the patient's age. Globes, retrobulbar soft tissues, paranasal sinuses, mastoid air cells and external auditory canals are within normal limits. Calvarium appears intact. Subcutaneous tissues within normal limits. 1. New linear hyperdensity at the left cerebellum compared to 2/17/2021, concerning for acute infarct. 2. Similar probable underlying chronic microvascular changes. Preliminary interpretation performed by [facility] and I agree with the preliminary findings. Resultant notification documented on preliminary report.  Signed by Dr Quintin Stern on 2/20/2021 6:34 AM    Xr Chest Portable    Result Date: 2/19/2021 EXAMINATION: XR CHEST PORTABLE 2/19/2021 3:05 PM HISTORY: PIC line placement COMPARISON: 2/19/2021 FINDINGS: Endotracheal tube is in place with tip above the alis. Enteric tube is in place with tip curled and projecting over the left upper quadrant of the abdomen, presumably in the stomach. A left approach PICC line is in place with tip projecting over the mid SVC. The heart and mediastinal contours are stable. Diffuse increased interstitial markings are present. There is consolidation in the left lung base. Trace pleural fluid is suspected bilaterally. There is no appreciable pneumothorax. Interval placement of PIC line with tip projecting over the mid SVC.  Otherwise, stable exam. Signed by Dr Edi Zepeda on 2/19/2021 3:06 PM    Vl Lower Extremity Arterial Segmental Pressures W Ppg    Result Date: 2/19/2021 Vascular Lower Arterial Plethysmography Procedure  Demographics   Patient Name     Yohana De Age                   68   Patient Number   [id number]      Gender                Female   Visit Number     [id number]   Interpreting          Laurence Irving MD                               Physician   Date of Birth    1940  Referring Physician   Laurence Irving MD   Accession Number [id number]  [address], [place], [facility] [place]  Procedure Type of Study:   Extremities Arteries:Lower Arterial Plethysmography, VASC LOWER EXTREMITY  ART SEGMENTAL PRESSURES W PPG. Indications for Study:Decreased pulses in feet. Risk Factors   - The patient's risk factor(s) include: dyslipidemia and arterial     hypertension.   - The patient has a former tobacco history. - The patient's risk factor(s) include: Prior CVA, ,  Impression   Bilateral great toe pressures 0. This is likely consistent with chronic  PAD plus arterial spasm from pressors and hypotension. Clinical  correlation recommended. The patient has moderately to severely diminished ankle-brachial indices  bilateral lower extremities. Based on segmental pressures and doppler waveforms, the patient likely has  disease in the bilateral superficial femoral and tibial arterial segments. Signature   ----------------------------------------------------------------  Electronically signed by Laurence Irving MD(Interpreting  physician) on 02/19/2021 04:16 PM  ----------------------------------------------------------------  Velocities are measured in cm/s ; Diameters are measured in mm Pressures +--------------------------------------++--------+-----+----+--------+-----+ ! ! !Right   ! ! Left!        !     ! +--------------------------------------++--------+-----+----+--------+-----+ ! Location                              ! !Pressure! Ratio! !Pressure! Ratio! +--------------------------------------++--------+-----+----+--------+-----+ ! Upper Thigh                           !!144     !1.03 !    !144     ! 1.03 ! +--------------------------------------++--------+-----+----+--------+-----+ ! Lower Thigh                           !!125     !0.89 !    !125     ! 0.89 ! +--------------------------------------++--------+-----+----+--------+-----+ ! Calf                                  !!101     !0.72 !    !122     !0.87 ! +--------------------------------------++--------+-----+----+--------+-----+ ! Ankle PT                              !!59      !0.42 ! !54      !0.39 ! +--------------------------------------++--------+-----+----+--------+-----+ ! DP                                    ! !56      !0.4  ! !67      !0.48 ! +--------------------------------------++--------+-----+----+--------+-----+ ! Great Toe                             !!0       !0    !    !0       !0    ! +--------------------------------------++--------+-----+----+--------+-----+   - Brachial Pressure:Right: 140.   - BERT:Right: 0.42. Left: 0.48. Plethysmographic Digit Evaluation +---------++--------+-----+---------------++--------+-----+----------------+ ! ! !Right   ! ! Left           !!        !     !                ! +---------++--------+-----+---------------++--------+-----+----------------+ ! Location ! !Pressure! Ratio! PPG Wave Form  ! !Pressure! Ratio! PPG Wave Form   ! +---------++--------+-----+---------------++--------+-----+----------------+ ! Great Toe!!0       !0    !               !!0       !0    !                ! +---------++--------+-----+---------------++--------+-----+----------------+       Assessment and Plan:   Septic shock  Suspect secondary to below  Shock resolved  Antibiotics on board since admission  Follow cultures  IVF  Norepinephrine gtt since weaned off  Trend lactate to clearance     Pseudomonas pneumonia  See above     COPD exacerbation  Steroids  Nebs  Antibiotics    MUNIRA/CKD3/hyperkalemia  Renal following  Hyperkalemia resolved  Anion gap metabolic acidosis resolved     Transaminitis  Continues to improve  Suspect shock liver secondary to above processes  Trend     Acute encephalopathy  Neurology following  CTH 2/17/2021: without evidence for acute process  [facility] 2/20/2021: Concern for acute left cerebellar infarct  Neurology recs supportive care  Aspirin/statin  RPR  Ammonia mildly elevated, lactulose     Ventilator dependent acute hypercapnic respiratory failure  Secondary to above processes  Titrate minute ventilation for pH 7.35  Follow ABG     Concern for RLE ischemia  Prior tobacco dependence  Vascular surgery following  Frequent neurovascular checks     DVT prophylaxis  Heparin     Poor prognosis    Total critical care time: 49 minutes    Remigio Dodd MD   2/21/2021 9:53 AM

## 2021-02-21 NOTE — PROGRESS NOTES
Magda Lanza M.D. Daily Progress Note    Pt Name: Liliana Mae Record Number: [id number]  Date of Birth 1940   Today's Date: 2/21/2021    Chief Complaint:  Chief Complaint   Patient presents with    Altered Mental Status       SUBJECTIVE:     Patient was seen and examined. She is intubated and sedated with a propofol drip. Apparently, when the nurse tries to wean the propofol she becomes very agitated but will still not follow commands even when the propofol is weaned.     OBJECTIVE:     Patient Vitals for the past 24 hrs:   BP Temp Temp src Pulse Resp SpO2 Weight   02/21/21 1100 (!) 122/53   85 28 91 %    02/21/21 1036    84 25 91 %    02/21/21 1000 122/64   84 26 91 %    02/21/21 0900 (!) 128/57   83 27 91 %    02/21/21 0800 (!) 132/46   82 28 92 %    02/21/21 0717 (!) 122/47 96.1 °F (35.6 °C) Temporal 82 27 92 %    02/21/21 0701 (!) 115/51   82 27 92 %    02/21/21 0643    80 27 92 %    02/21/21 0627     21 94 %    02/21/21 0626    80 27 93 %    02/21/21 0625    80 28 93 %    02/21/21 0600 (!) 133/58   80 27 92 %    02/21/21 0545 (!) 125/47   79 26 (!) 89 %    02/21/21 0530 (!) 132/56   79 23 91 %    02/21/21 0515 (!) 133/58   79 26 95 %    02/21/21 0500 (!) 128/55   79 24 93 %    02/21/21 0430 (!) 130/57   77 23 91 %    02/21/21 0400 (!) 128/52 97.3 °F (36.3 °C) Temporal 75 21 92 % 143 lb 4.8 oz (65 kg)   02/21/21 0200 (!) 123/53   75 19 96 %    02/21/21 0100 (!) 131/55   75 20 96 %    02/21/21 0045 (!) 114/50   73 15 99 %    02/21/21 0030 (!) 71/40   69 14 95 %    02/21/21 0017 (!) 71/38   70 14 97 %    02/21/21 0000 (!) 97/48 96.9 °F (36.1 °C) Temporal 71 18 95 %    02/20/21 2300 (!) 125/52   73 17 (!) 89 %    02/20/21 2200 (!) 108/50   68 16 94 %    02/20/21 2100 (!) 101/48   67 19 94 %    02/20/21 2046 (!) 100/46   66 16 93 %    02/20/21 2031 (!) 80/38   65 18 95 %  02/20/21 2017 (!) 88/39   66 16 92 %    02/20/21 2000 (!) 97/47 96.9 °F (36.1 °C) Temporal 67 22 93 %    02/20/21 1900 (!) 105/46   65 10 96 %    02/20/21 1802 (!) 105/51   64 11 98 %    02/20/21 1737    63 11 94 %    02/20/21 1700 (!) 92/49   64 12 99 %    02/20/21 1600 (!) 117/56 96.1 °F (35.6 °C) Temporal 62 12 98 %    02/20/21 1508 (!) 93/47   62 10 97 %    02/20/21 1505 (!) 79/47   62 12 98 %    02/20/21 1500 (!) 67/42   63 13 99 %    02/20/21 1420     15 98 %    02/20/21 1419    64 15 100 %    02/20/21 1400 (!) 107/53   61 13 92 %    02/20/21 1300 (!) 130/59   63 14 90 %    02/20/21 1200 (!) 92/49 97.5 °F (36.4 °C) Temporal 62 13 92 %          Intake/Output Summary (Last 24 hours) at 2/21/2021 1140  Last data filed at 2/21/2021 1000  Gross per 24 hour   Intake 3736.84 ml   Output 1440 ml   Net 2296.84 ml       In: 6069 [I.V.:2199.1; NG/GT:60]  Out: 1115 [Urine:915]    I/O last 3 completed shifts: In: 1048 [I.V.:2578. 1; NG/GT:60; IV Piggyback:999.9]  Out: 1505 [Urine:1305; Stool:200]     Date 02/21/21 0000 - 02/21/21 2359   Shift 5160-0842 4450-4983 0875-3185 24 Hour Total   INTAKE   P.O.(mL/kg/hr) 0(0)   0   I. V.(mL/kg) 877. 9(13.5) 424. 9(6.5)  1302.9(20)   Shift Total(mL/kg) 877. 9(13.5) 424. 9(6.5)  1302.9(20)   OUTPUT   Urine(mL/kg/hr) 320(0.6) 20  340   Stool(mL/kg) 0(0)   0(0)   Shift Total(mL/kg) 320(4.9) 20(0.3)  340(5.2)   Weight (kg) 65 65 65 65       Wt Readings from Last 3 Encounters:   02/21/21 143 lb 4.8 oz (65 kg)   12/15/20 141 lb (64 kg)   10/22/20 136 lb (61.7 kg)        Body mass index is 27.99 kg/m².      Diet: Diet NPO Effective Now Exceptions are: Ice Chips, Sips with Meds        MEDS:     Scheduled Meds:   sodium chloride  250 mL Intravenous Once    aspirin EC  81 mg Oral Daily    atorvastatin  40 mg Oral Daily    montelukast  10 mg Oral Daily    PARoxetine  40 mg Oral QAM    [START ON 2/22/2021] methylPREDNISolone  40 mg Intravenous Daily  chlorhexidine  15 mL Mouth/Throat BID    lactulose  20 g Oral TID    cefepime  1,000 mg Intravenous Q24H    lactated ringers bolus  500 mL Intravenous Once    sodium chloride flush  10 mL Intravenous 2 times per day    heparin (porcine)  5,000 Units Subcutaneous 3 times per day    famotidine (PEPCID) injection  20 mg Intravenous Daily    ipratropium-albuterol  1 ampule Inhalation Q4H    miconazole   Topical BID    carboxymethylcellulose  1 drop Both Eyes TID    insulin lispro  0-6 Units Subcutaneous TID WC    insulin lispro  0-3 Units Subcutaneous Nightly     Continuous Infusions:   propofol 15 mcg/kg/min (02/21/21 9351)    dexmedetomidine HCl in NaCl Stopped (02/20/21 1507)    sodium chloride 100 mL/hr at 02/21/21 0340    dextrose      norepinephrine Stopped (02/21/21 1213)     PRN Meds:    glucose, 15 g, PRN      dextrose, 12.5 g, PRN      glucagon (rDNA), 1 mg, PRN      dextrose, 100 mL/hr, PRN      sodium chloride flush, 10 mL, PRN      potassium chloride, 10 mEq, PRN      magnesium sulfate, 1,000 mg, PRN      promethazine, 12.5 mg, Q6H PRN    Or      ondansetron, 4 mg, Q6H PRN      polyethylene glycol, 17 g, Daily PRN      acetaminophen, 650 mg, Q6H PRN    Or      acetaminophen, 650 mg, Q6H PRN      hydrALAZINE, 10 mg, Q6H PRN          PHYSICAL EXAM:     CONSTITUTIONAL: She appears critically ill intubated on the ventilator and is not responsive to my commands. ABDOMEN: soft, nontender, nondistended, no masses or organomegaly  NEUROLOGIC: She does move the toes on the right foot when I touch her foot. EXTREMITY: The distal foot bilaterally is discolored including the toes. However, both feet are much warmer today. There is no obvious dry gangrene at this time.       LABS:       CBC:   Recent Labs     02/19/21  0341 02/20/21  0329 02/21/21  0330   WBC 26.2* 37.1* 39.3*   RBC 4.34 4.24 4.53   HGB 9.2* 9.0* 9.5*   HCT 33.0* 31.6* 33.3*   MCV 76.0* 74.5* 73.5* MCH 21.2* 21.2* 21.0*   MCHC 27.9* 28.5* 28.5*   RDW 23.7* 24.3* 24.4*    239 189   MPV 10.3 10.2  --       Last 3 CMP:   Recent Labs     02/19/21  0341 02/19/21  0809 02/20/21  0329 02/21/21  0330   *  --  140 146*   K 5.3* 4.9 4.8 4.3     --  100 104   CO2 17*  --  23 23   *  --  116* 120*   CREATININE 3.7*  --  3.8* 3.6*   GLUCOSE 127*  --  223* 115*   CALCIUM 7.1*  --  6.8* 7.2*   PROT 5.3*  --  5.5* 5.4*   LABALBU 2.4*  --  2.0* 1.9*   BILITOT 1.2  --  1.4* 1.7*   ALKPHOS 373*  --  421* 398*   AST >7,000*  --  4,309* 1,529*   ALT 2,565*  --  1,889* 1,242*      Troponin: No results for input(s): TROPONINI in the last 72 hours. Calcium:   Lab Results   Component Value Date    CALCIUM 7.2 02/21/2021    CALCIUM 6.8 02/20/2021    CALCIUM 7.1 02/19/2021      Ionized Calcium: No results found for: IONCA   Lipids: No results for input(s): CHOL, HDL in the last 72 hours. Invalid input(s): LDLCALCU  INR: No results for input(s): INR in the last 72 hours. Lactic Acid:   Lab Results   Component Value Date    LACTA 3.9 02/21/2021    LACTA 3.5 02/20/2021    LACTA 5.0 02/19/2021                    DVT prophylaxis: [] Lovenox                                 [] SCDs                                 [] SQ Heparin                                 [] Encourage ambulation, low risk for DVT, no chemical or                                      mechanical prophylaxis necessary              [] Already on Anticoagulation      RADIOLOGY:                ASSESSMENT:     1. She remains intubated and not responsive. The nurses were able to wean her Levophed to off. Her right foot is warmer today. The toes in the right foot and midfoot are still discolored but she moves all the toes. Her left foot is warm and the coloration is improved but the toes are still discolored on this side as well as the mid and distal foot.   2. HD # 3  Patient Active Problem List   Diagnosis    Basal ganglia infarction ([facility] [address]  Mild single current episode of major depressive disorder ([address]    Stage 4 very severe COPD by GOLD classification ([address]    COPD with acute exacerbation (HCC)    Inflammation of left sacroiliac joint (HCC)    Inflammation of right sacroiliac joint (HCC)    Cigarette smoker    Irritable bowel syndrome with diarrhea    Acute respiratory failure (HCC)    Lactic acidosis    Acute respiratory failure with hypoxia and hypercapnia (HCC)    Pulmonary hypertension due to COPD ([address]    Panic disorder    Spider veins    Atherosclerosis of native arteries of extremities with intermittent claudication, bilateral legs (HCC)    Atherosclerosis of native artery of both lower extremities with intermittent claudication (HCC)    Bilateral carotid artery stenosis    Acute hypoxemic respiratory failure (HCC)    Palliative care patient    Pulmonary nodule seen on imaging study    Recurrent major depressive disorder, in full remission ([address]    Chronic respiratory failure with hypoxia (HCC)    Oxygen dependent    Atherosclerosis of artery of extremity with ulceration (HCC)    Elevated serum creatinine    Chronic diastolic (congestive) heart failure (HCC)    Nonrheumatic mitral valve stenosis    Nonrheumatic aortic valve stenosis    Essential hypertension    Hyperlipidemia    Acute exacerbation of chronic obstructive pulmonary disease (COPD) (HCC)    Acute hyperkalemia    Hypoglycemia    Hyperkalemia    Altered mental status    Acute renal failure superimposed on chronic kidney disease (HCC)    Atherosclerosis of artery of both lower extremities ([address]   3. PLAN:     1. Continue supportive care. Prognosis is fairly poor because of all her of her other issues. I am more encouraged with her perfusion to the extremities now that the Levophed is off.  2.         Nader Granados M.D.    Electronically signed 2/21/2021 at 11:40 AM

## 2021-02-21 NOTE — PROGRESS NOTES
 potassium chloride 10 mEq/100 mL IVPB (Peripheral Line)  10 mEq Intravenous PRN Henriqeu Cannon MD        magnesium sulfate 1000 mg in dextrose 5% 100 mL IVPB  1,000 mg Intravenous PRN Henrique Cannon MD        heparin (porcine) injection 5,000 Units  5,000 Units Subcutaneous 3 times per day Henrique Cannon MD   5,000 Units at 02/21/21 1415    promethazine (PHENERGAN) tablet 12.5 mg  12.5 mg Oral Q6H PRN Henrique Cannon MD        Or    ondansetron (ZOFRAN) injection 4 mg  4 mg Intravenous Q6H PRN Henrique Cannon MD        polyethylene glycol (GLYCOLAX) packet 17 g  17 g Oral Daily PRN Henrique Cannon MD        acetaminophen (TYLENOL) tablet 650 mg  650 mg Oral Q6H PRN Henrique Cannon MD        Or    acetaminophen (TYLENOL) suppository 650 mg  650 mg Rectal Q6H PRN Henrique Cannon MD        famotidine (PEPCID) injection 20 mg  20 mg Intravenous Daily Henrique Cannon MD   20 mg at 02/21/21 0811    ipratropium-albuterol (DUONEB) nebulizer solution 1 ampule  1 ampule Inhalation Q4H Leanna Samaoya MD   1 ampule at 02/21/21 1422    hydrALAZINE (APRESOLINE) injection 10 mg  10 mg Intravenous Q6H PRN Leanna Samayoa MD   10 mg at 02/18/21 1144    miconazole (MICOTIN) 2 % powder   Topical BID Leanna Samayoa MD   Given at 02/21/21 5346    carboxymethylcellulose (REFRESH PLUS) 0.5 % ophthalmic solution 1 drop  1 drop Both Eyes TID Leanna Samayoa MD   1 drop at 02/21/21 1415    insulin lispro (HUMALOG) injection vial 0-6 Units  0-6 Units Subcutaneous TID WC Leanna Samayoa MD   3 Units at 02/20/21 0720    insulin lispro (HUMALOG) injection vial 0-3 Units  0-3 Units Subcutaneous Nightly Leanna Samayoa MD   1 Units at 02/19/21 2106       Past Medical History:  Past Medical History:   Diagnosis Date    Acid reflux     Acute renal failure superimposed on chronic kidney disease ([facility] [address]     Allergic rhinitis     Bilateral carotid artery stenosis 1/28/2020    Hyperlipidemia     Hypertension  Irritable bowel syndrome with diarrhea 7/5/2019    Lung cancer ([facility] [address]     Lung with chemo    Mild single current episode of major depressive disorder ([facility] 5/2/2018    New onset of congestive heart failure ([facility] 10/7/2020    Osteoarthritis     Osteoporosis     Other emphysema ([facility] 12/22/2017    Palliative care patient 08/06/2019    Panic disorder 8/16/2019    Stage 3 severe COPD by GOLD classification ([facility] 6/6/2018    FEV1 42%    Tobacco abuse 2/8/2018    Urinary incontinence     stress incontinence       Past Surgical History:  Past Surgical History:   Procedure Laterality Date    APPENDECTOMY      CARDIAC CATHETERIZATION  5/3/2013   MDL    EF 60%    HEMORRHOID SURGERY      HYSTERECTOMY      HYSTERECTOMY, TOTAL ABDOMINAL      LUNG CANCER SURGERY      TONSILLECTOMY      VASCULAR SURGERY  10/11/2019    [facility]. Aortogram and runoff. PTA and stenting of the left external iliac artery with a 7x 80 and innova stent dilated to 7.2mm. PTA of the left popliteal artery with 5mm x 2cm cutting balloon. attempted recalization of the peroneal artery aborted. Family History  Family History   Problem Relation Age of Onset    High Blood Pressure Mother     High Blood Pressure Father     Diabetes Sister     High Blood Pressure Brother        Social History  Social History     Socioeconomic History    Marital status:       Spouse name: Not on file    Number of children: Not on file    Years of education: Not on file    Highest education level: Not on file   Occupational History    Not on file   Social Needs    Financial resource strain: Not on file    Food insecurity     Worry: Not on file     Inability: Not on file    Transportation needs     Medical: Not on file     Non-medical: Not on file   Tobacco Use    Smoking status: Former Smoker     Packs/day: 0.50     Years: 30.00     Pack years: 15.00     Types: Cigarettes     Start date: 6/6/1970     Quit date: 08/2019 Years since quittin.5    Smokeless tobacco: Never Used   Substance and Sexual Activity    Alcohol use: Yes     Comment: occcasionally    Drug use: No    Sexual activity: Yes     Partners: Male   Lifestyle    Physical activity     Days per week: Not on file     Minutes per session: Not on file    Stress: Not on file   Relationships    Social connections     Talks on phone: Not on file     Gets together: Not on file     Attends Alevism service: Not on file     Active member of club or organization: Not on file     Attends meetings of clubs or organizations: Not on file     Relationship status: Not on file    Intimate partner violence     Fear of current or ex partner: Not on file     Emotionally abused: Not on file     Physically abused: Not on file     Forced sexual activity: Not on file   Other Topics Concern    Not on file   Social History Narrative    Not on file         Review of Systems:  Unable to obtain review of system as she is intubated and sedated.     Objective:  Patient Vitals for the past 24 hrs:   BP Temp Temp src Pulse Resp SpO2 Weight   21 1423     29 91 %    21 1422    86 30 92 %    21 1421    86 29 92 %    21 1400 (!) 134/58   86 30 91 %    21 1300 (!) 131/47   86 29 91 %    21 1200 (!) 122/49 99.4 °F (37.4 °C) Temporal 86 28 91 %    21 1100 (!) 122/53   85 28 91 %    21 1036    84 25 91 %    21 1000 122/64   84 26 91 %    21 0900 (!) 128/57   83 27 91 %    21 0800 (!) 132/46   82 28 92 %    21 0717 (!) 122/47 96.1 °F (35.6 °C) Temporal 82 27 92 %    21 0701 (!) 115/51   82 27 92 %    21 0643    80 27 92 %    21 0627     21 94 %    21 0626    80 27 93 %    21 0625    80 28 93 %    21 0600 (!) 133/58   80 27 92 %    21 0545 (!) 125/47   79 26 (!) 89 %    21 0530 (!) 132/56   79 23 91 %  02/21/21 0515 (!) 133/58   79 26 95 %    02/21/21 0500 (!) 128/55   79 24 93 %    02/21/21 0430 (!) 130/57   77 23 91 %    02/21/21 0400 (!) 128/52 97.3 °F (36.3 °C) Temporal 75 21 92 % 143 lb 4.8 oz (65 kg)   02/21/21 0200 (!) 123/53   75 19 96 %    02/21/21 0100 (!) 131/55   75 20 96 %    02/21/21 0045 (!) 114/50   73 15 99 %    02/21/21 0030 (!) 71/40   69 14 95 %    02/21/21 0017 (!) 71/38   70 14 97 %    02/21/21 0000 (!) 97/48 96.9 °F (36.1 °C) Temporal 71 18 95 %    02/20/21 2300 (!) 125/52   73 17 (!) 89 %    02/20/21 2200 (!) 108/50   68 16 94 %    02/20/21 2100 (!) 101/48   67 19 94 %    02/20/21 2046 (!) 100/46   66 16 93 %    02/20/21 2031 (!) 80/38   65 18 95 %    02/20/21 2017 (!) 88/39   66 16 92 %    02/20/21 2000 (!) 97/47 96.9 °F (36.1 °C) Temporal 67 22 93 %    02/20/21 1900 (!) 105/46   65 10 96 %    02/20/21 1802 (!) 105/51   64 11 98 %    02/20/21 1737    63 11 94 %    02/20/21 1700 (!) 92/49   64 12 99 %    02/20/21 1600 (!) 117/56 96.1 °F (35.6 °C) Temporal 62 12 98 %    02/20/21 1508 (!) 93/47   62 10 97 %    02/20/21 1505 (!) 79/47   62 12 98 %    02/20/21 1500 (!) 67/42   63 13 99 %        Intake/Output Summary (Last 24 hours) at 2/21/2021 1429  Last data filed at 2/21/2021 1400  Gross per 24 hour   Intake 3933.02 ml   Output 1155 ml   Net 2778.02 ml     General: Intubated/sedated. HEENT: Normocephalic atraumatic head with orotracheal intubation. Neck: Supple with no JVD or carotid bruits. Chest:  Decreased breath sound on both lung field. Expiratory wheezing  CVS: Irregularly irregular S1 and S2.   Abdominal: soft, nontender, normal bowel sounds  Extremities: no cyanosis or edema  Skin: warm and dry without rash      Labs:  BMP:   Recent Labs     02/19/21  0341 02/19/21  0809 02/20/21  0329 02/21/21  0330   *  --  140 146*   K 5.3* 4.9 4.8 4.3     --  100 104   CO2 17*  --  23 23   *  --  116* 120* CREATININE 3.7*  --  3.8* 3.6*   CALCIUM 7.1*  --  6.8* 7.2*     CBC:   Recent Labs     02/19/21  0341 02/20/21  0329 02/21/21  0330   WBC 26.2* 37.1* 39.3*   HGB 9.2* 9.0* 9.5*   HCT 33.0* 31.6* 33.3*   MCV 76.0* 74.5* 73.5*    239 189     LIVER PROFILE:   Recent Labs     02/19/21  0341 02/20/21 0329 02/21/21  0330   AST >7,000* 4,309* 1,529*   ALT 2,565* 1,889* 1,242*   BILITOT 1.2 1.4* 1.7*   ALKPHOS 373* 421* 398*     PT/INR: No results for input(s): PROTIME, INR in the last 72 hours. APTT: No results for input(s): APTT in the last 72 hours. BNP:  No results for input(s): BNP in the last 72 hours. Ionized Calcium:No results for input(s): IONCA in the last 72 hours. Magnesium:  Recent Labs     02/19/21  0341 02/20/21  0329 02/21/21  0330   MG 2.1 1.8 1.8     Phosphorus:  No results for input(s): PHOS in the last 72 hours. HgbA1C: No results for input(s): LABA1C in the last 72 hours. Hepatic:   Recent Labs     02/19/21  0341 02/20/21  0329 02/21/21  0330   ALKPHOS 373* 421* 398*   ALT 2,565* 1,889* 1,242*   AST >7,000* 4,309* 1,529*   PROT 5.3* 5.5* 5.4*   BILITOT 1.2 1.4* 1.7*   LABALBU 2.4* 2.0* 1.9*     Lactic Acid:   Recent Labs     02/21/21  1020   LACTA 3.9*     Troponin: No results for input(s): CKTOTAL, CKMB, TROPONINT in the last 72 hours. ABGs: No results for input(s): PH, PCO2, PO2, HCO3, O2SAT in the last 72 hours. CRP:  No results for input(s): CRP in the last 72 hours. Sed Rate:  No results for input(s): SEDRATE in the last 72 hours. Cultures:   No results for input(s): CULTURE in the last 72 hours. No results for input(s): BC, [facility] in the last 72 hours. No results for input(s): CXSURG in the last 72 hours.     Radiology reports as per the Radiologist  Radiology: Ct Head Wo Contrast    Result Date: 2/17/2021 CT HEAD WO CONTRAST 2/17/2021 7:50 PM History: Altered mental status. Noncontrast head CT compared with 29 June 2020. In order to have a CT radiation dose as low as reasonably achievable Automated Exposure Control was utilized for adjustment of the mA and/or KV according to patient size. DLP in mGycm= 742. Left maxillary sinus fluid and mucosal thickening is increased. Mild atrophy and moderate small vessel disease. Normal ventricle size. No hemorrhage or mass effect. 1. Left maxillary sinusitis changes. 2. Stable atrophy and small vessel disease with no evidence of mass or infarct. Signed by Dr Seth Conway on 2/17/2021 7:51 PM    Us Renal Complete    Result Date: 2/18/2021  EXAMINATION: US RENAL COMPLETE 2/18/2021 9:44 AM HISTORY: Acute kidney injury COMPARISON: None FINDINGS: Transabdominal sonographic evaluation of the kidneys and urinary bladder was performed in the transverse and longitudinal projections. Right kidney measures 8.6 x 4.1 x 4.3 cm in size. The cortex appears mildly atrophic. There is no sign of collecting system dilatation or solid renal mass. The inferior right kidney is obscured due to shadowing from bowel gas. The urinary bladder is decompressed and therefore not well evaluated. The left kidney is completely obscured due to shadowing from bowel gas. Markedly limited examination due to shadowing from bowel gas. The right kidney is visible and appears mildly atrophic. The left kidney is completely obscured.  Signed by Dr Ronal Reynaga on 2/18/2021 9:50 AM    Xr Chest Portable    Result Date: 2/18/2021 Examination. XR CHEST PORTABLE 2/18/2021 2:43 AM History: Intubation. A single frontal portable supine view of the chest is compared with the previous study dated 2/17/2021. There are respiratory motion artifacts. Chronic appearing interstitial changes and granulomas are seen in the lungs bilaterally, similar to the previous study. There is no pleural effusion, pulmonary congestion or pneumothorax. There is moderate cardiomegaly. Atheromatous changes thoracic aorta are noted. No acute bony abnormality. An endotracheal tube is seen with distal end 5 cm above trachea bifurcation. This is in appropriate position. No active cardiopulmonary disease. ET tube in appropriate position. Chronic inflammatory and granulomatous lung changes. A moderate cardiomegaly. Signed by Dr Miguelina Landry on 2/18/2021 7:01 AM    Xr Chest Portable    Result Date: 2/17/2021  XR CHEST PORTABLE 2/17/2021 8:10 PM History: Cough and congestion. Portable chest x-ray compared with 7 October 2020. The heart size is normal. The mediastinum is within normal limits. The lungs are adequately expanded with no pneumonia or pneumothorax. There is mild chronic interstitial disease with scattered calcified granulomas. There is no significant pleural fluid. No congestive failure changes. 1. No acute disease. Signed by Dr Chio Rendon on 2/17/2021 8:10 PM       Assessment   1. Acute kidney injury/stage III/worsen. 2.  Stage IIIb chronic kidney disease baseline. 3.  Severe hyperkalemia/improving. 4.  Metabolic acidemia. 5.  COPD exacerbation. 6.  Acute respiratory failure/intubated. 7.  Shock liver improving. 8.  Irritable bowel syndrome/chronic diarrhea. 9.  Hypoxemic ATN. 10.  Hypocalcemia  11. Septic shock. 12.  Hypernatremia    Plan:  1. Change to half-normal saline. 2.  Try to wean off Levophed. 3.  IV calcium gluconate    .       Araceli Rodriguez MD  02/21/21 2:29 PM

## 2021-02-21 NOTE — PROGRESS NOTES
[facility] Neurology Progress Note      Patient:   Magda Pro  MR#:    [id number]   Room:    73/817-60   YOB: 1940  Date of Progress Note: 2/21/2021  Time of Note                           1:35 PM  Consulting Physician:  Emerald Roberson DO  Attending Physician:  King Trey MD      INTERVAL HISTORY:  No acute events, remains largely unchanged overnight, unable to wean propofol completely. REVIEW OF SYSTEMS:  Unable to obtain    PHYSICAL EXAM:    Constitutional    BP (!) 131/47   Pulse 86   Temp 99.4 °F (37.4 °C) (Temporal)   Resp 29   Ht 5' (1.524 m)   Wt 143 lb 4.8 oz (65 kg)   SpO2 91%   BMI 27.99 kg/m²   General appearance: Intubated, sedated. EYES -   Conjunctiva normal  Pupillary exam as below, see CN exam in the neurologic exam  ENT-    No scars, masses, or lesions over external nose or ears  Neck-   No neck masses noted  Thyroid normal   No jugular vein distension  Cardiovascular -   No clubbing, cyanosis, or edema   Pulmonary-   Good expansion, normal effort without use of accessory muscles  Musculoskeletal    No significant wasting of muscles noted  Gait as below, see gait exam in the neurologic exam  Muscle strength, tone, stability as below see the motor exam in the neurologic exam.   No bony deformities  Skin    Warm, dry, and intact to inspection and palpation. No rash, erythema, or pallor      NEUROLOGICAL EXAM    Mental status   [] Awake, alert, oriented   [] Affect attention and concentration appear appropriate  [] Recent and remote memory appears unremarkable  [] Speech normal without dysarthria or aphasia, comprehension and repetition intact.    COMMENTS:  Sedated, unresponsive   Cranial Nerves [] No VF deficit to confrontation,  no papilledema on fundoscopic exam.  [] PERRLA, EOMI, no nystagmus, conjugate eye movements, no ptosis  [] Face symmetric  [] Facial sensation intact  [] Tongue midline no atrophy or fasciculations present [] Palate midline, hearing to finger rub normal  [] Shoulder shrug and SCM testing normal  COMMENTS:  Mild anisocoria noted, pupils reactive, face appears symmetric   Motor   [] 5/5 strength x 4 extremities  [x] Normal bulk and tone  [x] No tremor present  [] No rigidity or bradykinesia noted  COMMENTS: No withdrawal x 4    Sensory  [] Sensation intact to light touch, pin prick, vibration, and proprioception BLE  [] Sensation intact to light touch, pin prick, vibration, and proprioception BUE  COMMENTS:  Limited   Coordination [] FTN normal bilaterally   [] HTS normal bilaterally  [] ERIN normal.   COMMENTS: Limited   Reflexes  [] Symmetric and non-pathological  [x] Toes downgoing bilaterally  [x] No clonus present  COMMENTS: Hypoactive throughout    Gait                  [] Normal steady gait    [] Ataxic    [] Spastic     [] Magnetic     [] Shuffling  [x] Not assessed  COMMENTS:       LABS/IMAGING:    Ct Head Wo Contrast    Result Date: 2/17/2021  CT HEAD WO CONTRAST 2/17/2021 7:50 PM History: Altered mental status. Noncontrast head CT compared with 29 June 2020. In order to have a CT radiation dose as low as reasonably achievable Automated Exposure Control was utilized for adjustment of the mA and/or KV according to patient size. DLP in mGycm= 742. Left maxillary sinus fluid and mucosal thickening is increased. Mild atrophy and moderate small vessel disease. Normal ventricle size. No hemorrhage or mass effect. 1. Left maxillary sinusitis changes. 2. Stable atrophy and small vessel disease with no evidence of mass or infarct.  Signed by Dr Hershell Goodpasture on 2/17/2021 7:51 PM    Us Renal Complete    Result Date: 2/18/2021 EXAMINATION: US RENAL COMPLETE 2/18/2021 9:44 AM HISTORY: Acute kidney injury COMPARISON: None FINDINGS: Transabdominal sonographic evaluation of the kidneys and urinary bladder was performed in the transverse and longitudinal projections. Right kidney measures 8.6 x 4.1 x 4.3 cm in size. The cortex appears mildly atrophic. There is no sign of collecting system dilatation or solid renal mass. The inferior right kidney is obscured due to shadowing from bowel gas. The urinary bladder is decompressed and therefore not well evaluated. The left kidney is completely obscured due to shadowing from bowel gas. Markedly limited examination due to shadowing from bowel gas. The right kidney is visible and appears mildly atrophic. The left kidney is completely obscured. Signed by Dr Bart Larsen on 2/18/2021 9:50 AM    Xr Chest Portable    Result Date: 2/18/2021  Examination. XR CHEST PORTABLE 2/18/2021 2:43 AM History: Intubation. A single frontal portable supine view of the chest is compared with the previous study dated 2/17/2021. There are respiratory motion artifacts. Chronic appearing interstitial changes and granulomas are seen in the lungs bilaterally, similar to the previous study. There is no pleural effusion, pulmonary congestion or pneumothorax. There is moderate cardiomegaly. Atheromatous changes thoracic aorta are noted. No acute bony abnormality. An endotracheal tube is seen with distal end 5 cm above trachea bifurcation. This is in appropriate position. No active cardiopulmonary disease. ET tube in appropriate position. Chronic inflammatory and granulomatous lung changes. A moderate cardiomegaly.  Signed by Dr Gisselle Newton on 2/18/2021 7:01 AM    Xr Chest Portable    Result Date: 2/17/2021 XR CHEST PORTABLE 2/17/2021 8:10 PM History: Cough and congestion. Portable chest x-ray compared with 7 October 2020. The heart size is normal. The mediastinum is within normal limits. The lungs are adequately expanded with no pneumonia or pneumothorax. There is mild chronic interstitial disease with scattered calcified granulomas. There is no significant pleural fluid. No congestive failure changes. 1. No acute disease. Signed by Dr Eneida Shoemaker on 2/17/2021 8:10 PM      Lab Results   Component Value Date    WBC 39.3 (H) 02/21/2021    HGB 9.5 (L) 02/21/2021    HCT 33.3 (L) 02/21/2021    MCV 73.5 (L) 02/21/2021     02/21/2021     Lab Results   Component Value Date     (H) 02/21/2021    K 4.3 02/21/2021     02/21/2021    CO2 23 02/21/2021     (H) 02/21/2021    CREATININE 3.6 (H) 02/21/2021    GLUCOSE 115 (H) 02/21/2021    CALCIUM 7.2 (L) 02/21/2021    PROT 5.4 (L) 02/21/2021    LABALBU 1.9 (L) 02/21/2021    BILITOT 1.7 (H) 02/21/2021    ALKPHOS 398 (H) 02/21/2021    AST 1,529 (H) 02/21/2021    ALT 1,242 (H) 02/21/2021    LABGLOM 12 (A) 02/21/2021    GFRAA 15 (L) 02/21/2021    GLOB 3.3 01/10/2017     Lab Results   Component Value Date    INR 1.13 06/29/2020    INR 1.02 01/17/2020    INR 1.11 08/03/2019    PROTIME 14.4 06/29/2020    PROTIME 12.8 01/17/2020    PROTIME 13.7 08/03/2019       RECORD REVIEW:   Previous medical records, medications were reviewed at today's visit. Nursing/physician notes, imaging, labs and vitals reviewed.  PT,OT and/or speech notes reviewed      ASSESSMENT: 68 y.o. admitted with sepsis secondary to pseudomonas pneumonia, COPD, respiratory failure, acute on chronic renal failure, liver failure, metabolic acidosis, multiple electrolyte abnormalities, RLE ischemia, prolonged AMS with possible new left cerebellar stroke noted on repeat CT. Exam unchanged overnight, no significant improvement noted. PLAN:   1. Supportive care from a possible stroke standpoint. Recent ECHO negative. Consider carotid doppler if stabilizes medically but would not be a candidate for any carotid intervention currently, infarct is also posterior circulation at any rate. ASA, statin. 2.  Continue addressing sepsis, respiratory, renal, and liver failure. 3.  Try to limit sedation if possible  4. Vascular surgery, nephology following. 5.  Overall medical prognosis is felt poor. Please feel free to call with any questions. [phone] (cell phone).       Nayeli Ortiz DO  Board Certified Neurology

## 2021-02-21 NOTE — PLAN OF CARE
Problem: Falls - Risk of:  Goal: Will remain free from falls  Description: Will remain free from falls  Outcome: Met This Shift  Goal: Absence of physical injury  Description: Absence of physical injury  Outcome: Met This Shift     Problem: Skin Integrity:  Goal: Will show no infection signs and symptoms  Description: Will show no infection signs and symptoms  Outcome: Ongoing  Goal: Absence of new skin breakdown  Description: Absence of new skin breakdown  Outcome: Ongoing     Problem: Infection - Central Venous Catheter-Associated Bloodstream Infection:  Goal: Will show no infection signs and symptoms  Description: Will show no infection signs and symptoms  Outcome: Ongoing     Problem: Nutrition  Goal: Optimal nutrition therapy  Outcome: Not Met This Shift

## 2021-02-21 NOTE — PLAN OF CARE
Problem: Skin Integrity:  Goal: Will show no infection signs and symptoms  Description: Will show no infection signs and symptoms  2/21/2021 1502 by Stalin Ascencio RN  Outcome: Ongoing  2/21/2021 0630 by Kassie Hurd RN  Outcome: Ongoing  Goal: Absence of new skin breakdown  Description: Absence of new skin breakdown  2/21/2021 1502 by Stalin Ascencio RN  Outcome: Ongoing  2/21/2021 0630 by Kassie Hurd RN  Outcome: Ongoing     Problem: Falls - Risk of:  Goal: Will remain free from falls  Description: Will remain free from falls  2/21/2021 1502 by Stalin Ascencio RN  Outcome: Ongoing  2/21/2021 0630 by Kassie Hurd RN  Outcome: Met This Shift  Goal: Absence of physical injury  Description: Absence of physical injury  2/21/2021 1502 by Stalin Ascencio RN  Outcome: Ongoing  2/21/2021 0630 by Kassie Hurd RN  Outcome: Met This Shift     Problem: Nutrition  Goal: Optimal nutrition therapy  2/21/2021 1502 by Stalin Ascencio RN  Outcome: Ongoing  2/21/2021 0630 by Kassie Hurd RN  Outcome: Not Met This Shift     Problem: Infection - Central Venous Catheter-Associated Bloodstream Infection:  Goal: Will show no infection signs and symptoms  Description: Will show no infection signs and symptoms  2/21/2021 1502 by Stalin Ascencio RN  Outcome: Ongoing  2/21/2021 0630 by Kassie Hurd RN  Outcome: Ongoing

## 2021-02-22 PROBLEM — J96.21 ACUTE AND CHRONIC RESPIRATORY FAILURE WITH HYPOXIA (HCC): Status: ACTIVE | Noted: 2021-01-01

## 2021-02-22 NOTE — PROGRESS NOTES
Comprehensive Nutrition Assessment    Type and Reason for Visit:  Reassess    Nutrition Assessment:  Pt remains mechanically ventilated and NPO. Propofol currently provides 105.6 non-protein kcals/day. Consider RODERICK if within clinical course. Malnutrition Assessment:  Malnutrition Status: At risk for malnutrition (Comment)    Context:  Acute Illness     Findings of the 6 clinical characteristics of malnutrition:  Energy Intake:  Mild decrease in energy intake (Comment)  Weight Loss:  No significant weight loss     Body Fat Loss:  No significant body fat loss     Muscle Mass Loss:  No significant muscle mass loss    Fluid Accumulation:  No significant fluid accumulation     Strength:  Not Performed    Current Nutrition Therapies:    Diet NPO Effective Now Exceptions are: Ice Chips, Sips with Meds    Anthropometric Measures:  · Height: 5' (152.4 cm)  · Current Body Weight: 147 lb (66.7 kg)    · Ideal Body Weight: 100 lbs  · BMI: 28.7  · BMI Categories: Overweight (BMI 25.0-29. 9)       Nutrition Diagnosis:   · Inadequate oral intake related to impaired respiratory function as evidenced by intubation, NPO or clear liquid status due to medical condition    Nutrition Interventions:   Food and/or Nutrient Delivery:  Continue NPO  Coordination of Nutrition Care:  Continue to monitor while inpatient    Goals:  Pt will extubate and progress to an oral diet or nutritional needs will be met with RODERICK       Nutrition Monitoring and Evaluation:   Food/Nutrient Intake Outcomes:  Diet Advancement/Tolerance  Physical Signs/Symptoms Outcomes:  Biochemical Data, Nutrition Focused Physical Findings, Weight     Electronically signed by Valeriano Mcfadden, MS, RD, LD on 2/22/21 at 10:47 AM CST    Contact: [phone]

## 2021-02-22 NOTE — PROGRESS NOTES
Palliative Care Progress Note  2/22/2021 11:02 AM  Subjective:   Admit Date: 2/17/2021  PCP: Kaylen Reddy MD    Chief Complaint: intubated and mechanically ventilated    Interval History: Patient remains in ICU sedated, intubated, mechanically ventilated. Neurologic response remained poor, was noted to have unequal pupils on 2/20/2021, CT of the head completed showing possible new L cerebellar stroke. She has had intermittent issues with hypotension, levophed has been weaned off. Renal function is worsening, nephrology following. Family planned to be present this afternoon for further discussions regarding goals of care/code status. Portions of this note have been copied forward, however, changed to reflect the most current clinical status of this patient.     Review of Systems   Unable to obtain due to acuity of care, sedated, intubated, mechanically ventilated      Diet NPO Effective Now Exceptions are: Ice Chips, Sips with Meds    Medications:   sodium chloride 100 mL/hr at 02/22/21 0252    propofol 10 mcg/kg/min (02/22/21 9639)    dextrose       Current Facility-Administered Medications   Medication Dose Route Frequency Provider Last Rate Last Admin    aspirin EC tablet 81 mg  81 mg Oral Daily Diana Khan MD   81 mg at 02/22/21 0801    atorvastatin (LIPITOR) tablet 40 mg  40 mg Oral Daily Diana Khan MD   40 mg at 02/22/21 0801    montelukast (SINGULAIR) tablet 10 mg  10 mg Oral Daily Diana Khan MD   10 mg at 02/22/21 0801    PARoxetine (PAXIL) tablet 40 mg  40 mg Oral QAM Diana Khan MD   40 mg at 02/22/21 0801    methylPREDNISolone sodium (SOLU-MEDROL) injection 40 mg  40 mg Intravenous Daily Diana Khan MD   40 mg at 02/22/21 0959    0.45 % sodium chloride infusion   Intravenous Continuous Grace Gaytan  mL/hr at 02/22/21 0252 New Bag at 02/22/21 7295  propofol injection  5-50 mcg/kg/min Intravenous Titrated Emmett Garcia MD 4 mL/hr at 02/22/21 0759 10 mcg/kg/min at 02/22/21 0759    glucose (GLUTOSE) 40 % oral gel 15 g  15 g Oral PRN Diana Khna MD        dextrose 50 % IV solution  12.5 g Intravenous PRN Diana Khan MD   12.5 g at 02/21/21 1557    glucagon (rDNA) injection 1 mg  1 mg Intramuscular PRN Diana Khan MD        dextrose 5 % solution  100 mL/hr Intravenous PRN Diana Khan MD        chlorhexidine (PERIDEX) 0.12 % solution 15 mL  15 mL Mouth/Throat BID Diana Khan MD   15 mL at 02/22/21 0801    lactulose (CHRONULAC) 10 GM/15ML solution 20 g  20 g Oral TID Diana Khan MD   20 g at 02/22/21 0801    cefepime (MAXIPIME) 1,000 mg in sterile water 10 mL IV syringe  1,000 mg Intravenous Q24H Henrique Cannon MD   Stopped at 02/22/21 0604    lactated ringers bolus  500 mL Intravenous Once Diana Khan MD   Stopped at 02/19/21 1935    sodium chloride flush 0.9 % injection 10 mL  10 mL Intravenous 2 times per day Henrique Cannon MD   10 mL at 02/22/21 0801    sodium chloride flush 0.9 % injection 10 mL  10 mL Intravenous PRN Henrique Cannon MD        potassium chloride 10 mEq/100 mL IVPB (Peripheral Line)  10 mEq Intravenous PRN Henrique Cannon MD        magnesium sulfate 1000 mg in dextrose 5% 100 mL IVPB  1,000 mg Intravenous PRN Henrique Cannon MD        heparin (porcine) injection 5,000 Units  5,000 Units Subcutaneous 3 times per day Henrique Cannon MD   5,000 Units at 02/22/21 0559    promethazine (PHENERGAN) tablet 12.5 mg  12.5 mg Oral Q6H PRN Henrique Cannon MD        Or    ondansetron (ZOFRAN) injection 4 mg  4 mg Intravenous Q6H PRN Henrique Cannon MD        polyethylene glycol (GLYCOLAX) packet 17 g  17 g Oral Daily PRN Henrique Cannon MD        acetaminophen (TYLENOL) tablet 650 mg  650 mg Oral Q6H PRN Henrique Cannon MD        Or    acetaminophen (TYLENOL) suppository 650 mg  650 mg Rectal Q6H PRN John Borrego MD  famotidine (PEPCID) injection 20 mg  20 mg Intravenous Daily Henrique Cannon MD   20 mg at 02/22/21 0959    ipratropium-albuterol (DUONEB) nebulizer solution 1 ampule  1 ampule Inhalation Q4H Marifer Escobar MD   1 ampule at 02/22/21 0217    hydrALAZINE (APRESOLINE) injection 10 mg  10 mg Intravenous Q6H PRN Marifer Escobar MD   10 mg at 02/18/21 1144    miconazole (MICOTIN) 2 % powder   Topical BID Marifer Escobar MD   Given at 02/22/21 0802    carboxymethylcellulose (REFRESH PLUS) 0.5 % ophthalmic solution 1 drop  1 drop Both Eyes TID Marifer Escobar MD   1 drop at 02/22/21 0802    insulin lispro (HUMALOG) injection vial 0-6 Units  0-6 Units Subcutaneous TID WC Marifer Escobar MD   3 Units at 02/20/21 0720    insulin lispro (HUMALOG) injection vial 0-3 Units  0-3 Units Subcutaneous Nightly Marifer Escobar MD   1 Units at 02/19/21 2106        Labs:     Recent Labs     02/20/21 0329 02/21/21  0330 02/22/21  0155   WBC 37.1* 39.3* 42.9*   RBC 4.24 4.53 4.57   HGB 9.0* 9.5* 9.7*   HCT 31.6* 33.3* 33.9*   MCV 74.5* 73.5* 74.2*   MCH 21.2* 21.0* 21.2*   MCHC 28.5* 28.5* 28.6*    189 162     Recent Labs     02/20/21 0329 02/21/21  0330 02/22/21  0155    146* 143   K 4.8 4.3 5.6*   ANIONGAP 17 19 20*    104 104   CO2 23 23 19*   * 120* 137*   CREATININE 3.8* 3.6* 4.5*   GLUCOSE 223* 115* 93   CALCIUM 6.8* 7.2* 7.4*     Recent Labs     02/20/21  0329 02/21/21  0330 02/22/21  0155   MG 1.8 1.8 1.9     Recent Labs     02/20/21 0329 02/21/21  0330 02/22/21  0155   AST 4,309* 1,529* 675*   ALT 1,889* 1,242* 832*   BILITOT 1.4* 1.7* 1.9*   ALKPHOS 421* 398* 365*     ABGs:No results for input(s): PHART, YCO6AVJ, PO2ART, FDW5FJX, BEART, HGBAE, M8SIZYCQ, CARBOXHGBART, 02THERAPY in the last 72 hours. HgBA1c: No results for input(s): LABA1C in the last 72 hours.   FLP:    Lab Results   Component Value Date    TRIG 135 12/08/2020    HDL 37 12/08/2020    LDLCALC 72 12/08/2020    LDLDIRECT 158 01/18/2016 TSH:    Lab Results   Component Value Date    TSH 3.450 12/08/2020     Troponin T: No results for input(s): TROPONINI in the last 72 hours. INR: No results for input(s): INR in the last 72 hours.     Objective:   Vitals: BP (!) 134/55   Pulse 83   Temp 97.8 °F (36.6 °C) (Temporal)   Resp 30   Ht 5' (1.524 m)   Wt 147 lb 3.2 oz (66.8 kg)   SpO2 93%   BMI 28.75 kg/m²   24HR INTAKE/OUTPUT:      Intake/Output Summary (Last 24 hours) at 2/22/2021 1102  Last data filed at 2/22/2021 1000  Gross per 24 hour   Intake 3653.67 ml   Output 918 ml   Net 2735.67 ml     Physical Exam  General appearance: sedated, no acute distress, appears chronically ill   HEENT: atraumatic, eyes with clear conjunctiva and normal lids, pupils very sluggish R>L and irises normal, external ears and nose are normal,lips normal.  Neck: without masses, supple   Lungs:  ventilating bilaterally, rhonchi noted bilaterally  Heart: regular rate and rhythm and S1, S2 normal  Abdomen: soft, non-tender; bowel sounds normal; no masses,  no organomegaly  Genitourinary: No bladder fullness, masses, or tenderness  Extremities: atraumatic, no edema  Neurologic: Unresponsive, sedated  Psychiatric: no agitation or anxiety  Skin: warm, dry    Assessment and Plan:   Principal Problem:    Acute hyperkalemia  Active Problems:    Acute respiratory failure (HCC)    Lactic acidosis    Pulmonary hypertension due to COPD (HCC)    Panic disorder    Atherosclerosis of native arteries of extremities with intermittent claudication, bilateral legs ([facility])    Palliative care patient    Recurrent major depressive disorder, in full remission ([email or web address]    Chronic respiratory failure with hypoxia (HCC)    Oxygen dependent    Atherosclerosis of artery of extremity with ulceration (HCC)    Chronic diastolic (congestive) heart failure (HCC)    Hyperlipidemia    Acute exacerbation of chronic obstructive pulmonary disease (COPD) (HCC)    Hypoglycemia    Hyperkalemia Electronically signed by DIANA Pablo on 2/22/2021 at 11:02 AM    (Please note that portions of this note were completed with a voice recognition program.  Jamar Khan made to edit the dictations but occasionally words are mis-transcribed.)

## 2021-02-22 NOTE — PLAN OF CARE
Nutrition Problem #1: Inadequate oral intake  Intervention: Food and/or Nutrient Delivery: Continue NPO  Nutritional Goals: Pt will extubate and progress to an oral diet or nutritional needs will be met with RODERICK

## 2021-02-22 NOTE — SIGNIFICANT EVENT
Patient's family (son Blossom Smith, son Christian Cedeno, and Alan's wife) arrived to the hospital on 2/22/2021. They request hospice/comfort care measures. Plans to discharge to hospice at this time. DIANA Perez present and in agreement.     Stacy Joshua MD  2/22/2021

## 2021-02-22 NOTE — PLAN OF CARE
Problem: Falls - Risk of:  Goal: Will remain free from falls  Description: Will remain free from falls  Outcome: Met This Shift  Goal: Absence of physical injury  Description: Absence of physical injury  Outcome: Met This Shift     Problem: Skin Integrity:  Goal: Absence of new skin breakdown  Description: Absence of new skin breakdown  Outcome: Ongoing     Problem: Skin Integrity:  Goal: Will show no infection signs and symptoms  Description: Will show no infection signs and symptoms  Outcome: Not Met This Shift     Problem: Nutrition  Goal: Optimal nutrition therapy  Outcome: Not Met This Shift     Problem: Infection - Central Venous Catheter-Associated Bloodstream Infection:  Goal: Will show no infection signs and symptoms  Description: Will show no infection signs and symptoms  Outcome: Not Met This Shift

## 2021-02-22 NOTE — PROGRESS NOTES
Let Dr. La Coughlin (spoke with Amanda Figueroa from office) Federico Oro know that patient will be going to hospice

## 2021-02-22 NOTE — PROGRESS NOTES
The pts son Issac Tripathi signed the adm forms admitting the pt to the [facility] 75.. It is ok to dc the pt, call [phone] to give report then transfer the pt to the [facility] 75.. The pt will be extubated this pm after other family members arrive. Emotional and sc support provided.

## 2021-02-22 NOTE — PROGRESS NOTES
Hospitalist Progress Note  [facility]     Patient: Latonia Teresa  : 1940  MRN: [id number]  Code Status: Full Code    Hospital Day: 4   Date of Service: 2021    Subjective:   Pt seen and examined. Intubated and sedated. Past Medical History:   Diagnosis Date    Acid reflux     Acute renal failure superimposed on chronic kidney disease (HCC)     Allergic rhinitis     Bilateral carotid artery stenosis 2020    Hyperlipidemia     Hypertension     Irritable bowel syndrome with diarrhea 2019    Lung cancer ([facility] [address]     Lung with chemo    Mild single current episode of major depressive disorder ([address] 2018    New onset of congestive heart failure ([facility] [address] 10/7/2020    Osteoarthritis     Osteoporosis     Other emphysema ([facility] [address] 2017    Palliative care patient 2019    Panic disorder 2019    Stage 3 severe COPD by GOLD classification ([facility] [address] 2018    FEV1 42%    Tobacco abuse 2018    Urinary incontinence     stress incontinence       Past Surgical History:   Procedure Laterality Date    APPENDECTOMY      CARDIAC CATHETERIZATION  5/3/2013   MDL    EF 60%    HEMORRHOID SURGERY      HYSTERECTOMY      HYSTERECTOMY, TOTAL ABDOMINAL      LUNG CANCER SURGERY      TONSILLECTOMY      VASCULAR SURGERY  10/11/2019    TJR. Aortogram and runoff. PTA and stenting of the left external iliac artery with a 7x 80 and innova stent dilated to 7.2mm. PTA of the left popliteal artery with 5mm x 2cm cutting balloon. attempted recalization of the peroneal artery aborted. Family History   Problem Relation Age of Onset    High Blood Pressure Mother     High Blood Pressure Father     Diabetes Sister     High Blood Pressure Brother        Social History     Socioeconomic History    Marital status:       Spouse name: Not on file    Number of children: Not on file    Years of education: Not on file    Highest education level: Not on file Occupational History    Not on file   Social Needs    Financial resource strain: Not on file    Food insecurity     Worry: Not on file     Inability: Not on file    Transportation needs     Medical: Not on file     Non-medical: Not on file   Tobacco Use    Smoking status: Former Smoker     Packs/day: 0.50     Years: 30.00     Pack years: 15.00     Types: Cigarettes     Start date: 1970     Quit date: 2019     Years since quittin.5    Smokeless tobacco: Never Used   Substance and Sexual Activity    Alcohol use: Yes     Comment: occcasionally    Drug use: No    Sexual activity: Yes     Partners: Male   Lifestyle    Physical activity     Days per week: Not on file     Minutes per session: Not on file    Stress: Not on file   Relationships    Social connections     Talks on phone: Not on file     Gets together: Not on file     Attends Confucianism service: Not on file     Active member of club or organization: Not on file     Attends meetings of clubs or organizations: Not on file     Relationship status: Not on file    Intimate partner violence     Fear of current or ex partner: Not on file     Emotionally abused: Not on file     Physically abused: Not on file     Forced sexual activity: Not on file   Other Topics Concern    Not on file   Social History Narrative    Not on file       Current Facility-Administered Medications   Medication Dose Route Frequency Provider Last Rate Last Admin    aspirin EC tablet 81 mg  81 mg Oral Daily Shanon Floyd MD   81 mg at 21    atorvastatin (LIPITOR) tablet 40 mg  40 mg Oral Daily Shanon Floyd MD   40 mg at 21    montelukast (SINGULAIR) tablet 10 mg  10 mg Oral Daily Shanon Floyd MD   10 mg at 21    PARoxetine (PAXIL) tablet 40 mg  40 mg Oral QAM Shanon Floyd MD   40 mg at 21 08    methylPREDNISolone sodium (SOLU-MEDROL) injection 40 mg  40 mg Intravenous Daily Shanon Floyd MD  0.45 % sodium chloride infusion   Intravenous Continuous Junaid Sanchez  mL/hr at 02/22/21 0252 New Bag at 02/22/21 0252    propofol injection  5-50 mcg/kg/min Intravenous Titrated Jacquelin Tsang MD 4 mL/hr at 02/22/21 0759 10 mcg/kg/min at 02/22/21 0759    dexmedetomidine (PRECEDEX) 400 mcg in sodium chloride 0.9 % 100 mL infusion  0.2-1.4 mcg/kg/hr Intravenous Continuous Jacquelin Tsang MD   Stopped at 02/20/21 1507    glucose (GLUTOSE) 40 % oral gel 15 g  15 g Oral PRN Casper Stafford MD        dextrose 50 % IV solution  12.5 g Intravenous PRN Casper Stafford MD   12.5 g at 02/21/21 1557    glucagon (rDNA) injection 1 mg  1 mg Intramuscular PRN Casper Stafford MD        dextrose 5 % solution  100 mL/hr Intravenous PRN Casper Stafford MD        norepinephrine (LEVOPHED) 16 mg in sodium chloride 0.9 % 250 mL infusion  2-100 mcg/min Intravenous Continuous Henrique Cannon MD   Stopped at 02/21/21 0545    chlorhexidine (PERIDEX) 0.12 % solution 15 mL  15 mL Mouth/Throat BID Casper Stafford MD   15 mL at 02/22/21 0801    lactulose (CHRONULAC) 10 GM/15ML solution 20 g  20 g Oral TID Casper Stafford MD   20 g at 02/22/21 0801    cefepime (MAXIPIME) 1,000 mg in sterile water 10 mL IV syringe  1,000 mg Intravenous Q24H Henrique Cannon MD   Stopped at 02/22/21 0604    lactated ringers bolus  500 mL Intravenous Once Casper Stafford MD   Stopped at 02/19/21 1935    sodium chloride flush 0.9 % injection 10 mL  10 mL Intravenous 2 times per day Henrique Cannon MD   10 mL at 02/22/21 0801    sodium chloride flush 0.9 % injection 10 mL  10 mL Intravenous PRN Henrique Cannon MD        potassium chloride 10 mEq/100 mL IVPB (Peripheral Line)  10 mEq Intravenous PRN Henrique Cannon MD        magnesium sulfate 1000 mg in dextrose 5% 100 mL IVPB  1,000 mg Intravenous PRN Henrique Cannon MD        heparin (porcine) injection 5,000 Units  5,000 Units Subcutaneous 3 times per day Henriqueandrey Cannon MD   5,000 Units at 02/22/21 0583  promethazine (PHENERGAN) tablet 12.5 mg  12.5 mg Oral Q6H PRN Henrique Cannon MD        Or    ondansetron (ZOFRAN) injection 4 mg  4 mg Intravenous Q6H PRN Henrique Cannon MD        polyethylene glycol (GLYCOLAX) packet 17 g  17 g Oral Daily PRN Henrique Cannon MD        acetaminophen (TYLENOL) tablet 650 mg  650 mg Oral Q6H PRN Henrique Cannon MD        Or    acetaminophen (TYLENOL) suppository 650 mg  650 mg Rectal Q6H PRN Henrique Cannon MD        famotidine (PEPCID) injection 20 mg  20 mg Intravenous Daily Henrique Cannon MD   20 mg at 02/21/21 0811    ipratropium-albuterol (DUONEB) nebulizer solution 1 ampule  1 ampule Inhalation Q4H Sagrario Ohara MD   1 ampule at 02/22/21 0217    hydrALAZINE (APRESOLINE) injection 10 mg  10 mg Intravenous Q6H PRN Sagrario Ohara MD   10 mg at 02/18/21 1144    miconazole (MICOTIN) 2 % powder   Topical BID Sagrario Ohara MD   Given at 02/22/21 0802    carboxymethylcellulose (REFRESH PLUS) 0.5 % ophthalmic solution 1 drop  1 drop Both Eyes TID Sagrario Ohara MD   1 drop at 02/22/21 0802    insulin lispro (HUMALOG) injection vial 0-6 Units  0-6 Units Subcutaneous TID WC Sagrario Ohara MD   3 Units at 02/20/21 0720    insulin lispro (HUMALOG) injection vial 0-3 Units  0-3 Units Subcutaneous Nightly Sagrario Ohara MD   1 Units at 02/19/21 2106         sodium chloride 100 mL/hr at 02/22/21 0252    propofol 10 mcg/kg/min (02/22/21 0759)    dexmedetomidine HCl in NaCl Stopped (02/20/21 1507)    dextrose      norepinephrine Stopped (02/21/21 0541)        Objective:   BP (!) 112/55   Pulse 81   Temp 97.8 °F (36.6 °C) (Temporal)   Resp 27   Ht 5' (1.524 m)   Wt 147 lb 3.2 oz (66.8 kg)   SpO2 93%   BMI 28.75 kg/m²     General: no acute distress  HEENT: normocephalic, atraumatic, 6 mm right pupil reactive, 5 mm left pupil reactive  Neck: supple, symmetrical, trachea midline   Lungs: bilateral rhonchi  Cardiovascular: s1 and s2 normal  Abdomen: soft, positive bowel sounds Extremities: chronic PVD  Neuro: intubated and sedated     Recent Labs     02/20/21  0329 02/21/21  0330 02/22/21  0155   WBC 37.1* 39.3* 42.9*   RBC 4.24 4.53 4.57   HGB 9.0* 9.5* 9.7*   HCT 31.6* 33.3* 33.9*   MCV 74.5* 73.5* 74.2*   MCH 21.2* 21.0* 21.2*   MCHC 28.5* 28.5* 28.6*    189 162     Recent Labs     02/20/21  0329 02/21/21  0330 02/22/21  0155    146* 143   K 4.8 4.3 5.6*   ANIONGAP 17 19 20*    104 104   CO2 23 23 19*   * 120* 137*   CREATININE 3.8* 3.6* 4.5*   GLUCOSE 223* 115* 93   CALCIUM 6.8* 7.2* 7.4*     Recent Labs     02/20/21  0329 02/21/21  0330 02/22/21  0155   MG 1.8 1.8 1.9     Recent Labs     02/20/21  0329 02/21/21  0330 02/22/21  0155   AST 4,309* 1,529* 675*   ALT 1,889* 1,242* 832*   BILITOT 1.4* 1.7* 1.9*   ALKPHOS 421* 398* 365*     No results for input(s): PH, PO2, PCO2, HCO3, BE, O2SAT in the last 72 hours. No results for input(s): TROPONINI in the last 72 hours. No results for input(s): INR in the last 72 hours. Recent Labs     02/21/21  1020   LACTA 3.9*         Intake/Output Summary (Last 24 hours) at 2/22/2021 0930  Last data filed at 2/22/2021 0600  Gross per 24 hour   Intake 3409.67 ml   Output 905 ml   Net 2504.67 ml       No results found.      Assessment and Plan:   Septic shock/MODS  Suspect secondary to below processes  Shock resolved  Antibiotics on board since admission  Follow cultures  IVF  Norepinephrine gtt since weaned off  Trend lactate to clearance     Pseudomonas pneumonia  See above     COPD exacerbation  Steroids  Nebs  Antibiotics     MUNIRA/CKD3  Worsening  Renal following  Discussed with Dr. Elizabeth Burleson, see below  Medical management for now for K 5.6  Follow anion gap metabolic acidosis to correction     Transaminitis  Suspect shock liver secondary to above processes  Trend LFTs     Acute encephalopathy  Neurology following  [facility] 2/17/2021: without evidence for acute process  [facility] 2/20/2021: Concern for acute left cerebellar infarct Neurology recs supportive care  Aspirin/statin  Ammonia mildly elevated, lactulose     Ventilator dependent acute hypercapnic respiratory failure  Secondary to above processes  Titrate minute ventilation for pH 7.35  Follow ABG     Concern for RLE ischemia  Prior tobacco dependence  History of PVD  Vascular surgery following  Frequent neurovascular checks      DVT prophylaxis  Heparin     Very poor prognosis. Discussed with Dr. Tree Castillo who was in agreement. Further states she would be a poor candidate for renal replacement therapies.      Total critical care time: 53 minutes    Sagrario Ohara MD   2/22/2021 9:30 AM

## 2021-02-22 NOTE — PROGRESS NOTES
Vascular Surgery  Dr. Pablo Guaman   Daily Progress Note    Pt Name: Ghassan Larson Record Number: [id number]  Date of Birth 1940   Today's Date: 2/22/2021        SUBJECTIVE:     Patient was seen and examined. She is non-responsive on ventilatory support. OBJECTIVE:     Patient Vitals for the past 24 hrs:   BP Temp Temp src Pulse Resp SpO2 Weight   02/22/21 0700 (!) 97/45   81 26 93 %    02/22/21 0627     27 93 %    02/22/21 0626    83 26 93 %    02/22/21 0600 (!) 119/48   82 23 93 %    02/22/21 0500 (!) 120/59   84 26 92 %    02/22/21 0400 (!) 134/35 98 °F (36.7 °C) Temporal 85 (!) 31 92 % 147 lb 3.2 oz (66.8 kg)   02/22/21 0300 (!) 149/44   85 (!) 31 92 %    02/22/21 0200 (!) 140/62   82 29 92 %    02/22/21 0100 (!) 125/55   83 26 92 %    02/22/21 0000 (!) 137/53 98.3 °F (36.8 °C) Temporal 84 28 92 %    02/21/21 2300 (!) 124/53   84 27 92 %    02/21/21 2200 (!) 124/56   84 28 92 %    02/21/21 2100 126/61   86 29 92 %    02/21/21 2000 (!) 117/51 99.2 °F (37.3 °C) Temporal 84 30 92 %    02/21/21 1900 (!) 99/45   86 27 92 %    02/21/21 1800 (!) 115/48   85 28 91 %    02/21/21 1700 (!) 131/54   87 30     02/21/21 1600 (!) 115/54 99 °F (37.2 °C) Temporal 84 30 91 %    02/21/21 1500 (!) 129/59   86 (!) 32 92 %    02/21/21 1423     29 91 %    02/21/21 1422    86 30 92 %    02/21/21 1421    86 29 92 %    02/21/21 1400 (!) 134/58   86 30 91 %    02/21/21 1300 (!) 131/47   86 29 91 %    02/21/21 1200 (!) 122/49 99.4 °F (37.4 °C) Temporal 86 28 91 %    02/21/21 1100 (!) 122/53   85 28 91 %    02/21/21 1036    84 25 91 %    02/21/21 1000 122/64   84 26 91 %          Intake/Output Summary (Last 24 hours) at 2/22/2021 0919  Last data filed at 2/22/2021 0600  Gross per 24 hour   Intake 3409.67 ml   Output 905 ml   Net 2504.67 ml       In: 2523.7 [I.V.:1936. 2; NG/GT:150]  Out: 845 [Urine:145]    I/O last 3 completed shifts: In: 3622.6 [I.V.:2785. 1; NG/GT:150; IV Piggyback:687.5]  Out: 920 [Urine:220; Emesis/NG output:400; Stool:300]     Date 02/22/21 0000 - 02/22/21 2359   Shift 0370-4508 1917-9102 3055-9214 24 Hour Total   INTAKE   P.O. 0   0   I. V.(mL/kg/hr) 828.2(1.6)   828.2   NG/   150   Shift Total(mL/kg) 978.2(14.7)   978.2(14.7)   OUTPUT   Urine(mL/kg/hr) 90(0.2)   90   Emesis/NG output 400   400   Stool 0   0   Shift Total(mL/kg) 490(7.3)   490(7.3)   Weight (kg) 66.8 66.8 66.8 66.8       Wt Readings from Last 3 Encounters:   02/22/21 147 lb 3.2 oz (66.8 kg)   12/15/20 141 lb (64 kg)   10/22/20 136 lb (61.7 kg)        Body mass index is 28.75 kg/m².      Diet: Diet NPO Effective Now Exceptions are: Ice Chips, Sips with Meds        MEDS:     Scheduled Meds:   furosemide  20 mg Intravenous Once    aspirin EC  81 mg Oral Daily    atorvastatin  40 mg Oral Daily    montelukast  10 mg Oral Daily    PARoxetine  40 mg Oral QAM    methylPREDNISolone  40 mg Intravenous Daily    chlorhexidine  15 mL Mouth/Throat BID    lactulose  20 g Oral TID    cefepime  1,000 mg Intravenous Q24H    lactated ringers bolus  500 mL Intravenous Once    sodium chloride flush  10 mL Intravenous 2 times per day    heparin (porcine)  5,000 Units Subcutaneous 3 times per day    famotidine (PEPCID) injection  20 mg Intravenous Daily    ipratropium-albuterol  1 ampule Inhalation Q4H    miconazole   Topical BID    carboxymethylcellulose  1 drop Both Eyes TID    insulin lispro  0-6 Units Subcutaneous TID WC    insulin lispro  0-3 Units Subcutaneous Nightly     Continuous Infusions:   sodium chloride 100 mL/hr at 02/22/21 0252    propofol 10 mcg/kg/min (02/22/21 0759)    dexmedetomidine HCl in NaCl Stopped (02/20/21 1507)    dextrose      norepinephrine Stopped (02/21/21 4190)     PRN Meds:    glucose, 15 g, PRN      dextrose, 12.5 g, PRN      glucagon (rDNA), 1 mg, PRN      dextrose, 100 mL/hr, PRN DVT prophylaxis: Heparin 5000 units sq q8hr    VL Lower Extremity Arterial Segmental Pressures with PPG:   Bilateral great toe pressures 0. This is likely consistent with chronic PAD plus arterial spasm from pressors and hypotension. Clinical correlation recommended.    The patient has moderately to severely diminished ankle-brachial indices bilateral lower extremities.    Based on segmental pressures and doppler waveforms, the patient likely has disease in the bilateral superficial femoral and tibial arterial segments.      - BERT:Right: 0.42. Left: 0.48. ASSESSMENT:     1. Septic Shock 2' to Pseudomonas Pneumonia  2. Acute on Chronic Hypercapnic Hypoxic Respiratory Failure 2' to COPD Exacerbation Requiring Intubation  3. BLE Ischemia with Known PVD 2' to Pressors  4. Acute Renal Failure wit Underlying CKD - CR worsening         PLAN:     1. Family continues to want progressive treatment. Nephrology has asked for temporary dialysis catheter. Hospitalist to talk with family regarding grim situation.        Antonio Salas, APRN-BC

## 2021-02-22 NOTE — PROGRESS NOTES
Patient is getting discharged to hospice per family request. Report given to [facility], RN. Family notified that patient will be going to room 3. Respiratory notified. Diludid drip running at 1 mg. Angel Reynaga has been in to speak with patients family.

## 2021-02-22 NOTE — PROGRESS NOTES
Nephrology ([facility]) Progress Note      Patient:  Magda Pro  YOB: 1940  Date of Service: 2/22/2021  MRN: [id number]   Acct: [de-identified]   Primary Care Physician: Nayeli Anderson MD  Advance Directive: Full Code  Admit Date: 2/17/2021       Hospital Day: 4  Referring Provider: King Trey MD    Patient independently seen and examined, Chart, Consults, Notes, Operative notes, Labs, Cardiology, and Radiology studies reviewed as available. Chief complaint: Abnormal labs. Subjective:  Magda Pro is a 68 y.o. female  whom we were consulted for acute kidney injury/hyperkalemia. Presented to the emergency room as she was complaining of weakness, lack of energy and shortness of breath. Patient has history of chronic obstructive pulmonary disease and was diagnosed with COPD exacerbation. She was initially treated with BiPAP, later on she was intubated in ER. Incidental finding in the lab confirmed that her serum creatinine was 3.0 mg with a potassium was 7.0 mmol . Her initial potassium was even much higher as her pH was 6.99. She was treated with insulin, D50, calcium gluconate. Her kidney function continues to worsen. This morning patient was seen in the ICU. She has relatively stable hemodynamics (off pressors). Her renal function is worse and she is oliguric. Patient is critically ill in multiorgan failure.        Allergies:  Doxycycline, Abilify [aripiprazole], Celebrex [celecoxib], Naproxen, and Lactose intolerance (gi)    Medicines:  Current Facility-Administered Medications   Medication Dose Route Frequency Provider Last Rate Last Admin    aspirin EC tablet 81 mg  81 mg Oral Daily King Trey MD   81 mg at 02/22/21 0801    atorvastatin (LIPITOR) tablet 40 mg  40 mg Oral Daily King Trey MD   40 mg at 02/22/21 0801    montelukast (SINGULAIR) tablet 10 mg  10 mg Oral Daily King Trey MD   10 mg at 02/22/21 9874  PARoxetine (PAXIL) tablet 40 mg  40 mg Oral QAM Dann Parry MD   40 mg at 02/22/21 0801    methylPREDNISolone sodium (SOLU-MEDROL) injection 40 mg  40 mg Intravenous Daily Dann Parry MD        0.45 % sodium chloride infusion   Intravenous Continuous Alma Ortiz  mL/hr at 02/22/21 0252 New Bag at 02/22/21 0252    propofol injection  5-50 mcg/kg/min Intravenous Titrated Landry Fernando MD 4 mL/hr at 02/22/21 0759 10 mcg/kg/min at 02/22/21 0759    dexmedetomidine (PRECEDEX) 400 mcg in sodium chloride 0.9 % 100 mL infusion  0.2-1.4 mcg/kg/hr Intravenous Continuous Landry Fernando MD   Stopped at 02/20/21 1507    glucose (GLUTOSE) 40 % oral gel 15 g  15 g Oral PRN Dann Parry MD        dextrose 50 % IV solution  12.5 g Intravenous PRN Dann Parry MD   12.5 g at 02/21/21 1557    glucagon (rDNA) injection 1 mg  1 mg Intramuscular PRN Dann Parry MD        dextrose 5 % solution  100 mL/hr Intravenous PRN Dann Parry MD        norepinephrine (LEVOPHED) 16 mg in sodium chloride 0.9 % 250 mL infusion  2-100 mcg/min Intravenous Continuous Henrique Cannon MD   Stopped at 02/21/21 0545    chlorhexidine (PERIDEX) 0.12 % solution 15 mL  15 mL Mouth/Throat BID Dann Parry MD   15 mL at 02/22/21 0801    lactulose (CHRONULAC) 10 GM/15ML solution 20 g  20 g Oral TID Dann Parry MD   20 g at 02/22/21 0801    cefepime (MAXIPIME) 1,000 mg in sterile water 10 mL IV syringe  1,000 mg Intravenous Q24H Henrique Cannon MD   Stopped at 02/22/21 0604    lactated ringers bolus  500 mL Intravenous Once Dann Parry MD   Stopped at 02/19/21 1935    sodium chloride flush 0.9 % injection 10 mL  10 mL Intravenous 2 times per day Henrique Cannon MD   10 mL at 02/22/21 0801    sodium chloride flush 0.9 % injection 10 mL  10 mL Intravenous PRN Henrique Cannon MD        potassium chloride 10 mEq/100 mL IVPB (Peripheral Line)  10 mEq Intravenous PRN Tawana Duverney, MD  magnesium sulfate 1000 mg in dextrose 5% 100 mL IVPB  1,000 mg Intravenous PRN Henrique Cannon MD        heparin (porcine) injection 5,000 Units  5,000 Units Subcutaneous 3 times per day Henrique Cannon MD   5,000 Units at 02/22/21 0559    promethazine (PHENERGAN) tablet 12.5 mg  12.5 mg Oral Q6H PRN Henrique Cannon MD        Or    ondansetron (ZOFRAN) injection 4 mg  4 mg Intravenous Q6H PRN Henrique Cannon MD        polyethylene glycol (GLYCOLAX) packet 17 g  17 g Oral Daily PRN Henrique Cannon MD        acetaminophen (TYLENOL) tablet 650 mg  650 mg Oral Q6H PRN Henrique Cannon MD        Or    acetaminophen (TYLENOL) suppository 650 mg  650 mg Rectal Q6H PRN Henrique Cannon MD        famotidine (PEPCID) injection 20 mg  20 mg Intravenous Daily Henrique Cannon MD   20 mg at 02/21/21 0811    ipratropium-albuterol (DUONEB) nebulizer solution 1 ampule  1 ampule Inhalation Q4H Casper Stafford MD   1 ampule at 02/22/21 0217    hydrALAZINE (APRESOLINE) injection 10 mg  10 mg Intravenous Q6H PRN Casper Stafford MD   10 mg at 02/18/21 1144    miconazole (MICOTIN) 2 % powder   Topical BID Casper Stafford MD   Given at 02/22/21 0802    carboxymethylcellulose (REFRESH PLUS) 0.5 % ophthalmic solution 1 drop  1 drop Both Eyes TID Casper Stafford MD   1 drop at 02/22/21 0802    insulin lispro (HUMALOG) injection vial 0-6 Units  0-6 Units Subcutaneous TID WC Casper Stafford MD   3 Units at 02/20/21 0720    insulin lispro (HUMALOG) injection vial 0-3 Units  0-3 Units Subcutaneous Nightly Casper Stafford MD   1 Units at 02/19/21 2106       Past Medical History:  Past Medical History:   Diagnosis Date    Acid reflux     Acute renal failure superimposed on chronic kidney disease ([facility]     Allergic rhinitis     Bilateral carotid artery stenosis 1/28/2020    Hyperlipidemia     Hypertension     Irritable bowel syndrome with diarrhea 7/5/2019    [facility]     Lung with chemo  Mild single current episode of major depressive disorder [facility] 2018    New onset of congestive heart failure ([facility] 10/7/2020    Osteoarthritis     Osteoporosis     Other emphysema ([facility] 2017    Palliative care patient 2019    Panic disorder 2019    Stage 3 severe COPD by GOLD classification ([facility] 2018    FEV1 42%    Tobacco abuse 2018    Urinary incontinence     stress incontinence       Past Surgical History:  Past Surgical History:   Procedure Laterality Date    APPENDECTOMY      CARDIAC CATHETERIZATION  5/3/2013   MDL    EF 60%    HEMORRHOID SURGERY      HYSTERECTOMY      HYSTERECTOMY, TOTAL ABDOMINAL      LUNG CANCER SURGERY      TONSILLECTOMY      VASCULAR SURGERY  10/11/2019    [facility]. Aortogram and runoff. PTA and stenting of the left external iliac artery with a 7x 80 and innova stent dilated to 7.2mm. PTA of the left popliteal artery with 5mm x 2cm cutting balloon. attempted recalization of the peroneal artery aborted. Family History  Family History   Problem Relation Age of Onset    High Blood Pressure Mother     High Blood Pressure Father     Diabetes Sister     High Blood Pressure Brother        Social History  Social History     Socioeconomic History    Marital status:      Spouse name: Not on file    Number of children: Not on file    Years of education: Not on file    Highest education level: Not on file   Occupational History    Not on file   Social Needs    Financial resource strain: Not on file    Food insecurity     Worry: Not on file     Inability: Not on file    Transportation needs     Medical: Not on file     Non-medical: Not on file   Tobacco Use    Smoking status: Former Smoker     Packs/day: 0.50     Years: 30.00     Pack years: 15.00     Types: Cigarettes     Start date: 1970     Quit date: 2019     Years since quittin.5    Smokeless tobacco: Never Used   Substance and Sexual Activity    Alcohol use:  Yes 02/21/21 1422    86 30 92 %    02/21/21 1421    86 29 92 %    02/21/21 1400 (!) 134/58   86 30 91 %    02/21/21 1300 (!) 131/47   86 29 91 %    02/21/21 1200 (!) 122/49 99.4 °F (37.4 °C) Temporal 86 28 91 %    02/21/21 1100 (!) 122/53   85 28 91 %    02/21/21 1036    84 25 91 %    02/21/21 1000 122/64   84 26 91 %    02/21/21 0900 (!) 128/57   83 27 91 %        Intake/Output Summary (Last 24 hours) at 2/22/2021 0844  Last data filed at 2/22/2021 0600  Gross per 24 hour   Intake 3409.67 ml   Output 905 ml   Net 2504.67 ml     General: Intubated/sedated. HEENT: Normocephalic atraumatic head with orotracheal intubation. Neck: Supple with no JVD or carotid bruits. Chest:  Decreased breath sound on both lung field. Expiratory wheezing  CVS: Irregularly irregular S1 and S2. Abdominal: soft, nontender, normal bowel sounds  Extremities: necrotic/ischemic changes to LE (right. Left)  Skin: redness, cyanosis, gangrenous skin changes to LE      Labs:  BMP:   Recent Labs     02/20/21 0329 02/21/21  0330 02/22/21  0155    146* 143   K 4.8 4.3 5.6*    104 104   CO2 23 23 19*   * 120* 137*   CREATININE 3.8* 3.6* 4.5*   CALCIUM 6.8* 7.2* 7.4*     CBC:   Recent Labs     02/20/21 0329 02/21/21  0330 02/22/21  0155   WBC 37.1* 39.3* 42.9*   HGB 9.0* 9.5* 9.7*   HCT 31.6* 33.3* 33.9*   MCV 74.5* 73.5* 74.2*    189 162     LIVER PROFILE:   Recent Labs     02/20/21 0329 02/21/21  0330 02/22/21  0155   AST 4,309* 1,529* 675*   ALT 1,889* 1,242* 832*   BILITOT 1.4* 1.7* 1.9*   ALKPHOS 421* 398* 365*     PT/INR: No results for input(s): PROTIME, INR in the last 72 hours. APTT: No results for input(s): APTT in the last 72 hours. BNP:  No results for input(s): BNP in the last 72 hours. Ionized Calcium:No results for input(s): IONCA in the last 72 hours.   Magnesium:  Recent Labs     02/20/21  0329 02/21/21  0330 02/22/21  0155   MG 1.8 1.8 1.9     Phosphorus: No results for input(s): PHOS in the last 72 hours. HgbA1C: No results for input(s): LABA1C in the last 72 hours. Hepatic:   Recent Labs     02/20/21  0329 02/21/21  0330 02/22/21  0155   ALKPHOS 421* 398* 365*   ALT 1,889* 1,242* 832*   AST 4,309* 1,529* 675*   PROT 5.5* 5.4* 5.3*   BILITOT 1.4* 1.7* 1.9*   LABALBU 2.0* 1.9* 1.7*     Lactic Acid:   Recent Labs     02/21/21  1020   LACTA 3.9*     Troponin: No results for input(s): CKTOTAL, CKMB, TROPONINT in the last 72 hours. ABGs: No results for input(s): PH, PCO2, PO2, HCO3, O2SAT in the last 72 hours. CRP:  No results for input(s): CRP in the last 72 hours. Sed Rate:  No results for input(s): SEDRATE in the last 72 hours. Cultures:   No results for input(s): CULTURE in the last 72 hours. No results for input(s): BC, Zander Craw in the last 72 hours. No results for input(s): CXSURG in the last 72 hours. Radiology reports as per the Radiologist  Radiology: Ct Head Wo Contrast    Result Date: 2/17/2021  CT HEAD WO CONTRAST 2/17/2021 7:50 PM History: Altered mental status. Noncontrast head CT compared with 29 June 2020. In order to have a CT radiation dose as low as reasonably achievable Automated Exposure Control was utilized for adjustment of the mA and/or KV according to patient size. DLP in mGycm= 742. Left maxillary sinus fluid and mucosal thickening is increased. Mild atrophy and moderate small vessel disease. Normal ventricle size. No hemorrhage or mass effect. 1. Left maxillary sinusitis changes. 2. Stable atrophy and small vessel disease with no evidence of mass or infarct.  Signed by Dr Quincy Figueroa on 2/17/2021 7:51 PM    Us Renal Complete    Result Date: 2/18/2021 EXAMINATION: US RENAL COMPLETE 2/18/2021 9:44 AM HISTORY: Acute kidney injury COMPARISON: None FINDINGS: Transabdominal sonographic evaluation of the kidneys and urinary bladder was performed in the transverse and longitudinal projections. Right kidney measures 8.6 x 4.1 x 4.3 cm in size. The cortex appears mildly atrophic. There is no sign of collecting system dilatation or solid renal mass. The inferior right kidney is obscured due to shadowing from bowel gas. The urinary bladder is decompressed and therefore not well evaluated. The left kidney is completely obscured due to shadowing from bowel gas. Markedly limited examination due to shadowing from bowel gas. The right kidney is visible and appears mildly atrophic. The left kidney is completely obscured. Signed by Dr Rosario Ho on 2/18/2021 9:50 AM    Xr Chest Portable    Result Date: 2/18/2021  Examination. XR CHEST PORTABLE 2/18/2021 2:43 AM History: Intubation. A single frontal portable supine view of the chest is compared with the previous study dated 2/17/2021. There are respiratory motion artifacts. Chronic appearing interstitial changes and granulomas are seen in the lungs bilaterally, similar to the previous study. There is no pleural effusion, pulmonary congestion or pneumothorax. There is moderate cardiomegaly. Atheromatous changes thoracic aorta are noted. No acute bony abnormality. An endotracheal tube is seen with distal end 5 cm above trachea bifurcation. This is in appropriate position. No active cardiopulmonary disease. ET tube in appropriate position. Chronic inflammatory and granulomatous lung changes. A moderate cardiomegaly.  Signed by Dr Gregory Hargrove on 2/18/2021 7:01 AM    Xr Chest Portable    Result Date: 2/17/2021 XR CHEST PORTABLE 2/17/2021 8:10 PM History: Cough and congestion. Portable chest x-ray compared with 7 October 2020. The heart size is normal. The mediastinum is within normal limits. The lungs are adequately expanded with no pneumonia or pneumothorax. There is mild chronic interstitial disease with scattered calcified granulomas. There is no significant pleural fluid. No congestive failure changes. 1. No acute disease. Signed by Dr Chio Rendon on 2/17/2021 8:10 PM       Assessment   1. Acute kidney injury/stage III /worsening . 2.  Stage IIIb chronic kidney disease baseline. 3.  Severe hyperkalemia  4. Metabolic acidemia. 5.  COPD exacerbation. 6.  Acute respiratory failure/intubated. 7.  Shock liver   8. Irritable bowel syndrome/chronic diarrhea. 9.   ATN  10. Hypocalcemia  11. Septic shock. 12.  Distal gangrene/ PVD    Plan:  Patient is in multiorgan failure. Her MUNIRA /ATN is worsening. Her prognosis is very poor and she would be a poor candidate for renal replacement therapies. Case was discussed with Dr Britney Diamond, vascular surgery and ICU staff. Agree with kayexalate for now and will give some IV lasix. Dr Britney Diamond was planning to talk to the family and update them about her overall condition. We all agree that patient's best option would be hospice/comfor care. If family would still insist for aggressive management, vascular will eb asked to get a temporary dialysis line and will attempt then SLED. Critical care time spent was 35 minutes.      Ange Mahan MD  02/22/21  8:44 AM

## 2021-02-22 NOTE — PROGRESS NOTES
Pt transferred to hospice bed 3 , RN x2 & Respiratory at bedside.  Huber Saldivar RN in room upon arrival

## 2021-02-22 NOTE — PROGRESS NOTES
Sedation turned off at 0147 as pt's response to painful stimuli remained minimal.  Pt has remained with minimal response to painful stimuli since sedation has been off. She does blink when drops are placed in eyes and does have a gag reflex when suctioning airway at this time. She does not withdraw from painful stimuli at this time. Per report, pt has already been ruled out by Wilberto Mix.     Electronically signed by Kassie Hurd RN on 2/22/2021 at 4:24 AM

## 2021-02-22 NOTE — PROGRESS NOTES
At approx 0430, pt began to gag against ETT and began to move her head back and forth more vigorously. Propofol was turned on at 10mcg/Kg/min.       Electronically signed by Delisa Crawford RN on 2/22/2021 at 4:59 AM

## 2021-02-22 NOTE — SIGNIFICANT EVENT
Further significant clinical deterioration. Extensive discussions with patient's sons Mariajose Dupont and Danika Whaley) on 2/22/2021 in regards to current clinical state/goals of care. All questions sought and answered. They plan to arrive to the hospital at noon today and initiate comfort care measures at that time.       Skyler Baig MD  2/22/2021

## 2021-02-23 LAB
BLOOD CULTURE, ROUTINE: NORMAL
CULTURE, BLOOD 2: NORMAL
EKG P AXIS: 33 DEGREES
EKG P AXIS: 71 DEGREES
EKG P-R INTERVAL: 174 MS
EKG P-R INTERVAL: 198 MS
EKG Q-T INTERVAL: 498 MS
EKG Q-T INTERVAL: 500 MS
EKG QRS DURATION: 100 MS
EKG QRS DURATION: 86 MS
EKG QTC CALCULATION (BAZETT): 497 MS
EKG QTC CALCULATION (BAZETT): 500 MS
EKG T AXIS: 54 DEGREES
EKG T AXIS: 67 DEGREES

## 2021-02-27 NOTE — PROGRESS NOTES
Physician Progress Note      hCava Mccormack  CSN #:                  [id number]  :                       1940  ADMIT DATE:       2021 5:20 PM  [address] [place] DATE:        2021 3:52 PM  RESPONDING  PROVIDER #:        Fredis ALONSO MD          QUERY TEXT:    Pt admitted with c/o AMS & wheezing . Pt noted to have Sepsis documented   beginning .     The medical record reflects the following:  Risk Factors: COPD ex, leukocytosis, lactic acidosis  Clinical Indicators: WBC 19.5/13.6/26.2/37.1, Lactic Acid 1.3  inc to 5.8   in ER, Sputum c/s pseudomonas done on , RR 24 in ER, sat dropped to 82% on   RA in ER  Treatment: ICU, 1L NS bolus on  and again on , IV Maxipime started   ,  Thanks, Tom Burnette, BSN  [phone]  Options provided:  -- Yes, evolving sepsis was present at the time of the order to admit to the   hospital  -- No, sepsis was not present on admission and developed during the inpatient   stay  -- Other - I will add my own diagnosis  -- Disagree - Not applicable / Not valid  -- Disagree - Clinically unable to determine / Unknown  -- Refer to Clinical Documentation Reviewer    PROVIDER RESPONSE TEXT:    Yes, evolving was present at the time of the order to admit to the hospital.    Query created by: Jacqulynn Siemens on 2021 11:25 AM      Electronically signed by:  Otis Peters MD 2021 10:00 PM

## 2021-02-28 NOTE — DISCHARGE SUMMARY
Hospitalist Discharge Summary  [facility]    Patient: Reza Gonzalez  : 1940  MRN: [id number]  PCP: Altaf Gil MD  Attending: Cyndi Bain MD  Admission Date: 2021  Discharge Date:  2021  Length of Stay: 4 days    Hospital Course:   Septic shock  Multiple organ dysfunction syndrome   Lactic acidosis   Pseudomonas pneumonia  COPD exacerbation  History of lung cancer   Ventilator dependent acute hypercapnic respiratory failure  Acute kidney injury  Chronic kidney disease stage 3  Hyperkalemia  Anion gap metabolic acidosis   Transaminitis  Shock liver  Acute encephalopathy  Concern for acute left cerebellar infarct  Bilateral carotid artery stenosis   History of peripheral vascular disease   Tobacco dependence  Hypertension  Hyperlipidemia     Very poor prognosis. Discussed with Dr. Andreea Pierson who was in agreement. Further states she would be a poor candidate for renal replacement therapies. Further significant clinical deterioration. Extensive discussions with patient's sons Vasu Norman and Mukesh Patterson) on 2021 in regards to current clinical state/goals of care. All questions sought and answered. They plan to arrive to the hospital at noon today and initiate comfort care measures at that time. Patient's family (son Noe Bajwa, son Mukesh Patterson, and Alan's wife) arrived to the hospital on 2021. They request hospice/comfort care measures. Plans to discharge to hospice at this time. DIANA Khan present and in agreement. Patient discharged to hospice on 2021. Discharge Exam:   General: no acute distress  HEENT: normocephalic, atraumatic, 6 mm right pupil reactive, 5 mm left pupil reactive  Neck: supple, symmetrical, trachea midline   Lungs: bilateral rhonchi  Cardiovascular: s1 and s2 normal  Abdomen: soft, positive bowel sounds  Extremities: chronic PVD  Neuro: intubated and sedated     No results for input(s): WBC, HGB, PLT in the last 72 hours. No results for input(s): NA, K, CL, CO2, BUN, CREATININE, GLUCOSE in the last 72 hours. No results for input(s): AST, ALT, ALB, BILITOT, ALKPHOS in the last 72 hours. Troponin T: No results for input(s): TROPONINI in the last 72 hours. Pro-BNP: No results for input(s): BNP in the last 72 hours. INR: No results for input(s): INR in the last 72 hours. UA:No results for input(s): NITRITE, COLORU, PHUR, LABCAST, WBCUA, RBCUA, MUCUS, TRICHOMONAS, YEAST, BACTERIA, CLARITYU, SPECGRAV, LEUKOCYTESUR, UROBILINOGEN, BILIRUBINUR, BLOODU, GLUCOSEU, AMORPHOUS in the last 72 hours. A1C: No results for input(s): LABA1C in the last 72 hours. ABG:No results for input(s): PHART, QRP1NFQ, PO2ART, CYS4AQI, BEART, HGBAE, Q8YCGVQV, CARBOXHGBART in the last 72 hours.       Discharge Disposition: Hospice Facility    Time Spent on Discharge: 48 minutes    Gertrudis Jones MD  2/28/2021 11:17 AM

## 2021-05-04 ENCOUNTER — TELEPHONE (OUTPATIENT)
Dept: PRIMARY CARE CLINIC | Age: 81
End: 2021-05-04

## (undated) DEVICE — MASK,OXYGEN,MED CONC,ADLT,7' TUB, UC: Brand: PENDING

## (undated) DEVICE — Device: Brand: DEFENDO AIR/WATER/SUCTION AND BIOPSY VALVE

## (undated) DEVICE — SNAR POLYP SENSATION MICRO OVL 13 240X40

## (undated) DEVICE — TBG SMPL FLTR LINE NASL 02/C02 A/ BX/100

## (undated) DEVICE — SENSR O2 OXIMAX FNGR A/ 18IN NONSTR

## (undated) DEVICE — THE SINGLE USE ETRAP – POLYP TRAP IS USED FOR SUCTION RETRIEVAL OF ENDOSCOPICALLY REMOVED POLYPS.: Brand: ETRAP

## (undated) DEVICE — ENDOGATOR AUXILIARY WATER JET CONNECTOR: Brand: ENDOGATOR

## (undated) DEVICE — THE CHANNEL CLEANING BRUSH IS A NYLON FLEXI BRUSH ATTACHED TO A FLEXIBLE PLASTIC SHEATH DESIGNED TO SAFELY REMOVE DEBRIS FROM FLEXIBLE ENDOSCOPES.

## (undated) DEVICE — CUFF,BP,DISP,1 TUBE,ADULT,HP: Brand: MEDLINE